# Patient Record
Sex: FEMALE | Race: WHITE | Employment: OTHER
[De-identification: names, ages, dates, MRNs, and addresses within clinical notes are randomized per-mention and may not be internally consistent; named-entity substitution may affect disease eponyms.]

---

## 2017-01-04 ENCOUNTER — OFFICE VISIT (OUTPATIENT)
Dept: INTERNAL MEDICINE | Facility: CLINIC | Age: 72
End: 2017-01-04

## 2017-01-04 VITALS
WEIGHT: 148 LBS | SYSTOLIC BLOOD PRESSURE: 128 MMHG | BODY MASS INDEX: 23.23 KG/M2 | DIASTOLIC BLOOD PRESSURE: 82 MMHG | RESPIRATION RATE: 14 BRPM | HEIGHT: 67 IN

## 2017-01-04 DIAGNOSIS — J43.1 PANLOBULAR EMPHYSEMA (HCC): ICD-10-CM

## 2017-01-04 DIAGNOSIS — E11.9 TYPE 2 DIABETES MELLITUS WITHOUT COMPLICATION, WITHOUT LONG-TERM CURRENT USE OF INSULIN (HCC): ICD-10-CM

## 2017-01-04 DIAGNOSIS — R52 PAIN: Primary | ICD-10-CM

## 2017-01-04 DIAGNOSIS — Z23 NEED FOR VACCINATION: ICD-10-CM

## 2017-01-04 PROCEDURE — 99214 OFFICE O/P EST MOD 30 MIN: CPT | Performed by: INTERNAL MEDICINE

## 2017-01-04 PROCEDURE — 90732 PPSV23 VACC 2 YRS+ SUBQ/IM: CPT | Performed by: INTERNAL MEDICINE

## 2017-01-04 PROCEDURE — G0009 ADMIN PNEUMOCOCCAL VACCINE: HCPCS | Performed by: INTERNAL MEDICINE

## 2017-01-04 ASSESSMENT — ENCOUNTER SYMPTOMS
SHORTNESS OF BREATH: 1
BACK PAIN: 1
HEARTBURN: 0
NAUSEA: 0
RHINORRHEA: 0
ABDOMINAL PAIN: 0
COUGH: 0

## 2017-01-04 ASSESSMENT — COPD QUESTIONNAIRES: COPD: 1

## 2017-01-05 DIAGNOSIS — K21.9 GASTROESOPHAGEAL REFLUX DISEASE, ESOPHAGITIS PRESENCE NOT SPECIFIED: ICD-10-CM

## 2017-01-05 RX ORDER — GABAPENTIN 100 MG/1
CAPSULE ORAL
Qty: 90 CAPSULE | Refills: 3 | Status: ON HOLD | OUTPATIENT
Start: 2017-01-05 | End: 2017-03-27

## 2017-01-05 RX ORDER — OMEPRAZOLE 20 MG/1
CAPSULE, DELAYED RELEASE ORAL
Qty: 90 CAPSULE | Refills: 3 | Status: SHIPPED | OUTPATIENT
Start: 2017-01-05 | End: 2021-03-08

## 2017-01-10 ENCOUNTER — TELEPHONE (OUTPATIENT)
Dept: INTERNAL MEDICINE | Facility: CLINIC | Age: 72
End: 2017-01-10

## 2017-01-10 DIAGNOSIS — N39.0 URINARY TRACT INFECTION WITHOUT HEMATURIA, SITE UNSPECIFIED: ICD-10-CM

## 2017-01-10 DIAGNOSIS — N32.81 OVERACTIVE BLADDER: Primary | Chronic | ICD-10-CM

## 2017-01-12 RX ORDER — LEVOFLOXACIN 250 MG/1
250 TABLET ORAL DAILY
Qty: 15 TABLET | Refills: 0 | Status: SHIPPED | OUTPATIENT
Start: 2017-01-12 | End: 2017-01-27

## 2017-01-13 ENCOUNTER — OFFICE VISIT (OUTPATIENT)
Dept: ORTHOPEDIC SURGERY | Facility: CLINIC | Age: 72
End: 2017-01-13

## 2017-01-13 DIAGNOSIS — M25.511 ACUTE PAIN OF RIGHT SHOULDER: Primary | ICD-10-CM

## 2017-01-13 PROCEDURE — 20610 DRAIN/INJ JOINT/BURSA W/O US: CPT | Performed by: ORTHOPAEDIC SURGERY

## 2017-01-13 PROCEDURE — 99203 OFFICE O/P NEW LOW 30 MIN: CPT | Performed by: ORTHOPAEDIC SURGERY

## 2017-01-13 RX ORDER — BUPIVACAINE HYDROCHLORIDE 5 MG/ML
2 INJECTION, SOLUTION PERINEURAL ONCE
Status: COMPLETED | OUTPATIENT
Start: 2017-01-13 | End: 2017-01-13

## 2017-01-13 RX ORDER — LIDOCAINE HYDROCHLORIDE 10 MG/ML
2 INJECTION, SOLUTION EPIDURAL; INFILTRATION; INTRACAUDAL; PERINEURAL ONCE
Status: COMPLETED | OUTPATIENT
Start: 2017-01-13 | End: 2017-01-13

## 2017-01-13 RX ORDER — BETAMETHASONE SODIUM PHOSPHATE AND BETAMETHASONE ACETATE 3; 3 MG/ML; MG/ML
12 INJECTION, SUSPENSION INTRA-ARTICULAR; INTRALESIONAL; INTRAMUSCULAR; SOFT TISSUE ONCE
Status: COMPLETED | OUTPATIENT
Start: 2017-01-13 | End: 2017-01-13

## 2017-01-13 RX ADMIN — BUPIVACAINE HYDROCHLORIDE 10 MG: 5 INJECTION, SOLUTION PERINEURAL at 11:08

## 2017-01-13 RX ADMIN — BETAMETHASONE SODIUM PHOSPHATE AND BETAMETHASONE ACETATE 12 MG: 3; 3 INJECTION, SUSPENSION INTRA-ARTICULAR; INTRALESIONAL; INTRAMUSCULAR; SOFT TISSUE at 11:07

## 2017-01-13 RX ADMIN — LIDOCAINE HYDROCHLORIDE 2 ML: 10 INJECTION, SOLUTION EPIDURAL; INFILTRATION; INTRACAUDAL; PERINEURAL at 11:08

## 2017-01-24 ENCOUNTER — OFFICE VISIT (OUTPATIENT)
Dept: NEUROLOGY | Facility: CLINIC | Age: 72
End: 2017-01-24

## 2017-01-24 VITALS
BODY MASS INDEX: 23.75 KG/M2 | HEART RATE: 76 BPM | DIASTOLIC BLOOD PRESSURE: 82 MMHG | SYSTOLIC BLOOD PRESSURE: 124 MMHG | WEIGHT: 151.3 LBS | HEIGHT: 67 IN

## 2017-01-24 DIAGNOSIS — G62.9 NEUROPATHY: Primary | ICD-10-CM

## 2017-01-24 DIAGNOSIS — M48.061 LUMBAR SPINAL STENOSIS: ICD-10-CM

## 2017-01-24 PROCEDURE — 99214 OFFICE O/P EST MOD 30 MIN: CPT | Performed by: PSYCHIATRY & NEUROLOGY

## 2017-01-24 RX ORDER — DULOXETIN HYDROCHLORIDE 20 MG/1
20 CAPSULE, DELAYED RELEASE ORAL DAILY
Qty: 90 CAPSULE | Refills: 3 | Status: SHIPPED | OUTPATIENT
Start: 2017-01-24 | End: 2018-01-03 | Stop reason: SDUPTHER

## 2017-01-31 ENCOUNTER — TELEPHONE (OUTPATIENT)
Dept: INTERNAL MEDICINE | Facility: CLINIC | Age: 72
End: 2017-01-31

## 2017-01-31 DIAGNOSIS — M81.0 OSTEOPOROSIS: Primary | ICD-10-CM

## 2017-03-08 ENCOUNTER — TELEPHONE (OUTPATIENT)
Dept: INTERNAL MEDICINE | Facility: CLINIC | Age: 72
End: 2017-03-08

## 2017-03-08 DIAGNOSIS — N39.0 URINARY TRACT INFECTION WITHOUT HEMATURIA, SITE UNSPECIFIED: Primary | ICD-10-CM

## 2017-03-22 ENCOUNTER — OFFICE VISIT (OUTPATIENT)
Dept: INTERNAL MEDICINE CLINIC | Age: 72
End: 2017-03-22
Payer: MEDICARE

## 2017-03-22 VITALS
SYSTOLIC BLOOD PRESSURE: 128 MMHG | HEART RATE: 114 BPM | WEIGHT: 139 LBS | BODY MASS INDEX: 21.82 KG/M2 | HEIGHT: 67 IN | TEMPERATURE: 99.3 F | DIASTOLIC BLOOD PRESSURE: 76 MMHG

## 2017-03-22 DIAGNOSIS — R53.1 WEAKNESS: ICD-10-CM

## 2017-03-22 DIAGNOSIS — J11.1 INFLUENZA: Primary | ICD-10-CM

## 2017-03-22 DIAGNOSIS — R00.0 TACHYCARDIA: ICD-10-CM

## 2017-03-22 PROCEDURE — 99213 OFFICE O/P EST LOW 20 MIN: CPT | Performed by: NURSE PRACTITIONER

## 2017-03-22 RX ORDER — OSELTAMIVIR PHOSPHATE 75 MG/1
75 CAPSULE ORAL 2 TIMES DAILY
Qty: 10 CAPSULE | Refills: 0 | Status: ON HOLD | OUTPATIENT
Start: 2017-03-22 | End: 2017-03-29 | Stop reason: HOSPADM

## 2017-03-22 ASSESSMENT — ENCOUNTER SYMPTOMS
NAUSEA: 0
SORE THROAT: 0
VOMITING: 0
COUGH: 1
DIARRHEA: 0
RHINORRHEA: 1
CONSTIPATION: 0
SINUS PRESSURE: 0

## 2017-03-23 ENCOUNTER — TELEPHONE (OUTPATIENT)
Dept: INTERNAL MEDICINE CLINIC | Age: 72
End: 2017-03-23

## 2017-03-23 DIAGNOSIS — J11.1 INFLUENZA: Primary | ICD-10-CM

## 2017-03-27 ENCOUNTER — HOSPITAL ENCOUNTER (INPATIENT)
Age: 72
LOS: 2 days | Discharge: HOME | DRG: 690 | End: 2017-03-29
Attending: INTERNAL MEDICINE | Admitting: INTERNAL MEDICINE
Payer: MEDICARE

## 2017-03-27 ENCOUNTER — OFFICE VISIT (OUTPATIENT)
Dept: INTERNAL MEDICINE CLINIC | Age: 72
End: 2017-03-27
Payer: MEDICARE

## 2017-03-27 VITALS
RESPIRATION RATE: 14 BRPM | WEIGHT: 149 LBS | HEIGHT: 67 IN | BODY MASS INDEX: 23.39 KG/M2 | DIASTOLIC BLOOD PRESSURE: 70 MMHG | SYSTOLIC BLOOD PRESSURE: 100 MMHG

## 2017-03-27 DIAGNOSIS — E87.8 ELECTROLYTE DISORDER: ICD-10-CM

## 2017-03-27 DIAGNOSIS — E11.00 UNCONTROLLED TYPE 2 DM WITH HYPEROSMOLAR NONKETOTIC HYPERGLYCEMIA (HCC): Primary | ICD-10-CM

## 2017-03-27 DIAGNOSIS — E11.9 TYPE 2 DIABETES MELLITUS WITHOUT COMPLICATION, WITHOUT LONG-TERM CURRENT USE OF INSULIN (HCC): Primary | ICD-10-CM

## 2017-03-27 DIAGNOSIS — N39.0 URINARY TRACT INFECTION, SITE UNSPECIFIED: ICD-10-CM

## 2017-03-27 LAB
ANION GAP SERPL CALCULATED.3IONS-SCNC: 13 MMOL/L (ref 9–17)
BUN BLDV-MCNC: 14 MG/DL (ref 8–23)
BUN/CREAT BLD: ABNORMAL (ref 9–20)
CALCIUM SERPL-MCNC: 9.3 MG/DL (ref 8.6–10.4)
CHLORIDE BLD-SCNC: 93 MMOL/L (ref 98–107)
CO2: 28 MMOL/L (ref 20–31)
CREAT SERPL-MCNC: 0.7 MG/DL (ref 0.5–0.9)
GFR AFRICAN AMERICAN: >60 ML/MIN
GFR NON-AFRICAN AMERICAN: >60 ML/MIN
GFR SERPL CREATININE-BSD FRML MDRD: ABNORMAL ML/MIN/{1.73_M2}
GFR SERPL CREATININE-BSD FRML MDRD: ABNORMAL ML/MIN/{1.73_M2}
GLUCOSE BLD-MCNC: 343 MG/DL (ref 65–105)
GLUCOSE BLD-MCNC: 375 MG/DL (ref 70–99)
HCT VFR BLD CALC: 36.9 % (ref 36–46)
HEMOGLOBIN: 11.9 G/DL (ref 12–16)
MCH RBC QN AUTO: 26.5 PG (ref 26–34)
MCHC RBC AUTO-ENTMCNC: 32.2 G/DL (ref 31–37)
MCV RBC AUTO: 82.3 FL (ref 80–100)
PDW BLD-RTO: 16.1 % (ref 11.5–14.9)
PLATELET # BLD: 417 K/UL (ref 150–450)
PMV BLD AUTO: 8.4 FL (ref 6–12)
POTASSIUM SERPL-SCNC: 4 MMOL/L (ref 3.7–5.3)
RBC # BLD: 4.49 M/UL (ref 4–5.2)
SODIUM BLD-SCNC: 134 MMOL/L (ref 135–144)
WBC # BLD: 13.9 K/UL (ref 3.5–11)

## 2017-03-27 PROCEDURE — 6370000000 HC RX 637 (ALT 250 FOR IP): Performed by: NURSE PRACTITIONER

## 2017-03-27 PROCEDURE — 82947 ASSAY GLUCOSE BLOOD QUANT: CPT

## 2017-03-27 PROCEDURE — 99214 OFFICE O/P EST MOD 30 MIN: CPT | Performed by: INTERNAL MEDICINE

## 2017-03-27 PROCEDURE — G0378 HOSPITAL OBSERVATION PER HR: HCPCS

## 2017-03-27 PROCEDURE — 80048 BASIC METABOLIC PNL TOTAL CA: CPT

## 2017-03-27 PROCEDURE — 1200000000 HC SEMI PRIVATE

## 2017-03-27 PROCEDURE — 36415 COLL VENOUS BLD VENIPUNCTURE: CPT

## 2017-03-27 PROCEDURE — 6370000000 HC RX 637 (ALT 250 FOR IP): Performed by: INTERNAL MEDICINE

## 2017-03-27 PROCEDURE — 85027 COMPLETE CBC AUTOMATED: CPT

## 2017-03-27 PROCEDURE — G0379 DIRECT REFER HOSPITAL OBSERV: HCPCS

## 2017-03-27 PROCEDURE — 2580000003 HC RX 258: Performed by: NURSE PRACTITIONER

## 2017-03-27 RX ORDER — ONDANSETRON 2 MG/ML
4 INJECTION INTRAMUSCULAR; INTRAVENOUS EVERY 6 HOURS PRN
Status: DISCONTINUED | OUTPATIENT
Start: 2017-03-27 | End: 2017-03-29 | Stop reason: HOSPADM

## 2017-03-27 RX ORDER — DULOXETIN HYDROCHLORIDE 20 MG/1
20 CAPSULE, DELAYED RELEASE ORAL DAILY
Status: DISCONTINUED | OUTPATIENT
Start: 2017-03-28 | End: 2017-03-28

## 2017-03-27 RX ORDER — SODIUM CHLORIDE 0.9 % (FLUSH) 0.9 %
10 SYRINGE (ML) INJECTION EVERY 12 HOURS SCHEDULED
Status: DISCONTINUED | OUTPATIENT
Start: 2017-03-27 | End: 2017-03-29 | Stop reason: HOSPADM

## 2017-03-27 RX ORDER — BISACODYL 10 MG
10 SUPPOSITORY, RECTAL RECTAL DAILY PRN
Status: DISCONTINUED | OUTPATIENT
Start: 2017-03-27 | End: 2017-03-29 | Stop reason: HOSPADM

## 2017-03-27 RX ORDER — SODIUM CHLORIDE 0.9 % (FLUSH) 0.9 %
10 SYRINGE (ML) INJECTION PRN
Status: DISCONTINUED | OUTPATIENT
Start: 2017-03-27 | End: 2017-03-29 | Stop reason: HOSPADM

## 2017-03-27 RX ORDER — DEXTROSE MONOHYDRATE 25 G/50ML
12.5 INJECTION, SOLUTION INTRAVENOUS PRN
Status: DISCONTINUED | OUTPATIENT
Start: 2017-03-27 | End: 2017-03-29 | Stop reason: HOSPADM

## 2017-03-27 RX ORDER — OXYBUTYNIN CHLORIDE 5 MG/1
5 TABLET ORAL 2 TIMES DAILY
Status: DISCONTINUED | OUTPATIENT
Start: 2017-03-27 | End: 2017-03-29 | Stop reason: HOSPADM

## 2017-03-27 RX ORDER — CIPROFLOXACIN 2 MG/ML
400 INJECTION, SOLUTION INTRAVENOUS EVERY 12 HOURS
Status: DISCONTINUED | OUTPATIENT
Start: 2017-03-27 | End: 2017-03-28

## 2017-03-27 RX ORDER — MELOXICAM 7.5 MG/1
7.5 TABLET ORAL 2 TIMES DAILY PRN
Status: DISCONTINUED | OUTPATIENT
Start: 2017-03-27 | End: 2017-03-29 | Stop reason: HOSPADM

## 2017-03-27 RX ORDER — OSELTAMIVIR PHOSPHATE 6 MG/ML
30 FOR SUSPENSION ORAL 2 TIMES DAILY
Status: DISCONTINUED | OUTPATIENT
Start: 2017-03-27 | End: 2017-03-28

## 2017-03-27 RX ORDER — ASPIRIN 81 MG/1
81 TABLET, CHEWABLE ORAL DAILY
Status: DISCONTINUED | OUTPATIENT
Start: 2017-03-28 | End: 2017-03-29 | Stop reason: HOSPADM

## 2017-03-27 RX ORDER — SODIUM CHLORIDE 9 MG/ML
INJECTION, SOLUTION INTRAVENOUS CONTINUOUS
Status: DISCONTINUED | OUTPATIENT
Start: 2017-03-27 | End: 2017-03-29 | Stop reason: HOSPADM

## 2017-03-27 RX ORDER — NICOTINE POLACRILEX 4 MG
15 LOZENGE BUCCAL PRN
Status: DISCONTINUED | OUTPATIENT
Start: 2017-03-27 | End: 2017-03-29 | Stop reason: HOSPADM

## 2017-03-27 RX ORDER — SIMVASTATIN 20 MG
20 TABLET ORAL NIGHTLY
Status: DISCONTINUED | OUTPATIENT
Start: 2017-03-27 | End: 2017-03-28

## 2017-03-27 RX ORDER — GABAPENTIN 300 MG/1
900 CAPSULE ORAL 3 TIMES DAILY
Status: DISCONTINUED | OUTPATIENT
Start: 2017-03-27 | End: 2017-03-28

## 2017-03-27 RX ORDER — ACETAMINOPHEN 325 MG/1
650 TABLET ORAL EVERY 4 HOURS PRN
Status: DISCONTINUED | OUTPATIENT
Start: 2017-03-27 | End: 2017-03-29 | Stop reason: HOSPADM

## 2017-03-27 RX ORDER — PANTOPRAZOLE SODIUM 40 MG/1
40 TABLET, DELAYED RELEASE ORAL
Status: DISCONTINUED | OUTPATIENT
Start: 2017-03-28 | End: 2017-03-29 | Stop reason: HOSPADM

## 2017-03-27 RX ORDER — POTASSIUM CHLORIDE 7.45 MG/ML
10 INJECTION INTRAVENOUS PRN
Status: DISCONTINUED | OUTPATIENT
Start: 2017-03-27 | End: 2017-03-29 | Stop reason: HOSPADM

## 2017-03-27 RX ORDER — DEXTROSE MONOHYDRATE 50 MG/ML
100 INJECTION, SOLUTION INTRAVENOUS PRN
Status: DISCONTINUED | OUTPATIENT
Start: 2017-03-27 | End: 2017-03-29 | Stop reason: HOSPADM

## 2017-03-27 RX ORDER — MECOBALAMIN 5000 MCG
5 TABLET,DISINTEGRATING ORAL NIGHTLY PRN
Status: DISCONTINUED | OUTPATIENT
Start: 2017-03-28 | End: 2017-03-29 | Stop reason: HOSPADM

## 2017-03-27 RX ORDER — POTASSIUM CHLORIDE 20 MEQ/1
40 TABLET, EXTENDED RELEASE ORAL PRN
Status: DISCONTINUED | OUTPATIENT
Start: 2017-03-27 | End: 2017-03-29 | Stop reason: HOSPADM

## 2017-03-27 RX ORDER — POTASSIUM CHLORIDE 20MEQ/15ML
40 LIQUID (ML) ORAL PRN
Status: DISCONTINUED | OUTPATIENT
Start: 2017-03-27 | End: 2017-03-29 | Stop reason: HOSPADM

## 2017-03-27 RX ADMIN — INSULIN LISPRO 2 UNITS: 100 INJECTION, SOLUTION INTRAVENOUS; SUBCUTANEOUS at 22:06

## 2017-03-27 RX ADMIN — SODIUM CHLORIDE: 9 INJECTION, SOLUTION INTRAVENOUS at 23:04

## 2017-03-27 RX ADMIN — GABAPENTIN 900 MG: 300 CAPSULE ORAL at 23:05

## 2017-03-27 ASSESSMENT — ENCOUNTER SYMPTOMS
RHINORRHEA: 0
CONSTIPATION: 0
DOUBLE VISION: 0
SPUTUM PRODUCTION: 0
WHEEZING: 0
ABDOMINAL PAIN: 0
NAUSEA: 0
BACK PAIN: 0
NAUSEA: 0
VOMITING: 0
SORE THROAT: 0
SORE THROAT: 0
COUGH: 0
SHORTNESS OF BREATH: 1
COUGH: 1
BLURRED VISION: 0
BACK PAIN: 1
SHORTNESS OF BREATH: 1
ABDOMINAL PAIN: 0
DIARRHEA: 0

## 2017-03-28 ENCOUNTER — APPOINTMENT (OUTPATIENT)
Dept: GENERAL RADIOLOGY | Age: 72
DRG: 690 | End: 2017-03-28
Attending: INTERNAL MEDICINE
Payer: MEDICARE

## 2017-03-28 PROBLEM — N30.00 ACUTE CYSTITIS: Status: ACTIVE | Noted: 2017-03-28

## 2017-03-28 PROBLEM — E86.0 DEHYDRATION: Status: ACTIVE | Noted: 2017-03-28

## 2017-03-28 LAB
-: ABNORMAL
ALBUMIN SERPL-MCNC: 3.1 G/DL (ref 3.5–5.2)
ALBUMIN/GLOBULIN RATIO: ABNORMAL (ref 1–2.5)
ALP BLD-CCNC: 104 U/L (ref 35–104)
ALT SERPL-CCNC: 71 U/L (ref 5–33)
AMORPHOUS: ABNORMAL
ANION GAP SERPL CALCULATED.3IONS-SCNC: 16 MMOL/L (ref 9–17)
AST SERPL-CCNC: 73 U/L
BACTERIA: ABNORMAL
BILIRUB SERPL-MCNC: 0.59 MG/DL (ref 0.3–1.2)
BILIRUBIN URINE: NEGATIVE
BUN BLDV-MCNC: 13 MG/DL (ref 8–23)
BUN/CREAT BLD: ABNORMAL (ref 9–20)
CALCIUM SERPL-MCNC: 8.8 MG/DL (ref 8.6–10.4)
CASTS UA: ABNORMAL /LPF
CHLORIDE BLD-SCNC: 96 MMOL/L (ref 98–107)
CO2: 26 MMOL/L (ref 20–31)
COLOR: YELLOW
COMMENT UA: ABNORMAL
CREAT SERPL-MCNC: 0.67 MG/DL (ref 0.5–0.9)
CRYSTALS, UA: ABNORMAL /HPF
EPITHELIAL CELLS UA: ABNORMAL /HPF
GFR AFRICAN AMERICAN: >60 ML/MIN
GFR NON-AFRICAN AMERICAN: >60 ML/MIN
GFR SERPL CREATININE-BSD FRML MDRD: ABNORMAL ML/MIN/{1.73_M2}
GFR SERPL CREATININE-BSD FRML MDRD: ABNORMAL ML/MIN/{1.73_M2}
GLUCOSE BLD-MCNC: 229 MG/DL (ref 65–105)
GLUCOSE BLD-MCNC: 280 MG/DL (ref 65–105)
GLUCOSE BLD-MCNC: 308 MG/DL (ref 65–105)
GLUCOSE BLD-MCNC: 318 MG/DL (ref 65–105)
GLUCOSE BLD-MCNC: 346 MG/DL (ref 70–99)
GLUCOSE URINE: ABNORMAL
HCT VFR BLD CALC: 36.9 % (ref 36–46)
HEMOGLOBIN: 11.7 G/DL (ref 12–16)
KETONES, URINE: NEGATIVE
LEUKOCYTE ESTERASE, URINE: NEGATIVE
MCH RBC QN AUTO: 26.2 PG (ref 26–34)
MCHC RBC AUTO-ENTMCNC: 31.8 G/DL (ref 31–37)
MCV RBC AUTO: 82.5 FL (ref 80–100)
MUCUS: ABNORMAL
NITRITE, URINE: POSITIVE
OTHER OBSERVATIONS UA: ABNORMAL
PDW BLD-RTO: 16 % (ref 11.5–14.9)
PH UA: 6 (ref 5–8)
PLATELET # BLD: 423 K/UL (ref 150–450)
PMV BLD AUTO: 8.4 FL (ref 6–12)
POTASSIUM SERPL-SCNC: 4.2 MMOL/L (ref 3.7–5.3)
PROTEIN UA: ABNORMAL
RBC # BLD: 4.47 M/UL (ref 4–5.2)
RBC UA: ABNORMAL /HPF
RENAL EPITHELIAL, UA: ABNORMAL /HPF
SODIUM BLD-SCNC: 138 MMOL/L (ref 135–144)
SPECIFIC GRAVITY UA: 1.03 (ref 1–1.03)
TOTAL PROTEIN: 6.9 G/DL (ref 6.4–8.3)
TRICHOMONAS: ABNORMAL
TURBIDITY: ABNORMAL
URINE HGB: ABNORMAL
UROBILINOGEN, URINE: NORMAL
WBC # BLD: 14 K/UL (ref 3.5–11)
WBC UA: ABNORMAL /HPF
YEAST: ABNORMAL

## 2017-03-28 PROCEDURE — 71010 XR CHEST PORTABLE: CPT

## 2017-03-28 PROCEDURE — 87088 URINE BACTERIA CULTURE: CPT

## 2017-03-28 PROCEDURE — 6370000000 HC RX 637 (ALT 250 FOR IP): Performed by: NURSE PRACTITIONER

## 2017-03-28 PROCEDURE — 1200000000 HC SEMI PRIVATE

## 2017-03-28 PROCEDURE — 6370000000 HC RX 637 (ALT 250 FOR IP): Performed by: INTERNAL MEDICINE

## 2017-03-28 PROCEDURE — 99223 1ST HOSP IP/OBS HIGH 75: CPT | Performed by: INTERNAL MEDICINE

## 2017-03-28 PROCEDURE — 80053 COMPREHEN METABOLIC PANEL: CPT

## 2017-03-28 PROCEDURE — 82947 ASSAY GLUCOSE BLOOD QUANT: CPT

## 2017-03-28 PROCEDURE — 87186 SC STD MICRODIL/AGAR DIL: CPT

## 2017-03-28 PROCEDURE — 36415 COLL VENOUS BLD VENIPUNCTURE: CPT

## 2017-03-28 PROCEDURE — 87086 URINE CULTURE/COLONY COUNT: CPT

## 2017-03-28 PROCEDURE — 2580000003 HC RX 258: Performed by: NURSE PRACTITIONER

## 2017-03-28 PROCEDURE — 85027 COMPLETE CBC AUTOMATED: CPT

## 2017-03-28 PROCEDURE — 81001 URINALYSIS AUTO W/SCOPE: CPT

## 2017-03-28 PROCEDURE — 6360000002 HC RX W HCPCS: Performed by: INTERNAL MEDICINE

## 2017-03-28 RX ORDER — CALCIUM CARBONATE/VITAMIN D3 600 MG-10
1 TABLET ORAL DAILY
Status: DISCONTINUED | OUTPATIENT
Start: 2017-03-28 | End: 2017-03-29 | Stop reason: HOSPADM

## 2017-03-28 RX ORDER — GABAPENTIN 300 MG/1
300 CAPSULE ORAL 3 TIMES DAILY
Status: DISCONTINUED | OUTPATIENT
Start: 2017-03-28 | End: 2017-03-28

## 2017-03-28 RX ORDER — SIMVASTATIN 20 MG
20 TABLET ORAL NIGHTLY
Status: DISCONTINUED | OUTPATIENT
Start: 2017-03-28 | End: 2017-03-29 | Stop reason: HOSPADM

## 2017-03-28 RX ORDER — GABAPENTIN 300 MG/1
300 CAPSULE ORAL 3 TIMES DAILY
Status: DISCONTINUED | OUTPATIENT
Start: 2017-03-28 | End: 2017-03-29 | Stop reason: HOSPADM

## 2017-03-28 RX ORDER — GABAPENTIN 300 MG/1
900 CAPSULE ORAL 3 TIMES DAILY
Status: DISCONTINUED | OUTPATIENT
Start: 2017-03-28 | End: 2017-03-28

## 2017-03-28 RX ORDER — INSULIN GLARGINE 100 [IU]/ML
15 INJECTION, SOLUTION SUBCUTANEOUS EVERY MORNING
Status: DISCONTINUED | OUTPATIENT
Start: 2017-03-28 | End: 2017-03-29 | Stop reason: HOSPADM

## 2017-03-28 RX ADMIN — INSULIN LISPRO 2 UNITS: 100 INJECTION, SOLUTION INTRAVENOUS; SUBCUTANEOUS at 17:22

## 2017-03-28 RX ADMIN — OXYBUTYNIN CHLORIDE 5 MG: 5 TABLET ORAL at 20:28

## 2017-03-28 RX ADMIN — GABAPENTIN 300 MG: 300 CAPSULE ORAL at 14:00

## 2017-03-28 RX ADMIN — SODIUM CHLORIDE: 9 INJECTION, SOLUTION INTRAVENOUS at 13:46

## 2017-03-28 RX ADMIN — INSULIN LISPRO 4 UNITS: 100 INJECTION, SOLUTION INTRAVENOUS; SUBCUTANEOUS at 11:54

## 2017-03-28 RX ADMIN — Medication 25 MG: at 20:54

## 2017-03-28 RX ADMIN — INSULIN LISPRO 2 UNITS: 100 INJECTION, SOLUTION INTRAVENOUS; SUBCUTANEOUS at 20:33

## 2017-03-28 RX ADMIN — Medication 500 MG: at 20:30

## 2017-03-28 RX ADMIN — INSULIN GLARGINE 15 UNITS: 100 INJECTION, SOLUTION SUBCUTANEOUS at 11:55

## 2017-03-28 RX ADMIN — INSULIN LISPRO 4 UNITS: 100 INJECTION, SOLUTION INTRAVENOUS; SUBCUTANEOUS at 08:50

## 2017-03-28 RX ADMIN — Medication 25 MG: at 08:49

## 2017-03-28 RX ADMIN — GABAPENTIN 900 MG: 300 CAPSULE ORAL at 08:49

## 2017-03-28 RX ADMIN — PANTOPRAZOLE SODIUM 40 MG: 40 TABLET, DELAYED RELEASE ORAL at 06:35

## 2017-03-28 RX ADMIN — CIPROFLOXACIN 400 MG: 2 INJECTION, SOLUTION INTRAVENOUS at 05:18

## 2017-03-28 RX ADMIN — Medication 20 MG: at 08:59

## 2017-03-28 RX ADMIN — Medication 1 TABLET: at 20:29

## 2017-03-28 RX ADMIN — ACETAMINOPHEN 650 MG: 325 TABLET ORAL at 00:01

## 2017-03-28 RX ADMIN — GABAPENTIN 300 MG: 300 CAPSULE ORAL at 20:54

## 2017-03-28 ASSESSMENT — PAIN SCALES - GENERAL
PAINLEVEL_OUTOF10: 3
PAINLEVEL_OUTOF10: 0

## 2017-03-28 ASSESSMENT — PAIN DESCRIPTION - DESCRIPTORS: DESCRIPTORS: ACHING

## 2017-03-28 ASSESSMENT — PAIN DESCRIPTION - ORIENTATION: ORIENTATION: RIGHT;LEFT

## 2017-03-28 ASSESSMENT — PAIN DESCRIPTION - PAIN TYPE: TYPE: CHRONIC PAIN

## 2017-03-28 ASSESSMENT — PAIN DESCRIPTION - FREQUENCY: FREQUENCY: INTERMITTENT

## 2017-03-28 ASSESSMENT — PAIN DESCRIPTION - LOCATION: LOCATION: LEG

## 2017-03-29 VITALS
WEIGHT: 153.66 LBS | OXYGEN SATURATION: 98 % | HEART RATE: 88 BPM | DIASTOLIC BLOOD PRESSURE: 78 MMHG | HEIGHT: 66 IN | TEMPERATURE: 97.1 F | BODY MASS INDEX: 24.7 KG/M2 | SYSTOLIC BLOOD PRESSURE: 120 MMHG | RESPIRATION RATE: 16 BRPM

## 2017-03-29 LAB
ABSOLUTE BANDS #: 0.14 K/UL (ref 0–1)
ABSOLUTE EOS #: 0 K/UL (ref 0–0.4)
ABSOLUTE LYMPH #: 1.51 K/UL (ref 1–4.8)
ABSOLUTE MONO #: 1.64 K/UL (ref 0.1–1.3)
ANION GAP SERPL CALCULATED.3IONS-SCNC: 15 MMOL/L (ref 9–17)
BANDS: 1 % (ref 0–10)
BASOPHILS # BLD: 0 % (ref 0–2)
BASOPHILS ABSOLUTE: 0 K/UL (ref 0–0.2)
BUN BLDV-MCNC: 14 MG/DL (ref 8–23)
BUN/CREAT BLD: ABNORMAL (ref 9–20)
CALCIUM SERPL-MCNC: 8.2 MG/DL (ref 8.6–10.4)
CHLORIDE BLD-SCNC: 102 MMOL/L (ref 98–107)
CO2: 24 MMOL/L (ref 20–31)
CREAT SERPL-MCNC: 0.55 MG/DL (ref 0.5–0.9)
CULTURE: ABNORMAL
CULTURE: ABNORMAL
DIFFERENTIAL TYPE: ABNORMAL
EOSINOPHILS RELATIVE PERCENT: 0 % (ref 0–4)
ESTIMATED AVERAGE GLUCOSE: 206 MG/DL
GFR AFRICAN AMERICAN: >60 ML/MIN
GFR NON-AFRICAN AMERICAN: >60 ML/MIN
GFR SERPL CREATININE-BSD FRML MDRD: ABNORMAL ML/MIN/{1.73_M2}
GFR SERPL CREATININE-BSD FRML MDRD: ABNORMAL ML/MIN/{1.73_M2}
GLUCOSE BLD-MCNC: 187 MG/DL (ref 65–105)
GLUCOSE BLD-MCNC: 234 MG/DL (ref 70–99)
GLUCOSE BLD-MCNC: 238 MG/DL (ref 65–105)
HBA1C MFR BLD: 8.8 % (ref 4–6)
HCT VFR BLD CALC: 31.7 % (ref 36–46)
HEMOGLOBIN: 10.2 G/DL (ref 12–16)
LYMPHOCYTES # BLD: 11 % (ref 24–44)
Lab: ABNORMAL
MCH RBC QN AUTO: 26.4 PG (ref 26–34)
MCHC RBC AUTO-ENTMCNC: 32.1 G/DL (ref 31–37)
MCV RBC AUTO: 82.2 FL (ref 80–100)
METAMYELOCYTES ABSOLUTE COUNT: 0.14 K/UL
METAMYELOCYTES: 1 %
MONOCYTES # BLD: 12 % (ref 1–7)
MORPHOLOGY: ABNORMAL
ORGANISM: ABNORMAL
PDW BLD-RTO: 15.6 % (ref 11.5–14.9)
PLATELET # BLD: 450 K/UL (ref 150–450)
PLATELET ESTIMATE: ABNORMAL
PMV BLD AUTO: 8.1 FL (ref 6–12)
POTASSIUM SERPL-SCNC: 3.6 MMOL/L (ref 3.7–5.3)
RBC # BLD: 3.85 M/UL (ref 4–5.2)
RBC # BLD: ABNORMAL 10*6/UL
SEG NEUTROPHILS: 75 % (ref 36–66)
SEGMENTED NEUTROPHILS ABSOLUTE COUNT: 10.27 K/UL (ref 1.3–9.1)
SODIUM BLD-SCNC: 141 MMOL/L (ref 135–144)
SPECIMEN DESCRIPTION: ABNORMAL
SPECIMEN DESCRIPTION: ABNORMAL
STATUS: ABNORMAL
WBC # BLD: 13.7 K/UL (ref 3.5–11)
WBC # BLD: ABNORMAL 10*3/UL

## 2017-03-29 PROCEDURE — 6370000000 HC RX 637 (ALT 250 FOR IP): Performed by: INTERNAL MEDICINE

## 2017-03-29 PROCEDURE — 80048 BASIC METABOLIC PNL TOTAL CA: CPT

## 2017-03-29 PROCEDURE — 6370000000 HC RX 637 (ALT 250 FOR IP): Performed by: NURSE PRACTITIONER

## 2017-03-29 PROCEDURE — 36415 COLL VENOUS BLD VENIPUNCTURE: CPT

## 2017-03-29 PROCEDURE — 99239 HOSP IP/OBS DSCHRG MGMT >30: CPT | Performed by: INTERNAL MEDICINE

## 2017-03-29 PROCEDURE — 2580000003 HC RX 258: Performed by: NURSE PRACTITIONER

## 2017-03-29 PROCEDURE — 85025 COMPLETE CBC W/AUTO DIFF WBC: CPT

## 2017-03-29 PROCEDURE — 82947 ASSAY GLUCOSE BLOOD QUANT: CPT

## 2017-03-29 PROCEDURE — 83036 HEMOGLOBIN GLYCOSYLATED A1C: CPT

## 2017-03-29 RX ORDER — CIPROFLOXACIN 250 MG/1
250 TABLET, FILM COATED ORAL 2 TIMES DAILY
Qty: 14 TABLET | Refills: 0 | Status: SHIPPED | OUTPATIENT
Start: 2017-03-29 | End: 2017-04-05

## 2017-03-29 RX ORDER — GLIMEPIRIDE 2 MG/1
2 TABLET ORAL EVERY MORNING
Qty: 30 TABLET | Refills: 3 | Status: SHIPPED | OUTPATIENT
Start: 2017-03-29 | End: 2017-04-12 | Stop reason: DRUGHIGH

## 2017-03-29 RX ORDER — CIPROFLOXACIN 500 MG/1
500 TABLET, FILM COATED ORAL ONCE
Status: DISCONTINUED | OUTPATIENT
Start: 2017-03-29 | End: 2017-03-29 | Stop reason: HOSPADM

## 2017-03-29 RX ORDER — SIMVASTATIN 20 MG
20 TABLET ORAL NIGHTLY
Status: DISCONTINUED | OUTPATIENT
Start: 2017-03-29 | End: 2017-03-29 | Stop reason: HOSPADM

## 2017-03-29 RX ORDER — LANCETS 30 GAUGE
1 EACH MISCELLANEOUS DAILY
Qty: 100 EACH | Refills: 3 | Status: SHIPPED | OUTPATIENT
Start: 2017-03-29 | End: 2021-04-29

## 2017-03-29 RX ORDER — GABAPENTIN 300 MG/1
300 CAPSULE ORAL 3 TIMES DAILY
Status: DISCONTINUED | OUTPATIENT
Start: 2017-03-29 | End: 2017-03-29 | Stop reason: HOSPADM

## 2017-03-29 RX ADMIN — SODIUM CHLORIDE: 9 INJECTION, SOLUTION INTRAVENOUS at 00:31

## 2017-03-29 RX ADMIN — METOPROLOL TARTRATE 25 MG: 25 TABLET ORAL at 09:06

## 2017-03-29 RX ADMIN — Medication 1 TABLET: at 09:06

## 2017-03-29 RX ADMIN — GABAPENTIN 300 MG: 300 CAPSULE ORAL at 09:06

## 2017-03-29 RX ADMIN — ASPIRIN 81 MG: 81 TABLET, CHEWABLE ORAL at 09:07

## 2017-03-29 RX ADMIN — PANTOPRAZOLE SODIUM 40 MG: 40 TABLET, DELAYED RELEASE ORAL at 06:31

## 2017-03-29 RX ADMIN — MELOXICAM 7.5 MG: 7.5 TABLET ORAL at 09:07

## 2017-03-29 RX ADMIN — SODIUM CHLORIDE: 9 INJECTION, SOLUTION INTRAVENOUS at 09:08

## 2017-03-29 RX ADMIN — OXYBUTYNIN CHLORIDE 5 MG: 5 TABLET ORAL at 09:07

## 2017-03-29 RX ADMIN — INSULIN LISPRO 2 UNITS: 100 INJECTION, SOLUTION INTRAVENOUS; SUBCUTANEOUS at 12:04

## 2017-03-29 RX ADMIN — INSULIN GLARGINE 15 UNITS: 100 INJECTION, SOLUTION SUBCUTANEOUS at 09:31

## 2017-03-29 RX ADMIN — METFORMIN HYDROCHLORIDE 500 MG: 500 TABLET, FILM COATED ORAL at 09:06

## 2017-03-29 ASSESSMENT — PAIN SCALES - GENERAL
PAINLEVEL_OUTOF10: 0

## 2017-03-30 ENCOUNTER — TELEPHONE (OUTPATIENT)
Dept: INTERNAL MEDICINE CLINIC | Age: 72
End: 2017-03-30

## 2017-03-30 ENCOUNTER — CARE COORDINATION (OUTPATIENT)
Dept: CARE COORDINATION | Age: 72
End: 2017-03-30

## 2017-03-30 ENCOUNTER — CARE COORDINATION (OUTPATIENT)
Dept: INTERNAL MEDICINE CLINIC | Age: 72
End: 2017-03-30

## 2017-03-31 ENCOUNTER — TELEPHONE (OUTPATIENT)
Dept: INTERNAL MEDICINE CLINIC | Age: 72
End: 2017-03-31

## 2017-04-03 ENCOUNTER — TELEPHONE (OUTPATIENT)
Dept: INTERNAL MEDICINE CLINIC | Age: 72
End: 2017-04-03

## 2017-04-03 DIAGNOSIS — E11.00 UNCONTROLLED TYPE 2 DM WITH HYPEROSMOLAR NONKETOTIC HYPERGLYCEMIA (HCC): Primary | ICD-10-CM

## 2017-04-03 RX ORDER — BLOOD-GLUCOSE METER
1 KIT MISCELLANEOUS DAILY PRN
Qty: 1 KIT | Refills: 0 | Status: SHIPPED | OUTPATIENT
Start: 2017-04-03 | End: 2021-04-29

## 2017-04-03 RX ORDER — GLUCOSAMINE HCL/CHONDROITIN SU 500-400 MG
CAPSULE ORAL
Qty: 100 STRIP | Refills: 3 | Status: ON HOLD | OUTPATIENT
Start: 2017-04-03 | End: 2017-06-02 | Stop reason: SDUPTHER

## 2017-04-05 DIAGNOSIS — E11.69 TYPE 2 DIABETES MELLITUS WITH OTHER SPECIFIED COMPLICATION (HCC): Primary | ICD-10-CM

## 2017-04-05 RX ORDER — LANCETS
1 EACH MISCELLANEOUS 2 TIMES DAILY
Qty: 100 EACH | Refills: 3 | Status: SHIPPED | OUTPATIENT
Start: 2017-04-05

## 2017-04-12 ENCOUNTER — HOSPITAL ENCOUNTER (OUTPATIENT)
Age: 72
Setting detail: SPECIMEN
Discharge: HOME | End: 2017-04-12
Payer: MEDICARE

## 2017-04-12 ENCOUNTER — OFFICE VISIT (OUTPATIENT)
Dept: INTERNAL MEDICINE CLINIC | Age: 72
End: 2017-04-12
Payer: MEDICARE

## 2017-04-12 VITALS
SYSTOLIC BLOOD PRESSURE: 110 MMHG | HEIGHT: 66 IN | WEIGHT: 141 LBS | BODY MASS INDEX: 22.66 KG/M2 | DIASTOLIC BLOOD PRESSURE: 66 MMHG | RESPIRATION RATE: 14 BRPM

## 2017-04-12 DIAGNOSIS — E11.00 UNCONTROLLED TYPE 2 DM WITH HYPEROSMOLAR NONKETOTIC HYPERGLYCEMIA (HCC): ICD-10-CM

## 2017-04-12 DIAGNOSIS — J43.1 PANLOBULAR EMPHYSEMA (HCC): ICD-10-CM

## 2017-04-12 DIAGNOSIS — N39.0 URINARY TRACT INFECTION, SITE UNSPECIFIED: Primary | ICD-10-CM

## 2017-04-12 LAB
-: ABNORMAL
AMORPHOUS: ABNORMAL
BACTERIA: ABNORMAL
BILIRUBIN URINE: NEGATIVE
CASTS UA: ABNORMAL /LPF (ref 0–8)
COLOR: YELLOW
COMMENT UA: ABNORMAL
CRYSTALS, UA: ABNORMAL /HPF
EPITHELIAL CELLS UA: ABNORMAL /HPF (ref 0–5)
GLUCOSE URINE: NEGATIVE
KETONES, URINE: ABNORMAL
LEUKOCYTE ESTERASE, URINE: ABNORMAL
MUCUS: ABNORMAL
NITRITE, URINE: POSITIVE
OTHER OBSERVATIONS UA: ABNORMAL
PH UA: 5 (ref 5–8)
PROTEIN UA: ABNORMAL
RBC UA: ABNORMAL /HPF (ref 0–4)
RENAL EPITHELIAL, UA: ABNORMAL /HPF
SPECIFIC GRAVITY UA: 1.02 (ref 1–1.03)
TRICHOMONAS: ABNORMAL
TURBIDITY: ABNORMAL
URINE HGB: ABNORMAL
UROBILINOGEN, URINE: NORMAL
WBC UA: ABNORMAL /HPF (ref 0–5)
YEAST: ABNORMAL

## 2017-04-12 PROCEDURE — 99214 OFFICE O/P EST MOD 30 MIN: CPT | Performed by: INTERNAL MEDICINE

## 2017-04-12 RX ORDER — CIPROFLOXACIN 250 MG/1
TABLET, FILM COATED ORAL
COMMUNITY
Start: 2017-03-29 | End: 2017-04-12 | Stop reason: ALTCHOICE

## 2017-04-12 RX ORDER — GLIMEPIRIDE 1 MG/1
1 TABLET ORAL EVERY MORNING
Qty: 1 TABLET | Refills: 0 | Status: SHIPPED
Start: 2017-04-12 | End: 2017-04-17 | Stop reason: SINTOL

## 2017-04-12 ASSESSMENT — ENCOUNTER SYMPTOMS
RHINORRHEA: 0
SORE THROAT: 0
COUGH: 0
SHORTNESS OF BREATH: 0
ABDOMINAL PAIN: 0
BACK PAIN: 1
NAUSEA: 0

## 2017-04-13 LAB
CULTURE: ABNORMAL
CULTURE: ABNORMAL
Lab: ABNORMAL
ORGANISM: ABNORMAL
SPECIMEN DESCRIPTION: ABNORMAL
STATUS: ABNORMAL

## 2017-04-13 RX ORDER — CIPROFLOXACIN 500 MG/1
500 TABLET, FILM COATED ORAL 2 TIMES DAILY
Qty: 20 TABLET | Refills: 0 | Status: SHIPPED | OUTPATIENT
Start: 2017-04-13 | End: 2017-05-03 | Stop reason: ALTCHOICE

## 2017-04-17 ENCOUNTER — TELEPHONE (OUTPATIENT)
Dept: INTERNAL MEDICINE CLINIC | Age: 72
End: 2017-04-17

## 2017-04-19 ENCOUNTER — TELEPHONE (OUTPATIENT)
Dept: INTERNAL MEDICINE CLINIC | Age: 72
End: 2017-04-19

## 2017-04-19 DIAGNOSIS — M25.562 ACUTE PAIN OF LEFT KNEE: Primary | ICD-10-CM

## 2017-04-21 ENCOUNTER — HOSPITAL ENCOUNTER (OUTPATIENT)
Facility: CLINIC | Age: 72
Discharge: HOME | End: 2017-04-21
Payer: MEDICARE

## 2017-04-21 ENCOUNTER — HOSPITAL ENCOUNTER (OUTPATIENT)
Dept: GENERAL RADIOLOGY | Facility: CLINIC | Age: 72
Discharge: HOME | End: 2017-04-21
Payer: MEDICARE

## 2017-04-21 PROCEDURE — 73562 X-RAY EXAM OF KNEE 3: CPT

## 2017-05-03 ENCOUNTER — HOSPITAL ENCOUNTER (OUTPATIENT)
Dept: DIABETES SERVICES | Age: 72
Setting detail: THERAPIES SERIES
Discharge: HOME | End: 2017-05-03
Payer: MEDICARE

## 2017-05-03 VITALS
WEIGHT: 142.64 LBS | DIASTOLIC BLOOD PRESSURE: 79 MMHG | HEART RATE: 81 BPM | HEIGHT: 66 IN | BODY MASS INDEX: 22.92 KG/M2 | SYSTOLIC BLOOD PRESSURE: 134 MMHG

## 2017-05-03 PROCEDURE — G0108 DIAB MANAGE TRN  PER INDIV: HCPCS

## 2017-05-03 RX ORDER — MELOXICAM 7.5 MG/1
7.5 TABLET ORAL DAILY PRN
COMMUNITY
End: 2018-01-03 | Stop reason: SDUPTHER

## 2017-05-22 ENCOUNTER — HOSPITAL ENCOUNTER (OUTPATIENT)
Dept: DIABETES SERVICES | Age: 72
Setting detail: THERAPIES SERIES
Discharge: HOME | End: 2017-05-22
Payer: MEDICARE

## 2017-05-22 DIAGNOSIS — E11.00 UNCONTROLLED TYPE 2 DM WITH HYPEROSMOLAR NONKETOTIC HYPERGLYCEMIA (HCC): Primary | ICD-10-CM

## 2017-05-22 PROCEDURE — G0109 DIAB MANAGE TRN IND/GROUP: HCPCS

## 2017-05-26 ENCOUNTER — HOSPITAL ENCOUNTER (OUTPATIENT)
Age: 72
Setting detail: OUTPATIENT SURGERY
Discharge: HOME | End: 2017-05-26
Attending: UROLOGY | Admitting: UROLOGY
Payer: MEDICARE

## 2017-05-26 VITALS
SYSTOLIC BLOOD PRESSURE: 133 MMHG | HEIGHT: 66 IN | TEMPERATURE: 97.5 F | HEART RATE: 64 BPM | RESPIRATION RATE: 18 BRPM | WEIGHT: 142 LBS | DIASTOLIC BLOOD PRESSURE: 77 MMHG | BODY MASS INDEX: 22.82 KG/M2 | OXYGEN SATURATION: 98 %

## 2017-05-26 DIAGNOSIS — E11.00 UNCONTROLLED TYPE 2 DM WITH HYPEROSMOLAR NONKETOTIC HYPERGLYCEMIA (HCC): Primary | ICD-10-CM

## 2017-05-26 PROBLEM — N32.81 OAB (OVERACTIVE BLADDER): Status: ACTIVE | Noted: 2017-05-26

## 2017-05-26 PROBLEM — Z87.440 HISTORY OF RECURRENT UTIS: Status: ACTIVE | Noted: 2017-05-26

## 2017-05-26 PROBLEM — R31.29 MICROHEMATURIA: Status: ACTIVE | Noted: 2017-05-26

## 2017-05-26 LAB
-: ABNORMAL
AMORPHOUS: ABNORMAL
BACTERIA: ABNORMAL
BILIRUBIN URINE: NEGATIVE
CASTS UA: ABNORMAL /LPF
COLOR: YELLOW
COMMENT UA: ABNORMAL
CRYSTALS, UA: ABNORMAL /HPF
EPITHELIAL CELLS UA: ABNORMAL /HPF
GLUCOSE URINE: NEGATIVE
KETONES, URINE: NEGATIVE
LEUKOCYTE ESTERASE, URINE: ABNORMAL
MUCUS: ABNORMAL
NITRITE, URINE: POSITIVE
OTHER OBSERVATIONS UA: ABNORMAL
PH UA: 5.5 (ref 5–8)
PROTEIN UA: NEGATIVE
RBC UA: ABNORMAL /HPF
RENAL EPITHELIAL, UA: ABNORMAL /HPF
SPECIFIC GRAVITY UA: 1.02 (ref 1–1.03)
TRICHOMONAS: ABNORMAL
TURBIDITY: ABNORMAL
URINE HGB: ABNORMAL
UROBILINOGEN, URINE: NORMAL
WBC UA: ABNORMAL /HPF
YEAST: ABNORMAL

## 2017-05-26 PROCEDURE — 6370000000 HC RX 637 (ALT 250 FOR IP): Performed by: UROLOGY

## 2017-05-26 PROCEDURE — 3600000012 HC SURGERY LEVEL 2 ADDTL 15MIN: Performed by: UROLOGY

## 2017-05-26 PROCEDURE — 7100000010 HC PHASE II RECOVERY - FIRST 15 MIN: Performed by: UROLOGY

## 2017-05-26 PROCEDURE — 87088 URINE BACTERIA CULTURE: CPT

## 2017-05-26 PROCEDURE — 81001 URINALYSIS AUTO W/SCOPE: CPT

## 2017-05-26 PROCEDURE — 87086 URINE CULTURE/COLONY COUNT: CPT

## 2017-05-26 PROCEDURE — 87186 SC STD MICRODIL/AGAR DIL: CPT

## 2017-05-26 PROCEDURE — 3600000002 HC SURGERY LEVEL 2 BASE: Performed by: UROLOGY

## 2017-05-26 RX ORDER — NITROFURANTOIN 25; 75 MG/1; MG/1
100 CAPSULE ORAL DAILY
Qty: 30 CAPSULE | Refills: 2 | Status: SHIPPED | OUTPATIENT
Start: 2017-05-26 | End: 2017-06-25

## 2017-05-26 ASSESSMENT — PAIN - FUNCTIONAL ASSESSMENT: PAIN_FUNCTIONAL_ASSESSMENT: 0-10

## 2017-05-26 ASSESSMENT — PAIN SCALES - GENERAL: PAINLEVEL_OUTOF10: 0

## 2017-05-27 LAB
CULTURE: ABNORMAL
CULTURE: ABNORMAL
Lab: ABNORMAL
ORGANISM: ABNORMAL
SPECIMEN DESCRIPTION: ABNORMAL
SPECIMEN DESCRIPTION: ABNORMAL
STATUS: ABNORMAL

## 2017-05-30 ENCOUNTER — CARE COORDINATION (OUTPATIENT)
Dept: CARE COORDINATION | Age: 72
End: 2017-05-30

## 2017-05-30 PROCEDURE — G0109 DIAB MANAGE TRN IND/GROUP: HCPCS

## 2017-06-02 DIAGNOSIS — E11.00 UNCONTROLLED TYPE 2 DM WITH HYPEROSMOLAR NONKETOTIC HYPERGLYCEMIA (HCC): ICD-10-CM

## 2017-06-05 RX ORDER — GLUCOSAMINE HCL/CHONDROITIN SU 500-400 MG
CAPSULE ORAL
Qty: 200 STRIP | Refills: 3 | Status: SHIPPED | OUTPATIENT
Start: 2017-06-05 | End: 2018-08-24 | Stop reason: SDUPTHER

## 2017-06-07 ENCOUNTER — APPOINTMENT (OUTPATIENT)
Dept: DIABETES SERVICES | Age: 72
End: 2017-06-07
Payer: MEDICARE

## 2017-06-07 PROCEDURE — G0109 DIAB MANAGE TRN IND/GROUP: HCPCS

## 2017-06-10 ENCOUNTER — OFFICE VISIT (OUTPATIENT)
Dept: FAMILY MEDICINE CLINIC | Age: 72
End: 2017-06-10
Payer: MEDICARE

## 2017-06-10 VITALS
HEART RATE: 80 BPM | WEIGHT: 137 LBS | SYSTOLIC BLOOD PRESSURE: 109 MMHG | OXYGEN SATURATION: 95 % | DIASTOLIC BLOOD PRESSURE: 71 MMHG | BODY MASS INDEX: 21.5 KG/M2 | HEIGHT: 67 IN | TEMPERATURE: 97.7 F | RESPIRATION RATE: 18 BRPM

## 2017-06-10 DIAGNOSIS — L23.7 POISON IVY DERMATITIS: Primary | ICD-10-CM

## 2017-06-10 PROCEDURE — 99213 OFFICE O/P EST LOW 20 MIN: CPT | Performed by: INTERNAL MEDICINE

## 2017-06-10 RX ORDER — PREDNISONE 50 MG/1
50 TABLET ORAL DAILY
Qty: 10 TABLET | Refills: 0 | Status: SHIPPED | OUTPATIENT
Start: 2017-06-10 | End: 2017-06-20

## 2017-06-10 ASSESSMENT — ENCOUNTER SYMPTOMS
COUGH: 0
SHORTNESS OF BREATH: 0

## 2017-06-13 ENCOUNTER — OFFICE VISIT (OUTPATIENT)
Dept: INTERNAL MEDICINE CLINIC | Age: 72
End: 2017-06-13
Payer: MEDICARE

## 2017-06-13 VITALS
WEIGHT: 140 LBS | HEIGHT: 67 IN | DIASTOLIC BLOOD PRESSURE: 66 MMHG | SYSTOLIC BLOOD PRESSURE: 110 MMHG | RESPIRATION RATE: 14 BRPM | BODY MASS INDEX: 21.97 KG/M2

## 2017-06-13 DIAGNOSIS — J43.8 OTHER EMPHYSEMA (HCC): Primary | ICD-10-CM

## 2017-06-13 DIAGNOSIS — L23.7 POISON IVY DERMATITIS: ICD-10-CM

## 2017-06-13 DIAGNOSIS — Z87.440 HISTORY OF RECURRENT UTIS: ICD-10-CM

## 2017-06-13 PROCEDURE — 99214 OFFICE O/P EST MOD 30 MIN: CPT | Performed by: INTERNAL MEDICINE

## 2017-06-13 ASSESSMENT — ENCOUNTER SYMPTOMS
SHORTNESS OF BREATH: 0
BACK PAIN: 1
SORE THROAT: 0
NAUSEA: 0
COUGH: 0
RHINORRHEA: 0
ABDOMINAL PAIN: 0

## 2017-06-13 ASSESSMENT — PATIENT HEALTH QUESTIONNAIRE - PHQ9
2. FEELING DOWN, DEPRESSED OR HOPELESS: 0
SUM OF ALL RESPONSES TO PHQ9 QUESTIONS 1 & 2: 0
1. LITTLE INTEREST OR PLEASURE IN DOING THINGS: 0
SUM OF ALL RESPONSES TO PHQ QUESTIONS 1-9: 0

## 2017-06-15 ENCOUNTER — HOSPITAL ENCOUNTER (OUTPATIENT)
Dept: DIABETES SERVICES | Age: 72
Setting detail: THERAPIES SERIES
Discharge: HOME | End: 2017-06-15
Payer: MEDICARE

## 2017-06-15 DIAGNOSIS — E11.00 UNCONTROLLED TYPE 2 DM WITH HYPEROSMOLAR NONKETOTIC HYPERGLYCEMIA (HCC): Primary | ICD-10-CM

## 2017-06-15 DIAGNOSIS — N32.81 OVERACTIVE BLADDER: Primary | Chronic | ICD-10-CM

## 2017-06-15 PROCEDURE — G0109 DIAB MANAGE TRN IND/GROUP: HCPCS

## 2017-06-26 ENCOUNTER — HOSPITAL ENCOUNTER (OUTPATIENT)
Dept: CT IMAGING | Age: 72
Discharge: HOME | End: 2017-06-26
Payer: MEDICARE

## 2017-06-26 DIAGNOSIS — N39.0 URINARY TRACT INFECTION, SITE NOT SPECIFIED: ICD-10-CM

## 2017-06-26 LAB
CREAT SERPL-MCNC: 0.62 MG/DL (ref 0.5–0.9)
GFR AFRICAN AMERICAN: >60 ML/MIN
GFR NON-AFRICAN AMERICAN: >60 ML/MIN
GFR SERPL CREATININE-BSD FRML MDRD: NORMAL ML/MIN/{1.73_M2}
GFR SERPL CREATININE-BSD FRML MDRD: NORMAL ML/MIN/{1.73_M2}

## 2017-06-26 PROCEDURE — 2580000003 HC RX 258: Performed by: UROLOGY

## 2017-06-26 PROCEDURE — 36415 COLL VENOUS BLD VENIPUNCTURE: CPT

## 2017-06-26 PROCEDURE — 82565 ASSAY OF CREATININE: CPT

## 2017-06-26 PROCEDURE — 6360000004 HC RX CONTRAST MEDICATION: Performed by: UROLOGY

## 2017-06-26 PROCEDURE — 74178 CT ABD&PLV WO CNTR FLWD CNTR: CPT

## 2017-06-26 RX ORDER — SODIUM CHLORIDE 0.9 % (FLUSH) 0.9 %
10 SYRINGE (ML) INJECTION PRN
Status: DISCONTINUED | OUTPATIENT
Start: 2017-06-26 | End: 2017-06-29 | Stop reason: HOSPADM

## 2017-06-26 RX ORDER — 0.9 % SODIUM CHLORIDE 0.9 %
200 INTRAVENOUS SOLUTION INTRAVENOUS ONCE
Status: COMPLETED | OUTPATIENT
Start: 2017-06-26 | End: 2017-06-26

## 2017-06-26 RX ADMIN — IOVERSOL 130 ML: 741 INJECTION INTRA-ARTERIAL; INTRAVENOUS at 10:56

## 2017-06-26 RX ADMIN — SODIUM CHLORIDE 200 ML: 9 INJECTION, SOLUTION INTRAVENOUS at 10:56

## 2017-06-26 RX ADMIN — Medication 10 ML: at 10:56

## 2017-07-10 ENCOUNTER — TELEPHONE (OUTPATIENT)
Dept: INTERNAL MEDICINE CLINIC | Age: 72
End: 2017-07-10

## 2017-07-10 DIAGNOSIS — R52 PAIN: Primary | ICD-10-CM

## 2017-07-10 DIAGNOSIS — E11.00 UNCONTROLLED TYPE 2 DM WITH HYPEROSMOLAR NONKETOTIC HYPERGLYCEMIA (HCC): ICD-10-CM

## 2017-07-24 ENCOUNTER — HOSPITAL ENCOUNTER (OUTPATIENT)
Age: 72
Setting detail: SPECIMEN
Discharge: HOME | End: 2017-07-24
Payer: MEDICARE

## 2017-07-24 ENCOUNTER — OFFICE VISIT (OUTPATIENT)
Dept: INTERNAL MEDICINE CLINIC | Age: 72
End: 2017-07-24
Payer: MEDICARE

## 2017-07-24 VITALS
WEIGHT: 137 LBS | BODY MASS INDEX: 21.5 KG/M2 | SYSTOLIC BLOOD PRESSURE: 132 MMHG | DIASTOLIC BLOOD PRESSURE: 80 MMHG | HEIGHT: 67 IN

## 2017-07-24 DIAGNOSIS — I10 ESSENTIAL HYPERTENSION: Primary | ICD-10-CM

## 2017-07-24 DIAGNOSIS — E11.8 TYPE 2 DIABETES MELLITUS WITH COMPLICATION, WITHOUT LONG-TERM CURRENT USE OF INSULIN (HCC): ICD-10-CM

## 2017-07-24 DIAGNOSIS — N32.81 OVERACTIVE BLADDER: Chronic | ICD-10-CM

## 2017-07-24 DIAGNOSIS — M81.0 OSTEOPOROSIS: ICD-10-CM

## 2017-07-24 DIAGNOSIS — E78.2 MIXED HYPERLIPIDEMIA: ICD-10-CM

## 2017-07-24 PROBLEM — L23.7 POISON IVY DERMATITIS: Status: RESOLVED | Noted: 2017-06-13 | Resolved: 2017-07-24

## 2017-07-24 PROBLEM — E86.0 DEHYDRATION: Status: RESOLVED | Noted: 2017-03-28 | Resolved: 2017-07-24

## 2017-07-24 PROBLEM — E87.8 ELECTROLYTE DISORDER: Status: RESOLVED | Noted: 2017-03-27 | Resolved: 2017-07-24

## 2017-07-24 PROBLEM — N39.0 UTI (URINARY TRACT INFECTION): Status: RESOLVED | Noted: 2017-03-27 | Resolved: 2017-07-24

## 2017-07-24 LAB
CREATININE URINE: 100.4 MG/DL (ref 28–217)
ESTIMATED AVERAGE GLUCOSE: 140 MG/DL
HBA1C MFR BLD: 6.5 % (ref 4–6)
MICROALBUMIN/CREAT 24H UR: 16 MG/L
MICROALBUMIN/CREAT UR-RTO: 16 MCG/MG CREAT

## 2017-07-24 PROCEDURE — 99214 OFFICE O/P EST MOD 30 MIN: CPT | Performed by: INTERNAL MEDICINE

## 2017-07-24 ASSESSMENT — ENCOUNTER SYMPTOMS
EYE DISCHARGE: 0
EYE PAIN: 0
DIARRHEA: 0
SHORTNESS OF BREATH: 0
BLOOD IN STOOL: 0
TROUBLE SWALLOWING: 0
COLOR CHANGE: 0
COUGH: 0
ABDOMINAL DISTENTION: 0
WHEEZING: 0

## 2017-07-25 RX ORDER — GABAPENTIN 300 MG/1
CAPSULE ORAL
Qty: 810 CAPSULE | Refills: 2 | Status: SHIPPED | OUTPATIENT
Start: 2017-07-25 | End: 2018-01-31 | Stop reason: SDUPTHER

## 2017-07-25 RX ORDER — MELOXICAM 7.5 MG/1
TABLET ORAL
Qty: 180 TABLET | Refills: 2 | Status: SHIPPED | OUTPATIENT
Start: 2017-07-25 | End: 2018-06-29 | Stop reason: SDUPTHER

## 2017-07-31 ENCOUNTER — CARE COORDINATION (OUTPATIENT)
Dept: CARE COORDINATION | Age: 72
End: 2017-07-31

## 2017-08-30 ENCOUNTER — TELEPHONE (OUTPATIENT)
Dept: INTERNAL MEDICINE CLINIC | Age: 72
End: 2017-08-30

## 2017-08-30 RX ORDER — CEFUROXIME AXETIL 250 MG/1
250 TABLET ORAL 2 TIMES DAILY
Qty: 20 TABLET | Refills: 0 | Status: SHIPPED | OUTPATIENT
Start: 2017-08-30 | End: 2017-09-01 | Stop reason: ALTCHOICE

## 2017-09-01 ENCOUNTER — OFFICE VISIT (OUTPATIENT)
Dept: INTERNAL MEDICINE CLINIC | Age: 72
End: 2017-09-01
Payer: MEDICARE

## 2017-09-01 VITALS
HEIGHT: 67 IN | BODY MASS INDEX: 21.66 KG/M2 | WEIGHT: 138 LBS | SYSTOLIC BLOOD PRESSURE: 138 MMHG | DIASTOLIC BLOOD PRESSURE: 82 MMHG

## 2017-09-01 DIAGNOSIS — E78.2 MIXED HYPERLIPIDEMIA: ICD-10-CM

## 2017-09-01 DIAGNOSIS — I10 ESSENTIAL HYPERTENSION: ICD-10-CM

## 2017-09-01 DIAGNOSIS — J43.1 PANLOBULAR EMPHYSEMA (HCC): ICD-10-CM

## 2017-09-01 DIAGNOSIS — D64.9 ANEMIA, UNSPECIFIED TYPE: ICD-10-CM

## 2017-09-01 PROCEDURE — 99214 OFFICE O/P EST MOD 30 MIN: CPT | Performed by: INTERNAL MEDICINE

## 2017-09-01 RX ORDER — ATORVASTATIN CALCIUM 40 MG/1
40 TABLET, FILM COATED ORAL DAILY
Qty: 90 TABLET | Refills: 3 | Status: SHIPPED | OUTPATIENT
Start: 2017-09-01 | End: 2018-02-09 | Stop reason: SDUPTHER

## 2017-09-01 ASSESSMENT — ENCOUNTER SYMPTOMS
BLOOD IN STOOL: 0
WHEEZING: 0
SHORTNESS OF BREATH: 0
TROUBLE SWALLOWING: 0
COUGH: 0
EYE DISCHARGE: 0
EYE PAIN: 0
ABDOMINAL DISTENTION: 0
DIARRHEA: 0
COLOR CHANGE: 0

## 2017-09-13 ENCOUNTER — CARE COORDINATION (OUTPATIENT)
Dept: CARE COORDINATION | Age: 72
End: 2017-09-13

## 2017-09-13 ENCOUNTER — HOSPITAL ENCOUNTER (OUTPATIENT)
Age: 72
Setting detail: SPECIMEN
Discharge: HOME | End: 2017-09-13
Payer: MEDICARE

## 2017-09-13 DIAGNOSIS — D64.9 ANEMIA, UNSPECIFIED TYPE: ICD-10-CM

## 2017-09-13 LAB
ABSOLUTE EOS #: 0 K/UL (ref 0–0.4)
ABSOLUTE LYMPH #: 2.4 K/UL (ref 1–4.8)
ABSOLUTE MONO #: 0.5 K/UL (ref 0.1–1.2)
BASOPHILS # BLD: 0 %
BASOPHILS ABSOLUTE: 0 K/UL (ref 0–0.2)
DIFFERENTIAL TYPE: ABNORMAL
EOSINOPHILS RELATIVE PERCENT: 1 %
ESTIMATED AVERAGE GLUCOSE: 151 MG/DL
FOLATE: >20 NG/ML
HBA1C MFR BLD: 6.9 % (ref 4–6)
HCT VFR BLD CALC: 45.6 % (ref 36–46)
HEMOGLOBIN: 14.8 G/DL (ref 12–16)
IRON SATURATION: 28 % (ref 20–55)
IRON: 109 UG/DL (ref 37–145)
LYMPHOCYTES # BLD: 37 %
MCH RBC QN AUTO: 27.8 PG (ref 26–34)
MCHC RBC AUTO-ENTMCNC: 32.5 G/DL (ref 31–37)
MCV RBC AUTO: 85.7 FL (ref 80–100)
MONOCYTES # BLD: 8 %
PDW BLD-RTO: 16.1 % (ref 12.5–15.4)
PLATELET # BLD: 295 K/UL (ref 140–450)
PLATELET ESTIMATE: ABNORMAL
PMV BLD AUTO: 8.3 FL (ref 6–12)
RBC # BLD: 5.32 M/UL (ref 4–5.2)
RBC # BLD: ABNORMAL 10*6/UL
SEG NEUTROPHILS: 54 %
SEGMENTED NEUTROPHILS ABSOLUTE COUNT: 3.6 K/UL (ref 1.8–7.7)
TOTAL IRON BINDING CAPACITY: 390 UG/DL (ref 250–450)
UNSATURATED IRON BINDING CAPACITY: 281 UG/DL (ref 112–347)
WBC # BLD: 6.6 K/UL (ref 3.5–11)
WBC # BLD: ABNORMAL 10*3/UL

## 2017-09-22 ENCOUNTER — HOSPITAL ENCOUNTER (OUTPATIENT)
Dept: DIABETES SERVICES | Age: 72
Setting detail: THERAPIES SERIES
Discharge: HOME | End: 2017-09-22
Payer: MEDICARE

## 2017-09-22 DIAGNOSIS — E11.00 UNCONTROLLED TYPE 2 DM WITH HYPEROSMOLAR NONKETOTIC HYPERGLYCEMIA (HCC): Primary | ICD-10-CM

## 2017-09-22 PROCEDURE — G0109 DIAB MANAGE TRN IND/GROUP: HCPCS

## 2017-10-06 ENCOUNTER — OFFICE VISIT (OUTPATIENT)
Dept: INTERNAL MEDICINE CLINIC | Age: 72
End: 2017-10-06
Payer: MEDICARE

## 2017-10-06 VITALS
DIASTOLIC BLOOD PRESSURE: 86 MMHG | SYSTOLIC BLOOD PRESSURE: 138 MMHG | BODY MASS INDEX: 21.5 KG/M2 | WEIGHT: 137 LBS | OXYGEN SATURATION: 96 % | HEIGHT: 67 IN | HEART RATE: 84 BPM

## 2017-10-06 DIAGNOSIS — M81.0 OSTEOPOROSIS, UNSPECIFIED OSTEOPOROSIS TYPE, UNSPECIFIED PATHOLOGICAL FRACTURE PRESENCE: Primary | ICD-10-CM

## 2017-10-06 DIAGNOSIS — I10 ESSENTIAL HYPERTENSION: ICD-10-CM

## 2017-10-06 DIAGNOSIS — E78.2 MIXED HYPERLIPIDEMIA: ICD-10-CM

## 2017-10-06 PROCEDURE — 99214 OFFICE O/P EST MOD 30 MIN: CPT | Performed by: INTERNAL MEDICINE

## 2017-10-06 ASSESSMENT — ENCOUNTER SYMPTOMS
EYE PAIN: 0
WHEEZING: 0
BLOOD IN STOOL: 0
DIARRHEA: 0
ABDOMINAL DISTENTION: 0
COUGH: 0
EYE DISCHARGE: 0
TROUBLE SWALLOWING: 0
SHORTNESS OF BREATH: 0
COLOR CHANGE: 0

## 2017-10-06 NOTE — MR AVS SNAPSHOT
After Visit Summary             Latasha Rodas   10/6/2017 10:15 AM   Office Visit    Description:  Female : 1945   Provider:  Jessika Adorno MD   Department:  [facility]              Your Follow-Up and Future Appointments         Below is a list of your follow-up and future appointments. This may not be a complete list as you may have made appointments directly with providers that we are not aware of or your providers may have made some for you. Please call your providers to confirm appointments. It is important to keep your appointments. Please bring your current insurance card, photo ID, co-pay, and all medication bottles to your appointment. If self-pay, payment is expected at the time of service. Your To-Do List     Future Appointments Provider Department Dept Phone    2017 11:40 AM Wilian Sr MD [facility] Neurology Specialist [phone]    Please arrive 15 minutes prior to appointment time, bring insurance card and photo ID.     2018 10:30 AM Jessika Adorno MD [facility] [phone]    Please arrive 15 minutes prior to appointment, bring photo ID and insurance card. Future Orders Complete By Expires    DEXA BONE DENSITY AXIAL SKELETON [[id number] Custom]  2018 10/6/2018    Follow-Up    Return in about 3 months (around 2018) for HTN, DM.          Information from Your Visit        Department     Name Address Phone Fax    [facility] [id number] [phone]      You Were Seen for:         Comments    Osteoporosis, unspecified osteoporosis type, unspecified pathological fracture presence   [[id number]]         Vital Signs     Blood Pressure Pulse Height Weight Oxygen Saturation Body Mass Index    138/86 84 5' 6.53\" (1.69 m) 137 lb (62.1 kg) 96% 21.76 kg/m2    Smoking Status                   Former Smoker              Medications and Orders      Your Current Medications Are metFORMIN (GLUCOPHAGE) 1000 MG tablet Starting on 12/2/2017. Take 1 tablet by mouth 2 times daily (with meals)    atorvastatin (LIPITOR) 40 MG tablet Take 1 tablet by mouth daily    meloxicam (MOBIC) 7.5 MG tablet Take 1 tablet by mouth two  times daily as needed    gabapentin (NEURONTIN) 300 MG capsule Take 3 capsules by mouth 3  times daily    Glucose Blood (BLOOD GLUCOSE TEST STRIPS) STRP Patient checking blood sugar levels 4 times daily    meloxicam (MOBIC) 7.5 MG tablet Take 7.5 mg by mouth daily as needed for Pain    ONE TOUCH ULTRASOFT LANCETS MISC 1 each by Does not apply route 2 times daily    Blood Glucose Monitoring Suppl (ONE TOUCH ULTRA MINI) W/DEVICE KIT 1 kit by Does not apply route daily as needed (patient is checking blood sugar levels twice daily)    Lancets MISC 1 each by Does not apply route daily    DULoxetine (CYMBALTA) 20 MG extended release capsule Take 1 capsule by mouth daily    omeprazole (PRILOSEC) 20 MG delayed release capsule Take 1 capsule by mouth  daily    metoprolol tartrate (LOPRESSOR) 25 MG tablet Take 1 tablet by mouth two  times daily    denosumab (PROLIA) 60 MG/ML SOLN SC injection Inject 1 mL into the skin once for 1 dose    oxybutynin (DITROPAN) 5 MG tablet Take 1 tablet by mouth 2 times daily    fluticasone (FLOVENT HFA) 220 MCG/ACT inhaler Inhale 2 puffs into the lungs 2 times daily    MELATONIN PO Take 5 mg by mouth nightly as needed (insomnia)     Calcium-Magnesium-Vitamin D (CALCIUM 1200+D3 PO) Take 2 tablets by mouth daily. Glucosamine 500 MG CAPS Take 3 tablets by mouth daily. acetaminophen (TYLENOL) 500 MG tablet Take 500 mg by mouth every 6 hours as needed for Pain. aspirin 81 MG chewable tablet Take 81 mg by mouth daily.         Allergies           No Known Allergies         Additional Information        Basic Information     Date Of Birth Sex Race Ethnicity Preferred Language Preferred Written Language guidelines. However these guidelines can be individualized by your provider. 10/17/2018    Colonoscopy 4/11/2025            MyChart Signup           Our records indicate that you have an active [employer] account. You can view your After Visit Summary by going to [email or web address] and logging in with your [employer] username and password. If you don't have a [employer] username and password but a parent or guardian has access to your record, the parent or guardian should login with their own [employer] username and password and access your record to view the After Visit Summary. Additional Information  If you have questions, please contact the physician practice where you receive care. Remember, [employer] is NOT to be used for urgent needs. For medical emergencies, dial 911. For questions regarding your [employer] account call [phone]. If you have a clinical question, please call your doctor's office.

## 2017-10-06 NOTE — PROGRESS NOTES
Subjective:      Visit Information    Have you changed or started any medications since your last visit including any over-the-counter medicines, vitamins, or herbal medicines? no   Have you stopped taking any of your medications? Is so, why? -  no  Are you having any side effects from any of your medications? - no    Have you seen any other physician or provider since your last visit?  no   Have you had any other diagnostic tests since your last visit?  no   Have you been seen in the emergency room and/or had an admission in a hospital since we last saw you?  no   Have you had your routine dental cleaning in the past 6 months?  no     Do you have an active MyChart account? If no, what is the barrier? Yes    Patient Care Team:  Brinda Ramirez MD as PCP - General (Internal Medicine)  Ida Santana MD as PCP - [facility] Attributed Provider  Stephanie Bhagat DO (Obstetrics & Gynecology)  Ramon Carreno RN as Care Coordinator    Medical History Review  Past Medical, Family, and Social History reviewed and does not contribute to the patient presenting condition    Health Maintenance   Topic Date Due    DTaP/Tdap/Td vaccine (1 - Tdap) 09/08/1963    Zostavax vaccine  09/08/2004    Lipid screen  03/15/2017    Diabetic foot exam  06/14/2017    Flu vaccine (1) 09/01/2017    Pneumococcal low/med risk (2 of 2 - PCV13) 01/04/2018    Diabetic microalbuminuria test  07/24/2018    Diabetic retinal exam  07/31/2018    Diabetic hemoglobin A1C test  09/13/2018    Breast cancer screen  10/17/2018    Colon cancer screen colonoscopy  04/11/2025    DEXA (modify frequency per FRAX score)  Completed             Patient ID: Ciaran Gottlieb is a 67 y.o. female. HPI    Review of Systems   Constitutional: Negative for appetite change, diaphoresis and fatigue. HENT: Negative for ear discharge and trouble swallowing. Eyes: Negative for pain and discharge. Respiratory: Negative for cough, shortness of breath and wheezing. Neurological: She is alert and oriented to person, place, and time. She displays no atrophy. No cranial nerve deficit or sensory deficit. She exhibits normal muscle tone. Coordination normal.   Skin: Skin is warm. No bruising, no ecchymosis and no rash noted. She is not diaphoretic. No pallor. Psychiatric: She has a normal mood and affect. Her behavior is normal. Her mood appears not anxious. Her affect is not angry. Her speech is not slurred. She is not aggressive. Cognition and memory are not impaired. She expresses no homicidal ideation. I have personally reviewed and agree with the patient ROS, HPI and [address] Vivian Florentino is a 67 y.o. female who presents today for follow up on her chronic medical conditions as noted below.   Georgie De Paz is c/o of   Chief Complaint   Patient presents with    Results     labs       Patient Active Problem List:     Hypertension     Hyperlipidemia     Thoracic or lumbosacral neuritis or radiculitis, unspecified     Overactive bladder     Lumbar stenosis     COPD (chronic obstructive pulmonary disease) ([address]     History of lumbosacral spine surgery     S/P spinal fusion     Osteoporosis     Intercostal pain     Acute cystitis     Microhematuria     History of recurrent UTIs     DM type 2, uncontrolled, with neuropathy ([address]     Past Medical History:   Diagnosis Date    Anxiety state, unspecified     Arthritis     Cataracts, bilateral     Esophageal reflux     Generalized osteoarthrosis, unspecified site     Hearing loss     Hyperlipidemia     Hypertension     Neuropathy ([address]     Neuropathy due to medical condition ([address]     Pure hypercholesterolemia     Thoracic or lumbosacral neuritis or radiculitis, unspecified     Urinary retention     UTI (urinary tract infection)       Past Surgical History:   Procedure Laterality Date    BACK SURGERY      X3    BREAST BIOPSY  2007    Lt     CARPAL TUNNEL RELEASE Right     CATARACT REMOVAL WITH IMPLANT Bilateral DENSITY AXIAL SKELETON   2. Essential hypertension     3. Mixed hyperlipidemia     4. DM type 2, uncontrolled, with neuropathy ([facility]             Plan:      No Follow-up on file. Orders Placed This Encounter   Procedures    DEXA BONE DENSITY AXIAL SKELETON     Standing Status:   Future     Standing Expiration Date:   10/6/2018     Order Specific Question:   Reason for exam:     Answer:   osteoporosis     No orders of the defined types were placed in this encounter.   labas rev  Anemia resolved  Ok to prn meloxican    a1c is rev  Better  htn better  hdl rev

## 2017-10-09 NOTE — TELEPHONE ENCOUNTER
Patient is requesting refill on amaryl. According to the chart this was discontinued in April due to low BS. Please advise if she is supposed to resume taking this, it was stopped because patient states that her BS was running down into the 80's.

## 2017-10-10 RX ORDER — GLIMEPIRIDE 2 MG/1
2 TABLET ORAL EVERY MORNING
Qty: 90 TABLET | Refills: 3 | Status: SHIPPED | OUTPATIENT
Start: 2017-10-10 | End: 2018-01-03 | Stop reason: ALTCHOICE

## 2017-10-24 DIAGNOSIS — Z12.31 SCREENING MAMMOGRAM, ENCOUNTER FOR: Primary | ICD-10-CM

## 2017-11-14 ENCOUNTER — HOSPITAL ENCOUNTER (OUTPATIENT)
Dept: WOMENS IMAGING | Age: 72
Discharge: HOME | End: 2017-11-14
Payer: MEDICARE

## 2017-11-14 ENCOUNTER — TELEPHONE (OUTPATIENT)
Dept: NEUROLOGY | Age: 72
End: 2017-11-14

## 2017-11-14 DIAGNOSIS — Z12.31 SCREENING MAMMOGRAM, ENCOUNTER FOR: ICD-10-CM

## 2017-11-14 DIAGNOSIS — M81.0 OSTEOPOROSIS, UNSPECIFIED OSTEOPOROSIS TYPE, UNSPECIFIED PATHOLOGICAL FRACTURE PRESENCE: ICD-10-CM

## 2017-11-14 PROCEDURE — 77080 DXA BONE DENSITY AXIAL: CPT

## 2017-11-14 PROCEDURE — 77063 BREAST TOMOSYNTHESIS BI: CPT

## 2017-11-14 NOTE — TELEPHONE ENCOUNTER
Pt called in stating she received a bunch of lab orders stating she needed to have them done. She was confused about this. I looked at her chart and told her that in January when she saw Dr Juanpablo Gaona he had ordered a CBC, E-lyte panel, BUN and ALT. We never received those results so we resent the orders. She said that she had gone to see her PCP and he told her that he would take care of the labs and make sure we received the results. She said apparently that didn't happen. I told her no. I did look at her chart and told her that in February Dr Miryam Kwan had done a CBC and a CMP, these two labs would have covered all the labs Dr Juanpablo Gaona ordered. She can disregard the labs we sent her. I will cancel them in her chart and soheila them as duplicate. She stated her understanding.

## 2017-11-15 ENCOUNTER — CARE COORDINATION (OUTPATIENT)
Dept: CARE COORDINATION | Age: 72
End: 2017-11-15

## 2017-11-15 NOTE — CARE COORDINATION
Ambulatory Care Coordination Note  11/15/2017  CM Risk Score: 3  Rainer Mortality Risk Score: 7.38    ACC: Reena Middleton, RN    Summary Note: spoke to patient who states she is doing well, has no voiced complaints. She said her blood sugars vary depending on what she eats. She went in Sept to diabetic classes at [address]. She said she recently has a mammogram and dexa done is waiting on results. A1c had improved last checked 6.9. Encouraged to continue with diet. Diabetes Assessment    Meal Planning:  Avoidance of concentrated sweets   How often do you test your blood sugar?:  No Testing   Do you have barriers with adherence to non-pharmacologic self-management interventions? (Nutrition/Exercise/Self-Monitoring):  No   Have you ever had to go to the ED for symptoms of low blood sugar?:  No       No patient-reported symptoms        Lab Results   Component Value Date    LABA1C 6.9 (H) 09/13/2017    LABA1C 6.5 (H) 07/24/2017    LABA1C 8.8 (H) 03/29/2017     Lab Results   Component Value Date    LABMICR 16 07/24/2017    CREATININE 0.62 06/26/2017           Care Coordination Interventions    Program Enrollment:  Rising Risk  Referral from Primary Care Provider:  Yes  Suggested Interventions and Community Resources  Diabetes Education:  Completed (Comment: [facility] Classes)  Zone Management Tools:  Completed (Comment: DM, COPD zones)         Goals Addressed             Most Recent     Conditions and Symptoms   On track (11/15/2017)             I will schedule office visits, as directed by my provider. I will keep my appointment or reschedule if I have to cancel. I will notify my provider of any barriers to my plan of care. I will follow my Zone Management tool to seek urgent or emergent care. I will notify my provider of any symptoms that indicate a worsening of my condition. Barriers: lack of education  Plan for overcoming my barriers: will make future appt ahen in the office.   Confidence: 6 hours as needed for Pain. Yes Historical Provider, MD   aspirin 81 MG chewable tablet Take 81 mg by mouth daily. Yes Historical Provider, MD   meloxicam (MOBIC) 7.5 MG tablet Take 7.5 mg by mouth daily as needed for Pain    Historical Provider, MD   Blood Glucose Monitoring Suppl (ONE TOUCH ULTRA MINI) W/DEVICE KIT 1 kit by Does not apply route daily as needed (patient is checking blood sugar levels twice daily) 4/3/17   Kathy Melvin MD   Lancets MISC 1 each by Does not apply route daily 3/29/17   Juliette Zimmer MD   denosumab (PROLIA) 60 MG/ML SOLN SC injection Inject 1 mL into the skin once for 1 dose 4/12/16 10/6/17  Kathy Melvin MD       Future Appointments  Date Time Provider Deidra Lopezi   12/5/2017 11:40 AM Luis Santos MD Neuro [facility]   1/12/2018 10:30 AM Joss Goodwin MD [facility] will reach out to patient in 2-3 weeks for follow up. Patient encouraged to contact [facility] for any questions or concerns regarding his health care, medications, or treatment plan, patient verbalized understanding. RNCC instructed patient that any concerns or issues will be addressed with patient's PCP and any new orders or instruction will be relayed to patient via [facility] or [address] [place] [address] once received. [facility] contact information given to patient.     Meme Asencio RN  Ambulatory Care Coordinator

## 2018-01-03 ENCOUNTER — OFFICE VISIT (OUTPATIENT)
Dept: NEUROLOGY | Age: 73
End: 2018-01-03
Payer: MEDICARE

## 2018-01-03 VITALS
SYSTOLIC BLOOD PRESSURE: 148 MMHG | WEIGHT: 138 LBS | HEART RATE: 96 BPM | HEIGHT: 66 IN | DIASTOLIC BLOOD PRESSURE: 81 MMHG | BODY MASS INDEX: 22.18 KG/M2

## 2018-01-03 DIAGNOSIS — M48.062 SPINAL STENOSIS OF LUMBAR REGION WITH NEUROGENIC CLAUDICATION: Primary | ICD-10-CM

## 2018-01-03 PROCEDURE — 99213 OFFICE O/P EST LOW 20 MIN: CPT | Performed by: NURSE PRACTITIONER

## 2018-01-03 PROCEDURE — 3017F COLORECTAL CA SCREEN DOC REV: CPT | Performed by: NURSE PRACTITIONER

## 2018-01-03 PROCEDURE — G8484 FLU IMMUNIZE NO ADMIN: HCPCS | Performed by: NURSE PRACTITIONER

## 2018-01-03 PROCEDURE — 3014F SCREEN MAMMO DOC REV: CPT | Performed by: NURSE PRACTITIONER

## 2018-01-03 PROCEDURE — 1090F PRES/ABSN URINE INCON ASSESS: CPT | Performed by: NURSE PRACTITIONER

## 2018-01-03 PROCEDURE — 1123F ACP DISCUSS/DSCN MKR DOCD: CPT | Performed by: NURSE PRACTITIONER

## 2018-01-03 PROCEDURE — G8420 CALC BMI NORM PARAMETERS: HCPCS | Performed by: NURSE PRACTITIONER

## 2018-01-03 PROCEDURE — G8399 PT W/DXA RESULTS DOCUMENT: HCPCS | Performed by: NURSE PRACTITIONER

## 2018-01-03 PROCEDURE — G8427 DOCREV CUR MEDS BY ELIG CLIN: HCPCS | Performed by: NURSE PRACTITIONER

## 2018-01-03 PROCEDURE — 4040F PNEUMOC VAC/ADMIN/RCVD: CPT | Performed by: NURSE PRACTITIONER

## 2018-01-03 PROCEDURE — 1036F TOBACCO NON-USER: CPT | Performed by: NURSE PRACTITIONER

## 2018-01-03 RX ORDER — DULOXETIN HYDROCHLORIDE 20 MG/1
20 CAPSULE, DELAYED RELEASE ORAL DAILY
Qty: 90 CAPSULE | Refills: 3 | Status: SHIPPED | OUTPATIENT
Start: 2018-01-03 | End: 2019-01-08 | Stop reason: SDUPTHER

## 2018-01-03 NOTE — LETTER
 COLONOSCOPY  4/24/15    diverticulosis    CYSTOSCOPY  [id number]    CYSTOSCOPY  05/26/2017    CYSTOSCOPY N/A 5/26/2017    CYSTOSCOPY URODYNAMICS AND DILATION  performed by Idania Mitchell MD at [address] HAND SURGERY Bilateral     thumbs reconstruction    HIP ARTHROPLASTY Bilateral     JOINT REPLACEMENT Bilateral     LUMBAR FUSION  October 27, 2015       Family History   Problem Relation Age of Onset    Cancer Mother      lymphoma    Hypertension Mother     Hypertension Father     Hypertension Brother        Social History   Substance Use Topics    Smoking status: Former Smoker     Quit date: 1/1/1984    Smokeless tobacco: Never Used    Alcohol use 0.0 oz/week      Comment: social                               REVIEW OF SYSTEMS    CONSTITUTIONAL Weight: present, Appetite: absent, Fatigue: absent      HEENT Ears: normal, Visual disturbance: absent   RESPIRATORY Shortness of breath: absent, Cough: absent   CARDIOVASCULAR Chest pain: absent, Leg swelling :absent      GI Constipation: absent, Diarrhea: absent, Swallowing change: absent       Urinary frequency: present, Urinary urgency: present   MUSCULOSKELETAL Neck pain: absent, Back pain: absent, Stiffness: absent, Muscle pain: absent, Joint pain: present Restless legs: absent   DERMATOLOGIC Hair loss: present, Skin changes: absent   NEUROLOGIC Memory loss: absent, Confusion: absent, Seizures: absent Trouble walking or imbalance: present, Dizziness: present, Weakness: absent, Numbness: present Tremor: absent, Spasm: absent, Speech difficulty: absent, Headache: absent, Light sensitivity: absent   PSYCHIATRIC Anxiety: absent, Hallucination: absent, Mood disorder: absent   HEMATOLOGIC Abnormal bleeding: absent, Anemia: absent, Clotting disorder: absent, Lymph gland changes: absent           No Known Allergies        Current Outpatient Prescriptions   Medication Sig Dispense Refill    metFORMIN (GLUCOPHAGE) 1000 MG tablet Take 1 tablet by mouth 2 times Left patellar: 2+  Right achilles: 2+  Left achilles: 2+  Right plantar: normal  Left plantar: normal            ASSESSMENT/PLAN:       In summary, your patient, Fern Jones exhibits the following, with associated plan:    1. High-grade lumbar stenosis with associated back and leg pain. 1. Continue gabapentin 300 mg in the morning and 600 mg in the afternoon and 900 mg at bedtime  2. Start Cymbalta 20 mg daily. Patient was instructed that the medication is a daily medication and cannot be used on an as-needed basis. 3. Patient will continue to follow up on an annual basis            Signed: Guillaume Canales CNP        If you have questions, please do not hesitate to call me. I look forward to following Agus Rausch along with you.     Sincerely,        Darcie Thompson CNP

## 2018-01-03 NOTE — PROGRESS NOTES
Bilateral     thumbs reconstruction    HIP ARTHROPLASTY Bilateral     JOINT REPLACEMENT Bilateral     LUMBAR FUSION  October 27, 2015       Family History   Problem Relation Age of Onset    Cancer Mother      lymphoma    Hypertension Mother     Hypertension Father     Hypertension Brother        Social History   Substance Use Topics    Smoking status: Former Smoker     Quit date: 1/1/1984    Smokeless tobacco: Never Used    Alcohol use 0.0 oz/week      Comment: social                               REVIEW OF SYSTEMS    CONSTITUTIONAL Weight: present, Appetite: absent, Fatigue: absent      HEENT Ears: normal, Visual disturbance: absent   RESPIRATORY Shortness of breath: absent, Cough: absent   CARDIOVASCULAR Chest pain: absent, Leg swelling :absent      GI Constipation: absent, Diarrhea: absent, Swallowing change: absent       Urinary frequency: present, Urinary urgency: present   MUSCULOSKELETAL Neck pain: absent, Back pain: absent, Stiffness: absent, Muscle pain: absent, Joint pain: present Restless legs: absent   DERMATOLOGIC Hair loss: present, Skin changes: absent   NEUROLOGIC Memory loss: absent, Confusion: absent, Seizures: absent Trouble walking or imbalance: present, Dizziness: present, Weakness: absent, Numbness: present Tremor: absent, Spasm: absent, Speech difficulty: absent, Headache: absent, Light sensitivity: absent   PSYCHIATRIC Anxiety: absent, Hallucination: absent, Mood disorder: absent   HEMATOLOGIC Abnormal bleeding: absent, Anemia: absent, Clotting disorder: absent, Lymph gland changes: absent           No Known Allergies        Current Outpatient Prescriptions   Medication Sig Dispense Refill    metFORMIN (GLUCOPHAGE) 1000 MG tablet Take 1 tablet by mouth 2 times daily (with meals) 180 tablet 3    meloxicam (MOBIC) 7.5 MG tablet Take 1 tablet by mouth two  times daily as needed 180 tablet 2    gabapentin (NEURONTIN) 300 MG capsule Take 3 capsules by mouth 3  times daily 810 capsule 2    Glucose Blood (BLOOD GLUCOSE TEST STRIPS) STRP Patient checking blood sugar levels 4 times daily 200 strip 3    ONE TOUCH ULTRASOFT LANCETS MISC 1 each by Does not apply route 2 times daily 100 each 3    Blood Glucose Monitoring Suppl (ONE TOUCH ULTRA MINI) W/DEVICE KIT 1 kit by Does not apply route daily as needed (patient is checking blood sugar levels twice daily) 1 kit 0    Lancets MISC 1 each by Does not apply route daily 100 each 3    DULoxetine (CYMBALTA) 20 MG extended release capsule Take 1 capsule by mouth daily 90 capsule 3    omeprazole (PRILOSEC) 20 MG delayed release capsule Take 1 capsule by mouth  daily 90 capsule 3    metoprolol tartrate (LOPRESSOR) 25 MG tablet Take 1 tablet by mouth two  times daily 180 tablet 11    oxybutynin (DITROPAN) 5 MG tablet Take 1 tablet by mouth 2 times daily      Calcium-Magnesium-Vitamin D (CALCIUM 1200+D3 PO) Take 2 tablets by mouth daily.  Glucosamine 500 MG CAPS Take 3 tablets by mouth daily.  acetaminophen (TYLENOL) 500 MG tablet Take 500 mg by mouth every 6 hours as needed for Pain.  aspirin 81 MG chewable tablet Take 81 mg by mouth daily.  atorvastatin (LIPITOR) 40 MG tablet Take 1 tablet by mouth daily 90 tablet 3    denosumab (PROLIA) 60 MG/ML SOLN SC injection Inject 1 mL into the skin once for 1 dose 1 mL 0     No current facility-administered medications for this visit. PHYSICAL EXAMINATION       There were no vitals filed for this visit.                                            .                                                                                                    General Appearance:  Alert, cooperative, no signs of distress, appears stated age   Head:  Normocephalic, no signs of trauma   Eyes:  Conjunctiva/corneas clear;  eyelids intact   Ears:  Normal external ear and canals   Nose: Nares normal, mucosa normal, no drainage    Throat: Lips and tongue normal;

## 2018-01-08 ENCOUNTER — CARE COORDINATION (OUTPATIENT)
Dept: CARE COORDINATION | Age: 73
End: 2018-01-08

## 2018-01-09 ENCOUNTER — CARE COORDINATION (OUTPATIENT)
Dept: CARE COORDINATION | Age: 73
End: 2018-01-09

## 2018-01-12 ENCOUNTER — TELEPHONE (OUTPATIENT)
Dept: INTERNAL MEDICINE CLINIC | Age: 73
End: 2018-01-12

## 2018-01-12 NOTE — TELEPHONE ENCOUNTER
Spoke with patient, she spoke with [facility] this morning and changed her appt.   NOT mailing no show letter

## 2018-02-02 RX ORDER — GABAPENTIN 300 MG/1
600 CAPSULE ORAL 3 TIMES DAILY
Qty: 540 CAPSULE | Refills: 0 | Status: SHIPPED | OUTPATIENT
Start: 2018-02-02 | End: 2018-06-29 | Stop reason: SDUPTHER

## 2018-02-09 ENCOUNTER — CARE COORDINATION (OUTPATIENT)
Dept: CARE COORDINATION | Age: 73
End: 2018-02-09

## 2018-02-09 ENCOUNTER — OFFICE VISIT (OUTPATIENT)
Dept: INTERNAL MEDICINE CLINIC | Age: 73
End: 2018-02-09
Payer: MEDICARE

## 2018-02-09 VITALS
BODY MASS INDEX: 22.02 KG/M2 | SYSTOLIC BLOOD PRESSURE: 138 MMHG | HEIGHT: 66 IN | WEIGHT: 137 LBS | DIASTOLIC BLOOD PRESSURE: 82 MMHG

## 2018-02-09 DIAGNOSIS — J44.9 CHRONIC OBSTRUCTIVE PULMONARY DISEASE, UNSPECIFIED COPD TYPE (HCC): ICD-10-CM

## 2018-02-09 DIAGNOSIS — E78.2 MIXED HYPERLIPIDEMIA: ICD-10-CM

## 2018-02-09 DIAGNOSIS — J43.1 PANLOBULAR EMPHYSEMA (HCC): ICD-10-CM

## 2018-02-09 DIAGNOSIS — I10 ESSENTIAL HYPERTENSION: ICD-10-CM

## 2018-02-09 DIAGNOSIS — E11.42 DIABETIC POLYNEUROPATHY ASSOCIATED WITH TYPE 2 DIABETES MELLITUS (HCC): ICD-10-CM

## 2018-02-09 PROCEDURE — G8399 PT W/DXA RESULTS DOCUMENT: HCPCS | Performed by: INTERNAL MEDICINE

## 2018-02-09 PROCEDURE — G8427 DOCREV CUR MEDS BY ELIG CLIN: HCPCS | Performed by: INTERNAL MEDICINE

## 2018-02-09 PROCEDURE — 1123F ACP DISCUSS/DSCN MKR DOCD: CPT | Performed by: INTERNAL MEDICINE

## 2018-02-09 PROCEDURE — 3023F SPIROM DOC REV: CPT | Performed by: INTERNAL MEDICINE

## 2018-02-09 PROCEDURE — 3046F HEMOGLOBIN A1C LEVEL >9.0%: CPT | Performed by: INTERNAL MEDICINE

## 2018-02-09 PROCEDURE — G8420 CALC BMI NORM PARAMETERS: HCPCS | Performed by: INTERNAL MEDICINE

## 2018-02-09 PROCEDURE — G8484 FLU IMMUNIZE NO ADMIN: HCPCS | Performed by: INTERNAL MEDICINE

## 2018-02-09 PROCEDURE — 3017F COLORECTAL CA SCREEN DOC REV: CPT | Performed by: INTERNAL MEDICINE

## 2018-02-09 PROCEDURE — 99214 OFFICE O/P EST MOD 30 MIN: CPT | Performed by: INTERNAL MEDICINE

## 2018-02-09 PROCEDURE — 1036F TOBACCO NON-USER: CPT | Performed by: INTERNAL MEDICINE

## 2018-02-09 PROCEDURE — 3014F SCREEN MAMMO DOC REV: CPT | Performed by: INTERNAL MEDICINE

## 2018-02-09 PROCEDURE — 1090F PRES/ABSN URINE INCON ASSESS: CPT | Performed by: INTERNAL MEDICINE

## 2018-02-09 PROCEDURE — G8926 SPIRO NO PERF OR DOC: HCPCS | Performed by: INTERNAL MEDICINE

## 2018-02-09 PROCEDURE — 4040F PNEUMOC VAC/ADMIN/RCVD: CPT | Performed by: INTERNAL MEDICINE

## 2018-02-09 RX ORDER — ATORVASTATIN CALCIUM 40 MG/1
40 TABLET, FILM COATED ORAL DAILY
Qty: 90 TABLET | Refills: 3 | Status: SHIPPED | OUTPATIENT
Start: 2018-02-09 | End: 2019-01-04 | Stop reason: SDUPTHER

## 2018-02-09 ASSESSMENT — ENCOUNTER SYMPTOMS
ABDOMINAL DISTENTION: 0
SHORTNESS OF BREATH: 0
EYE DISCHARGE: 0
WHEEZING: 0
COLOR CHANGE: 0
COUGH: 0
TROUBLE SWALLOWING: 0
EYE PAIN: 0
DIARRHEA: 0
BLOOD IN STOOL: 0

## 2018-02-09 NOTE — PROGRESS NOTES
blurry vision  No chest pain  HLD  Onset more than 5 years ago  Severity is mild, not getting worse  Not associated with pancreatitis  Tolerating statin well no muscle pain          Review of Systems   Constitutional: Negative for appetite change, diaphoresis and fatigue. HENT: Negative for ear discharge and trouble swallowing. Eyes: Negative for pain and discharge. Respiratory: Negative for cough, shortness of breath and wheezing. Cardiovascular: Negative for chest pain and palpitations. Gastrointestinal: Negative for abdominal distention, blood in stool and diarrhea. Endocrine: Negative for polydipsia and polyphagia. Genitourinary: Negative for difficulty urinating and frequency. Musculoskeletal: Negative for gait problem, myalgias and neck pain. Skin: Negative for color change and rash. Allergic/Immunologic: Negative for environmental allergies and food allergies. Neurological: Positive for numbness. Negative for dizziness and headaches. Hematological: Negative for adenopathy. Does not bruise/bleed easily. Psychiatric/Behavioral: Negative for behavioral problems and sleep disturbance. Objective:   Physical Exam   Constitutional: She is oriented to person, place, and time. She appears well-developed and well-nourished. HENT:   Head: Normocephalic and atraumatic. Eyes: Conjunctivae and EOM are normal. Right eye exhibits no discharge. Left eye exhibits no discharge. Right conjunctiva is not injected. Left conjunctiva is not injected. Right eye exhibits normal extraocular motion. Left eye exhibits normal extraocular motion. Neck: Normal range of motion. Neck supple. No JVD present. No edema and no erythema present. No thyroid mass and no thyromegaly present. Cardiovascular: Normal rate and regular rhythm. Exam reveals no friction rub. No murmur heard. Pulmonary/Chest: Effort normal and breath sounds normal. No accessory muscle usage. No tachypnea and no bradypnea.  No respiratory distress. She has no wheezes. She has no rales. Abdominal: Soft. Bowel sounds are normal. She exhibits no distension. There is no tenderness. There is no rebound. Musculoskeletal: Normal range of motion. She exhibits no edema or tenderness. Lymphadenopathy:        Head (right side): No submental and no submandibular adenopathy present. Head (left side): No submental and no submandibular adenopathy present. She has no cervical adenopathy. Neurological: She is alert and oriented to person, place, and time. She displays no atrophy. No cranial nerve deficit or sensory deficit. She exhibits normal muscle tone. Coordination normal.   Skin: Skin is warm. No bruising, no ecchymosis and no rash noted. She is not diaphoretic. No pallor. Psychiatric: She has a normal mood and affect. Her behavior is normal. Her mood appears not anxious. Her affect is not angry. Her speech is not slurred. She is not aggressive. Cognition and memory are not impaired. She expresses no homicidal ideation. I have personally reviewed and agree with the patient NORA, GABBIE and [address] is a 67 y.o. female who presents today for follow up on her chronic medical conditions as noted below. Noah Jeffries is c/o of   Chief Complaint   Patient presents with   Arpita Mad Discuss Medications     discuss high qty of gabapentin    Anorexia     pt states she is having loss of appetite.  Osteoporosis     pt is wondering if she needs another prolia injection    Urinary Frequency     pt states she gets up every hr or so at night to use the restroom .         Patient Active Problem List:     Hypertension     Hyperlipidemia     Thoracic or lumbosacral neuritis or radiculitis, unspecified     Overactive bladder     Lumbar stenosis     History of lumbosacral spine surgery     S/P spinal fusion     Osteoporosis     Intercostal pain     Acute cystitis     Microhematuria     History of recurrent UTIs     DM type 2, uncontrolled, with neuropathy [facility])     Past Medical History:   Diagnosis Date    Anxiety state, unspecified     Arthritis     Cataracts, bilateral     Esophageal reflux     Generalized osteoarthrosis, unspecified site     Hearing loss     Hyperlipidemia     Hypertension     Neuropathy ([address]     Neuropathy due to medical condition ([address]     Pure hypercholesterolemia     Thoracic or lumbosacral neuritis or radiculitis, unspecified     Type 2 diabetes mellitus without complication ([address]     Urinary retention     UTI (urinary tract infection)       Past Surgical History:   Procedure Laterality Date    BACK SURGERY      X3    BREAST BIOPSY  2007    Lt     CARPAL TUNNEL RELEASE Right     CATARACT REMOVAL WITH IMPLANT Bilateral     COLONOSCOPY  2006    COLONOSCOPY  4/24/15    diverticulosis    CYSTOSCOPY  [id number]    CYSTOSCOPY  05/26/2017    CYSTOSCOPY N/A 5/26/2017    CYSTOSCOPY URODYNAMICS AND DILATION  performed by Ellen Carballo MD at [facility] [place] HAND SURGERY Bilateral     thumbs reconstruction    HIP ARTHROPLASTY Bilateral     JOINT REPLACEMENT Bilateral     LUMBAR FUSION  October 27, 2015     Family History   Problem Relation Age of Onset    Cancer Mother      lymphoma    Hypertension Mother     Hypertension Father     Hypertension Brother     Heart Disease Brother      pt states that brother had 5 bypass surgeries. Current Outpatient Prescriptions   Medication Sig Dispense Refill    atorvastatin (LIPITOR) 40 MG tablet Take 1 tablet by mouth daily 90 tablet 3    gabapentin (NEURONTIN) 300 MG capsule Take 2 capsules by mouth 3 times daily for 90 days.  540 capsule 0    DULoxetine (CYMBALTA) 20 MG extended release capsule Take 1 capsule by mouth daily 90 capsule 3    metFORMIN (GLUCOPHAGE) 1000 MG tablet Take 1 tablet by mouth 2 times daily (with meals) 180 tablet 3    meloxicam (MOBIC) 7.5 MG tablet Take 1 tablet by mouth two  times daily as needed 180 tablet 2    Glucose Blood (BLOOD

## 2018-02-12 ENCOUNTER — HOSPITAL ENCOUNTER (OUTPATIENT)
Age: 73
Setting detail: SPECIMEN
Discharge: HOME | End: 2018-02-12
Payer: MEDICARE

## 2018-02-12 DIAGNOSIS — E78.2 MIXED HYPERLIPIDEMIA: ICD-10-CM

## 2018-02-12 LAB
CHOLESTEROL/HDL RATIO: 1.8
CHOLESTEROL: 161 MG/DL
CREATININE URINE: 105.3 MG/DL (ref 28–217)
ESTIMATED AVERAGE GLUCOSE: 140 MG/DL
HBA1C MFR BLD: 6.5 % (ref 4–6)
HDLC SERPL-MCNC: 90 MG/DL
LDL CHOLESTEROL: 49 MG/DL (ref 0–130)
MICROALBUMIN/CREAT 24H UR: 122 MG/L
MICROALBUMIN/CREAT UR-RTO: 116 MCG/MG CREAT
TRIGL SERPL-MCNC: 110 MG/DL
VLDLC SERPL CALC-MCNC: NORMAL MG/DL (ref 1–30)

## 2018-02-13 ENCOUNTER — CARE COORDINATION (OUTPATIENT)
Dept: CARE COORDINATION | Age: 73
End: 2018-02-13

## 2018-02-13 NOTE — LETTER
2/13/2018    Matthew Gerard [address]  [address] [address]      I wanted to introduce myself and the care coordination program offered here at Mallory García MD's office. Our goal is to support you in your efforts to be as healthy as possible. [facility] is committed to walk with you on your journey to better health. Please let me know if you have any questions or if you would like to schedule an appointment with me. You can reach me at the numbers listed below.      In good health,     Levy Morales, [address]

## 2018-03-01 ENCOUNTER — CARE COORDINATION (OUTPATIENT)
Dept: CARE COORDINATION | Age: 73
End: 2018-03-01

## 2018-03-01 ENCOUNTER — TELEPHONE (OUTPATIENT)
Dept: INTERNAL MEDICINE CLINIC | Age: 73
End: 2018-03-01

## 2018-03-01 DIAGNOSIS — N39.0 URINARY TRACT INFECTION WITHOUT HEMATURIA, SITE UNSPECIFIED: Primary | ICD-10-CM

## 2018-03-01 DIAGNOSIS — R80.9 MICROALBUMINURIA: ICD-10-CM

## 2018-03-01 RX ORDER — CIPROFLOXACIN 500 MG/1
500 TABLET, FILM COATED ORAL 2 TIMES DAILY
Qty: 10 TABLET | Refills: 0 | Status: SHIPPED | OUTPATIENT
Start: 2018-03-01 | End: 2018-03-06

## 2018-03-01 RX ORDER — LISINOPRIL 10 MG/1
10 TABLET ORAL DAILY
Qty: 30 TABLET | Refills: 3 | Status: SHIPPED | OUTPATIENT
Start: 2018-03-01 | End: 2019-03-20 | Stop reason: SDUPTHER

## 2018-03-01 NOTE — TELEPHONE ENCOUNTER
Patient requesting results from urinalysis on 2-12-18, she did not get results yet and is still having urinary frequency, please advise.

## 2018-03-14 ENCOUNTER — CARE COORDINATION (OUTPATIENT)
Dept: CARE COORDINATION | Age: 73
End: 2018-03-14

## 2018-03-14 NOTE — CARE COORDINATION
Attempting to reach patient for a follow up care coordination call, regarding previous UTI symptoms, check on improvement?

## 2018-03-16 ENCOUNTER — OFFICE VISIT (OUTPATIENT)
Dept: INTERNAL MEDICINE CLINIC | Age: 73
End: 2018-03-16
Payer: MEDICARE

## 2018-03-16 VITALS
DIASTOLIC BLOOD PRESSURE: 62 MMHG | OXYGEN SATURATION: 93 % | HEIGHT: 66 IN | WEIGHT: 135 LBS | SYSTOLIC BLOOD PRESSURE: 118 MMHG | BODY MASS INDEX: 21.69 KG/M2 | HEART RATE: 67 BPM

## 2018-03-16 DIAGNOSIS — Z23 NEED FOR VACCINATION: ICD-10-CM

## 2018-03-16 DIAGNOSIS — I10 ESSENTIAL HYPERTENSION: ICD-10-CM

## 2018-03-16 DIAGNOSIS — E78.2 MIXED HYPERLIPIDEMIA: ICD-10-CM

## 2018-03-16 PROCEDURE — G8427 DOCREV CUR MEDS BY ELIG CLIN: HCPCS | Performed by: INTERNAL MEDICINE

## 2018-03-16 PROCEDURE — G0009 ADMIN PNEUMOCOCCAL VACCINE: HCPCS | Performed by: INTERNAL MEDICINE

## 2018-03-16 PROCEDURE — 1090F PRES/ABSN URINE INCON ASSESS: CPT | Performed by: INTERNAL MEDICINE

## 2018-03-16 PROCEDURE — 3014F SCREEN MAMMO DOC REV: CPT | Performed by: INTERNAL MEDICINE

## 2018-03-16 PROCEDURE — 1036F TOBACCO NON-USER: CPT | Performed by: INTERNAL MEDICINE

## 2018-03-16 PROCEDURE — 99213 OFFICE O/P EST LOW 20 MIN: CPT | Performed by: INTERNAL MEDICINE

## 2018-03-16 PROCEDURE — 3044F HG A1C LEVEL LT 7.0%: CPT | Performed by: INTERNAL MEDICINE

## 2018-03-16 PROCEDURE — 1123F ACP DISCUSS/DSCN MKR DOCD: CPT | Performed by: INTERNAL MEDICINE

## 2018-03-16 PROCEDURE — 90670 PCV13 VACCINE IM: CPT | Performed by: INTERNAL MEDICINE

## 2018-03-16 PROCEDURE — 4040F PNEUMOC VAC/ADMIN/RCVD: CPT | Performed by: INTERNAL MEDICINE

## 2018-03-16 PROCEDURE — 3017F COLORECTAL CA SCREEN DOC REV: CPT | Performed by: INTERNAL MEDICINE

## 2018-03-16 PROCEDURE — G8399 PT W/DXA RESULTS DOCUMENT: HCPCS | Performed by: INTERNAL MEDICINE

## 2018-03-16 PROCEDURE — G8484 FLU IMMUNIZE NO ADMIN: HCPCS | Performed by: INTERNAL MEDICINE

## 2018-03-16 PROCEDURE — G8420 CALC BMI NORM PARAMETERS: HCPCS | Performed by: INTERNAL MEDICINE

## 2018-03-16 ASSESSMENT — ENCOUNTER SYMPTOMS
SHORTNESS OF BREATH: 0
WHEEZING: 0
ABDOMINAL DISTENTION: 0
EYE PAIN: 0
COUGH: 0
BLOOD IN STOOL: 0
TROUBLE SWALLOWING: 0
COLOR CHANGE: 0
DIARRHEA: 0
EYE DISCHARGE: 0

## 2018-03-16 NOTE — PROGRESS NOTES
Subjective:      Patient ID: Alicia Licea is a 67 y.o. female. Visit Information    Have you changed or started any medications since your last visit including any over-the-counter medicines, vitamins, or herbal medicines? no   Are you having any side effects from any of your medications? -  no  Have you stopped taking any of your medications? Is so, why? -  no    Have you seen any other physician or provider since your last visit? No  Have you had any other diagnostic tests since your last visit? No  Have you been seen in the emergency room and/or had an admission to a hospital since we last saw you? No  Have you had your routine dental cleaning in the past 6 months? no    Have you activated your [employer] account? If not, what are your barriers?  Yes     Patient Care Team:  Harrison Lewis MD as PCP - General (Internal Medicine)  Harrison Lewis MD as PCP - S Attributed Provider  Stephanie August DO (Obstetrics & Gynecology)  Zoë Nicole RN as Care Coordinator    Medical History Review  Past Medical, Family, and Social History reviewed and does not contribute to the patient presenting condition    Health Maintenance   Topic Date Due    DTaP/Tdap/Td vaccine (1 - Tdap) 09/08/1963    Shingles Vaccine (1 of 2 - 2 Dose Series) 09/08/1994    Diabetic foot exam  06/14/2017    Pneumococcal low/med risk (2 of 2 - PCV13) 01/04/2018    Potassium monitoring  03/29/2018    Flu vaccine (1) 03/28/2019 (Originally 9/1/2017)    Creatinine monitoring  06/26/2018    Diabetic retinal exam  07/31/2018    A1C test (Diabetic or Prediabetic)  02/12/2019    Diabetic microalbuminuria test  02/12/2019    Lipid screen  02/12/2019    Breast cancer screen  11/14/2019    Colon cancer screen colonoscopy  04/11/2025    DEXA (modify frequency per FRAX score)  Completed     HTN  Onset more than 2 years ago  ernesto mild to mod  Controlled with current po meds  Not associated with headaches or blurry vision  No chest pain          Review of Systems   Constitutional: Negative for appetite change, diaphoresis and fatigue. HENT: Negative for ear discharge and trouble swallowing. Eyes: Negative for pain and discharge. Respiratory: Negative for cough, shortness of breath and wheezing. Cardiovascular: Negative for chest pain and palpitations. Gastrointestinal: Negative for abdominal distention, blood in stool and diarrhea. Endocrine: Negative for polydipsia and polyphagia. Genitourinary: Negative for difficulty urinating and frequency. Musculoskeletal: Negative for gait problem, myalgias and neck pain. Skin: Negative for color change and rash. Allergic/Immunologic: Negative for environmental allergies and food allergies. Neurological: Negative for dizziness and headaches. Hematological: Negative for adenopathy. Does not bruise/bleed easily. Psychiatric/Behavioral: Negative for behavioral problems and sleep disturbance. Objective:   Physical Exam   Constitutional: She is oriented to person, place, and time. She appears well-developed and well-nourished. HENT:   Head: Normocephalic and atraumatic. Eyes: Conjunctivae and EOM are normal. Right eye exhibits no discharge. Left eye exhibits no discharge. Right conjunctiva is not injected. Left conjunctiva is not injected. Right eye exhibits normal extraocular motion. Left eye exhibits normal extraocular motion. Neck: Normal range of motion. Neck supple. No JVD present. No edema and no erythema present. No thyroid mass and no thyromegaly present. Cardiovascular: Normal rate and regular rhythm. Exam reveals no friction rub. No murmur heard. Pulmonary/Chest: Effort normal and breath sounds normal. No accessory muscle usage. No tachypnea and no bradypnea. No respiratory distress. She has no wheezes. She has no rales. Abdominal: Soft. Bowel sounds are normal. She exhibits no distension. There is no tenderness. There is no rebound. Musculoskeletal: Normal range of motion. She exhibits no edema or tenderness. Lymphadenopathy:        Head (right side): No submental and no submandibular adenopathy present. Head (left side): No submental and no submandibular adenopathy present. She has no cervical adenopathy. Neurological: She is alert and oriented to person, place, and time. She displays no atrophy. No cranial nerve deficit or sensory deficit. She exhibits normal muscle tone. Coordination normal.   Skin: Skin is warm. No bruising, no ecchymosis and no rash noted. She is not diaphoretic. No pallor. Psychiatric: She has a normal mood and affect. Her behavior is normal. Her mood appears not anxious. Her affect is not angry. Her speech is not slurred. She is not aggressive. Cognition and memory are not impaired. She expresses no homicidal ideation. I have personally reviewed and agree with the patient NORA, GABBIE and [address] is a 67 y.o. female who presents today for follow up on her chronic medical conditions as noted below.   Torri Alcala is c/o of   Chief Complaint   Patient presents with    1 Month Follow-Up     pt here for 1 month check up       Patient Active Problem List:     Hypertension     Hyperlipidemia     Thoracic or lumbosacral neuritis or radiculitis, unspecified     Overactive bladder     Lumbar stenosis     History of lumbosacral spine surgery     S/P spinal fusion     Osteoporosis     Intercostal pain     Acute cystitis     Microhematuria     History of recurrent UTIs     DM type 2, uncontrolled, with neuropathy ([address]     Past Medical History:   Diagnosis Date    Anxiety state, unspecified     Arthritis     Cataracts, bilateral     Esophageal reflux     Generalized osteoarthrosis, unspecified site     Hearing loss     Hyperlipidemia     Hypertension     Neuropathy ([address]     Neuropathy due to medical condition ([address]     Pure hypercholesterolemia     Thoracic or lumbosacral neuritis or blood sugar levels twice daily) 1 kit 0    Lancets MISC 1 each by Does not apply route daily 100 each 3    omeprazole (PRILOSEC) 20 MG delayed release capsule Take 1 capsule by mouth  daily 90 capsule 3    metoprolol tartrate (LOPRESSOR) 25 MG tablet Take 1 tablet by mouth two  times daily 180 tablet 11    oxybutynin (DITROPAN) 5 MG tablet Take 1 tablet by mouth 2 times daily      Calcium-Magnesium-Vitamin D (CALCIUM 1200+D3 PO) Take 2 tablets by mouth daily.  Glucosamine 500 MG CAPS Take 3 tablets by mouth daily.  acetaminophen (TYLENOL) 500 MG tablet Take 500 mg by mouth every 6 hours as needed for Pain.  aspirin 81 MG chewable tablet Take 81 mg by mouth daily. No current facility-administered medications for this visit. ALLERGIES:  No Known Allergies    Social History   Substance Use Topics    Smoking status: Former Smoker     Quit date: 1/1/1984    Smokeless tobacco: Never Used    Alcohol use 0.0 oz/week      Comment: social      Body mass index is 21.96 kg/m².   /62   Pulse 67   Ht 5' 5.75\" (1.67 m)   Wt 135 lb (61.2 kg)   SpO2 93%   BMI 21.96 kg/m²     Lab Results   Component Value Date     03/29/2017    K 3.6 03/29/2017     03/29/2017    CO2 24 03/29/2017    BUN 14 03/29/2017    CREATININE 0.62 06/26/2017    GLUCOSE 234 03/29/2017    CALCIUM 8.2 03/29/2017    PROT 6.9 03/28/2017    LABALBU 3.1 03/28/2017    BILITOT 0.59 03/28/2017    ALKPHOS 104 03/28/2017    AST 73 03/28/2017    ALT 71 03/28/2017       Lab Results   Component Value Date    WBC 6.6 09/13/2017    RBC 5.32 09/13/2017    HGB 14.8 09/13/2017    HCT 45.6 09/13/2017    MCV 85.7 09/13/2017    MCH 27.8 09/13/2017    MCHC 32.5 09/13/2017    RDW 16.1 09/13/2017     09/13/2017    MPV 8.3 09/13/2017       Lab Results   Component Value Date    LABA1C 6.5 02/12/2018       Lab Results   Component Value Date    HDL 90 02/12/2018    LDLCHOLESTEROL 49 02/12/2018       Assessment:      1. DM type 2, uncontrolled, with neuropathy ([facility] [address]     2. Need for vaccination  PREVNAR 13 IM (Pneumococcal conjugate vaccine 13-valent)   3. Essential hypertension     4. Mixed hyperlipidemia             Plan:      No Follow-up on file. Orders Placed This Encounter   Procedures    PREVNAR 13 IM (Pneumococcal conjugate vaccine 13-valent)     No orders of the defined types were placed in this encounter.     a1c     6.5  Metformin ful dose

## 2018-03-22 ENCOUNTER — CARE COORDINATION (OUTPATIENT)
Dept: CARE COORDINATION | Age: 73
End: 2018-03-22

## 2018-03-22 NOTE — CARE COORDINATION
Attempting to reach patient for a follow up care coordination call regarding any needs, questions or concerns.   Dione Rubio, [address] [facility]

## 2018-04-04 DIAGNOSIS — I10 ESSENTIAL HYPERTENSION: ICD-10-CM

## 2018-04-24 ENCOUNTER — CARE COORDINATION (OUTPATIENT)
Dept: CARE COORDINATION | Age: 73
End: 2018-04-24

## 2018-04-26 ENCOUNTER — CARE COORDINATION (OUTPATIENT)
Dept: CARE COORDINATION | Age: 73
End: 2018-04-26

## 2018-05-01 ENCOUNTER — CARE COORDINATION (OUTPATIENT)
Dept: CARE COORDINATION | Age: 73
End: 2018-05-01

## 2018-05-16 ENCOUNTER — CARE COORDINATION (OUTPATIENT)
Dept: CARE COORDINATION | Age: 73
End: 2018-05-16

## 2018-05-18 ENCOUNTER — CARE COORDINATION (OUTPATIENT)
Dept: CARE COORDINATION | Age: 73
End: 2018-05-18

## 2018-05-22 ENCOUNTER — TELEPHONE (OUTPATIENT)
Dept: INTERNAL MEDICINE CLINIC | Age: 73
End: 2018-05-22

## 2018-06-15 ENCOUNTER — OFFICE VISIT (OUTPATIENT)
Dept: INTERNAL MEDICINE CLINIC | Age: 73
End: 2018-06-15
Payer: MEDICARE

## 2018-06-15 VITALS
BODY MASS INDEX: 21.05 KG/M2 | WEIGHT: 131 LBS | HEIGHT: 66 IN | SYSTOLIC BLOOD PRESSURE: 122 MMHG | DIASTOLIC BLOOD PRESSURE: 70 MMHG

## 2018-06-15 DIAGNOSIS — I10 ESSENTIAL HYPERTENSION: ICD-10-CM

## 2018-06-15 DIAGNOSIS — R63.4 WEIGHT LOSS: ICD-10-CM

## 2018-06-15 DIAGNOSIS — M81.0 OSTEOPOROSIS, UNSPECIFIED OSTEOPOROSIS TYPE, UNSPECIFIED PATHOLOGICAL FRACTURE PRESENCE: Primary | ICD-10-CM

## 2018-06-15 DIAGNOSIS — R14.0 BLOATING: ICD-10-CM

## 2018-06-15 DIAGNOSIS — E78.2 MIXED HYPERLIPIDEMIA: ICD-10-CM

## 2018-06-15 PROCEDURE — G8427 DOCREV CUR MEDS BY ELIG CLIN: HCPCS | Performed by: INTERNAL MEDICINE

## 2018-06-15 PROCEDURE — 99214 OFFICE O/P EST MOD 30 MIN: CPT | Performed by: INTERNAL MEDICINE

## 2018-06-15 PROCEDURE — 1090F PRES/ABSN URINE INCON ASSESS: CPT | Performed by: INTERNAL MEDICINE

## 2018-06-15 PROCEDURE — 1123F ACP DISCUSS/DSCN MKR DOCD: CPT | Performed by: INTERNAL MEDICINE

## 2018-06-15 PROCEDURE — 2022F DILAT RTA XM EVC RTNOPTHY: CPT | Performed by: INTERNAL MEDICINE

## 2018-06-15 PROCEDURE — 1036F TOBACCO NON-USER: CPT | Performed by: INTERNAL MEDICINE

## 2018-06-15 PROCEDURE — G8399 PT W/DXA RESULTS DOCUMENT: HCPCS | Performed by: INTERNAL MEDICINE

## 2018-06-15 PROCEDURE — 4040F PNEUMOC VAC/ADMIN/RCVD: CPT | Performed by: INTERNAL MEDICINE

## 2018-06-15 PROCEDURE — 3044F HG A1C LEVEL LT 7.0%: CPT | Performed by: INTERNAL MEDICINE

## 2018-06-15 PROCEDURE — G8420 CALC BMI NORM PARAMETERS: HCPCS | Performed by: INTERNAL MEDICINE

## 2018-06-15 PROCEDURE — 3017F COLORECTAL CA SCREEN DOC REV: CPT | Performed by: INTERNAL MEDICINE

## 2018-06-15 ASSESSMENT — ENCOUNTER SYMPTOMS
ABDOMINAL DISTENTION: 0
BLOOD IN STOOL: 0
WHEEZING: 0
EYE PAIN: 0
TROUBLE SWALLOWING: 0
COUGH: 0
SHORTNESS OF BREATH: 0
EYE DISCHARGE: 0
DIARRHEA: 0
COLOR CHANGE: 0

## 2018-06-15 ASSESSMENT — PATIENT HEALTH QUESTIONNAIRE - PHQ9
SUM OF ALL RESPONSES TO PHQ9 QUESTIONS 1 & 2: 0
2. FEELING DOWN, DEPRESSED OR HOPELESS: 0
1. LITTLE INTEREST OR PLEASURE IN DOING THINGS: 0
SUM OF ALL RESPONSES TO PHQ QUESTIONS 1-9: 0

## 2018-06-18 ENCOUNTER — HOSPITAL ENCOUNTER (OUTPATIENT)
Age: 73
Setting detail: SPECIMEN
Discharge: HOME | End: 2018-06-18
Payer: MEDICARE

## 2018-06-18 DIAGNOSIS — R63.4 WEIGHT LOSS: ICD-10-CM

## 2018-06-18 LAB
HEPATITIS C ANTIBODY: NONREACTIVE
TSH SERPL DL<=0.05 MIU/L-ACNC: 1.08 MIU/L (ref 0.3–5)

## 2018-06-21 ENCOUNTER — HOSPITAL ENCOUNTER (OUTPATIENT)
Dept: CT IMAGING | Facility: CLINIC | Age: 73
Discharge: HOME | End: 2018-06-23
Payer: MEDICARE

## 2018-06-21 ENCOUNTER — HOSPITAL ENCOUNTER (OUTPATIENT)
Age: 73
Setting detail: SPECIMEN
Discharge: HOME | End: 2018-06-21
Payer: MEDICARE

## 2018-06-21 DIAGNOSIS — R63.4 WEIGHT LOSS: ICD-10-CM

## 2018-06-21 LAB — POC CREATININE: 0.7 MG/DL (ref 0.6–1.4)

## 2018-06-21 PROCEDURE — 74177 CT ABD & PELVIS W/CONTRAST: CPT

## 2018-06-21 PROCEDURE — 2580000003 HC RX 258: Performed by: INTERNAL MEDICINE

## 2018-06-21 PROCEDURE — 6360000004 HC RX CONTRAST MEDICATION: Performed by: INTERNAL MEDICINE

## 2018-06-21 PROCEDURE — 71260 CT THORAX DX C+: CPT

## 2018-06-21 RX ORDER — SODIUM CHLORIDE 0.9 % (FLUSH) 0.9 %
10 SYRINGE (ML) INJECTION PRN
Status: DISCONTINUED | OUTPATIENT
Start: 2018-06-21 | End: 2018-06-24 | Stop reason: HOSPADM

## 2018-06-21 RX ORDER — 0.9 % SODIUM CHLORIDE 0.9 %
100 INTRAVENOUS SOLUTION INTRAVENOUS ONCE
Status: COMPLETED | OUTPATIENT
Start: 2018-06-21 | End: 2018-06-21

## 2018-06-21 RX ADMIN — Medication 10 ML: at 10:12

## 2018-06-21 RX ADMIN — IOPAMIDOL 75 ML: 755 INJECTION, SOLUTION INTRAVENOUS at 10:12

## 2018-06-21 RX ADMIN — SODIUM CHLORIDE 100 ML: 9 INJECTION, SOLUTION INTRAVENOUS at 10:11

## 2018-06-21 RX ADMIN — IOHEXOL 50 ML: 240 INJECTION, SOLUTION INTRATHECAL; INTRAVASCULAR; INTRAVENOUS; ORAL at 10:11

## 2018-06-22 ENCOUNTER — CARE COORDINATION (OUTPATIENT)
Dept: CARE COORDINATION | Age: 73
End: 2018-06-22

## 2018-06-29 RX ORDER — GABAPENTIN 300 MG/1
CAPSULE ORAL
Qty: 540 CAPSULE | Refills: 0 | Status: SHIPPED | OUTPATIENT
Start: 2018-06-29 | End: 2019-02-08 | Stop reason: SDUPTHER

## 2018-06-29 RX ORDER — MELOXICAM 7.5 MG/1
TABLET ORAL
Qty: 180 TABLET | Refills: 2 | Status: SHIPPED | OUTPATIENT
Start: 2018-06-29 | End: 2019-08-17 | Stop reason: SDUPTHER

## 2018-07-06 ENCOUNTER — TELEPHONE (OUTPATIENT)
Dept: INTERNAL MEDICINE CLINIC | Age: 73
End: 2018-07-06

## 2018-07-06 DIAGNOSIS — E04.1 THYROID NODULE: Primary | ICD-10-CM

## 2018-07-11 ENCOUNTER — HOSPITAL ENCOUNTER (OUTPATIENT)
Dept: ULTRASOUND IMAGING | Age: 73
Discharge: HOME | End: 2018-07-13
Payer: MEDICARE

## 2018-07-11 DIAGNOSIS — E04.1 THYROID NODULE: ICD-10-CM

## 2018-07-11 PROCEDURE — 76536 US EXAM OF HEAD AND NECK: CPT

## 2018-07-17 ENCOUNTER — HOSPITAL ENCOUNTER (OUTPATIENT)
Age: 73
Setting detail: SPECIMEN
Discharge: HOME | End: 2018-07-17
Payer: MEDICARE

## 2018-07-17 ENCOUNTER — OFFICE VISIT (OUTPATIENT)
Dept: INTERNAL MEDICINE CLINIC | Age: 73
End: 2018-07-17
Payer: MEDICARE

## 2018-07-17 VITALS
WEIGHT: 129 LBS | DIASTOLIC BLOOD PRESSURE: 80 MMHG | HEIGHT: 66 IN | BODY MASS INDEX: 20.73 KG/M2 | SYSTOLIC BLOOD PRESSURE: 124 MMHG

## 2018-07-17 DIAGNOSIS — I10 ESSENTIAL HYPERTENSION: ICD-10-CM

## 2018-07-17 DIAGNOSIS — E04.1 THYROID NODULE: ICD-10-CM

## 2018-07-17 DIAGNOSIS — E78.2 MIXED HYPERLIPIDEMIA: ICD-10-CM

## 2018-07-17 DIAGNOSIS — R63.4 WEIGHT LOSS: ICD-10-CM

## 2018-07-17 LAB
ESTIMATED AVERAGE GLUCOSE: 140 MG/DL
HBA1C MFR BLD: 6.5 % (ref 4–6)
TSH SERPL DL<=0.05 MIU/L-ACNC: 0.67 MIU/L (ref 0.3–5)

## 2018-07-17 PROCEDURE — 3044F HG A1C LEVEL LT 7.0%: CPT | Performed by: INTERNAL MEDICINE

## 2018-07-17 PROCEDURE — G8427 DOCREV CUR MEDS BY ELIG CLIN: HCPCS | Performed by: INTERNAL MEDICINE

## 2018-07-17 PROCEDURE — 1090F PRES/ABSN URINE INCON ASSESS: CPT | Performed by: INTERNAL MEDICINE

## 2018-07-17 PROCEDURE — 1036F TOBACCO NON-USER: CPT | Performed by: INTERNAL MEDICINE

## 2018-07-17 PROCEDURE — 1123F ACP DISCUSS/DSCN MKR DOCD: CPT | Performed by: INTERNAL MEDICINE

## 2018-07-17 PROCEDURE — 99214 OFFICE O/P EST MOD 30 MIN: CPT | Performed by: INTERNAL MEDICINE

## 2018-07-17 PROCEDURE — 3017F COLORECTAL CA SCREEN DOC REV: CPT | Performed by: INTERNAL MEDICINE

## 2018-07-17 PROCEDURE — 2022F DILAT RTA XM EVC RTNOPTHY: CPT | Performed by: INTERNAL MEDICINE

## 2018-07-17 PROCEDURE — 4040F PNEUMOC VAC/ADMIN/RCVD: CPT | Performed by: INTERNAL MEDICINE

## 2018-07-17 PROCEDURE — G8420 CALC BMI NORM PARAMETERS: HCPCS | Performed by: INTERNAL MEDICINE

## 2018-07-17 PROCEDURE — 1101F PT FALLS ASSESS-DOCD LE1/YR: CPT | Performed by: INTERNAL MEDICINE

## 2018-07-17 PROCEDURE — G8399 PT W/DXA RESULTS DOCUMENT: HCPCS | Performed by: INTERNAL MEDICINE

## 2018-07-17 ASSESSMENT — PATIENT HEALTH QUESTIONNAIRE - PHQ9
SUM OF ALL RESPONSES TO PHQ QUESTIONS 1-9: 0
2. FEELING DOWN, DEPRESSED OR HOPELESS: 0
1. LITTLE INTEREST OR PLEASURE IN DOING THINGS: 0
SUM OF ALL RESPONSES TO PHQ9 QUESTIONS 1 & 2: 0

## 2018-07-17 ASSESSMENT — ENCOUNTER SYMPTOMS
DIARRHEA: 0
TROUBLE SWALLOWING: 0
SHORTNESS OF BREATH: 0
COUGH: 0
EYE DISCHARGE: 0
COLOR CHANGE: 0
BLOOD IN STOOL: 0
ABDOMINAL DISTENTION: 0
EYE PAIN: 0
WHEEZING: 0

## 2018-07-17 NOTE — PROGRESS NOTES
sever  Associated signs and symtoms none   aggravated with sugar diet and better with low sugar diet             Review of Systems   Constitutional: Negative for appetite change, diaphoresis and fatigue. HENT: Negative for ear discharge and trouble swallowing. Eyes: Negative for pain and discharge. Respiratory: Negative for cough, shortness of breath and wheezing. Cardiovascular: Negative for chest pain and palpitations. Gastrointestinal: Negative for abdominal distention, blood in stool and diarrhea. Endocrine: Negative for polydipsia and polyphagia. Genitourinary: Negative for difficulty urinating and frequency. Musculoskeletal: Negative for gait problem, myalgias and neck pain. Skin: Negative for color change and rash. Allergic/Immunologic: Negative for environmental allergies and food allergies. Neurological: Negative for dizziness and headaches. Hematological: Negative for adenopathy. Does not bruise/bleed easily. Psychiatric/Behavioral: Negative for behavioral problems and sleep disturbance. Objective:   Physical Exam   Constitutional: She is oriented to person, place, and time. She appears well-developed and well-nourished. HENT:   Head: Normocephalic and atraumatic. Eyes: Conjunctivae and EOM are normal. Right eye exhibits no discharge. Left eye exhibits no discharge. Right conjunctiva is not injected. Left conjunctiva is not injected. Right eye exhibits normal extraocular motion. Left eye exhibits normal extraocular motion. Neck: Normal range of motion. Neck supple. No JVD present. No edema and no erythema present. No thyroid mass and no thyromegaly present. Cardiovascular: Normal rate and regular rhythm. Exam reveals no friction rub. No murmur heard. Pulmonary/Chest: Effort normal and breath sounds normal. No accessory muscle usage. No tachypnea and no bradypnea. No respiratory distress. She has no wheezes. She has no rales. Abdominal: Soft.  Bowel sounds are sugar levels twice daily) 1 kit 0    Lancets MISC 1 each by Does not apply route daily 100 each 3    omeprazole (PRILOSEC) 20 MG delayed release capsule Take 1 capsule by mouth  daily 90 capsule 3    oxybutynin (DITROPAN) 5 MG tablet Take 1 tablet by mouth 2 times daily      Calcium-Magnesium-Vitamin D (CALCIUM 1200+D3 PO) Take 2 tablets by mouth daily.  Glucosamine 500 MG CAPS Take 3 tablets by mouth daily.  acetaminophen (TYLENOL) 500 MG tablet Take 500 mg by mouth every 6 hours as needed for Pain.  aspirin 81 MG chewable tablet Take 81 mg by mouth daily.  atorvastatin (LIPITOR) 40 MG tablet Take 1 tablet by mouth daily 90 tablet 3    metFORMIN (GLUCOPHAGE) 1000 MG tablet Take 1 tablet by mouth 2 times daily (with meals) 180 tablet 3     No current facility-administered medications for this visit. ALLERGIES:  No Known Allergies    Social History   Substance Use Topics    Smoking status: Former Smoker     Packs/day: 2.00     Years: 20.00     Types: Cigarettes     Quit date: 1/1/1984    Smokeless tobacco: Never Used    Alcohol use 0.0 oz/week      Comment: social      Body mass index is 20.82 kg/m².   /80   Ht 5' 6\" (1.676 m)   Wt 129 lb (58.5 kg)   BMI 20.82 kg/m²     Lab Results   Component Value Date     03/29/2017    K 3.6 03/29/2017     03/29/2017    CO2 24 03/29/2017    BUN 14 03/29/2017    CREATININE 0.7 06/21/2018    CREATININE 0.62 06/26/2017    GLUCOSE 234 03/29/2017    CALCIUM 8.2 03/29/2017    PROT 6.9 03/28/2017    LABALBU 3.1 03/28/2017    BILITOT 0.59 03/28/2017    ALKPHOS 104 03/28/2017    AST 73 03/28/2017    ALT 71 03/28/2017       Lab Results   Component Value Date    WBC 6.6 09/13/2017    RBC 5.32 09/13/2017    HGB 14.8 09/13/2017    HCT 45.6 09/13/2017    MCV 85.7 09/13/2017    MCH 27.8 09/13/2017    MCHC 32.5 09/13/2017    RDW 16.1 09/13/2017     09/13/2017    MPV 8.3 09/13/2017       Lab Results   Component Value Date    LABA1C 6.5 02/12/2018       Lab Results   Component Value Date    HDL 90 02/12/2018    LDLCHOLESTEROL 49 02/12/2018       Assessment:       Diagnosis Orders   1. DM type 2, uncontrolled, with neuropathy (HCC)  HM DIABETES FOOT EXAM    Hemoglobin A1C   2. Essential hypertension     3. Mixed hyperlipidemia     4. Weight loss     5. Thyroid nodule  US Thyroid    TSH With Reflex Ft4           Plan:      No Follow-up on file. Orders Placed This Encounter   Procedures    US Thyroid     Standing Status:   Future     Standing Expiration Date:   7/17/2019    Hemoglobin A1C     Standing Status:   Future     Standing Expiration Date:   10/15/2018    TSH With Reflex Ft4     Standing Status:   Future     Standing Expiration Date:   7/17/2019     DIABETES FOOT EXAM     No orders of the defined types were placed in this encounter.         likey caue of wt loss is extreme diet with dm 2  Pt seems very concerned of dm  [place] dign[place]  Doing well with control  sherlyn cut ack metofmon to half  Do a1c  May nneed go back to full dose  Wt bearing exercise  Ct abdo chst no ca   thyoid nodule sherlyn need us thyroid

## 2018-07-18 ENCOUNTER — CARE COORDINATION (OUTPATIENT)
Dept: CARE COORDINATION | Age: 73
End: 2018-07-18

## 2018-08-24 ENCOUNTER — OFFICE VISIT (OUTPATIENT)
Dept: INTERNAL MEDICINE CLINIC | Age: 73
End: 2018-08-24
Payer: MEDICARE

## 2018-08-24 ENCOUNTER — CARE COORDINATION (OUTPATIENT)
Dept: INTERNAL MEDICINE CLINIC | Age: 73
End: 2018-08-24

## 2018-08-24 VITALS
BODY MASS INDEX: 20.73 KG/M2 | HEIGHT: 66 IN | SYSTOLIC BLOOD PRESSURE: 138 MMHG | WEIGHT: 129 LBS | DIASTOLIC BLOOD PRESSURE: 88 MMHG

## 2018-08-24 DIAGNOSIS — R50.9 FEVER OF UNDETERMINED ORIGIN: Primary | ICD-10-CM

## 2018-08-24 DIAGNOSIS — R61 NIGHT SWEAT: ICD-10-CM

## 2018-08-24 DIAGNOSIS — E78.2 MIXED HYPERLIPIDEMIA: ICD-10-CM

## 2018-08-24 DIAGNOSIS — I10 ESSENTIAL HYPERTENSION: ICD-10-CM

## 2018-08-24 DIAGNOSIS — E11.00 UNCONTROLLED TYPE 2 DM WITH HYPEROSMOLAR NONKETOTIC HYPERGLYCEMIA (HCC): ICD-10-CM

## 2018-08-24 PROCEDURE — G8399 PT W/DXA RESULTS DOCUMENT: HCPCS | Performed by: INTERNAL MEDICINE

## 2018-08-24 PROCEDURE — 3017F COLORECTAL CA SCREEN DOC REV: CPT | Performed by: INTERNAL MEDICINE

## 2018-08-24 PROCEDURE — 1090F PRES/ABSN URINE INCON ASSESS: CPT | Performed by: INTERNAL MEDICINE

## 2018-08-24 PROCEDURE — 99214 OFFICE O/P EST MOD 30 MIN: CPT | Performed by: INTERNAL MEDICINE

## 2018-08-24 PROCEDURE — 1123F ACP DISCUSS/DSCN MKR DOCD: CPT | Performed by: INTERNAL MEDICINE

## 2018-08-24 PROCEDURE — 2022F DILAT RTA XM EVC RTNOPTHY: CPT | Performed by: INTERNAL MEDICINE

## 2018-08-24 PROCEDURE — G8420 CALC BMI NORM PARAMETERS: HCPCS | Performed by: INTERNAL MEDICINE

## 2018-08-24 PROCEDURE — G8427 DOCREV CUR MEDS BY ELIG CLIN: HCPCS | Performed by: INTERNAL MEDICINE

## 2018-08-24 PROCEDURE — 1036F TOBACCO NON-USER: CPT | Performed by: INTERNAL MEDICINE

## 2018-08-24 PROCEDURE — 3044F HG A1C LEVEL LT 7.0%: CPT | Performed by: INTERNAL MEDICINE

## 2018-08-24 PROCEDURE — 4040F PNEUMOC VAC/ADMIN/RCVD: CPT | Performed by: INTERNAL MEDICINE

## 2018-08-24 PROCEDURE — 1101F PT FALLS ASSESS-DOCD LE1/YR: CPT | Performed by: INTERNAL MEDICINE

## 2018-08-24 RX ORDER — GLUCOSAMINE HCL/CHONDROITIN SU 500-400 MG
CAPSULE ORAL
Qty: 200 STRIP | Refills: 3 | Status: SHIPPED | OUTPATIENT
Start: 2018-08-24 | End: 2020-07-23 | Stop reason: SDUPTHER

## 2018-08-24 ASSESSMENT — ENCOUNTER SYMPTOMS
ABDOMINAL DISTENTION: 0
TROUBLE SWALLOWING: 0
COUGH: 0
COLOR CHANGE: 0
EYE DISCHARGE: 0
DIARRHEA: 0
WHEEZING: 0
SHORTNESS OF BREATH: 0
BLOOD IN STOOL: 0
EYE PAIN: 0

## 2018-08-24 NOTE — PROGRESS NOTES
Subjective:      Visit Information    Have you changed or started any medications since your last visit including any over-the-counter medicines, vitamins, or herbal medicines? no   Have you stopped taking any of your medications? Is so, why? -  no  Are you having any side effects from any of your medications? - no    Have you seen any other physician or provider since your last visit?  no   Have you had any other diagnostic tests since your last visit? Yes, thyroid u/s  Have you been seen in the emergency room and/or had an admission in a hospital since we last saw you?  no   Have you had your routine dental cleaning in the past 6 months?  no     Do you have an active MyChart account? If no, what is the barrier? Yes    Patient Care Team:  Mia Angulo MD as PCP - General (Internal Medicine)  Mia Angulo MD as PCP - S Attributed Provider  Stephanie Barrera DO (Obstetrics & Gynecology)  Crow Lopez RN as Care Coordinator    Medical History Review  Past Medical, Family, and Social History reviewed and does not contribute to the patient presenting condition    Health Maintenance   Topic Date Due    DTaP/Tdap/Td vaccine (1 - Tdap) 09/08/1963    Shingles Vaccine (1 of 2 - 2 Dose Series) 09/08/1994    Potassium monitoring  03/29/2018    Creatinine monitoring  06/26/2018    Diabetic retinal exam  07/31/2018    Flu vaccine (1) 03/28/2019 (Originally 9/1/2018)    Diabetic microalbuminuria test  02/12/2019    Lipid screen  02/12/2019    Diabetic foot exam  07/17/2019    A1C test (Diabetic or Prediabetic)  07/17/2019    Breast cancer screen  11/14/2019    Colon cancer screen colonoscopy  04/24/2025    DEXA (modify frequency per FRAX score)  Completed    Pneumococcal low/med risk  Completed             Patient ID: Monica Yoon is a 67 y.o. female.     HTN  Onset more than 2 years ago  ernesto mild to mod  Controlled with current po meds  Not associated with headaches or blurry vision  No chest accessory muscle usage. No tachypnea and no bradypnea. No respiratory distress. She has no wheezes. She has no rales. Abdominal: Soft. Bowel sounds are normal. She exhibits no distension. There is no tenderness. There is no rebound. Musculoskeletal: Normal range of motion. She exhibits no edema or tenderness. Lymphadenopathy:        Head (right side): No submental and no submandibular adenopathy present. Head (left side): No submental and no submandibular adenopathy present. She has no cervical adenopathy. Neurological: She is alert and oriented to person, place, and time. She displays no atrophy. No cranial nerve deficit or sensory deficit. She exhibits normal muscle tone. Coordination normal.   Skin: Skin is warm. No bruising, no ecchymosis and no rash noted. She is not diaphoretic. No pallor. Psychiatric: She has a normal mood and affect. Her behavior is normal. Her mood appears not anxious. Her affect is not angry. Her speech is not slurred. She is not aggressive. Cognition and memory are not impaired. She expresses no homicidal ideation. I have personally reviewed and agree with the patient ROS, HPI and [address] Florentino is a 67 y.o. female who presents today for follow up on her chronic medical conditions as noted below. Paulina Khalil is c/o of   Chief Complaint   Patient presents with    Results     labs and thyroid u/s    Fever     pt states she will get a fever for 2-3 days every month. pt is wondering what this could be.         Patient Active Problem List:     Hypertension     Hyperlipidemia     Thoracic or lumbosacral neuritis or radiculitis, unspecified     Overactive bladder     Lumbar stenosis     History of lumbosacral spine surgery     S/P spinal fusion     Osteoporosis     Intercostal pain     Acute cystitis     Microhematuria     History of recurrent UTIs     DM type 2, uncontrolled, with neuropathy ([facility] [address]     Past Medical History:   Diagnosis Date    Anxiety state, Ros Michaud MD

## 2018-08-29 ENCOUNTER — HOSPITAL ENCOUNTER (OUTPATIENT)
Age: 73
Setting detail: SPECIMEN
Discharge: HOME | End: 2018-08-29
Payer: MEDICARE

## 2018-08-29 DIAGNOSIS — R50.9 FEVER OF UNDETERMINED ORIGIN: ICD-10-CM

## 2018-08-29 LAB
LACTATE DEHYDROGENASE: 137 U/L (ref 135–214)
SEDIMENTATION RATE, ERYTHROCYTE: 3 MM (ref 0–20)

## 2018-08-31 ENCOUNTER — TELEPHONE (OUTPATIENT)
Dept: INTERNAL MEDICINE CLINIC | Age: 73
End: 2018-08-31

## 2018-08-31 NOTE — TELEPHONE ENCOUNTER
Received a call from [employer] they are unable to reach the pt at either of her number and they need to know what brand of test strips she is using. They are not able to fill until they get the info.

## 2018-09-13 DIAGNOSIS — E11.00 UNCONTROLLED TYPE 2 DM WITH HYPEROSMOLAR NONKETOTIC HYPERGLYCEMIA (HCC): ICD-10-CM

## 2018-09-18 DIAGNOSIS — E11.00 UNCONTROLLED TYPE 2 DM WITH HYPEROSMOLAR NONKETOTIC HYPERGLYCEMIA (HCC): ICD-10-CM

## 2018-09-20 ENCOUNTER — CARE COORDINATION (OUTPATIENT)
Dept: CARE COORDINATION | Age: 73
End: 2018-09-20

## 2018-09-20 NOTE — CARE COORDINATION
Attempting to reach patient for a follow up care coordination call regarding any needs, questions or concerns.   Lisa Valladares, [address]

## 2018-09-27 ENCOUNTER — HOSPITAL ENCOUNTER (OUTPATIENT)
Age: 73
Setting detail: SPECIMEN
Discharge: HOME | End: 2018-09-27
Payer: MEDICARE

## 2018-09-27 LAB
C-REACTIVE PROTEIN: 1.1 MG/L (ref 0–5)
LACTATE DEHYDROGENASE: 154 U/L (ref 135–214)
SEDIMENTATION RATE, ERYTHROCYTE: 1 MM (ref 0–20)

## 2018-10-05 ENCOUNTER — OFFICE VISIT (OUTPATIENT)
Dept: INTERNAL MEDICINE CLINIC | Age: 73
End: 2018-10-05
Payer: MEDICARE

## 2018-10-05 ENCOUNTER — CARE COORDINATION (OUTPATIENT)
Dept: CARE COORDINATION | Age: 73
End: 2018-10-05

## 2018-10-05 VITALS
DIASTOLIC BLOOD PRESSURE: 80 MMHG | WEIGHT: 132 LBS | HEIGHT: 66 IN | SYSTOLIC BLOOD PRESSURE: 138 MMHG | BODY MASS INDEX: 21.21 KG/M2

## 2018-10-05 DIAGNOSIS — E78.2 MIXED HYPERLIPIDEMIA: ICD-10-CM

## 2018-10-05 DIAGNOSIS — I10 ESSENTIAL HYPERTENSION: ICD-10-CM

## 2018-10-05 PROCEDURE — 1036F TOBACCO NON-USER: CPT | Performed by: INTERNAL MEDICINE

## 2018-10-05 PROCEDURE — G8420 CALC BMI NORM PARAMETERS: HCPCS | Performed by: INTERNAL MEDICINE

## 2018-10-05 PROCEDURE — G8484 FLU IMMUNIZE NO ADMIN: HCPCS | Performed by: INTERNAL MEDICINE

## 2018-10-05 PROCEDURE — 1101F PT FALLS ASSESS-DOCD LE1/YR: CPT | Performed by: INTERNAL MEDICINE

## 2018-10-05 PROCEDURE — 99213 OFFICE O/P EST LOW 20 MIN: CPT | Performed by: INTERNAL MEDICINE

## 2018-10-05 PROCEDURE — G8427 DOCREV CUR MEDS BY ELIG CLIN: HCPCS | Performed by: INTERNAL MEDICINE

## 2018-10-05 PROCEDURE — 1123F ACP DISCUSS/DSCN MKR DOCD: CPT | Performed by: INTERNAL MEDICINE

## 2018-10-05 PROCEDURE — G8399 PT W/DXA RESULTS DOCUMENT: HCPCS | Performed by: INTERNAL MEDICINE

## 2018-10-05 PROCEDURE — 4040F PNEUMOC VAC/ADMIN/RCVD: CPT | Performed by: INTERNAL MEDICINE

## 2018-10-05 PROCEDURE — 3044F HG A1C LEVEL LT 7.0%: CPT | Performed by: INTERNAL MEDICINE

## 2018-10-05 PROCEDURE — 1090F PRES/ABSN URINE INCON ASSESS: CPT | Performed by: INTERNAL MEDICINE

## 2018-10-05 PROCEDURE — 2022F DILAT RTA XM EVC RTNOPTHY: CPT | Performed by: INTERNAL MEDICINE

## 2018-10-05 PROCEDURE — 3017F COLORECTAL CA SCREEN DOC REV: CPT | Performed by: INTERNAL MEDICINE

## 2018-10-05 ASSESSMENT — ENCOUNTER SYMPTOMS
COUGH: 0
WHEEZING: 0
EYE PAIN: 0
TROUBLE SWALLOWING: 0
COLOR CHANGE: 0
DIARRHEA: 0
ABDOMINAL DISTENTION: 0
EYE DISCHARGE: 0
SHORTNESS OF BREATH: 0
BLOOD IN STOOL: 0

## 2018-10-05 NOTE — CARE COORDINATION
Take 1 capsule by mouth daily 1/3/18 1/3/19  JAMES Sherman CNP   ONE TOUCH ULTRASOFT LANCETS MISC 1 each by Does not apply route 2 times daily 4/5/17   Orlando Iqbal MD   Blood Glucose Monitoring Suppl (ONE TOUCH ULTRA MINI) W/DEVICE KIT 1 kit by Does not apply route daily as needed (patient is checking blood sugar levels twice daily) 4/3/17   Orlando Iqbal MD   Lancets MISC 1 each by Does not apply route daily 3/29/17   Ronna Shields MD   omeprazole (PRILOSEC) 20 MG delayed release capsule Take 1 capsule by mouth  daily 1/5/17   Orlando Iqbal MD   oxybutynin (DITROPAN) 5 MG tablet Take 1 tablet by mouth 2 times daily 5/20/15   Historical Provider, MD   Calcium-Magnesium-Vitamin D (CALCIUM 1200+D3 PO) Take 2 tablets by mouth daily. Historical Provider, MD   Glucosamine 500 MG CAPS Take 3 tablets by mouth daily. Historical Provider, MD   acetaminophen (TYLENOL) 500 MG tablet Take 500 mg by mouth every 6 hours as needed for Pain. Historical Provider, MD   aspirin 81 MG chewable tablet Take 81 mg by mouth daily.       Historical Provider, MD       Future Appointments  Date Time Provider Deidra Cici   1/8/2019 10:20 AM JAMES Sherman CNP Neuro Spec [id number]   2/8/2019 9:30 AM [facility]

## 2018-10-05 NOTE — PROGRESS NOTES
Subjective:      Visit Information    Have you changed or started any medications since your last visit including any over-the-counter medicines, vitamins, or herbal medicines? no   Have you stopped taking any of your medications? Is so, why? -  no  Are you having any side effects from any of your medications? - no    Have you seen any other physician or provider since your last visit?  no   Have you had any other diagnostic tests since your last visit?  no   Have you been seen in the emergency room and/or had an admission in a hospital since we last saw you?  no   Have you had your routine dental cleaning in the past 6 months?  yes -      Do you have an active MyChart account? If no, what is the barrier? Yes    Patient Care Team:  Ronni Schirmer, MD as PCP - General (Internal Medicine)  Ronni Schirmer, MD as PCP - S Attributed Provider  Stephanie Desir DO (Obstetrics & Gynecology)  Sully Rodriguez RN as Care Coordinator    Medical History Review  Past Medical, Family, and Social History reviewed and does not contribute to the patient presenting condition    Health Maintenance   Topic Date Due    DTaP/Tdap/Td vaccine (1 - Tdap) 09/08/1963    Shingles Vaccine (1 of 2 - 2 Dose Series) 09/08/1994    Potassium monitoring  03/29/2018    Creatinine monitoring  06/26/2018    Diabetic retinal exam  07/31/2018    Flu vaccine (1) 03/28/2019 (Originally 9/1/2018)    Diabetic microalbuminuria test  02/12/2019    Lipid screen  02/12/2019    Diabetic foot exam  07/17/2019    A1C test (Diabetic or Prediabetic)  07/17/2019    Breast cancer screen  11/14/2019    Colon cancer screen colonoscopy  04/24/2025    DEXA (modify frequency per FRAX score)  Completed    Pneumococcal low/med risk  Completed             Patient ID: Temi Alcazar is a 75 y.o. female.     HTN  Onset more than 2 years ago  ernesto mild to mod  Controlled with current po meds  Not associated with headaches or blurry vision  No chest pain  Diabetes

## 2018-11-01 ENCOUNTER — OFFICE VISIT (OUTPATIENT)
Dept: INTERNAL MEDICINE CLINIC | Age: 73
End: 2018-11-01
Payer: MEDICARE

## 2018-11-01 ENCOUNTER — HOSPITAL ENCOUNTER (OUTPATIENT)
Age: 73
Setting detail: SPECIMEN
Discharge: HOME | End: 2018-11-01
Payer: MEDICARE

## 2018-11-01 VITALS
RESPIRATION RATE: 14 BRPM | WEIGHT: 133 LBS | DIASTOLIC BLOOD PRESSURE: 75 MMHG | HEART RATE: 66 BPM | SYSTOLIC BLOOD PRESSURE: 140 MMHG | BODY MASS INDEX: 21.38 KG/M2 | HEIGHT: 66 IN

## 2018-11-01 DIAGNOSIS — Z00.00 ROUTINE GENERAL MEDICAL EXAMINATION AT A HEALTH CARE FACILITY: ICD-10-CM

## 2018-11-01 DIAGNOSIS — E78.2 MIXED HYPERLIPIDEMIA: ICD-10-CM

## 2018-11-01 DIAGNOSIS — I10 HYPERTENSION, UNSPECIFIED TYPE: ICD-10-CM

## 2018-11-01 LAB
CREATININE URINE: 240 MG/DL (ref 28–217)
MICROALBUMIN/CREAT 24H UR: 28 MG/L
MICROALBUMIN/CREAT UR-RTO: 12 MCG/MG CREAT

## 2018-11-01 PROCEDURE — 3044F HG A1C LEVEL LT 7.0%: CPT | Performed by: INTERNAL MEDICINE

## 2018-11-01 PROCEDURE — 4040F PNEUMOC VAC/ADMIN/RCVD: CPT | Performed by: INTERNAL MEDICINE

## 2018-11-01 PROCEDURE — G8484 FLU IMMUNIZE NO ADMIN: HCPCS | Performed by: INTERNAL MEDICINE

## 2018-11-01 PROCEDURE — G0438 PPPS, INITIAL VISIT: HCPCS | Performed by: INTERNAL MEDICINE

## 2018-11-01 ASSESSMENT — PATIENT HEALTH QUESTIONNAIRE - PHQ9
SUM OF ALL RESPONSES TO PHQ QUESTIONS 1-9: 0
SUM OF ALL RESPONSES TO PHQ9 QUESTIONS 1 & 2: 0
SUM OF ALL RESPONSES TO PHQ QUESTIONS 1-9: 0
1. LITTLE INTEREST OR PLEASURE IN DOING THINGS: 0
2. FEELING DOWN, DEPRESSED OR HOPELESS: 0

## 2018-11-01 ASSESSMENT — LIFESTYLE VARIABLES
HOW MANY STANDARD DRINKS CONTAINING ALCOHOL DO YOU HAVE ON A TYPICAL DAY: 0
HOW OFTEN DO YOU HAVE SIX OR MORE DRINKS ON ONE OCCASION: 0
HOW OFTEN DURING THE LAST YEAR HAVE YOU HAD A FEELING OF GUILT OR REMORSE AFTER DRINKING: 0
HOW OFTEN DURING THE LAST YEAR HAVE YOU FOUND THAT YOU WERE NOT ABLE TO STOP DRINKING ONCE YOU HAD STARTED: 0
AUDIT TOTAL SCORE: 4
AUDIT-C TOTAL SCORE: 4
HAS A RELATIVE, FRIEND, DOCTOR, OR ANOTHER HEALTH PROFESSIONAL EXPRESSED CONCERN ABOUT YOUR DRINKING OR SUGGESTED YOU CUT DOWN: 0
HOW OFTEN DURING THE LAST YEAR HAVE YOU NEEDED AN ALCOHOLIC DRINK FIRST THING IN THE MORNING TO GET YOURSELF GOING AFTER A NIGHT OF HEAVY DRINKING: 0
HOW OFTEN DURING THE LAST YEAR HAVE YOU BEEN UNABLE TO REMEMBER WHAT HAPPENED THE NIGHT BEFORE BECAUSE YOU HAD BEEN DRINKING: 0
HAVE YOU OR SOMEONE ELSE BEEN INJURED AS A RESULT OF YOUR DRINKING: 0
HOW OFTEN DO YOU HAVE A DRINK CONTAINING ALCOHOL: 4
HOW OFTEN DURING THE LAST YEAR HAVE YOU FAILED TO DO WHAT WAS NORMALLY EXPECTED FROM YOU BECAUSE OF DRINKING: 0

## 2018-11-01 ASSESSMENT — ANXIETY QUESTIONNAIRES: GAD7 TOTAL SCORE: 0

## 2018-11-01 NOTE — PROGRESS NOTES
Medicare Annual Wellness Visit  Name: Iqra Perez Date: 2018   MRN: [id number] Sex: Female   Age: 75 y.o. Ethnicity: Non-/Non    : 1945 Race: Matias Mccabe is here for Annual Exam    Screenings for behavioral, psychosocial and functional/safety risks, and cognitive dysfunction are all negative except as indicated below. These results, as well as other patient data from the [address] form, are documented in Flowsheets linked to this Encounter. No Known Allergies    Prior to Visit Medications    Medication Sig Taking?  Authorizing Provider   ONE TOUCH ULTRA TEST strip TEST BLOOD SUGAR LEVELS FOUR TIMES A DAY Yes Naina Simmons MD   ONE TOUCH ULTRA TEST strip TEST BLOOD SUGAR LEVELS FOUR TIMES A DAY Yes Hugo Ferguson MD   blood glucose monitor strips Patient checking blood sugar levels 4 times daily Yes Hugo Ferguson MD   metFORMIN (GLUCOPHAGE) 1000 MG tablet TAKE 1 TABLET BY MOUTH TWO  TIMES DAILY WITH MEALS Yes Hugo Ferguson MD   meloxicam (MOBIC) 7.5 MG tablet TAKE 1 TABLET BY MOUTH TWO  TIMES DAILY AS NEEDED Yes Hugo Ferguson MD   gabapentin (NEURONTIN) 300 MG capsule TAKE 2 CAPSULES BY MOUTH 3  TIMES A DAY Yes Hugo Ferguson MD   metoprolol tartrate (LOPRESSOR) 25 MG tablet TAKE 1 TABLET BY MOUTH TWO  TIMES DAILY Yes Hugo Ferguson MD   lisinopril (PRINIVIL;ZESTRIL) 10 MG tablet Take 1 tablet by mouth daily Yes Hugo Ferguson MD   atorvastatin (LIPITOR) 40 MG tablet Take 1 tablet by mouth daily Yes Hugo Ferguson MD   DULoxetine (CYMBALTA) 20 MG extended release capsule Take 1 capsule by mouth daily Yes Cruzito Zamora APRN - CNP   ONE TOUCH ULTRASOFT LANCETS MISC 1 each by Does not apply route 2 times daily Yes Naina Simmons MD   Blood Glucose Monitoring Suppl (ONE TOUCH ULTRA MINI) W/DEVICE KIT 1 kit by Does not apply route daily as needed (patient is checking blood sugar levels twice daily) Yes Naina Simmons MD

## 2018-11-01 NOTE — PATIENT INSTRUCTIONS
Personalized Preventive Plan for Julieth Filter - 11/1/2018  Medicare offers a range of preventive health benefits. Some of the tests and screenings are paid in full while other may be subject to a deductible, co-insurance, and/or copay. Some of these benefits include a comprehensive review of your medical history including lifestyle, illnesses that may run in your family, and various assessments and screenings as appropriate. After reviewing your medical record and screening and assessments performed today your provider may have ordered immunizations, labs, imaging, and/or referrals for you. A list of these orders (if applicable) as well as your Preventive Care list are included within your After Visit Summary for your review. Other Preventive Recommendations:    · A preventive eye exam performed by an eye specialist is recommended every 1-2 years to screen for glaucoma; cataracts, macular degeneration, and other eye disorders. · A preventive dental visit is recommended every 6 months. · Try to get at least 150 minutes of exercise per week or 10,000 steps per day on a pedometer . · Order or download the FREE \"Exercise & Physical Activity: Your Everyday Guide\" from [employer]. Call 5-267.351.6654 or search [employer] online. · You need 9058-0665 mg of calcium and 9719-5252 IU of vitamin D per day. It is possible to meet your calcium requirement with diet alone, but a vitamin D supplement is usually necessary to meet this goal.  · When exposed to the sun, use a sunscreen that protects against both UVA and UVB radiation with an SPF of 30 or greater. Reapply every 2 to 3 hours or after sweating, drying off with a towel, or swimming. · Always wear a seat belt when traveling in a car. Always wear a helmet when riding a bicycle or motorcycle.

## 2018-11-02 LAB
ESTIMATED AVERAGE GLUCOSE: 137 MG/DL
HBA1C MFR BLD: 6.4 % (ref 4–6)

## 2019-01-04 RX ORDER — ATORVASTATIN CALCIUM 40 MG/1
40 TABLET, FILM COATED ORAL DAILY
Qty: 90 TABLET | Refills: 3 | Status: SHIPPED | OUTPATIENT
Start: 2019-01-04 | End: 2019-11-18

## 2019-01-08 ENCOUNTER — OFFICE VISIT (OUTPATIENT)
Dept: NEUROLOGY | Age: 74
End: 2019-01-08
Payer: MEDICARE

## 2019-01-08 VITALS
DIASTOLIC BLOOD PRESSURE: 72 MMHG | SYSTOLIC BLOOD PRESSURE: 136 MMHG | BODY MASS INDEX: 21.95 KG/M2 | HEIGHT: 66 IN | WEIGHT: 136.6 LBS | HEART RATE: 78 BPM

## 2019-01-08 DIAGNOSIS — Z98.1 S/P SPINAL FUSION: ICD-10-CM

## 2019-01-08 DIAGNOSIS — M48.062 SPINAL STENOSIS OF LUMBAR REGION WITH NEUROGENIC CLAUDICATION: Primary | ICD-10-CM

## 2019-01-08 PROCEDURE — G8427 DOCREV CUR MEDS BY ELIG CLIN: HCPCS | Performed by: NURSE PRACTITIONER

## 2019-01-08 PROCEDURE — G8420 CALC BMI NORM PARAMETERS: HCPCS | Performed by: NURSE PRACTITIONER

## 2019-01-08 PROCEDURE — 1090F PRES/ABSN URINE INCON ASSESS: CPT | Performed by: NURSE PRACTITIONER

## 2019-01-08 PROCEDURE — 1123F ACP DISCUSS/DSCN MKR DOCD: CPT | Performed by: NURSE PRACTITIONER

## 2019-01-08 PROCEDURE — 1036F TOBACCO NON-USER: CPT | Performed by: NURSE PRACTITIONER

## 2019-01-08 PROCEDURE — G8399 PT W/DXA RESULTS DOCUMENT: HCPCS | Performed by: NURSE PRACTITIONER

## 2019-01-08 PROCEDURE — G8484 FLU IMMUNIZE NO ADMIN: HCPCS | Performed by: NURSE PRACTITIONER

## 2019-01-08 PROCEDURE — 4040F PNEUMOC VAC/ADMIN/RCVD: CPT | Performed by: NURSE PRACTITIONER

## 2019-01-08 PROCEDURE — 3017F COLORECTAL CA SCREEN DOC REV: CPT | Performed by: NURSE PRACTITIONER

## 2019-01-08 PROCEDURE — 99214 OFFICE O/P EST MOD 30 MIN: CPT | Performed by: NURSE PRACTITIONER

## 2019-01-08 PROCEDURE — 1101F PT FALLS ASSESS-DOCD LE1/YR: CPT | Performed by: NURSE PRACTITIONER

## 2019-01-08 RX ORDER — DULOXETIN HYDROCHLORIDE 30 MG/1
30 CAPSULE, DELAYED RELEASE ORAL DAILY
Qty: 90 CAPSULE | Refills: 3 | Status: SHIPPED | OUTPATIENT
Start: 2019-01-08 | End: 2020-01-08

## 2019-02-08 DIAGNOSIS — I10 ESSENTIAL HYPERTENSION: ICD-10-CM

## 2019-02-08 RX ORDER — GABAPENTIN 300 MG/1
CAPSULE ORAL
Qty: 540 CAPSULE | Refills: 1 | Status: SHIPPED | OUTPATIENT
Start: 2019-02-08 | End: 2020-01-08

## 2019-02-13 ENCOUNTER — HOSPITAL ENCOUNTER (OUTPATIENT)
Age: 74
Setting detail: SPECIMEN
Discharge: HOME | End: 2019-02-13
Payer: MEDICARE

## 2019-02-13 ENCOUNTER — OFFICE VISIT (OUTPATIENT)
Dept: INTERNAL MEDICINE CLINIC | Age: 74
End: 2019-02-13
Payer: MEDICARE

## 2019-02-13 VITALS
DIASTOLIC BLOOD PRESSURE: 72 MMHG | WEIGHT: 136 LBS | SYSTOLIC BLOOD PRESSURE: 132 MMHG | BODY MASS INDEX: 21.86 KG/M2 | HEIGHT: 66 IN

## 2019-02-13 DIAGNOSIS — M19.041 PRIMARY OSTEOARTHRITIS OF BOTH HANDS: ICD-10-CM

## 2019-02-13 DIAGNOSIS — E78.2 MIXED HYPERLIPIDEMIA: ICD-10-CM

## 2019-02-13 DIAGNOSIS — M19.042 PRIMARY OSTEOARTHRITIS OF BOTH HANDS: ICD-10-CM

## 2019-02-13 DIAGNOSIS — E11.00 UNCONTROLLED TYPE 2 DM WITH HYPEROSMOLAR NONKETOTIC HYPERGLYCEMIA (HCC): ICD-10-CM

## 2019-02-13 DIAGNOSIS — I10 ESSENTIAL HYPERTENSION: ICD-10-CM

## 2019-02-13 LAB
CREATININE URINE: 335.6 MG/DL (ref 28–217)
ESTIMATED AVERAGE GLUCOSE: 154 MG/DL
HBA1C MFR BLD: 7 % (ref 4–6)
MICROALBUMIN/CREAT 24H UR: 61 MG/L
MICROALBUMIN/CREAT UR-RTO: 18 MCG/MG CREAT

## 2019-02-13 PROCEDURE — 4040F PNEUMOC VAC/ADMIN/RCVD: CPT | Performed by: INTERNAL MEDICINE

## 2019-02-13 PROCEDURE — 1036F TOBACCO NON-USER: CPT | Performed by: INTERNAL MEDICINE

## 2019-02-13 PROCEDURE — 3017F COLORECTAL CA SCREEN DOC REV: CPT | Performed by: INTERNAL MEDICINE

## 2019-02-13 PROCEDURE — G8427 DOCREV CUR MEDS BY ELIG CLIN: HCPCS | Performed by: INTERNAL MEDICINE

## 2019-02-13 PROCEDURE — 1123F ACP DISCUSS/DSCN MKR DOCD: CPT | Performed by: INTERNAL MEDICINE

## 2019-02-13 PROCEDURE — G8399 PT W/DXA RESULTS DOCUMENT: HCPCS | Performed by: INTERNAL MEDICINE

## 2019-02-13 PROCEDURE — 2022F DILAT RTA XM EVC RTNOPTHY: CPT | Performed by: INTERNAL MEDICINE

## 2019-02-13 PROCEDURE — G8420 CALC BMI NORM PARAMETERS: HCPCS | Performed by: INTERNAL MEDICINE

## 2019-02-13 PROCEDURE — G8484 FLU IMMUNIZE NO ADMIN: HCPCS | Performed by: INTERNAL MEDICINE

## 2019-02-13 PROCEDURE — 1101F PT FALLS ASSESS-DOCD LE1/YR: CPT | Performed by: INTERNAL MEDICINE

## 2019-02-13 PROCEDURE — 1090F PRES/ABSN URINE INCON ASSESS: CPT | Performed by: INTERNAL MEDICINE

## 2019-02-13 PROCEDURE — 3046F HEMOGLOBIN A1C LEVEL >9.0%: CPT | Performed by: INTERNAL MEDICINE

## 2019-02-13 PROCEDURE — 99214 OFFICE O/P EST MOD 30 MIN: CPT | Performed by: INTERNAL MEDICINE

## 2019-02-13 RX ORDER — IBUPROFEN 400 MG/1
400 TABLET ORAL EVERY 8 HOURS PRN
Qty: 90 TABLET | Refills: 1 | Status: SHIPPED | OUTPATIENT
Start: 2019-02-13 | End: 2020-02-24

## 2019-02-13 ASSESSMENT — ENCOUNTER SYMPTOMS
EYE DISCHARGE: 0
WHEEZING: 0
COLOR CHANGE: 0
SHORTNESS OF BREATH: 0
TROUBLE SWALLOWING: 0
DIARRHEA: 0
EYE PAIN: 0
BLOOD IN STOOL: 0
COUGH: 0
ABDOMINAL DISTENTION: 0

## 2019-02-13 ASSESSMENT — PATIENT HEALTH QUESTIONNAIRE - PHQ9
2. FEELING DOWN, DEPRESSED OR HOPELESS: 0
SUM OF ALL RESPONSES TO PHQ QUESTIONS 1-9: 0
SUM OF ALL RESPONSES TO PHQ9 QUESTIONS 1 & 2: 0
1. LITTLE INTEREST OR PLEASURE IN DOING THINGS: 0
SUM OF ALL RESPONSES TO PHQ QUESTIONS 1-9: 0

## 2019-02-19 ENCOUNTER — TELEPHONE (OUTPATIENT)
Dept: INTERNAL MEDICINE CLINIC | Age: 74
End: 2019-02-19

## 2019-03-20 ENCOUNTER — OFFICE VISIT (OUTPATIENT)
Dept: INTERNAL MEDICINE CLINIC | Age: 74
End: 2019-03-20
Payer: MEDICARE

## 2019-03-20 VITALS
SYSTOLIC BLOOD PRESSURE: 136 MMHG | BODY MASS INDEX: 21.69 KG/M2 | HEIGHT: 66 IN | WEIGHT: 135 LBS | DIASTOLIC BLOOD PRESSURE: 78 MMHG

## 2019-03-20 DIAGNOSIS — E78.2 MIXED HYPERLIPIDEMIA: ICD-10-CM

## 2019-03-20 DIAGNOSIS — I10 ESSENTIAL HYPERTENSION: Primary | ICD-10-CM

## 2019-03-20 DIAGNOSIS — M48.061 SPINAL STENOSIS OF LUMBAR REGION, UNSPECIFIED WHETHER NEUROGENIC CLAUDICATION PRESENT: ICD-10-CM

## 2019-03-20 DIAGNOSIS — R80.9 MICROALBUMINURIA: ICD-10-CM

## 2019-03-20 PROCEDURE — 3017F COLORECTAL CA SCREEN DOC REV: CPT | Performed by: INTERNAL MEDICINE

## 2019-03-20 PROCEDURE — G8420 CALC BMI NORM PARAMETERS: HCPCS | Performed by: INTERNAL MEDICINE

## 2019-03-20 PROCEDURE — G8427 DOCREV CUR MEDS BY ELIG CLIN: HCPCS | Performed by: INTERNAL MEDICINE

## 2019-03-20 PROCEDURE — 1090F PRES/ABSN URINE INCON ASSESS: CPT | Performed by: INTERNAL MEDICINE

## 2019-03-20 PROCEDURE — G8484 FLU IMMUNIZE NO ADMIN: HCPCS | Performed by: INTERNAL MEDICINE

## 2019-03-20 PROCEDURE — 3045F PR MOST RECENT HEMOGLOBIN A1C LEVEL 7.0-9.0%: CPT | Performed by: INTERNAL MEDICINE

## 2019-03-20 PROCEDURE — 1123F ACP DISCUSS/DSCN MKR DOCD: CPT | Performed by: INTERNAL MEDICINE

## 2019-03-20 PROCEDURE — 1101F PT FALLS ASSESS-DOCD LE1/YR: CPT | Performed by: INTERNAL MEDICINE

## 2019-03-20 PROCEDURE — 99214 OFFICE O/P EST MOD 30 MIN: CPT | Performed by: INTERNAL MEDICINE

## 2019-03-20 PROCEDURE — 1036F TOBACCO NON-USER: CPT | Performed by: INTERNAL MEDICINE

## 2019-03-20 PROCEDURE — 2022F DILAT RTA XM EVC RTNOPTHY: CPT | Performed by: INTERNAL MEDICINE

## 2019-03-20 PROCEDURE — G8399 PT W/DXA RESULTS DOCUMENT: HCPCS | Performed by: INTERNAL MEDICINE

## 2019-03-20 PROCEDURE — 4040F PNEUMOC VAC/ADMIN/RCVD: CPT | Performed by: INTERNAL MEDICINE

## 2019-03-20 RX ORDER — LISINOPRIL 20 MG/1
20 TABLET ORAL 2 TIMES DAILY
Qty: 180 TABLET | Refills: 2 | Status: SHIPPED | OUTPATIENT
Start: 2019-03-20 | End: 2019-11-11 | Stop reason: SDUPTHER

## 2019-03-20 ASSESSMENT — ENCOUNTER SYMPTOMS
COLOR CHANGE: 0
EYE DISCHARGE: 0
TROUBLE SWALLOWING: 0
COUGH: 0
BLOOD IN STOOL: 0
WHEEZING: 0
SHORTNESS OF BREATH: 0
ABDOMINAL DISTENTION: 0
DIARRHEA: 0
EYE PAIN: 0

## 2019-05-16 ENCOUNTER — HOSPITAL ENCOUNTER (OUTPATIENT)
Age: 74
Setting detail: SPECIMEN
Discharge: HOME | End: 2019-05-16
Payer: MEDICARE

## 2019-05-16 DIAGNOSIS — E78.2 MIXED HYPERLIPIDEMIA: ICD-10-CM

## 2019-05-16 LAB
-: ABNORMAL
AMORPHOUS: ABNORMAL
BACTERIA: ABNORMAL
BILIRUBIN URINE: NEGATIVE
CASTS UA: ABNORMAL /LPF (ref 0–8)
CHOLESTEROL/HDL RATIO: 2.2
CHOLESTEROL: 162 MG/DL
COLOR: YELLOW
COMMENT UA: ABNORMAL
CRYSTALS, UA: ABNORMAL /HPF
EPITHELIAL CELLS UA: ABNORMAL /HPF (ref 0–5)
ESTIMATED AVERAGE GLUCOSE: 148 MG/DL
GLUCOSE URINE: NEGATIVE
HBA1C MFR BLD: 6.8 % (ref 4–6)
HDLC SERPL-MCNC: 73 MG/DL
KETONES, URINE: NEGATIVE
LDL CHOLESTEROL: 71 MG/DL (ref 0–130)
LEUKOCYTE ESTERASE, URINE: ABNORMAL
MUCUS: ABNORMAL
NITRITE, URINE: NEGATIVE
OTHER OBSERVATIONS UA: ABNORMAL
PH UA: 8 (ref 5–8)
PROTEIN UA: NEGATIVE
RBC UA: ABNORMAL /HPF (ref 0–4)
RENAL EPITHELIAL, UA: ABNORMAL /HPF
SPECIFIC GRAVITY UA: 1.01 (ref 1–1.03)
TRICHOMONAS: ABNORMAL
TRIGL SERPL-MCNC: 89 MG/DL
TURBIDITY: CLEAR
URINE HGB: NEGATIVE
UROBILINOGEN, URINE: NORMAL
VLDLC SERPL CALC-MCNC: NORMAL MG/DL (ref 1–30)
WBC UA: ABNORMAL /HPF (ref 0–5)
YEAST: ABNORMAL

## 2019-06-17 ENCOUNTER — OFFICE VISIT (OUTPATIENT)
Dept: INTERNAL MEDICINE CLINIC | Age: 74
End: 2019-06-17
Payer: MEDICARE

## 2019-06-17 VITALS
WEIGHT: 135 LBS | BODY MASS INDEX: 21.69 KG/M2 | HEIGHT: 66 IN | DIASTOLIC BLOOD PRESSURE: 72 MMHG | SYSTOLIC BLOOD PRESSURE: 118 MMHG

## 2019-06-17 DIAGNOSIS — I10 ESSENTIAL HYPERTENSION: ICD-10-CM

## 2019-06-17 DIAGNOSIS — E78.2 MIXED HYPERLIPIDEMIA: ICD-10-CM

## 2019-06-17 PROBLEM — N30.00 ACUTE CYSTITIS: Status: RESOLVED | Noted: 2017-03-28 | Resolved: 2019-06-17

## 2019-06-17 LAB — HBA1C MFR BLD: 6.4 %

## 2019-06-17 PROCEDURE — 4040F PNEUMOC VAC/ADMIN/RCVD: CPT | Performed by: INTERNAL MEDICINE

## 2019-06-17 PROCEDURE — G8420 CALC BMI NORM PARAMETERS: HCPCS | Performed by: INTERNAL MEDICINE

## 2019-06-17 PROCEDURE — 1036F TOBACCO NON-USER: CPT | Performed by: INTERNAL MEDICINE

## 2019-06-17 PROCEDURE — 83036 HEMOGLOBIN GLYCOSYLATED A1C: CPT | Performed by: INTERNAL MEDICINE

## 2019-06-17 PROCEDURE — 3044F HG A1C LEVEL LT 7.0%: CPT | Performed by: INTERNAL MEDICINE

## 2019-06-17 PROCEDURE — G8427 DOCREV CUR MEDS BY ELIG CLIN: HCPCS | Performed by: INTERNAL MEDICINE

## 2019-06-17 PROCEDURE — 1090F PRES/ABSN URINE INCON ASSESS: CPT | Performed by: INTERNAL MEDICINE

## 2019-06-17 PROCEDURE — 3017F COLORECTAL CA SCREEN DOC REV: CPT | Performed by: INTERNAL MEDICINE

## 2019-06-17 PROCEDURE — G8399 PT W/DXA RESULTS DOCUMENT: HCPCS | Performed by: INTERNAL MEDICINE

## 2019-06-17 PROCEDURE — 99213 OFFICE O/P EST LOW 20 MIN: CPT | Performed by: INTERNAL MEDICINE

## 2019-06-17 PROCEDURE — 1123F ACP DISCUSS/DSCN MKR DOCD: CPT | Performed by: INTERNAL MEDICINE

## 2019-06-17 PROCEDURE — 2022F DILAT RTA XM EVC RTNOPTHY: CPT | Performed by: INTERNAL MEDICINE

## 2019-06-17 ASSESSMENT — ENCOUNTER SYMPTOMS
ABDOMINAL DISTENTION: 0
COUGH: 0
BLOOD IN STOOL: 0
SHORTNESS OF BREATH: 0
EYE DISCHARGE: 0
WHEEZING: 0
EYE PAIN: 0
TROUBLE SWALLOWING: 0
COLOR CHANGE: 0
DIARRHEA: 0

## 2019-06-17 NOTE — PROGRESS NOTES
Subjective:      Patient ID: Barin Cintron is a 75 y.o. female. Visit Information    Have you changed or started any medications since your last visit including any over-the-counter medicines, vitamins, or herbal medicines? no   Are you having any side effects from any of your medications? -  no  Have you stopped taking any of your medications? Is so, why? -  no    Have you seen any other physician or provider since your last visit? No  Have you had any other diagnostic tests since your last visit? No  Have you been seen in the emergency room and/or had an admission to a hospital since we last saw you? No  Have you had your routine dental cleaning in the past 6 months? no    Have you activated your [employer] account? If not, what are your barriers?  Yes     Patient Care Team:  Cora Magaña MD as PCP - General (Internal Medicine)  Cora Magaña MD as PCP - [facility]  Stephanie Gayle DO (Obstetrics & Gynecology)    Medical History Review  Past Medical, Family, and Social History reviewed and does contribute to the patient presenting condition    Health Maintenance   Topic Date Due    DTaP/Tdap/Td vaccine (1 - Tdap) 09/08/1963    Potassium monitoring  03/29/2018    Creatinine monitoring  06/26/2018    Diabetic retinal exam  07/31/2018    Shingles Vaccine (2 of 2) 09/26/2018    Diabetic foot exam  07/17/2019    Flu vaccine (Season Ended) 09/01/2019    Breast cancer screen  11/14/2019    Diabetic microalbuminuria test  02/13/2020    A1C test (Diabetic or Prediabetic)  05/16/2020    Lipid screen  05/16/2020    Colon cancer screen colonoscopy  04/24/2025    DEXA (modify frequency per FRAX score)  Completed    Pneumococcal 65+ years Vaccine  Completed     Gas bloating  Wants to cut back metformin  HTN  Onset more than 2 years ago  ernesto mild to mod  Controlled with current po meds  Not associated with headaches or blurry vision  No chest pain        Review of Systems   Constitutional: tenderness. Lymphadenopathy:        Head (right side): No submental and no submandibular adenopathy present. Head (left side): No submental and no submandibular adenopathy present. She has no cervical adenopathy. Neurological: She is alert and oriented to person, place, and time. She displays no atrophy. No cranial nerve deficit or sensory deficit. She exhibits normal muscle tone. Coordination normal.   Skin: Skin is warm. No bruising, no ecchymosis and no rash noted. She is not diaphoretic. No pallor. Psychiatric: She has a normal mood and affect. Her behavior is normal. Her mood appears not anxious. Her affect is not angry. Her speech is not slurred. She is not aggressive. Cognition and memory are not impaired. She expresses no homicidal ideation. I have personally reviewed and agree with the patient GABBIE MELENDEZ and Jose Vivian Florentino is a 75 y.o. female who presents today for follow up on her chronic medical conditions as noted below.   Franci Whitten is c/o of   Chief Complaint   Patient presents with    Diabetes    3 Month Follow-Up    Gas     still having concerns       Patient Active Problem List:     Hypertension     Hyperlipidemia     Thoracic or lumbosacral neuritis or radiculitis, unspecified     Overactive bladder     Lumbar stenosis     History of lumbosacral spine surgery     S/P spinal fusion     Osteoporosis     Intercostal pain     Microhematuria     History of recurrent UTIs     DM type 2, uncontrolled, with neuropathy ([address]     Osteoarthritis of both hands     Past Medical History:   Diagnosis Date    Anxiety state, unspecified     Arthritis     Cataracts, bilateral     Esophageal reflux     Generalized osteoarthrosis, unspecified site     Hearing loss     Hyperlipidemia     Hypertension     Neuropathy     Neuropathy due to medical condition ([address]     Pure hypercholesterolemia     Thoracic or lumbosacral neuritis or radiculitis, unspecified     Type 2 diabetes (MOBIC) 7.5 MG tablet TAKE 1 TABLET BY MOUTH TWO  TIMES DAILY AS NEEDED 180 tablet 2    Blood Glucose Monitoring Suppl (ONE TOUCH ULTRA MINI) W/DEVICE KIT 1 kit by Does not apply route daily as needed (patient is checking blood sugar levels twice daily) 1 kit 0    Lancets MISC 1 each by Does not apply route daily 100 each 3    omeprazole (PRILOSEC) 20 MG delayed release capsule Take 1 capsule by mouth  daily 90 capsule 3    oxybutynin (DITROPAN) 5 MG tablet Take 1 tablet by mouth 2 times daily      Calcium-Magnesium-Vitamin D (CALCIUM 1200+D3 PO) Take 2 tablets by mouth daily.  Glucosamine 500 MG CAPS Take 3 tablets by mouth daily.  acetaminophen (TYLENOL) 500 MG tablet Take 500 mg by mouth every 6 hours as needed for Pain.  aspirin 81 MG chewable tablet Take 81 mg by mouth daily.  ONE TOUCH ULTRASOFT LANCETS MISC 1 each by Does not apply route 2 times daily 100 each 3     No current facility-administered medications for this visit. ALLERGIES:  No Known Allergies    Social History     Tobacco Use    Smoking status: Former Smoker     Packs/day: 2.00     Years: 20.00     Pack years: 40.00     Types: Cigarettes     Last attempt to quit: 1984     Years since quittin.4    Smokeless tobacco: Never Used   Substance Use Topics    Alcohol use: Yes     Alcohol/week: 0.0 oz     Comment: social      Body mass index is 21.8 kg/m².   /72 (Site: Left Upper Arm, Position: Sitting, Cuff Size: Small Adult)   Ht 5' 5.98\" (1.676 m)   Wt 135 lb (61.2 kg)   BMI 21.80 kg/m²     Lab Results   Component Value Date     2017    K 3.6 2017     2017    CO2 24 2017    BUN 14 2017    CREATININE 0.7 2018    CREATININE 0.62 2017    GLUCOSE 234 2017    CALCIUM 8.2 2017    PROT 6.9 2017    LABALBU 3.1 2017    BILITOT 0.59 2017    ALKPHOS 104 2017    AST 73 2017    ALT 71 2017       Lab Results Component Value Date    WBC 6.6 09/13/2017    RBC 5.32 09/13/2017    HGB 14.8 09/13/2017    HCT 45.6 09/13/2017    MCV 85.7 09/13/2017    MCH 27.8 09/13/2017    MCHC 32.5 09/13/2017    RDW 16.1 09/13/2017     09/13/2017    MPV 8.3 09/13/2017       Lab Results   Component Value Date    LABA1C 6.4 06/17/2019       Lab Results   Component Value Date    HDL 73 05/16/2019    LDLCHOLESTEROL 71 05/16/2019       Assessment / Plan:      Diagnosis Orders   1. DM type 2, uncontrolled, with neuropathy (HCC)  POCT glycosylated hemoglobin (Hb A1C)   2. Mixed hyperlipidemia     3. Essential hypertension       No follow-ups on file. Orders Placed This Encounter   Procedures    POCT glycosylated hemoglobin (Hb A1C)     No orders of the defined types were placed in this encounter.        Gas bloating  Wants to cut back metformin   Ok to c ut back 500 bid  Pt agrees  [employer] prn rare

## 2019-08-18 DIAGNOSIS — I10 ESSENTIAL HYPERTENSION: ICD-10-CM

## 2019-08-19 RX ORDER — MELOXICAM 7.5 MG/1
TABLET ORAL
Qty: 180 TABLET | Refills: 2 | Status: SHIPPED | OUTPATIENT
Start: 2019-08-19 | End: 2021-03-08

## 2019-10-05 DIAGNOSIS — E11.00 UNCONTROLLED TYPE 2 DM WITH HYPEROSMOLAR NONKETOTIC HYPERGLYCEMIA (HCC): ICD-10-CM

## 2019-10-08 ENCOUNTER — TELEPHONE (OUTPATIENT)
Dept: INTERNAL MEDICINE CLINIC | Age: 74
End: 2019-10-08

## 2019-10-08 DIAGNOSIS — I10 ESSENTIAL HYPERTENSION: ICD-10-CM

## 2019-10-22 ENCOUNTER — TELEPHONE (OUTPATIENT)
Dept: OBGYN CLINIC | Age: 74
End: 2019-10-22

## 2019-11-08 DIAGNOSIS — R80.9 MICROALBUMINURIA: ICD-10-CM

## 2019-11-11 RX ORDER — LISINOPRIL 20 MG/1
20 TABLET ORAL 2 TIMES DAILY
Qty: 180 TABLET | Refills: 2 | Status: SHIPPED | OUTPATIENT
Start: 2019-11-11 | End: 2020-07-23 | Stop reason: SDUPTHER

## 2019-11-13 ENCOUNTER — TELEPHONE (OUTPATIENT)
Dept: PHARMACY | Facility: CLINIC | Age: 74
End: 2019-11-13

## 2019-11-18 ENCOUNTER — HOSPITAL ENCOUNTER (OUTPATIENT)
Age: 74
Setting detail: SPECIMEN
Discharge: HOME | End: 2019-11-18
Payer: MEDICARE

## 2019-11-18 ENCOUNTER — OFFICE VISIT (OUTPATIENT)
Dept: INTERNAL MEDICINE CLINIC | Age: 74
End: 2019-11-18
Payer: MEDICARE

## 2019-11-18 VITALS
BODY MASS INDEX: 20.88 KG/M2 | OXYGEN SATURATION: 96 % | SYSTOLIC BLOOD PRESSURE: 110 MMHG | HEART RATE: 70 BPM | DIASTOLIC BLOOD PRESSURE: 64 MMHG | HEIGHT: 67 IN | WEIGHT: 133 LBS

## 2019-11-18 DIAGNOSIS — M48.061 SPINAL STENOSIS OF LUMBAR REGION WITHOUT NEUROGENIC CLAUDICATION: ICD-10-CM

## 2019-11-18 DIAGNOSIS — I10 ESSENTIAL HYPERTENSION: ICD-10-CM

## 2019-11-18 DIAGNOSIS — R14.0 BLOATING SYMPTOM: ICD-10-CM

## 2019-11-18 DIAGNOSIS — E78.2 MIXED HYPERLIPIDEMIA: ICD-10-CM

## 2019-11-18 DIAGNOSIS — M81.0 AGE-RELATED OSTEOPOROSIS WITHOUT CURRENT PATHOLOGICAL FRACTURE: ICD-10-CM

## 2019-11-18 PROBLEM — M85.852 OSTEOPENIA OF NECKS OF BOTH FEMURS: Status: ACTIVE | Noted: 2019-11-18

## 2019-11-18 PROBLEM — M85.851 OSTEOPENIA OF NECKS OF BOTH FEMURS: Status: ACTIVE | Noted: 2019-11-18

## 2019-11-18 LAB
ABSOLUTE EOS #: 0.24 K/UL (ref 0–0.44)
ABSOLUTE IMMATURE GRANULOCYTE: <0.03 K/UL (ref 0–0.3)
ABSOLUTE LYMPH #: 3.24 K/UL (ref 1.1–3.7)
ABSOLUTE MONO #: 0.91 K/UL (ref 0.1–1.2)
ALBUMIN SERPL-MCNC: 4.5 G/DL (ref 3.5–5.2)
ALBUMIN/GLOBULIN RATIO: 1.7 (ref 1–2.5)
ALP BLD-CCNC: 73 U/L (ref 35–104)
ALT SERPL-CCNC: 20 U/L (ref 5–33)
ANION GAP SERPL CALCULATED.3IONS-SCNC: 18 MMOL/L (ref 9–17)
AST SERPL-CCNC: 16 U/L
BASOPHILS # BLD: 0 % (ref 0–2)
BASOPHILS ABSOLUTE: 0.03 K/UL (ref 0–0.2)
BILIRUB SERPL-MCNC: 0.54 MG/DL (ref 0.3–1.2)
BUN BLDV-MCNC: 19 MG/DL (ref 8–23)
BUN/CREAT BLD: ABNORMAL (ref 9–20)
CALCIUM SERPL-MCNC: 9.5 MG/DL (ref 8.6–10.4)
CHLORIDE BLD-SCNC: 101 MMOL/L (ref 98–107)
CO2: 23 MMOL/L (ref 20–31)
CREAT SERPL-MCNC: 0.52 MG/DL (ref 0.5–0.9)
DIFFERENTIAL TYPE: NORMAL
EOSINOPHILS RELATIVE PERCENT: 3 % (ref 1–4)
ESTIMATED AVERAGE GLUCOSE: 151 MG/DL
GFR AFRICAN AMERICAN: >60 ML/MIN
GFR NON-AFRICAN AMERICAN: >60 ML/MIN
GFR SERPL CREATININE-BSD FRML MDRD: ABNORMAL ML/MIN/{1.73_M2}
GFR SERPL CREATININE-BSD FRML MDRD: ABNORMAL ML/MIN/{1.73_M2}
GLUCOSE BLD-MCNC: 72 MG/DL (ref 70–99)
HBA1C MFR BLD: 6.9 % (ref 4–6)
HCT VFR BLD CALC: 46.2 % (ref 36.3–47.1)
HEMOGLOBIN: 14.1 G/DL (ref 11.9–15.1)
IMMATURE GRANULOCYTES: 0 %
LYMPHOCYTES # BLD: 35 % (ref 24–43)
MCH RBC QN AUTO: 29.5 PG (ref 25.2–33.5)
MCHC RBC AUTO-ENTMCNC: 30.5 G/DL (ref 28.4–34.8)
MCV RBC AUTO: 96.7 FL (ref 82.6–102.9)
MONOCYTES # BLD: 10 % (ref 3–12)
NRBC AUTOMATED: 0 PER 100 WBC
PDW BLD-RTO: 13.4 % (ref 11.8–14.4)
PLATELET # BLD: 358 K/UL (ref 138–453)
PLATELET ESTIMATE: NORMAL
PMV BLD AUTO: 10.4 FL (ref 8.1–13.5)
POTASSIUM SERPL-SCNC: 5 MMOL/L (ref 3.7–5.3)
RBC # BLD: 4.78 M/UL (ref 3.95–5.11)
RBC # BLD: NORMAL 10*6/UL
SEG NEUTROPHILS: 52 % (ref 36–65)
SEGMENTED NEUTROPHILS ABSOLUTE COUNT: 4.72 K/UL (ref 1.5–8.1)
SODIUM BLD-SCNC: 142 MMOL/L (ref 135–144)
TOTAL PROTEIN: 7.2 G/DL (ref 6.4–8.3)
WBC # BLD: 9.2 K/UL (ref 3.5–11.3)
WBC # BLD: NORMAL 10*3/UL

## 2019-11-18 PROCEDURE — G8399 PT W/DXA RESULTS DOCUMENT: HCPCS | Performed by: INTERNAL MEDICINE

## 2019-11-18 PROCEDURE — 4040F PNEUMOC VAC/ADMIN/RCVD: CPT | Performed by: INTERNAL MEDICINE

## 2019-11-18 PROCEDURE — G8420 CALC BMI NORM PARAMETERS: HCPCS | Performed by: INTERNAL MEDICINE

## 2019-11-18 PROCEDURE — 2022F DILAT RTA XM EVC RTNOPTHY: CPT | Performed by: INTERNAL MEDICINE

## 2019-11-18 PROCEDURE — 1090F PRES/ABSN URINE INCON ASSESS: CPT | Performed by: INTERNAL MEDICINE

## 2019-11-18 PROCEDURE — 99214 OFFICE O/P EST MOD 30 MIN: CPT | Performed by: INTERNAL MEDICINE

## 2019-11-18 PROCEDURE — G8484 FLU IMMUNIZE NO ADMIN: HCPCS | Performed by: INTERNAL MEDICINE

## 2019-11-18 PROCEDURE — G8427 DOCREV CUR MEDS BY ELIG CLIN: HCPCS | Performed by: INTERNAL MEDICINE

## 2019-11-18 PROCEDURE — 1123F ACP DISCUSS/DSCN MKR DOCD: CPT | Performed by: INTERNAL MEDICINE

## 2019-11-18 PROCEDURE — 3044F HG A1C LEVEL LT 7.0%: CPT | Performed by: INTERNAL MEDICINE

## 2019-11-18 PROCEDURE — 1036F TOBACCO NON-USER: CPT | Performed by: INTERNAL MEDICINE

## 2019-11-18 PROCEDURE — 3017F COLORECTAL CA SCREEN DOC REV: CPT | Performed by: INTERNAL MEDICINE

## 2019-11-18 RX ORDER — GABAPENTIN 300 MG/1
CAPSULE ORAL
COMMUNITY
Start: 2019-11-13 | End: 2020-01-08

## 2019-11-18 RX ORDER — ATORVASTATIN CALCIUM 40 MG/1
TABLET, FILM COATED ORAL
COMMUNITY
Start: 2019-09-09 | End: 2020-02-17

## 2019-11-18 ASSESSMENT — ENCOUNTER SYMPTOMS
COUGH: 0
SHORTNESS OF BREATH: 0
DIARRHEA: 0
ABDOMINAL DISTENTION: 0
WHEEZING: 0
BLOOD IN STOOL: 0
EYE PAIN: 0
TROUBLE SWALLOWING: 0
EYE DISCHARGE: 0
COLOR CHANGE: 0

## 2019-11-19 ENCOUNTER — HOSPITAL ENCOUNTER (OUTPATIENT)
Age: 74
Setting detail: SPECIMEN
Discharge: HOME | End: 2019-11-19
Payer: MEDICARE

## 2019-11-19 LAB
CREATININE URINE: 32.1 MG/DL (ref 28–217)
MICROALBUMIN/CREAT 24H UR: <12 MG/L
MICROALBUMIN/CREAT UR-RTO: NORMAL MCG/MG CREAT

## 2019-12-03 DIAGNOSIS — M81.0 AGE-RELATED OSTEOPOROSIS WITHOUT CURRENT PATHOLOGICAL FRACTURE: Primary | ICD-10-CM

## 2019-12-30 ENCOUNTER — HOSPITAL ENCOUNTER (OUTPATIENT)
Dept: WOMENS IMAGING | Age: 74
Discharge: HOME | End: 2020-01-01
Payer: MEDICARE

## 2019-12-30 ENCOUNTER — NURSE TRIAGE (OUTPATIENT)
Dept: OTHER | Facility: CLINIC | Age: 74
End: 2019-12-30

## 2019-12-30 PROCEDURE — 77063 BREAST TOMOSYNTHESIS BI: CPT

## 2020-01-02 ENCOUNTER — OFFICE VISIT (OUTPATIENT)
Dept: INTERNAL MEDICINE CLINIC | Age: 75
End: 2020-01-02
Payer: MEDICARE

## 2020-01-02 VITALS
HEART RATE: 70 BPM | BODY MASS INDEX: 20.89 KG/M2 | DIASTOLIC BLOOD PRESSURE: 72 MMHG | SYSTOLIC BLOOD PRESSURE: 120 MMHG | HEIGHT: 66 IN | OXYGEN SATURATION: 98 % | WEIGHT: 130 LBS

## 2020-01-02 PROCEDURE — 99214 OFFICE O/P EST MOD 30 MIN: CPT | Performed by: INTERNAL MEDICINE

## 2020-01-02 PROCEDURE — 2022F DILAT RTA XM EVC RTNOPTHY: CPT | Performed by: INTERNAL MEDICINE

## 2020-01-02 PROCEDURE — 3017F COLORECTAL CA SCREEN DOC REV: CPT | Performed by: INTERNAL MEDICINE

## 2020-01-02 PROCEDURE — 4040F PNEUMOC VAC/ADMIN/RCVD: CPT | Performed by: INTERNAL MEDICINE

## 2020-01-02 PROCEDURE — 3046F HEMOGLOBIN A1C LEVEL >9.0%: CPT | Performed by: INTERNAL MEDICINE

## 2020-01-02 PROCEDURE — G8420 CALC BMI NORM PARAMETERS: HCPCS | Performed by: INTERNAL MEDICINE

## 2020-01-02 PROCEDURE — 1090F PRES/ABSN URINE INCON ASSESS: CPT | Performed by: INTERNAL MEDICINE

## 2020-01-02 PROCEDURE — 1036F TOBACCO NON-USER: CPT | Performed by: INTERNAL MEDICINE

## 2020-01-02 PROCEDURE — G8399 PT W/DXA RESULTS DOCUMENT: HCPCS | Performed by: INTERNAL MEDICINE

## 2020-01-02 PROCEDURE — G8427 DOCREV CUR MEDS BY ELIG CLIN: HCPCS | Performed by: INTERNAL MEDICINE

## 2020-01-02 PROCEDURE — 1123F ACP DISCUSS/DSCN MKR DOCD: CPT | Performed by: INTERNAL MEDICINE

## 2020-01-02 PROCEDURE — G8482 FLU IMMUNIZE ORDER/ADMIN: HCPCS | Performed by: INTERNAL MEDICINE

## 2020-01-02 RX ORDER — ALBUTEROL SULFATE 90 UG/1
2 AEROSOL, METERED RESPIRATORY (INHALATION) 4 TIMES DAILY PRN
Qty: 3 INHALER | Refills: 1 | Status: SHIPPED | OUTPATIENT
Start: 2020-01-02 | End: 2021-03-08

## 2020-01-02 ASSESSMENT — PATIENT HEALTH QUESTIONNAIRE - PHQ9
SUM OF ALL RESPONSES TO PHQ9 QUESTIONS 1 & 2: 0
1. LITTLE INTEREST OR PLEASURE IN DOING THINGS: 0
SUM OF ALL RESPONSES TO PHQ QUESTIONS 1-9: 0
2. FEELING DOWN, DEPRESSED OR HOPELESS: 0
SUM OF ALL RESPONSES TO PHQ QUESTIONS 1-9: 0

## 2020-01-02 NOTE — PROGRESS NOTES
Visit Information    Have you changed or started any medications since your last visit including any over-the-counter medicines, vitamins, or herbal medicines? no   Are you having any side effects from any of your medications? -  no  Have you stopped taking any of your medications? Is so, why? -  no    Have you seen any other physician or provider since your last visit? No  Have you had any other diagnostic tests since your last visit? No  Have you been seen in the emergency room and/or had an admission to a hospital since we last saw you? No  Have you had your routine dental cleaning in the past 6 months? yes -     Have you activated your [employer] account? If not, what are your barriers?  Yes     Patient Care Team:  Ovi Simons MD as PCP - General (Internal Medicine)  Ovi Simons MD as PCP - [facility]  Stephanie Marquez DO (Obstetrics & Gynecology)    Medical History Review  Past Medical, Family, and Social History reviewed and does not contribute to the patient presenting condition    Health Maintenance   Topic Date Due    DTaP/Tdap/Td vaccine (1 - Tdap) 09/08/1956    Shingles Vaccine (2 of 2) 09/26/2018    Annual Wellness Visit (AWV)  05/29/2019    Diabetic foot exam  07/17/2019    Lipid screen  05/16/2020    Diabetic retinal exam  08/07/2020    A1C test (Diabetic or Prediabetic)  11/18/2020    Potassium monitoring  11/18/2020    Creatinine monitoring  11/18/2020    Colon cancer screen colonoscopy  04/24/2025    DEXA (modify frequency per FRAX score)  Completed    Flu vaccine  Completed    Pneumococcal 65+ years Vaccine  Completed     SUBJECTIVE:  Blaire Pulliam is a 75 y.o. female patient who  comes for complaints of   Chief Complaint   Patient presents with    Shortness of Breath     on going for a few months     Diabetes     last a1c on 11/18/19 6.[address] [facility]       Shortness of breath  Duration-few months  Severity- worsening over last few month  Context- no known asthma/COPD; has well conrolled Ty2 DM and HTN  Associated signs and symptoms-   Initially in summer when going upstairs   Now even if she walks down the [place]  No leg swelling  Associated with palpitations sometimes. NO chest pain  No cough/fevers  3-4lbs wt loss in last month  Wt Readings from Last 3 Encounters:   01/02/20 130 lb (59 kg)   11/18/19 133 lb (60.3 kg)   06/17/19 135 lb (61.2 kg)      quit smoking ~30yr ago  1PPD for 20yrs before that    Modifying factors- relived on resting    Brother had CAD- first noted age >71    Labs reviewed  Hb 11/2019 was 14.1  Cbc, CMP unremarkable    No h/o occupational exposure to chemicals  Has a cat at home- 10 yrs      REVIEW OF SYSTEMS (except Subjective (HPI))  GENERAL: No fevers / chills  RESPIRATORY: see HPI  CARDIOVASCULAR: Negative for chest pain or palpitations.   GI: no nausea, vomiting, or diarrhea  NEURO: No history of headaches    Past Medical History:   Diagnosis Date    Anxiety state, unspecified     Arthritis     Cataracts, bilateral     Esophageal reflux     Generalized osteoarthrosis, unspecified site     Hearing loss     Hyperlipidemia     Hypertension     Neuropathy     Neuropathy due to medical condition ([address]     Pure hypercholesterolemia     Thoracic or lumbosacral neuritis or radiculitis, unspecified     Type 2 diabetes mellitus without complication ([address]     Urinary retention     UTI (urinary tract infection)        SOCIAL HISTORY:  Social History     Socioeconomic History    Marital status:      Spouse name: Not on file    Number of children: Not on file    Years of education: Not on file    Highest education level: Not on file   Occupational History    Not on file   Social Needs    Financial resource strain: Not on file    Food insecurity:     Worry: Not on file     Inability: Not on file    Transportation needs:     Medical: Not on file     Non-medical: Not on file   Tobacco Use    Smoking status: Former Smoker Packs/day: 2.00     Years: 20.00     Pack years: 40.00     Types: Cigarettes     Last attempt to quit: 1984     Years since quittin.0    Smokeless tobacco: Never Used   Substance and Sexual Activity    Alcohol use:  Yes     Alcohol/week: 0.0 standard drinks     Comment: social    Drug use: No    Sexual activity: Yes     Partners: Male   Lifestyle    Physical activity:     Days per week: Not on file     Minutes per session: Not on file    Stress: Not on file   Relationships    Social connections:     Talks on phone: Not on file     Gets together: Not on file     Attends Sikhism service: Not on file     Active member of club or organization: Not on file     Attends meetings of clubs or organizations: Not on file     Relationship status: Not on file    Intimate partner violence:     Fear of current or ex partner: Not on file     Emotionally abused: Not on file     Physically abused: Not on file     Forced sexual activity: Not on file   Other Topics Concern    Not on file   Social History Narrative    Not on file           CURRENT MEDICATIONS:  Current Outpatient Medications   Medication Sig Dispense Refill    atorvastatin (LIPITOR) 40 MG tablet       gabapentin (NEURONTIN) 300 MG capsule       lisinopril (PRINIVIL;ZESTRIL) 20 MG tablet Take 1 tablet by mouth 2 times daily 180 tablet 2    metoprolol tartrate (LOPRESSOR) 25 MG tablet Take 1 tablet by mouth 2 times daily 180 tablet 1    ONE TOUCH ULTRA TEST strip USE TO TEST BLOOD SUGAR FOUR TIMES A  strip 1    meloxicam (MOBIC) 7.5 MG tablet TAKE 1 TABLET BY MOUTH TWO  TIMES DAILY AS NEEDED 180 tablet 2    metFORMIN (GLUCOPHAGE) 1000 MG tablet TAKE 1 TABLET BY MOUTH TWO  TIMES DAILY WITH MEALS 180 tablet 3    DULoxetine (CYMBALTA) 30 MG extended release capsule Take 1 capsule by mouth daily 90 capsule 3    blood glucose monitor strips Patient checking blood sugar levels 4 times daily 200 strip 3    ONE TOUCH ULTRASOFT LANCETS MISC 1 each by Does not apply route 2 times daily 100 each 3    Blood Glucose Monitoring Suppl (ONE TOUCH ULTRA MINI) W/DEVICE KIT 1 kit by Does not apply route daily as needed (patient is checking blood sugar levels twice daily) 1 kit 0    Lancets MISC 1 each by Does not apply route daily 100 each 3    omeprazole (PRILOSEC) 20 MG delayed release capsule Take 1 capsule by mouth  daily 90 capsule 3    oxybutynin (DITROPAN) 5 MG tablet Take 1 tablet by mouth 2 times daily      Calcium-Magnesium-Vitamin D (CALCIUM 1200+D3 PO) Take 2 tablets by mouth daily.  Glucosamine 500 MG CAPS Take 3 tablets by mouth daily.  acetaminophen (TYLENOL) 500 MG tablet Take 500 mg by mouth every 6 hours as needed for Pain.  aspirin 81 MG chewable tablet Take 81 mg by mouth daily.  ibuprofen (ADVIL;MOTRIN) 400 MG tablet Take 1 tablet by mouth every 8 hours as needed for Pain 90 tablet 1    gabapentin (NEURONTIN) 300 MG capsule TAKE 2 CAPSULES BY MOUTH 3  TIMES A  capsule 1    ONE TOUCH ULTRA TEST strip TEST BLOOD SUGAR LEVELS FOUR TIMES A DAY (Patient not taking: Reported on 1/2/2020) 200 strip 2     No current facility-administered medications for this visit. OBJECTIVE:  Vitals:    01/02/20 1045   BP: 120/72   Pulse: 70   SpO2: 98%     Body mass index is 20.67 kg/m². Focal exam:  Visual inspection:  Deformity/amputation: absent  Skin lesions/pre-ulcerative calluses: absent  Nail changes: none  Edema: not noted    Sensory exam:  Monofilament sensation: normal  (minimum of 5 random plantar locations tested, avoiding callused areas - > 1 area with absence of sensation is + for neuropathy)    Pulses: normal DP both feet. Nia Bocanegra MD        General exam (except above):  General appearance - well appearing, alert, in no acute distress  Head - Atraumatic, normocephalic  Eyes - EOMI, no jaundice or pallor  Lungs - Lungs clear to auscultation.   No wheezing, rhonchi, rales  Heart - RRR without murmur, gallop, or rubs. No ectopy  Abdomen - Abdomen soft, non-tender. Bowel sounds normal. No masses, organomegaly  Extremities -No significant edema, or skin discoloration. Good capillary refill. Neuro - Pt Alert, awake and oriented x 3. No gross focal neurological deficits    ASSESSMENT AND PLAN (MEDICAL DECISION MAKING):  Yen Henriquez was seen today for shortness of breath and diabetes. Diagnoses and all orders for this visit:    Dyspnea on exertion  Comments:  examination benign  Orders:  -     ECHO 2D WO Color Doppler Complete; Future  -     XR CHEST STANDARD (2 VW); Future  -     Brain Natriuretic Peptide; Future  -     TSH; Future  -     Spirometry With Bronchodilator; Future  -     albuterol sulfate  (90 Base) MCG/ACT inhaler; Inhale 2 puffs into the lungs 4 times daily as needed for Wheezing    Essential hypertension    DM type 2, uncontrolled, with neuropathy ([facility] [address]  -      DIABETES FOOT EXAM    Palpitation  Comments:  normal sinus rhythm  Orders:  -     EKG 12 lead; Future  -     TSH; Future  -     Spirometry With Bronchodilator; Future              Follow up in: 2mth    Greater than 25 minutes spent face-to-face with patient of which more than 50% was spent reviewing the past medical history, addressing the current concerns, counseling and formulating a mutual plan to help coordinate the care    This note was partially generated using Dragon voice recognition system, any errors noted are unintentional and are due to this technology.     Daysi Scott MD

## 2020-01-03 PROCEDURE — 93000 ELECTROCARDIOGRAM COMPLETE: CPT | Performed by: INTERNAL MEDICINE

## 2020-01-06 ENCOUNTER — HOSPITAL ENCOUNTER (OUTPATIENT)
Age: 75
Setting detail: SPECIMEN
Discharge: HOME | End: 2020-01-06
Payer: MEDICARE

## 2020-01-06 ENCOUNTER — HOSPITAL ENCOUNTER (OUTPATIENT)
Facility: CLINIC | Age: 75
Discharge: HOME | End: 2020-01-08
Payer: MEDICARE

## 2020-01-06 ENCOUNTER — HOSPITAL ENCOUNTER (OUTPATIENT)
Dept: GENERAL RADIOLOGY | Facility: CLINIC | Age: 75
Discharge: HOME | End: 2020-01-08
Payer: MEDICARE

## 2020-01-06 LAB
BNP INTERPRETATION: NORMAL
PRO-BNP: 198 PG/ML
TSH SERPL DL<=0.05 MIU/L-ACNC: 0.55 MIU/L (ref 0.3–5)

## 2020-01-06 PROCEDURE — 71046 X-RAY EXAM CHEST 2 VIEWS: CPT

## 2020-01-08 ENCOUNTER — OFFICE VISIT (OUTPATIENT)
Dept: NEUROLOGY | Age: 75
End: 2020-01-08
Payer: MEDICARE

## 2020-01-08 VITALS
BODY MASS INDEX: 21.38 KG/M2 | SYSTOLIC BLOOD PRESSURE: 119 MMHG | HEART RATE: 68 BPM | WEIGHT: 133 LBS | DIASTOLIC BLOOD PRESSURE: 67 MMHG | HEIGHT: 66 IN

## 2020-01-08 PROCEDURE — G8399 PT W/DXA RESULTS DOCUMENT: HCPCS | Performed by: NURSE PRACTITIONER

## 2020-01-08 PROCEDURE — 1123F ACP DISCUSS/DSCN MKR DOCD: CPT | Performed by: NURSE PRACTITIONER

## 2020-01-08 PROCEDURE — G8427 DOCREV CUR MEDS BY ELIG CLIN: HCPCS | Performed by: NURSE PRACTITIONER

## 2020-01-08 PROCEDURE — 99214 OFFICE O/P EST MOD 30 MIN: CPT | Performed by: NURSE PRACTITIONER

## 2020-01-08 PROCEDURE — 2022F DILAT RTA XM EVC RTNOPTHY: CPT | Performed by: NURSE PRACTITIONER

## 2020-01-08 PROCEDURE — 3046F HEMOGLOBIN A1C LEVEL >9.0%: CPT | Performed by: NURSE PRACTITIONER

## 2020-01-08 PROCEDURE — 1036F TOBACCO NON-USER: CPT | Performed by: NURSE PRACTITIONER

## 2020-01-08 PROCEDURE — 4040F PNEUMOC VAC/ADMIN/RCVD: CPT | Performed by: NURSE PRACTITIONER

## 2020-01-08 PROCEDURE — 3017F COLORECTAL CA SCREEN DOC REV: CPT | Performed by: NURSE PRACTITIONER

## 2020-01-08 PROCEDURE — G8482 FLU IMMUNIZE ORDER/ADMIN: HCPCS | Performed by: NURSE PRACTITIONER

## 2020-01-08 PROCEDURE — G8420 CALC BMI NORM PARAMETERS: HCPCS | Performed by: NURSE PRACTITIONER

## 2020-01-08 PROCEDURE — 1090F PRES/ABSN URINE INCON ASSESS: CPT | Performed by: NURSE PRACTITIONER

## 2020-01-08 RX ORDER — DULOXETIN HYDROCHLORIDE 60 MG/1
60 CAPSULE, DELAYED RELEASE ORAL DAILY
Qty: 90 CAPSULE | Refills: 3 | Status: SHIPPED | OUTPATIENT
Start: 2020-01-08 | End: 2020-09-16

## 2020-01-08 RX ORDER — GABAPENTIN 300 MG/1
CAPSULE ORAL
Qty: 360 CAPSULE | Refills: 3 | Status: SHIPPED | OUTPATIENT
Start: 2020-01-08 | End: 2021-02-04

## 2020-01-08 NOTE — PROGRESS NOTES
L1/2 to the visualized lower thoracic spine. Findings could be reactive to patient's 3 prior surgeries. Inflammatory, infectious, or neoplastic process not entirely excluded. Clinical    correlation and followup is recommended. 3. Degenerative changes result in severe central canal stenosis and severe narrowing bilateral neural foramina at L1/2 which has progressed from prior exam. Degenerative changes present to lesser extent at other levels. See above for description of    findings at each level. 4. 2 foci of intermediate signal intensity posterior to the right lobe of the liver with dimensions given above. These are potentially related to lymph nodes. This area was not covered in the field-of-view on prior exam. Recommend attention to this area    on followup exam to confirm stability. 5. 13 mm cystic lesion laterally mid pole left kidney. Further evaluation can begin with ultrasound.       A [employer] message communicated  via EHR and through phone conversation via the Radiology Hub connecting radiologist to health care provider on 7/10/2015 2:34 PM, [employer] Critical Result System.  Radiology hub asked to fax copy of report to Dr. Ahuja Lute office and to call office to confirm receipt of fax.                   PAST MEDICAL HISTORY:         Diagnosis Date    Anxiety state, unspecified     Arthritis     Cataracts, bilateral     Esophageal reflux     Generalized osteoarthrosis, unspecified site     Hearing loss     Hyperlipidemia     Hypertension     Neuropathy     Neuropathy due to medical condition ([address]     Pure hypercholesterolemia     Thoracic or lumbosacral neuritis or radiculitis, unspecified     Type 2 diabetes mellitus without complication ([address]     Urinary retention     UTI (urinary tract infection)         PAST SURGICAL HISTORY:         Procedure Laterality Date    BACK SURGERY      X3    BREAST BIOPSY  2007    Lt     CARPAL TUNNEL RELEASE Right     CATARACT REMOVAL WITH IMPLANT Bilateral     COLONOSCOPY      COLONOSCOPY  4/24/15    diverticulosis    CYSTOSCOPY  [id number]    CYSTOSCOPY  2017    CYSTOSCOPY N/A 2017    CYSTOSCOPY URODYNAMICS AND DILATION  performed by Jany Monreal MD at [facility] HAND SURGERY Bilateral     thumbs reconstruction    HIP ARTHROPLASTY Bilateral     JOINT REPLACEMENT Bilateral     LUMBAR FUSION  2015        SOCIAL HISTORY:     Social History     Socioeconomic History    Marital status:      Spouse name: Not on file    Number of children: Not on file    Years of education: Not on file    Highest education level: Not on file   Occupational History    Not on file   Social Needs    Financial resource strain: Not on file    Food insecurity:     Worry: Not on file     Inability: Not on file    Transportation needs:     Medical: Not on file     Non-medical: Not on file   Tobacco Use    Smoking status: Former Smoker     Packs/day: 2.00     Years: 20.00     Pack years: 40.00     Types: Cigarettes     Last attempt to quit: 1984     Years since quittin.0    Smokeless tobacco: Never Used   Substance and Sexual Activity    Alcohol use:  Yes     Alcohol/week: 0.0 standard drinks     Comment: social    Drug use: No    Sexual activity: Yes     Partners: Male   Lifestyle    Physical activity:     Days per week: Not on file     Minutes per session: Not on file    Stress: Not on file   Relationships    Social connections:     Talks on phone: Not on file     Gets together: Not on file     Attends Sikhism service: Not on file     Active member of club or organization: Not on file     Attends meetings of clubs or organizations: Not on file     Relationship status: Not on file    Intimate partner violence:     Fear of current or ex partner: Not on file     Emotionally abused: Not on file     Physically abused: Not on file     Forced sexual activity: Not on file   Other Topics Concern    Not on file   Social History Narrative    Not on file       CURRENT MEDICATIONS:     Current Outpatient Medications   Medication Sig Dispense Refill    atorvastatin (LIPITOR) 40 MG tablet       lisinopril (PRINIVIL;ZESTRIL) 20 MG tablet Take 1 tablet by mouth 2 times daily 180 tablet 2    metoprolol tartrate (LOPRESSOR) 25 MG tablet Take 1 tablet by mouth 2 times daily 180 tablet 1    ONE TOUCH ULTRA TEST strip USE TO TEST BLOOD SUGAR FOUR TIMES A  strip 1    meloxicam (MOBIC) 7.5 MG tablet TAKE 1 TABLET BY MOUTH TWO  TIMES DAILY AS NEEDED 180 tablet 2    metFORMIN (GLUCOPHAGE) 1000 MG tablet TAKE 1 TABLET BY MOUTH TWO  TIMES DAILY WITH MEALS 180 tablet 3    gabapentin (NEURONTIN) 300 MG capsule TAKE 2 CAPSULES BY MOUTH 3  TIMES A  capsule 1    DULoxetine (CYMBALTA) 30 MG extended release capsule Take 1 capsule by mouth daily 90 capsule 3    blood glucose monitor strips Patient checking blood sugar levels 4 times daily 200 strip 3    ONE TOUCH ULTRASOFT LANCETS MISC 1 each by Does not apply route 2 times daily 100 each 3    Blood Glucose Monitoring Suppl (ONE TOUCH ULTRA MINI) W/DEVICE KIT 1 kit by Does not apply route daily as needed (patient is checking blood sugar levels twice daily) 1 kit 0    Lancets MISC 1 each by Does not apply route daily 100 each 3    oxybutynin (DITROPAN) 5 MG tablet Take 1 tablet by mouth 2 times daily      Calcium-Magnesium-Vitamin D (CALCIUM 1200+D3 PO) Take 2 tablets by mouth daily.  Glucosamine 500 MG CAPS Take 3 tablets by mouth daily.  acetaminophen (TYLENOL) 500 MG tablet Take 500 mg by mouth every 6 hours as needed for Pain.  aspirin 81 MG chewable tablet Take 81 mg by mouth daily.         albuterol sulfate  (90 Base) MCG/ACT inhaler Inhale 2 puffs into the lungs 4 times daily as needed for Wheezing (Patient not taking: Reported on 1/8/2020) 3 Inhaler 1    ibuprofen (ADVIL;MOTRIN) 400 MG tablet Take 1 tablet by mouth every 8 hours as needed for Pain (Patient not taking: Reported on 1/8/2020) 90 tablet 1    omeprazole (PRILOSEC) 20 MG delayed release capsule Take 1 capsule by mouth  daily (Patient not taking: Reported on 1/8/2020) 90 capsule 3     No current facility-administered medications for this visit.          ALLERGIES:   No Known Allergies                              REVIEW OF SYSTEMS    CONSTITUTIONAL Weight: absent, Appetite: absent, Fatigue: absent      HEENT Ears: normal, Visual disturbance: absent   RESPIRATORY Shortness of breath: present, Cough: absent   CARDIOVASCULAR Chest pain: absent, Leg swelling :absent      GI Constipation: absent, Diarrhea: absent, Swallowing change: absent       Urinary frequency: absent, Urinary urgency: absent,   MUSCULOSKELETAL Neck pain: absent, Back pain: absent, Stiffness: absent, Muscle pain: absent, Joint pain: absent Restless legs: absent   DERMATOLOGIC Hair loss: absent, Skin changes: absent   NEUROLOGIC Memory loss: absent, Confusion: absent, Seizures: absent Trouble walking or imbalance: absent, Dizziness: absent, Weakness: absent, Numbness: present Tremor: absent, Spasm: absent, Speech difficulty: absent, Headache: absent, Light sensitivity: absent   PSYCHIATRIC Anxiety: absent, Hallucination: absent, Mood disorder: absent   HEMATOLOGIC Abnormal bleeding: absent, Anemia: absent,            PHYSICAL EXAMINATION:       Vitals:    01/08/20 1013   BP: 119/67   Pulse: 68                                              .                                                                                                    General Appearance:  Alert, cooperative, no signs of distress, appears stated age   Head:  Normocephalic, no signs of trauma   Eyes:  Conjunctiva/corneas clear;  eyelids intact   Ears:  Normal external ear and canals   Nose: Nares normal, mucosa normal, no drainage    Throat: Lips and tongue normal; teeth normal;  gums normal   Neck: Supple, intact flexion, extension and rotation;   trachea midline; no adenopathy;   thyroid: not enlarged;   no carotid pulse abnormality   Back:   Symmetric, no curvature, ROM adequate   Lungs:   Respirations unlabored   Heart:  Regular rate and rhythm           Extremities: Extremities normal, no cyanosis, no edema   Pulses: Symmetric over head and neck   Skin: Skin color, texture normal, no rashes, no lesions                                     NEUROLOGIC EXAMINATION    Neurologic Exam  Mental status    Alert and oriented x 3; intact memory with no confusion, speech or language problems; no hallucinations or delusions  Fund of information appropriate for level of education    Cranial nerves    II - visual fields intact to confrontation bilaterally  III, IV, VI - extra-ocular muscles full: no pupillary defect; no CLAUDIA, no nystagmus, no ptosis   V - normal facial sensation                                                               VII - normal facial symmetry                                                             VIII - intact hearing                                                                             IX, X - symmetrical palate                                                                  XI - symmetrical shoulder shrug                                                       XII - tongue midline without atrophy or fasciculation      Motor function  Normal muscle bulk and tone; strength 5/5 on all 4 extremities, no pronator drift      Sensory function Intact to light touch, pinprick, vibration, proprioception on all 4 extremities      Cerebellar Intact fine motor movement. No involuntary movements or tremors. No ataxia or dysmetria on finger to nose or heel to shin testing      Reflex function DTR plus in left patellar tendon and 1+ in the right patellar tendon.   They are 1+ in bilateral Achilles and 2+ in her arms bilaterally      Gait                   normal base and arm swing                  ASSESSMENT AND PLAN:           In summary, your patient, Tati Push exhibits the following, with associated plan:    1. High-grade lumbar stenosis associated with saddle paresthesias. Patient also likely suffers from a mild diabetic neuropathy  1. Increase Cymbalta to 60 mg at bedtime daily  2. Gabapentin 300 mg in the morning, 300 mg in the afternoon, and 600 mg at bedtime.   3. The patient will continue to be seen on an annual basis per her request.            Signed: Norville Olszewski, CNP      *Please note that portions of this note were completed with a voice recognition program.  Although every effort was made to insure the accuracy of this automated transcription, some errors in transcription may have occurred, occasionally words and are mis-transcribed

## 2020-01-28 LAB
EXPIRATORY TIME-POST: NORMAL
EXPIRATORY TIME: NORMAL
FEF 25-75% %CHNG: NORMAL
FEF 25-75% %PRED-POST: NORMAL
FEF 25-75% %PRED-PRE: NORMAL
FEF 25-75% PRED: NORMAL
FEF 25-75%-POST: NORMAL
FEF 25-75%-PRE: NORMAL
FEV1 %PRED-POST: 2.15 %
FEV1 %PRED-PRE: 2.02 %
FEV1 PRED: NORMAL
FEV1-POST: NORMAL
FEV1-PRE: NORMAL
FEV1/FVC %PRED-POST: NORMAL
FEV1/FVC %PRED-PRE: NORMAL
FEV1/FVC PRED: NORMAL
FEV1/FVC-POST: 2.73 %
FEV1/FVC-PRE: 2.42 %
FVC %PRED-POST: NORMAL
FVC %PRED-PRE: NORMAL
FVC PRED: NORMAL
FVC-POST: NORMAL
FVC-PRE: NORMAL
PEF %PRED-POST: NORMAL
PEF %PRED-PRE: NORMAL
PEF PRED: NORMAL
PEF%CHNG: NORMAL
PEF-POST: NORMAL
PEF-PRE: NORMAL

## 2020-01-28 ASSESSMENT — PULMONARY FUNCTION TESTS
FEV1_PERCENT_PREDICTED_PRE: 2.02
FEV1_PERCENT_PREDICTED_POST: 2.15
FEV1/FVC_POST: 2.73
FEV1/FVC_PRE: 2.42

## 2020-02-10 RX ORDER — DULOXETIN HYDROCHLORIDE 30 MG/1
30 CAPSULE, DELAYED RELEASE ORAL DAILY
Qty: 90 CAPSULE | Refills: 3 | Status: SHIPPED | OUTPATIENT
Start: 2020-02-10 | End: 2021-03-08

## 2020-02-10 NOTE — TELEPHONE ENCOUNTER
Marquez Carlson called saying that the cost for the Cymbalta 60 mg is too high. She would like a prescription mailed to her for the Cymbalta 30 mg instead. This will cost her less. I put the prescription in for the medication. Please approve if appropriate.

## 2020-02-17 ENCOUNTER — TELEPHONE (OUTPATIENT)
Dept: NEUROLOGY | Age: 75
End: 2020-02-17

## 2020-02-17 RX ORDER — ATORVASTATIN CALCIUM 40 MG/1
TABLET, FILM COATED ORAL
Qty: 90 TABLET | Refills: 1 | Status: SHIPPED | OUTPATIENT
Start: 2020-02-17 | End: 2020-07-09

## 2020-02-21 ENCOUNTER — TELEPHONE (OUTPATIENT)
Dept: INTERNAL MEDICINE CLINIC | Age: 75
End: 2020-02-21

## 2020-02-24 ENCOUNTER — HOSPITAL ENCOUNTER (OUTPATIENT)
Dept: WOMENS IMAGING | Age: 75
Discharge: HOME | End: 2020-02-26
Payer: MEDICARE

## 2020-02-24 ENCOUNTER — OFFICE VISIT (OUTPATIENT)
Dept: INTERNAL MEDICINE CLINIC | Age: 75
End: 2020-02-24
Payer: MEDICARE

## 2020-02-24 ENCOUNTER — HOSPITAL ENCOUNTER (OUTPATIENT)
Age: 75
Discharge: HOME | End: 2020-02-24
Payer: MEDICARE

## 2020-02-24 ENCOUNTER — HOSPITAL ENCOUNTER (OUTPATIENT)
Dept: CT IMAGING | Age: 75
Discharge: HOME | End: 2020-02-26
Payer: MEDICARE

## 2020-02-24 VITALS
RESPIRATION RATE: 18 BRPM | HEART RATE: 76 BPM | WEIGHT: 131 LBS | DIASTOLIC BLOOD PRESSURE: 76 MMHG | OXYGEN SATURATION: 98 % | HEIGHT: 66 IN | SYSTOLIC BLOOD PRESSURE: 120 MMHG | BODY MASS INDEX: 21.05 KG/M2

## 2020-02-24 LAB
ABSOLUTE EOS #: 0.17 K/UL (ref 0–0.4)
ABSOLUTE IMMATURE GRANULOCYTE: ABNORMAL K/UL (ref 0–0.3)
ABSOLUTE LYMPH #: 2.91 K/UL (ref 1–4.8)
ABSOLUTE MONO #: 0.83 K/UL (ref 0.1–1.3)
ANION GAP SERPL CALCULATED.3IONS-SCNC: 15 MMOL/L (ref 9–17)
BASOPHILS # BLD: 1 % (ref 0–2)
BASOPHILS ABSOLUTE: 0.08 K/UL (ref 0–0.2)
BUN BLDV-MCNC: 24 MG/DL (ref 8–23)
BUN/CREAT BLD: ABNORMAL (ref 9–20)
CALCIUM SERPL-MCNC: 10.4 MG/DL (ref 8.6–10.4)
CHLORIDE BLD-SCNC: 99 MMOL/L (ref 98–107)
CHP ED QC CHECK: NORMAL
CO2: 25 MMOL/L (ref 20–31)
CREAT SERPL-MCNC: 0.61 MG/DL (ref 0.5–0.9)
DIFFERENTIAL TYPE: ABNORMAL
EOSINOPHILS RELATIVE PERCENT: 2 % (ref 0–4)
GFR AFRICAN AMERICAN: >60 ML/MIN
GFR NON-AFRICAN AMERICAN: >60 ML/MIN
GFR SERPL CREATININE-BSD FRML MDRD: ABNORMAL ML/MIN/{1.73_M2}
GFR SERPL CREATININE-BSD FRML MDRD: ABNORMAL ML/MIN/{1.73_M2}
GLUCOSE BLD-MCNC: 129 MG/DL (ref 70–99)
GLUCOSE BLD-MCNC: 199 MG/DL
HBA1C MFR BLD: 7.2 %
HCT VFR BLD CALC: 37.2 % (ref 36–46)
HEMOGLOBIN: 11.4 G/DL (ref 12–16)
IMMATURE GRANULOCYTES: ABNORMAL %
LYMPHOCYTES # BLD: 35 % (ref 24–44)
MCH RBC QN AUTO: 23.8 PG (ref 26–34)
MCHC RBC AUTO-ENTMCNC: 30.7 G/DL (ref 31–37)
MCV RBC AUTO: 77.6 FL (ref 80–100)
MONOCYTES # BLD: 10 % (ref 1–7)
MORPHOLOGY: ABNORMAL
MORPHOLOGY: ABNORMAL
NRBC AUTOMATED: ABNORMAL PER 100 WBC
PDW BLD-RTO: 18.2 % (ref 11.5–14.9)
PLATELET # BLD: 425 K/UL (ref 150–450)
PLATELET ESTIMATE: ABNORMAL
PMV BLD AUTO: 7.3 FL (ref 6–12)
POTASSIUM SERPL-SCNC: 4.7 MMOL/L (ref 3.7–5.3)
RBC # BLD: 4.79 M/UL (ref 4–5.2)
RBC # BLD: ABNORMAL 10*6/UL
SEG NEUTROPHILS: 52 % (ref 36–66)
SEGMENTED NEUTROPHILS ABSOLUTE COUNT: 4.31 K/UL (ref 1.3–9.1)
SODIUM BLD-SCNC: 139 MMOL/L (ref 135–144)
WBC # BLD: 8.3 K/UL (ref 3.5–11)
WBC # BLD: ABNORMAL 10*3/UL

## 2020-02-24 PROCEDURE — G8399 PT W/DXA RESULTS DOCUMENT: HCPCS | Performed by: INTERNAL MEDICINE

## 2020-02-24 PROCEDURE — 1036F TOBACCO NON-USER: CPT | Performed by: INTERNAL MEDICINE

## 2020-02-24 PROCEDURE — 2580000003 HC RX 258: Performed by: INTERNAL MEDICINE

## 2020-02-24 PROCEDURE — 2022F DILAT RTA XM EVC RTNOPTHY: CPT | Performed by: INTERNAL MEDICINE

## 2020-02-24 PROCEDURE — 1090F PRES/ABSN URINE INCON ASSESS: CPT | Performed by: INTERNAL MEDICINE

## 2020-02-24 PROCEDURE — 85025 COMPLETE CBC W/AUTO DIFF WBC: CPT

## 2020-02-24 PROCEDURE — 77081 DXA BONE DENSITY APPENDICULR: CPT

## 2020-02-24 PROCEDURE — 80048 BASIC METABOLIC PNL TOTAL CA: CPT

## 2020-02-24 PROCEDURE — 3017F COLORECTAL CA SCREEN DOC REV: CPT | Performed by: INTERNAL MEDICINE

## 2020-02-24 PROCEDURE — 3051F HG A1C>EQUAL 7.0%<8.0%: CPT | Performed by: INTERNAL MEDICINE

## 2020-02-24 PROCEDURE — 99214 OFFICE O/P EST MOD 30 MIN: CPT | Performed by: INTERNAL MEDICINE

## 2020-02-24 PROCEDURE — 71260 CT THORAX DX C+: CPT

## 2020-02-24 PROCEDURE — 1123F ACP DISCUSS/DSCN MKR DOCD: CPT | Performed by: INTERNAL MEDICINE

## 2020-02-24 PROCEDURE — 4040F PNEUMOC VAC/ADMIN/RCVD: CPT | Performed by: INTERNAL MEDICINE

## 2020-02-24 PROCEDURE — G8420 CALC BMI NORM PARAMETERS: HCPCS | Performed by: INTERNAL MEDICINE

## 2020-02-24 PROCEDURE — G8427 DOCREV CUR MEDS BY ELIG CLIN: HCPCS | Performed by: INTERNAL MEDICINE

## 2020-02-24 PROCEDURE — 6360000004 HC RX CONTRAST MEDICATION: Performed by: INTERNAL MEDICINE

## 2020-02-24 PROCEDURE — 36415 COLL VENOUS BLD VENIPUNCTURE: CPT

## 2020-02-24 PROCEDURE — G8482 FLU IMMUNIZE ORDER/ADMIN: HCPCS | Performed by: INTERNAL MEDICINE

## 2020-02-24 RX ORDER — SODIUM CHLORIDE 0.9 % (FLUSH) 0.9 %
10 SYRINGE (ML) INJECTION PRN
Status: DISCONTINUED | OUTPATIENT
Start: 2020-02-24 | End: 2020-02-27 | Stop reason: HOSPADM

## 2020-02-24 RX ORDER — 0.9 % SODIUM CHLORIDE 0.9 %
80 INTRAVENOUS SOLUTION INTRAVENOUS ONCE
Status: COMPLETED | OUTPATIENT
Start: 2020-02-24 | End: 2020-02-24

## 2020-02-24 RX ADMIN — Medication 10 ML: at 18:22

## 2020-02-24 RX ADMIN — SODIUM CHLORIDE 80 ML: 9 INJECTION, SOLUTION INTRAVENOUS at 18:22

## 2020-02-24 RX ADMIN — IOVERSOL 75 ML: 741 INJECTION INTRA-ARTERIAL; INTRAVENOUS at 18:22

## 2020-02-24 ASSESSMENT — ENCOUNTER SYMPTOMS
TROUBLE SWALLOWING: 0
EYE DISCHARGE: 0
COLOR CHANGE: 0
WHEEZING: 0
ABDOMINAL DISTENTION: 0
SHORTNESS OF BREATH: 1
COUGH: 0
BLOOD IN STOOL: 0
DIARRHEA: 0
EYE PAIN: 0

## 2020-02-24 NOTE — PROGRESS NOTES
[address] [address]  Dept: [phone]  Dept Fax: [phone]     Yanni Ortiz is a 75 y.o. female who presents today for her medical conditions/complaintsas noted below. Yanni Ortiz is c/o of   Chief Complaint   Patient presents with    Shortness of Breath     Pt still c/o shortness of breath since last visit.  Medication Refill     Refill oxybutine         HPI:      dyspena on exertion  No cp  No anekle edema  Quit smoking 40 years ago        Hemoglobin A1C (%)   Date Value   02/24/2020 7.2   11/18/2019 6.9 (H)   06/17/2019 6.4             ( goal A1Cis < 7)   Microalb/Crt.  Ratio (mcg/mg creat)   Date Value   11/19/2019 CANNOT BE CALCULATED     LDL Cholesterol (mg/dL)   Date Value   05/16/2019 71   02/12/2018 49   03/15/2016 127       (goal LDL is <100)   AST (U/L)   Date Value   11/18/2019 16     ALT (U/L)   Date Value   11/18/2019 20     BUN (mg/dL)   Date Value   11/18/2019 19     BP Readings from Last 3 Encounters:   02/24/20 120/76   01/08/20 119/67   01/02/20 120/72          (goal 120/80)    Past Medical History:   Diagnosis Date    Anxiety state, unspecified     Arthritis     Cataracts, bilateral     Esophageal reflux     Generalized osteoarthrosis, unspecified site     Hearing loss     Hyperlipidemia     Hypertension     Neuropathy     Neuropathy due to medical condition ([address]     Pure hypercholesterolemia     Thoracic or lumbosacral neuritis or radiculitis, unspecified     Type 2 diabetes mellitus without complication ([address]     Urinary retention     UTI (urinary tract infection)       Past Surgical History:   Procedure Laterality Date    BACK SURGERY      X3    BREAST BIOPSY  2007    Lt     CARPAL TUNNEL RELEASE Right     CATARACT REMOVAL WITH IMPLANT Bilateral     COLONOSCOPY  2006    COLONOSCOPY  4/24/15    diverticulosis    CYSTOSCOPY  [id number]    CYSTOSCOPY  05/26/2017    CYSTOSCOPY N/A 5/26/2017    CYSTOSCOPY 1 kit 0    Lancets MISC 1 each by Does not apply route daily 100 each 3    oxybutynin (DITROPAN) 5 MG tablet Take 1 tablet by mouth 2 times daily      Calcium-Magnesium-Vitamin D (CALCIUM 1200+D3 PO) Take 2 tablets by mouth daily.  Glucosamine 500 MG CAPS Take 3 tablets by mouth daily.  acetaminophen (TYLENOL) 500 MG tablet Take 500 mg by mouth every 6 hours as needed for Pain.  aspirin 81 MG chewable tablet Take 81 mg by mouth daily.  DULoxetine (CYMBALTA) 60 MG extended release capsule Take 1 capsule by mouth daily (Patient not taking: Reported on 2/24/2020) 90 capsule 3    albuterol sulfate  (90 Base) MCG/ACT inhaler Inhale 2 puffs into the lungs 4 times daily as needed for Wheezing (Patient not taking: Reported on 2/24/2020) 3 Inhaler 1    lisinopril (PRINIVIL;ZESTRIL) 20 MG tablet Take 1 tablet by mouth 2 times daily 180 tablet 2    omeprazole (PRILOSEC) 20 MG delayed release capsule Take 1 capsule by mouth  daily (Patient not taking: Reported on 2/24/2020) 90 capsule 3     No current facility-administered medications for this visit. No Known Allergies    Health Maintenance   Topic Date Due    DTaP/Tdap/Td vaccine (1 - Tdap) 09/08/1956    Hepatitis B vaccine (1 of 3 - Risk 3-dose series) 09/08/1963    Shingles Vaccine (2 of 2) 09/26/2018    Annual Wellness Visit (AWV)  02/25/2020 (Originally 5/29/2019)    Lipid screen  05/16/2020    Diabetic retinal exam  08/07/2020    A1C test (Diabetic or Prediabetic)  11/18/2020    Diabetic foot exam  01/02/2021    Colon cancer screen colonoscopy  04/24/2025    DEXA (modify frequency per FRAX score)  Completed    Flu vaccine  Completed    Pneumococcal 65+ years Vaccine  Completed    Hepatitis A vaccine  Aged Out    Hib vaccine  Aged Out    Meningococcal (ACWY) vaccine  Aged Out       Subjective:     Review of Systems   Constitutional: Negative for appetite change, diaphoresis and fatigue.    HENT: Negative for ear discharge and trouble swallowing. Eyes: Negative for pain and discharge. Respiratory: Positive for shortness of breath. Negative for cough and wheezing. Cardiovascular: Negative for chest pain and palpitations. Gastrointestinal: Negative for abdominal distention, blood in stool and diarrhea. Endocrine: Negative for polydipsia and polyphagia. Genitourinary: Negative for difficulty urinating and frequency. Musculoskeletal: Negative for gait problem, myalgias and neck pain. Skin: Negative for color change and rash. Allergic/Immunologic: Negative for environmental allergies and food allergies. Neurological: Negative for dizziness and headaches. Hematological: Negative for adenopathy. Does not bruise/bleed easily. Psychiatric/Behavioral: Negative for behavioral problems and sleep disturbance. Objective:     Physical Exam  Constitutional:       Appearance: She is well-developed. She is not diaphoretic. HENT:      Head: Normocephalic and atraumatic. Eyes:      General:         Right eye: No discharge. Left eye: No discharge. Extraocular Movements:      Right eye: Normal extraocular motion. Left eye: Normal extraocular motion. Conjunctiva/sclera: Conjunctivae normal.      Right eye: Right conjunctiva is not injected. Left eye: Left conjunctiva is not injected. Neck:      Musculoskeletal: Normal range of motion and neck supple. No edema or erythema. Thyroid: No thyroid mass or thyromegaly. Vascular: No JVD. Cardiovascular:      Rate and Rhythm: Normal rate and regular rhythm. Heart sounds: No murmur. No friction rub. Pulmonary:      Effort: Pulmonary effort is normal. No tachypnea, bradypnea, accessory muscle usage or respiratory distress. Breath sounds: Normal breath sounds. No wheezing or rales. Abdominal:      General: Bowel sounds are normal. There is no distension. Palpations: Abdomen is soft. Tenderness:  There is no abdominal tenderness. There is no rebound. Musculoskeletal: Normal range of motion. General: No tenderness. Lymphadenopathy:      Head:      Right side of head: No submental or submandibular adenopathy. Left side of head: No submental or submandibular adenopathy. Cervical: No cervical adenopathy. Skin:     General: Skin is warm. Coloration: Skin is not pale. Findings: No bruising, ecchymosis or rash. Neurological:      Mental Status: She is alert and oriented to person, place, and time. Cranial Nerves: No cranial nerve deficit. Sensory: No sensory deficit. Motor: No atrophy or abnormal muscle tone. Coordination: Coordination normal.   Psychiatric:         Mood and Affect: Mood is not anxious. Affect is not angry. Speech: Speech is not slurred. Behavior: Behavior normal. Behavior is not aggressive. Thought Content: Thought content does not include homicidal ideation. Cognition and Memory: Memory is not impaired. /76 (Site: Right Upper Arm, Position: Sitting, Cuff Size: Small Adult)   Pulse 76   Resp 18   Ht 5' 5.98\" (1.676 m)   Wt 131 lb (59.4 kg)   SpO2 98%   BMI 21.15 kg/m²     Assessment:          Diagnosis Orders   1. DM type 2, uncontrolled, with neuropathy (HCC)  POCT glycosylated hemoglobin (Hb A1C)    POCT Glucose    POCT glycosylated hemoglobin (Hb A1C)    POCT Glucose    CT Chest Pulmonary Embolism W Contrast   2. Dyspnea on exertion  Stress test, myoview    CBC Auto Differential    Basic Metabolic Panel             Plan:      Return in about 1 week (around 3/2/2020).     Orders Placed This Encounter   Procedures    CT Chest Pulmonary Embolism W Contrast     Standing Status:   Future     Standing Expiration Date:   2/23/2021     Order Specific Question:   Reason for exam:     Answer:   rule otu pe    CBC Auto Differential     Standing Status:   Future     Standing Expiration Date:   5/24/2020   Elidia Talavera Metabolic Panel     Standing Status:   Future     Standing Expiration Date:   2/24/2021    Stress test, myoview     Order Specific Question:   Reason for Exam?     Answer:   Shortness of breath    POCT glycosylated hemoglobin (Hb A1C)    POCT Glucose    POCT glycosylated hemoglobin (Hb A1C)    POCT Glucose      No orders of the defined types were placed in this encounter. exertional dyspena  Will rule out angina equivalnt  stres test in few days and ct a rule out pe  Today  Stat  Pf tis not reprfoted yet  Hg is good  Lung sounds clear     Patient given educational materials - see patient instructions. Discussed use, benefit, and side effects of prescribed medications. All patientquestions answered. Pt voiced understanding. Reviewed health maintenance. Instructedto continue current medications, diet and exercise. Patient agreed with treatmentplan. Follow up as directed.      Electronically signed by Mary Zafar MD on 2/24/2020 at 4:32 PM

## 2020-02-28 ENCOUNTER — TELEPHONE (OUTPATIENT)
Dept: INTERNAL MEDICINE CLINIC | Age: 75
End: 2020-02-28

## 2020-02-28 NOTE — TELEPHONE ENCOUNTER
Please inform patient CT of the chest is essentially normal.  Some minor long-term changes noted-can be discussed further at next office visit. Recommend office visit within the next couple of weeks.   Chalo Vaughn

## 2020-03-02 ENCOUNTER — HOSPITAL ENCOUNTER (OUTPATIENT)
Dept: NUCLEAR MEDICINE | Age: 75
Discharge: HOME | End: 2020-03-04
Payer: MEDICARE

## 2020-03-02 ENCOUNTER — HOSPITAL ENCOUNTER (OUTPATIENT)
Dept: NON INVASIVE DIAGNOSTICS | Age: 75
Discharge: HOME | End: 2020-03-02
Payer: MEDICARE

## 2020-03-02 VITALS
RESPIRATION RATE: 16 BRPM | DIASTOLIC BLOOD PRESSURE: 58 MMHG | HEIGHT: 66 IN | OXYGEN SATURATION: 96 % | BODY MASS INDEX: 21.05 KG/M2 | HEART RATE: 65 BPM | WEIGHT: 131 LBS | SYSTOLIC BLOOD PRESSURE: 109 MMHG

## 2020-03-02 LAB
LV EF: 70 %
LVEF MODALITY: NORMAL

## 2020-03-02 PROCEDURE — 3430000000 HC RX DIAGNOSTIC RADIOPHARMACEUTICAL: Performed by: INTERNAL MEDICINE

## 2020-03-02 PROCEDURE — 93017 CV STRESS TEST TRACING ONLY: CPT

## 2020-03-02 PROCEDURE — 2580000003 HC RX 258: Performed by: INTERNAL MEDICINE

## 2020-03-02 PROCEDURE — 78452 HT MUSCLE IMAGE SPECT MULT: CPT

## 2020-03-02 PROCEDURE — 6360000002 HC RX W HCPCS: Performed by: INTERNAL MEDICINE

## 2020-03-02 PROCEDURE — A9500 TC99M SESTAMIBI: HCPCS | Performed by: INTERNAL MEDICINE

## 2020-03-02 RX ORDER — SIMVASTATIN 40 MG
TABLET ORAL
COMMUNITY
End: 2020-12-18

## 2020-03-02 RX ORDER — SODIUM CHLORIDE 0.9 % (FLUSH) 0.9 %
10 SYRINGE (ML) INJECTION PRN
Status: DISCONTINUED | OUTPATIENT
Start: 2020-03-02 | End: 2020-03-05 | Stop reason: HOSPADM

## 2020-03-02 RX ORDER — AMINOPHYLLINE DIHYDRATE 25 MG/ML
50 INJECTION, SOLUTION INTRAVENOUS PRN
Status: ACTIVE | OUTPATIENT
Start: 2020-03-02 | End: 2020-03-02

## 2020-03-02 RX ORDER — ALBUTEROL SULFATE 90 UG/1
2 AEROSOL, METERED RESPIRATORY (INHALATION) PRN
Status: ACTIVE | OUTPATIENT
Start: 2020-03-02 | End: 2020-03-02

## 2020-03-02 RX ORDER — METOPROLOL TARTRATE 5 MG/5ML
5 INJECTION INTRAVENOUS EVERY 5 MIN PRN
Status: ACTIVE | OUTPATIENT
Start: 2020-03-02 | End: 2020-03-02

## 2020-03-02 RX ORDER — ATROPINE SULFATE 0.1 MG/ML
0.5 INJECTION INTRAVENOUS EVERY 5 MIN PRN
Status: ACTIVE | OUTPATIENT
Start: 2020-03-02 | End: 2020-03-02

## 2020-03-02 RX ORDER — NITROGLYCERIN 0.4 MG/1
0.4 TABLET SUBLINGUAL EVERY 5 MIN PRN
Status: ACTIVE | OUTPATIENT
Start: 2020-03-02 | End: 2020-03-02

## 2020-03-02 RX ORDER — KETOTIFEN FUMARATE 0.35 MG/ML
1 SOLUTION/ DROPS OPHTHALMIC
COMMUNITY
End: 2021-04-29

## 2020-03-02 RX ORDER — SODIUM CHLORIDE 9 MG/ML
500 INJECTION, SOLUTION INTRAVENOUS CONTINUOUS PRN
Status: ACTIVE | OUTPATIENT
Start: 2020-03-02 | End: 2020-03-02

## 2020-03-02 RX ORDER — SODIUM CHLORIDE 0.9 % (FLUSH) 0.9 %
10 SYRINGE (ML) INJECTION PRN
Status: ACTIVE | OUTPATIENT
Start: 2020-03-02 | End: 2020-03-02

## 2020-03-02 RX ADMIN — Medication 10 ML: at 09:08

## 2020-03-02 RX ADMIN — REGADENOSON 0.4 MG: 0.08 INJECTION, SOLUTION INTRAVENOUS at 09:08

## 2020-03-02 RX ADMIN — Medication 10 ML: at 08:35

## 2020-03-02 RX ADMIN — TETRAKIS(2-METHOXYISOBUTYLISOCYANIDE)COPPER(I) TETRAFLUOROBORATE 35.3 MILLICURIE: 1 INJECTION, POWDER, LYOPHILIZED, FOR SOLUTION INTRAVENOUS at 09:10

## 2020-03-02 RX ADMIN — Medication 10 ML: at 07:03

## 2020-03-02 RX ADMIN — Medication 10 ML: at 07:02

## 2020-03-02 RX ADMIN — TETRAKIS(2-METHOXYISOBUTYLISOCYANIDE)COPPER(I) TETRAFLUOROBORATE 11.1 MILLICURIE: 1 INJECTION, POWDER, LYOPHILIZED, FOR SOLUTION INTRAVENOUS at 07:03

## 2020-03-02 NOTE — PROGRESS NOTES
PATIENT RETURNED TO BASELINE, /52, , IV REMOVED, PATIENT TAKEN TO WAITING ROOM FOR FINAL STRESS SCANS

## 2020-03-02 NOTE — PROGRESS NOTES
PATIENT TO STRESS LAB, EXPLAINED LEXISCAN AND CONSENT SIGNED, PATIENT PLACED ON EKG AND VITALS MACHINE. PATIENT DENIES PAIN AND SHORTNESS OF BREATH AT THIS TIME. IV FLUSHED WITH NORMAL SALINE. /59, HR 65, O2 SAT 96%ROOM AIR, PATIENT GIVEN WARM BLANKETS AND IS RESTING IN BED, NSR ON MONITOR.

## 2020-03-03 ENCOUNTER — OFFICE VISIT (OUTPATIENT)
Dept: INTERNAL MEDICINE CLINIC | Age: 75
End: 2020-03-03
Payer: MEDICARE

## 2020-03-03 VITALS
SYSTOLIC BLOOD PRESSURE: 120 MMHG | OXYGEN SATURATION: 97 % | WEIGHT: 131 LBS | HEART RATE: 69 BPM | DIASTOLIC BLOOD PRESSURE: 70 MMHG | BODY MASS INDEX: 21.05 KG/M2 | HEIGHT: 66 IN

## 2020-03-03 PROBLEM — R06.09 DYSPNEA ON EXERTION: Status: ACTIVE | Noted: 2020-03-03

## 2020-03-03 PROBLEM — D50.0 IRON DEFICIENCY ANEMIA DUE TO CHRONIC BLOOD LOSS: Status: ACTIVE | Noted: 2020-03-03

## 2020-03-03 PROCEDURE — 2022F DILAT RTA XM EVC RTNOPTHY: CPT | Performed by: INTERNAL MEDICINE

## 2020-03-03 PROCEDURE — G8420 CALC BMI NORM PARAMETERS: HCPCS | Performed by: INTERNAL MEDICINE

## 2020-03-03 PROCEDURE — 1090F PRES/ABSN URINE INCON ASSESS: CPT | Performed by: INTERNAL MEDICINE

## 2020-03-03 PROCEDURE — 1036F TOBACCO NON-USER: CPT | Performed by: INTERNAL MEDICINE

## 2020-03-03 PROCEDURE — 4040F PNEUMOC VAC/ADMIN/RCVD: CPT | Performed by: INTERNAL MEDICINE

## 2020-03-03 PROCEDURE — 99214 OFFICE O/P EST MOD 30 MIN: CPT | Performed by: INTERNAL MEDICINE

## 2020-03-03 PROCEDURE — G8482 FLU IMMUNIZE ORDER/ADMIN: HCPCS | Performed by: INTERNAL MEDICINE

## 2020-03-03 PROCEDURE — 3017F COLORECTAL CA SCREEN DOC REV: CPT | Performed by: INTERNAL MEDICINE

## 2020-03-03 PROCEDURE — G8399 PT W/DXA RESULTS DOCUMENT: HCPCS | Performed by: INTERNAL MEDICINE

## 2020-03-03 PROCEDURE — G8427 DOCREV CUR MEDS BY ELIG CLIN: HCPCS | Performed by: INTERNAL MEDICINE

## 2020-03-03 PROCEDURE — 3051F HG A1C>EQUAL 7.0%<8.0%: CPT | Performed by: INTERNAL MEDICINE

## 2020-03-03 PROCEDURE — 1123F ACP DISCUSS/DSCN MKR DOCD: CPT | Performed by: INTERNAL MEDICINE

## 2020-03-03 RX ORDER — FERROUS SULFATE 325(65) MG
325 TABLET ORAL 2 TIMES DAILY
Qty: 180 TABLET | Refills: 1 | Status: SHIPPED | OUTPATIENT
Start: 2020-03-03 | End: 2021-03-08

## 2020-03-03 ASSESSMENT — PATIENT HEALTH QUESTIONNAIRE - PHQ9
2. FEELING DOWN, DEPRESSED OR HOPELESS: 0
1. LITTLE INTEREST OR PLEASURE IN DOING THINGS: 0
SUM OF ALL RESPONSES TO PHQ QUESTIONS 1-9: 0
SUM OF ALL RESPONSES TO PHQ QUESTIONS 1-9: 0
SUM OF ALL RESPONSES TO PHQ9 QUESTIONS 1 & 2: 0

## 2020-03-03 ASSESSMENT — ENCOUNTER SYMPTOMS
ABDOMINAL DISTENTION: 0
SHORTNESS OF BREATH: 1
TROUBLE SWALLOWING: 0
EYE PAIN: 0
COLOR CHANGE: 0
WHEEZING: 0
DIARRHEA: 0
EYE DISCHARGE: 0
COUGH: 0
BLOOD IN STOOL: 0

## 2020-03-03 NOTE — PROGRESS NOTES
Visit Information    Have you changed or started any medications since your last visit including any over-the-counter medicines, vitamins, or herbal medicines? no   Are you having any side effects from any of your medications? -  no  Have you stopped taking any of your medications? Is so, why? -  no    Have you seen any other physician or provider since your last visit? Yes - Records Obtained  Have you had any other diagnostic tests since your last visit? Yes - Records Obtained     [address] [address]  Dept: [phone]  Dept Fax: [phone]     Jorje Winslow is a 75 y.o. female who presents today for her medical conditions/complaintsas noted below. Jorje Winslow is c/o of   Chief Complaint   Patient presents with    Diabetes     7.2         HPI:      Dyspnea on exertion  No cp  HTN  Onset more than 2 years ago  ernesto mild to mod  Controlled with current po meds  Not associated with headaches or blurry vision  No chest pain  Diabetes   Duration more than 7 years  Modifying factors on Glucophage and other med  Severity uncontrolled sever  Associated signs and symtoms neuropathy/ckd/ CAD. aggravated with sugar diet and better with low sugar diet           Hemoglobin A1C (%)   Date Value   02/24/2020 7.2   11/18/2019 6.9 (H)   06/17/2019 6.4             ( goal A1Cis < 7)   Microalb/Crt.  Ratio (mcg/mg creat)   Date Value   11/19/2019 CANNOT BE CALCULATED     LDL Cholesterol (mg/dL)   Date Value   05/16/2019 71   02/12/2018 49   03/15/2016 127       (goal LDL is <100)   AST (U/L)   Date Value   11/18/2019 16     ALT (U/L)   Date Value   11/18/2019 20     BUN (mg/dL)   Date Value   02/24/2020 24 (H)     BP Readings from Last 3 Encounters:   03/03/20 120/70   03/02/20 (!) 109/58   02/24/20 120/76          (goal 120/80)    Past Medical History:   Diagnosis Date    Anxiety state, unspecified     Arthritis     Cataracts, bilateral     Esophageal reflux     Generalized osteoarthrosis, unspecified site     Hearing loss     Hyperlipidemia     Hypertension     Neuropathy     [facility]     Pure hypercholesterolemia     Thoracic or lumbosacral neuritis or radiculitis, unspecified     Type 2 diabetes mellitus without complication ([facility]     Urinary retention     UTI (urinary tract infection)       Past Surgical History:   Procedure Laterality Date    BACK SURGERY      X3    BREAST BIOPSY  2007    Lt     CARPAL TUNNEL RELEASE Right     CATARACT REMOVAL WITH IMPLANT Bilateral     COLONOSCOPY      COLONOSCOPY  4/24/15    diverticulosis    CYSTOSCOPY  [id number]    CYSTOSCOPY  2017    CYSTOSCOPY N/A 2017    CYSTOSCOPY URODYNAMICS AND DILATION  performed by Thuy Soni MD at [address] Bilateral     thumbs reconstruction    HIP ARTHROPLASTY Bilateral     JOINT REPLACEMENT Bilateral     LUMBAR FUSION  2015       Family History   Problem Relation Age of Onset    Cancer Mother         lymphoma    Hypertension Mother     Hypertension Father     Hypertension Brother     Heart Disease Brother         pt states that brother had 5 bypass surgeries. Social History     Tobacco Use    Smoking status: Former Smoker     Packs/day: 2.00     Years: 20.00     Pack years: 40.00     Types: Cigarettes     Last attempt to quit: 1984     Years since quittin.1    Smokeless tobacco: Never Used   Substance Use Topics    Alcohol use:  Yes     Alcohol/week: 0.0 standard drinks     Comment: social         Current Outpatient Medications   Medication Sig Dispense Refill    ferrous sulfate 325 (65 Fe) MG tablet Take 1 tablet by mouth 2 times daily 180 tablet 1    simvastatin (ZOCOR) 40 MG tablet take 1 tablet (40MG)  by oral route  every day      ketotifen (ALAWAY) 0.025 % ophthalmic solution Apply 1 drop to eye      atorvastatin (LIPITOR) 40 MG tablet TAKE 1 TABLET BY MOUTH  DAILY 90 - see patient instructions. Discussed use, benefit, and side effects of prescribed medications. All patientquestions answered. Pt voiced understanding. Reviewed health maintenance. Instructedto continue current medications, diet and exercise. Patient agreed with treatmentplan. Follow up as directed. Electronically signed by Bautista Martinez MD on 3/3/2020 at 10:37 AM  Have you been seen in the emergency room and/or had an admission to a hospital since we last saw you? No  Have you had your routine dental cleaning in the past 6 months? yes -     Have you activated your [employer] account? If not, what are your barriers?  Yes     Patient Care Team:  Bautista Martinez MD as PCP - General (Internal Medicine)  Bautista Martinez MD as PCP - [facility] Empaneled Provider  Stephanie Frazier DO (Obstetrics & Gynecology)    Medical History Review  Past Medical, Family, and Social History reviewed and does not contribute to the patient presenting condition    Health Maintenance   Topic Date Due    DTaP/Tdap/Td vaccine (1 - Tdap) 09/08/1955    Hepatitis B vaccine (1 of 3 - Risk 3-dose series) 09/08/1963    Shingles Vaccine (2 of 2) 09/26/2018    Annual Wellness Visit (AWV)  05/29/2019    Lipid screen  05/16/2020    Diabetic retinal exam  08/07/2020    Diabetic foot exam  01/02/2021    A1C test (Diabetic or Prediabetic)  02/24/2021    Colon cancer screen colonoscopy  04/24/2025    DEXA (modify frequency per FRAX score)  Completed    Flu vaccine  Completed    Pneumococcal 65+ years Vaccine  Completed    Hepatitis A vaccine  Aged Out    Hib vaccine  Aged Out    Meningococcal (ACWY) vaccine  Aged Out

## 2020-03-10 ENCOUNTER — TELEPHONE (OUTPATIENT)
Dept: GASTROENTEROLOGY | Age: 75
End: 2020-03-10

## 2020-06-23 ENCOUNTER — OFFICE VISIT (OUTPATIENT)
Dept: INTERNAL MEDICINE CLINIC | Age: 75
End: 2020-06-23
Payer: MEDICARE

## 2020-06-23 VITALS
DIASTOLIC BLOOD PRESSURE: 70 MMHG | OXYGEN SATURATION: 98 % | TEMPERATURE: 97 F | HEART RATE: 70 BPM | HEIGHT: 66 IN | SYSTOLIC BLOOD PRESSURE: 116 MMHG | BODY MASS INDEX: 20.57 KG/M2 | WEIGHT: 128 LBS

## 2020-06-23 PROCEDURE — 4040F PNEUMOC VAC/ADMIN/RCVD: CPT | Performed by: INTERNAL MEDICINE

## 2020-06-23 PROCEDURE — 1123F ACP DISCUSS/DSCN MKR DOCD: CPT | Performed by: INTERNAL MEDICINE

## 2020-06-23 PROCEDURE — G8427 DOCREV CUR MEDS BY ELIG CLIN: HCPCS | Performed by: INTERNAL MEDICINE

## 2020-06-23 PROCEDURE — 3051F HG A1C>EQUAL 7.0%<8.0%: CPT | Performed by: INTERNAL MEDICINE

## 2020-06-23 PROCEDURE — 99214 OFFICE O/P EST MOD 30 MIN: CPT | Performed by: INTERNAL MEDICINE

## 2020-06-23 PROCEDURE — G8399 PT W/DXA RESULTS DOCUMENT: HCPCS | Performed by: INTERNAL MEDICINE

## 2020-06-23 PROCEDURE — G8420 CALC BMI NORM PARAMETERS: HCPCS | Performed by: INTERNAL MEDICINE

## 2020-06-23 PROCEDURE — 3017F COLORECTAL CA SCREEN DOC REV: CPT | Performed by: INTERNAL MEDICINE

## 2020-06-23 PROCEDURE — 1036F TOBACCO NON-USER: CPT | Performed by: INTERNAL MEDICINE

## 2020-06-23 PROCEDURE — 2022F DILAT RTA XM EVC RTNOPTHY: CPT | Performed by: INTERNAL MEDICINE

## 2020-06-23 PROCEDURE — 1090F PRES/ABSN URINE INCON ASSESS: CPT | Performed by: INTERNAL MEDICINE

## 2020-06-23 ASSESSMENT — PATIENT HEALTH QUESTIONNAIRE - PHQ9
SUM OF ALL RESPONSES TO PHQ QUESTIONS 1-9: 0
SUM OF ALL RESPONSES TO PHQ QUESTIONS 1-9: 0

## 2020-06-23 ASSESSMENT — LIFESTYLE VARIABLES
HOW MANY STANDARD DRINKS CONTAINING ALCOHOL DO YOU HAVE ON A TYPICAL DAY: 0
HOW OFTEN DURING THE LAST YEAR HAVE YOU NEEDED AN ALCOHOLIC DRINK FIRST THING IN THE MORNING TO GET YOURSELF GOING AFTER A NIGHT OF HEAVY DRINKING: 0
AUDIT TOTAL SCORE: 4
HAVE YOU OR SOMEONE ELSE BEEN INJURED AS A RESULT OF YOUR DRINKING: 0
HOW OFTEN DURING THE LAST YEAR HAVE YOU FAILED TO DO WHAT WAS NORMALLY EXPECTED FROM YOU BECAUSE OF DRINKING: 0
HOW OFTEN DURING THE LAST YEAR HAVE YOU HAD A FEELING OF GUILT OR REMORSE AFTER DRINKING: 0
HOW OFTEN DO YOU HAVE A DRINK CONTAINING ALCOHOL: 4
HOW OFTEN DO YOU HAVE SIX OR MORE DRINKS ON ONE OCCASION: 0
HOW OFTEN DURING THE LAST YEAR HAVE YOU BEEN UNABLE TO REMEMBER WHAT HAPPENED THE NIGHT BEFORE BECAUSE YOU HAD BEEN DRINKING: 0
HAS A RELATIVE, FRIEND, DOCTOR, OR ANOTHER HEALTH PROFESSIONAL EXPRESSED CONCERN ABOUT YOUR DRINKING OR SUGGESTED YOU CUT DOWN: 0
AUDIT-C TOTAL SCORE: 4
HOW OFTEN DURING THE LAST YEAR HAVE YOU FOUND THAT YOU WERE NOT ABLE TO STOP DRINKING ONCE YOU HAD STARTED: 0

## 2020-06-23 NOTE — PATIENT INSTRUCTIONS
Personalized Preventive Plan for Jerson Formica - 6/23/2020  Medicare offers a range of preventive health benefits. Some of the tests and screenings are paid in full while other may be subject to a deductible, co-insurance, and/or copay. Some of these benefits include a comprehensive review of your medical history including lifestyle, illnesses that may run in your family, and various assessments and screenings as appropriate. After reviewing your medical record and screening and assessments performed today your provider may have ordered immunizations, labs, imaging, and/or referrals for you. A list of these orders (if applicable) as well as your Preventive Care list are included within your After Visit Summary for your review. Other Preventive Recommendations:    · A preventive eye exam performed by an eye specialist is recommended every 1-2 years to screen for glaucoma; cataracts, macular degeneration, and other eye disorders. · A preventive dental visit is recommended every 6 months. · Try to get at least 150 minutes of exercise per week or 10,000 steps per day on a pedometer . · Order or download the FREE \"Exercise & Physical Activity: Your Everyday Guide\" from [employer]. Call 5-867.317.6714 or search [employer] online. · You need 7093-7639 mg of calcium and 1596-4544 IU of vitamin D per day. It is possible to meet your calcium requirement with diet alone, but a vitamin D supplement is usually necessary to meet this goal.  · When exposed to the sun, use a sunscreen that protects against both UVA and UVB radiation with an SPF of 30 or greater. Reapply every 2 to 3 hours or after sweating, drying off with a towel, or swimming. · Always wear a seat belt when traveling in a car. Always wear a helmet when riding a bicycle or motorcycle.

## 2020-06-23 NOTE — PROGRESS NOTES
MOUTH TWO  TIMES DAILY WITH MEALS Yes Blair Alejandre MD   blood glucose monitor strips Patient checking blood sugar levels 4 times daily Yes Blair Alejandre MD   ONE TOUCH ULTRASOFT LANCETS MISC 1 each by Does not apply route 2 times daily Yes Rashmi Mendoza MD   Blood Glucose Monitoring Suppl (ONE TOUCH ULTRA MINI) W/DEVICE KIT 1 kit by Does not apply route daily as needed (patient is checking blood sugar levels twice daily) Yes Rashmi Mendoza MD   Lancets MISC 1 each by Does not apply route daily Yes Feliciano Zhang MD   omeprazole (PRILOSEC) 20 MG delayed release capsule Take 1 capsule by mouth  daily Yes Rashmi Mendoza MD   oxybutynin (DITROPAN) 5 MG tablet Take 1 tablet by mouth 2 times daily Yes Historical Provider, MD   Calcium-Magnesium-Vitamin D (CALCIUM 1200+D3 PO) Take 2 tablets by mouth daily. Yes Historical Provider, MD   Glucosamine 500 MG CAPS Take 3 tablets by mouth daily. Yes Historical Provider, MD   acetaminophen (TYLENOL) 500 MG tablet Take 500 mg by mouth every 6 hours as needed for Pain. Yes Historical Provider, MD   aspirin 81 MG chewable tablet Take 81 mg by mouth daily.    Yes Historical Provider, MD   lisinopril (PRINIVIL;ZESTRIL) 20 MG tablet Take 1 tablet by mouth 2 times daily  Blair Alejandre MD       Past Medical History:   Diagnosis Date    Anxiety state, unspecified     Arthritis     Cataracts, bilateral     Esophageal reflux     Generalized osteoarthrosis, unspecified site     Hearing loss     Hyperlipidemia     Hypertension     Neuropathy     Neuropathy due to medical condition ([address]     Pure hypercholesterolemia     Thoracic or lumbosacral neuritis or radiculitis, unspecified     Type 2 diabetes mellitus without complication ([address]     Urinary retention     UTI (urinary tract infection)        Past Surgical History:   Procedure Laterality Date    BACK SURGERY      X3    BREAST BIOPSY  2007    Lt     CARPAL TUNNEL RELEASE Right     CATARACT

## 2020-06-24 ENCOUNTER — HOSPITAL ENCOUNTER (OUTPATIENT)
Age: 75
Setting detail: SPECIMEN
Discharge: HOME | End: 2020-06-24
Payer: MEDICARE

## 2020-06-24 LAB
CHOLESTEROL, FASTING: 155 MG/DL
CHOLESTEROL/HDL RATIO: 2.1
ESTIMATED AVERAGE GLUCOSE: 203 MG/DL
HBA1C MFR BLD: 8.7 % (ref 4–6)
HDLC SERPL-MCNC: 73 MG/DL
LDL CHOLESTEROL: 62 MG/DL (ref 0–130)
TRIGLYCERIDE, FASTING: 101 MG/DL
VLDLC SERPL CALC-MCNC: NORMAL MG/DL (ref 1–30)

## 2020-06-25 RX ORDER — GLIPIZIDE 5 MG/1
5 TABLET ORAL
Qty: 60 TABLET | Refills: 11 | Status: SHIPPED | OUTPATIENT
Start: 2020-06-25 | End: 2020-07-23 | Stop reason: SDUPTHER

## 2020-07-09 RX ORDER — ATORVASTATIN CALCIUM 40 MG/1
TABLET, FILM COATED ORAL
Qty: 90 TABLET | Refills: 1 | Status: SHIPPED | OUTPATIENT
Start: 2020-07-09 | End: 2020-12-18

## 2020-07-23 ENCOUNTER — OFFICE VISIT (OUTPATIENT)
Dept: INTERNAL MEDICINE CLINIC | Age: 75
End: 2020-07-23
Payer: MEDICARE

## 2020-07-23 VITALS
SYSTOLIC BLOOD PRESSURE: 122 MMHG | WEIGHT: 126 LBS | DIASTOLIC BLOOD PRESSURE: 70 MMHG | HEIGHT: 66 IN | TEMPERATURE: 98.2 F | BODY MASS INDEX: 20.25 KG/M2 | HEART RATE: 87 BPM | OXYGEN SATURATION: 98 %

## 2020-07-23 PROCEDURE — G8399 PT W/DXA RESULTS DOCUMENT: HCPCS | Performed by: INTERNAL MEDICINE

## 2020-07-23 PROCEDURE — 1036F TOBACCO NON-USER: CPT | Performed by: INTERNAL MEDICINE

## 2020-07-23 PROCEDURE — 1090F PRES/ABSN URINE INCON ASSESS: CPT | Performed by: INTERNAL MEDICINE

## 2020-07-23 PROCEDURE — 2022F DILAT RTA XM EVC RTNOPTHY: CPT | Performed by: INTERNAL MEDICINE

## 2020-07-23 PROCEDURE — 1123F ACP DISCUSS/DSCN MKR DOCD: CPT | Performed by: INTERNAL MEDICINE

## 2020-07-23 PROCEDURE — 3052F HG A1C>EQUAL 8.0%<EQUAL 9.0%: CPT | Performed by: INTERNAL MEDICINE

## 2020-07-23 PROCEDURE — G8420 CALC BMI NORM PARAMETERS: HCPCS | Performed by: INTERNAL MEDICINE

## 2020-07-23 PROCEDURE — G8427 DOCREV CUR MEDS BY ELIG CLIN: HCPCS | Performed by: INTERNAL MEDICINE

## 2020-07-23 PROCEDURE — 3017F COLORECTAL CA SCREEN DOC REV: CPT | Performed by: INTERNAL MEDICINE

## 2020-07-23 PROCEDURE — 4040F PNEUMOC VAC/ADMIN/RCVD: CPT | Performed by: INTERNAL MEDICINE

## 2020-07-23 PROCEDURE — 99214 OFFICE O/P EST MOD 30 MIN: CPT | Performed by: INTERNAL MEDICINE

## 2020-07-23 RX ORDER — GLUCOSAMINE HCL/CHONDROITIN SU 500-400 MG
CAPSULE ORAL
Qty: 200 STRIP | Refills: 3 | Status: SHIPPED | OUTPATIENT
Start: 2020-07-23 | End: 2021-04-29

## 2020-07-23 RX ORDER — GLUCOSAMINE HCL/CHONDROITIN SU 500-400 MG
CAPSULE ORAL
Qty: 120 STRIP | Refills: 5 | Status: SHIPPED | OUTPATIENT
Start: 2020-07-23

## 2020-07-23 RX ORDER — LISINOPRIL 20 MG/1
20 TABLET ORAL 2 TIMES DAILY
Qty: 180 TABLET | Refills: 2 | Status: SHIPPED | OUTPATIENT
Start: 2020-07-23 | End: 2021-04-13

## 2020-07-23 RX ORDER — OXYBUTYNIN CHLORIDE 5 MG/1
5 TABLET ORAL 2 TIMES DAILY
Qty: 180 TABLET | Refills: 3 | Status: SHIPPED | OUTPATIENT
Start: 2020-07-23 | End: 2021-04-29

## 2020-07-23 RX ORDER — GLIPIZIDE 10 MG/1
10 TABLET ORAL
Qty: 180 TABLET | Refills: 3 | Status: SHIPPED | OUTPATIENT
Start: 2020-07-23 | End: 2021-03-08 | Stop reason: SDUPTHER

## 2020-07-23 ASSESSMENT — ENCOUNTER SYMPTOMS
WHEEZING: 0
COUGH: 0
ABDOMINAL DISTENTION: 0
SHORTNESS OF BREATH: 0
EYE DISCHARGE: 0
TROUBLE SWALLOWING: 0
DIARRHEA: 0
BLOOD IN STOOL: 0
EYE PAIN: 0
COLOR CHANGE: 0

## 2020-07-23 NOTE — PROGRESS NOTES
Visit Information    Have you changed or started any medications since your last visit including any over-the-counter medicines, vitamins, or herbal medicines? no   Are you having any side effects from any of your medications? -  no  Have you stopped taking any of your medications? Is so, why? -  no    Have you seen any other physician or provider since your last visit? No  Have you had any other diagnostic tests since your last visit? No  Have you been seen in the emergency room and/or had an admission to a hospital since we last saw you? No  Have you had your routine dental cleaning in the past 6 months? no    Have you activated your [employer] account? If not, what are your barriers?  Yes     Patient Care Team:  Irasema Gan MD as PCP - General (Internal Medicine)  Irasema Gan MD as PCP - [facility]  Stephanie Foster DO (Obstetrics & Gynecology)    Medical History Review  Past Medical, Family, and Social History reviewed and does not contribute to the patient presenting condition    Health Maintenance   Topic Date Due    DTaP/Tdap/Td vaccine (1 - Tdap) 09/08/1963    Shingles Vaccine (2 of 2) 09/26/2018    Annual Wellness Visit (AWV)  05/29/2019    Diabetic retinal exam  08/07/2020    Flu vaccine (1) 09/01/2020    Diabetic foot exam  01/02/2021    A1C test (Diabetic or Prediabetic)  06/24/2021    Lipid screen  06/24/2021    Colon cancer screen colonoscopy  04/24/2025    DEXA (modify frequency per FRAX score)  Completed    Pneumococcal 65+ years Vaccine  Completed    Hepatitis A vaccine  Aged Out    Hib vaccine  Aged Out    Meningococcal (ACWY) vaccine  Aged Out

## 2020-07-23 NOTE — PROGRESS NOTES
[address] [address]  Dept: [phone]  Dept Fax: [phone]    Park Hilton is a 75 y.o. female who presents today for her medical conditions/complaintsas noted below. Park Hilton is c/o of   Chief Complaint   Patient presents with    Diabetes     8.7 on 6/24/20          HPI:     HTN  Onset more than 2 years ago  ernesto mild to mod  Controlled with current po meds  Not associated with headaches or blurry vision  No chest pain  Diabetes   Duration more than 7 years  Modifying factors on Glucophage and other med  Severity uncontrolled sever  Associated signs and symtoms neuropathy/ckd/ CAD. aggravated with sugar diet and better with low sugar diet           Hemoglobin A1C (%)   Date Value   06/24/2020 8.7 (H)   02/24/2020 7.2   11/18/2019 6.9 (H)             ( goal A1Cis < 7)   Microalb/Crt.  Ratio (mcg/mg creat)   Date Value   11/19/2019 CANNOT BE CALCULATED     LDL Cholesterol (mg/dL)   Date Value   06/24/2020 62   05/16/2019 71   02/12/2018 49       (goal LDL is <100)   AST (U/L)   Date Value   11/18/2019 16     ALT (U/L)   Date Value   11/18/2019 20     BUN (mg/dL)   Date Value   02/24/2020 24 (H)     BP Readings from Last 3 Encounters:   07/23/20 122/70   06/23/20 116/70   03/02/20 (!) 109/58          (goal 120/80)    Past Medical History:   Diagnosis Date    Anxiety state, unspecified     Arthritis     Cataracts, bilateral     Esophageal reflux     Generalized osteoarthrosis, unspecified site     Hearing loss     Hyperlipidemia     Hypertension     Neuropathy     Neuropathy due to medical condition ([address]     Pure hypercholesterolemia     Thoracic or lumbosacral neuritis or radiculitis, unspecified     Type 2 diabetes mellitus without complication ([address]     Urinary retention     UTI (urinary tract infection)       Past Surgical History:   Procedure Laterality Date    BACK SURGERY      X3    BREAST BIOPSY  2007    Lt     CARPAL TUNNEL RELEASE Right     CATARACT REMOVAL WITH IMPLANT Bilateral     COLONOSCOPY      COLONOSCOPY  4/24/15    diverticulosis    CYSTOSCOPY  [id number]    CYSTOSCOPY  2017    CYSTOSCOPY N/A 2017    CYSTOSCOPY URODYNAMICS AND DILATION  performed by Lyubov Waller MD at [address] [place] [address] SURGERY Bilateral     thumbs reconstruction    HIP ARTHROPLASTY Bilateral     JOINT REPLACEMENT Bilateral     LUMBAR FUSION  2015       Family History   Problem Relation Age of Onset    Cancer Mother         lymphoma    Hypertension Mother     Hypertension Father     Hypertension Brother     Heart Disease Brother         pt states that brother had 5 bypass surgeries. Social History     Tobacco Use    Smoking status: Former Smoker     Packs/day: 2.00     Years: 20.00     Pack years: 40.00     Types: Cigarettes     Last attempt to quit: 1984     Years since quittin.5    Smokeless tobacco: Never Used   Substance Use Topics    Alcohol use: Yes     Alcohol/week: 0.0 standard drinks     Comment: social      Current Outpatient Medications   Medication Sig Dispense Refill    lisinopril (PRINIVIL;ZESTRIL) 20 MG tablet Take 1 tablet by mouth 2 times daily 180 tablet 2    metoprolol tartrate (LOPRESSOR) 25 MG tablet TAKE 1 TABLET BY MOUTH TWO  TIMES DAILY 180 tablet 3    blood glucose monitor strips Patient checking blood sugar levels 4 times daily 200 strip 3    glipiZIDE (GLUCOTROL) 10 MG tablet Take 1 tablet by mouth 2 times daily (before meals) 180 tablet 3    oxybutynin (DITROPAN) 5 MG tablet Take 1 tablet by mouth 2 times daily 180 tablet 3    blood glucose monitor strips Test four  times a day & as needed for symptoms of irregular blood glucose. Dispense sufficient amount for indicated testing frequency plus additional to accommodate PRN testing needs.  120 strip 5    atorvastatin (LIPITOR) 40 MG tablet TAKE 1 TABLET BY MOUTH  DAILY 90 tablet 1    ferrous sulfate 325 (65 Fe) MG tablet Take 1 tablet by mouth 2 times daily 180 tablet 1    simvastatin (ZOCOR) 40 MG tablet take 1 tablet (40MG)  by oral route  every day      ketotifen (ALAWAY) 0.025 % ophthalmic solution Apply 1 drop to eye      DULoxetine (CYMBALTA) 30 MG extended release capsule Take 1 capsule by mouth daily 90 capsule 3    DULoxetine (CYMBALTA) 60 MG extended release capsule Take 1 capsule by mouth daily 90 capsule 3    gabapentin (NEURONTIN) 300 MG capsule 300 mg in the am, 300 mg in the afternoon and 600 mg at bedtime 360 capsule 3    albuterol sulfate  (90 Base) MCG/ACT inhaler Inhale 2 puffs into the lungs 4 times daily as needed for Wheezing 3 Inhaler 1    ONE TOUCH ULTRA TEST strip USE TO TEST BLOOD SUGAR FOUR TIMES A  strip 1    meloxicam (MOBIC) 7.5 MG tablet TAKE 1 TABLET BY MOUTH TWO  TIMES DAILY AS NEEDED 180 tablet 2    metFORMIN (GLUCOPHAGE) 1000 MG tablet TAKE 1 TABLET BY MOUTH TWO  TIMES DAILY WITH MEALS 180 tablet 3    ONE TOUCH ULTRASOFT LANCETS MISC 1 each by Does not apply route 2 times daily 100 each 3    Blood Glucose Monitoring Suppl (ONE TOUCH ULTRA MINI) W/DEVICE KIT 1 kit by Does not apply route daily as needed (patient is checking blood sugar levels twice daily) 1 kit 0    Lancets MISC 1 each by Does not apply route daily 100 each 3    omeprazole (PRILOSEC) 20 MG delayed release capsule Take 1 capsule by mouth  daily 90 capsule 3    Calcium-Magnesium-Vitamin D (CALCIUM 1200+D3 PO) Take 2 tablets by mouth daily.  Glucosamine 500 MG CAPS Take 3 tablets by mouth daily.  acetaminophen (TYLENOL) 500 MG tablet Take 500 mg by mouth every 6 hours as needed for Pain.  aspirin 81 MG chewable tablet Take 81 mg by mouth daily.  SITagliptin (JANUVIA) 100 MG tablet Take 1 tablet by mouth daily (Patient not taking: Reported on 7/23/2020) 30 tablet 3     No current facility-administered medications for this visit.       Allergies   Allergen Reactions    Acetaminophen     Hydrocodone        Health Maintenance   Topic Date Due    DTaP/Tdap/Td vaccine (1 - Tdap) 09[birth year]08/1963    Shingles Vaccine (2 of 2) 09/26/2018    Annual Wellness Visit (AWV)  05/29/2019    Diabetic retinal exam  08/07/2020    Flu vaccine (1) 09/01/2020    Diabetic foot exam  01/02/2021    Potassium monitoring  02/24/2021    Creatinine monitoring  02/24/2021    A1C test (Diabetic or Prediabetic)  06/24/2021    Lipid screen  06/24/2021    Colon cancer screen colonoscopy  04/24/2025    DEXA (modify frequency per FRAX score)  Completed    Pneumococcal 65+ years Vaccine  Completed    Hepatitis A vaccine  Aged Out    Hib vaccine  Aged Out    Meningococcal (ACWY) vaccine  Aged Out       Subjective:     Review of Systems   Constitutional: Positive for fatigue. Negative for appetite change and diaphoresis. HENT: Negative for ear discharge and trouble swallowing. Eyes: Negative for pain and discharge. Respiratory: Negative for cough, shortness of breath and wheezing. Cardiovascular: Negative for chest pain and palpitations. Gastrointestinal: Negative for abdominal distention, blood in stool and diarrhea. Endocrine: Negative for polydipsia and polyphagia. Genitourinary: Negative for difficulty urinating and frequency. Musculoskeletal: Positive for arthralgias. Negative for gait problem, myalgias and neck pain. Skin: Negative for color change and rash. Allergic/Immunologic: Negative for environmental allergies and food allergies. Neurological: Negative for dizziness and headaches. Hematological: Negative for adenopathy. Does not bruise/bleed easily. Psychiatric/Behavioral: Negative for behavioral problems and sleep disturbance. Objective:     Physical Exam  Constitutional:       Appearance: She is well-developed. She is not diaphoretic. HENT:      Head: Normocephalic and atraumatic. Eyes:      General:         Right eye: No discharge.          Left eye: No discharge. Extraocular Movements:      Right eye: Normal extraocular motion. Left eye: Normal extraocular motion. Conjunctiva/sclera: Conjunctivae normal.      Right eye: Right conjunctiva is not injected. Left eye: Left conjunctiva is not injected. Neck:      Musculoskeletal: Normal range of motion and neck supple. No edema or erythema. Thyroid: No thyroid mass or thyromegaly. Vascular: No JVD. Cardiovascular:      Rate and Rhythm: Normal rate and regular rhythm. Heart sounds: No murmur. No friction rub. Pulmonary:      Effort: Pulmonary effort is normal. No tachypnea, bradypnea, accessory muscle usage or respiratory distress. Breath sounds: Normal breath sounds. No wheezing or rales. Abdominal:      General: Bowel sounds are normal. There is no distension. Palpations: Abdomen is soft. Tenderness: There is no abdominal tenderness. There is no rebound. Musculoskeletal: Normal range of motion. General: No tenderness. Lymphadenopathy:      Head:      Right side of head: No submental or submandibular adenopathy. Left side of head: No submental or submandibular adenopathy. Cervical: No cervical adenopathy. Skin:     General: Skin is warm. Coloration: Skin is not pale. Findings: No bruising, ecchymosis or rash. Neurological:      Mental Status: She is alert and oriented to person, place, and time. Cranial Nerves: No cranial nerve deficit. Sensory: No sensory deficit. Motor: No atrophy or abnormal muscle tone. Coordination: Coordination normal.   Psychiatric:         Mood and Affect: Mood is not anxious. Affect is not angry. Speech: Speech is not slurred. Behavior: Behavior normal. Behavior is not aggressive. Thought Content: Thought content does not include homicidal ideation. Cognition and Memory: Memory is not impaired.        /70   Pulse 87   Temp 98.2 °F (36.8 °C)  5' 5.98\" (1.676 m)   Wt 126 lb (57.2 kg)   SpO2 98%   BMI 20.35 kg/m²     Assessment:       Diagnosis Orders   1. Uncontrolled type 2 DM with hyperosmolar nonketotic hyperglycemia (HCC)  blood glucose monitor strips     DIABETES FOOT EXAM   2. Microalbuminuria  lisinopril (PRINIVIL;ZESTRIL) 20 MG tablet   3. Essential hypertension  metoprolol tartrate (LOPRESSOR) 25 MG tablet   4. Mixed hyperlipidemia     5. DM type 2, uncontrolled, with neuropathy ([facility]   DIABETES FOOT EXAM             Plan:      No follow-ups on file. Orders Placed This Encounter   Procedures     DIABETES FOOT EXAM     Orders Placed This Encounter   Medications    lisinopril (PRINIVIL;ZESTRIL) 20 MG tablet     Sig: Take 1 tablet by mouth 2 times daily     Dispense:  180 tablet     Refill:  2    metoprolol tartrate (LOPRESSOR) 25 MG tablet     Sig: TAKE 1 TABLET BY MOUTH TWO  TIMES DAILY     Dispense:  180 tablet     Refill:  3    blood glucose monitor strips     Sig: Patient checking blood sugar levels 4 times daily     Dispense:  200 strip     Refill:  3    glipiZIDE (GLUCOTROL) 10 MG tablet     Sig: Take 1 tablet by mouth 2 times daily (before meals)     Dispense:  180 tablet     Refill:  3    oxybutynin (DITROPAN) 5 MG tablet     Sig: Take 1 tablet by mouth 2 times daily     Dispense:  180 tablet     Refill:  3    blood glucose monitor strips     Sig: Test four  times a day & as needed for symptoms of irregular blood glucose. Dispense sufficient amount for indicated testing frequency plus additional to accommodate PRN testing needs. Dispense:  120 strip     Refill:  5     Brand per patient preference. May round up to next available package size. Patient given educational materials - see patient instructions. Discussed use, benefit, and side effects of prescribed medications. All patientquestions answered. Pt voiced understanding. Reviewed health maintenance.   Instructedto continue current medications, diet and

## 2020-07-27 ENCOUNTER — TELEPHONE (OUTPATIENT)
Dept: INTERNAL MEDICINE CLINIC | Age: 75
End: 2020-07-27

## 2020-07-27 RX ORDER — BLOOD SUGAR DIAGNOSTIC
STRIP MISCELLANEOUS
Qty: 100 STRIP | Refills: 0 | Status: SHIPPED | OUTPATIENT
Start: 2020-07-27 | End: 2021-08-06 | Stop reason: SDUPTHER

## 2020-08-20 ENCOUNTER — OFFICE VISIT (OUTPATIENT)
Dept: INTERNAL MEDICINE CLINIC | Age: 75
End: 2020-08-20
Payer: MEDICARE

## 2020-08-20 VITALS
WEIGHT: 124 LBS | DIASTOLIC BLOOD PRESSURE: 66 MMHG | HEIGHT: 65 IN | TEMPERATURE: 97.8 F | OXYGEN SATURATION: 98 % | BODY MASS INDEX: 20.66 KG/M2 | SYSTOLIC BLOOD PRESSURE: 118 MMHG | HEART RATE: 74 BPM

## 2020-08-20 PROCEDURE — G8420 CALC BMI NORM PARAMETERS: HCPCS | Performed by: INTERNAL MEDICINE

## 2020-08-20 PROCEDURE — G8427 DOCREV CUR MEDS BY ELIG CLIN: HCPCS | Performed by: INTERNAL MEDICINE

## 2020-08-20 PROCEDURE — 1036F TOBACCO NON-USER: CPT | Performed by: INTERNAL MEDICINE

## 2020-08-20 PROCEDURE — G8399 PT W/DXA RESULTS DOCUMENT: HCPCS | Performed by: INTERNAL MEDICINE

## 2020-08-20 PROCEDURE — 3017F COLORECTAL CA SCREEN DOC REV: CPT | Performed by: INTERNAL MEDICINE

## 2020-08-20 PROCEDURE — 1090F PRES/ABSN URINE INCON ASSESS: CPT | Performed by: INTERNAL MEDICINE

## 2020-08-20 PROCEDURE — 4040F PNEUMOC VAC/ADMIN/RCVD: CPT | Performed by: INTERNAL MEDICINE

## 2020-08-20 PROCEDURE — G8510 SCR DEP NEG, NO PLAN REQD: HCPCS | Performed by: INTERNAL MEDICINE

## 2020-08-20 PROCEDURE — 2022F DILAT RTA XM EVC RTNOPTHY: CPT | Performed by: INTERNAL MEDICINE

## 2020-08-20 PROCEDURE — 99214 OFFICE O/P EST MOD 30 MIN: CPT | Performed by: INTERNAL MEDICINE

## 2020-08-20 PROCEDURE — 3052F HG A1C>EQUAL 8.0%<EQUAL 9.0%: CPT | Performed by: INTERNAL MEDICINE

## 2020-08-20 PROCEDURE — 1123F ACP DISCUSS/DSCN MKR DOCD: CPT | Performed by: INTERNAL MEDICINE

## 2020-08-20 ASSESSMENT — ENCOUNTER SYMPTOMS
EYE DISCHARGE: 0
COUGH: 0
COLOR CHANGE: 0
TROUBLE SWALLOWING: 0
BLOOD IN STOOL: 0
DIARRHEA: 0
WHEEZING: 0
ABDOMINAL DISTENTION: 0
SHORTNESS OF BREATH: 0
EYE PAIN: 0

## 2020-08-20 ASSESSMENT — PATIENT HEALTH QUESTIONNAIRE - PHQ9
SUM OF ALL RESPONSES TO PHQ QUESTIONS 1-9: 0
2. FEELING DOWN, DEPRESSED OR HOPELESS: 0
SUM OF ALL RESPONSES TO PHQ QUESTIONS 1-9: 0
SUM OF ALL RESPONSES TO PHQ9 QUESTIONS 1 & 2: 0
1. LITTLE INTEREST OR PLEASURE IN DOING THINGS: 0

## 2020-08-20 NOTE — PROGRESS NOTES
Visit Information    Have you changed or started any medications since your last visit including any over-the-counter medicines, vitamins, or herbal medicines? no   Are you having any side effects from any of your medications? -  no  Have you stopped taking any of your medications? Is so, why? -  no    Have you seen any other physician or provider since your last visit? No  Have you had any other diagn    [facility] [id number] [place] [address]  [address]  Dept: [phone]  Dept Fax: [phone]    María Mullen is a 75 y.o. female who presents today for her medical conditions/complaintsas noted below. María Mullen is c/o of   Chief Complaint   Patient presents with    Diabetes     6/24/20 a1c was 8.7    Other     [facility] states 8/19/20 patient has open care gap for their diabetic kidney disease screening          HPI:     HTN  Onset more than 2 years ago  ernesto mild to mod  Controlled with current po meds  Not associated with headaches or blurry vision  No chest pain  Diabetes   Duration more than 7 years  Modifying factors on Glucophage and other med  Severity uncontrolled sever  Associated signs and symtoms neuropathy/ckd/ CAD. aggravated with sugar diet and better with low sugar diet           Hemoglobin A1C (%)   Date Value   06/24/2020 8.7 (H)   02/24/2020 7.2   11/18/2019 6.9 (H)             ( goal A1Cis < 7)   Microalb/Crt.  Ratio (mcg/mg creat)   Date Value   11/19/2019 CANNOT BE CALCULATED     LDL Cholesterol (mg/dL)   Date Value   06/24/2020 62   05/16/2019 71   02/12/2018 49       (goal LDL is <100)   AST (U/L)   Date Value   11/18/2019 16     ALT (U/L)   Date Value   11/18/2019 20     BUN (mg/dL)   Date Value   02/24/2020 24 (H)     BP Readings from Last 3 Encounters:   08/20/20 118/66   07/23/20 122/70   06/23/20 116/70          (goal 120/80)    Past Medical History:   Diagnosis Date    Anxiety state, unspecified     Arthritis     Cataracts, bilateral     Esophageal reflux     Generalized osteoarthrosis, unspecified site     Hearing loss     Hyperlipidemia     Hypertension     Neuropathy     [facility]uropathy due to medical condition ([facility]     Pure hypercholesterolemia     Thoracic or lumbosacral neuritis or radiculitis, unspecified     Type 2 diabetes mellitus without complication ([facility]     Urinary retention     UTI (urinary tract infection)       Past Surgical History:   Procedure Laterality Date    BACK SURGERY      X3    BREAST BIOPSY  2007    Lt     CARPAL TUNNEL RELEASE Right     CATARACT REMOVAL WITH IMPLANT Bilateral     COLONOSCOPY      COLONOSCOPY  4/24/15    diverticulosis    CYSTOSCOPY  [id number]    CYSTOSCOPY  2017    CYSTOSCOPY N/A 2017    CYSTOSCOPY URODYNAMICS AND DILATION  performed by Janeen Renee MD at [address] Bilateral     thumbs reconstruction    HIP ARTHROPLASTY Bilateral     JOINT REPLACEMENT Bilateral     LUMBAR FUSION  2015       Family History   Problem Relation Age of Onset    Cancer Mother         lymphoma    Hypertension Mother     Hypertension Father     Hypertension Brother     Heart Disease Brother         pt states that brother had 5 bypass surgeries. Social History     Tobacco Use    Smoking status: Former Smoker     Packs/day: 2.00     Years: 20.00     Pack years: 40.00     Types: Cigarettes     Last attempt to quit: 1984     Years since quittin.6    Smokeless tobacco: Never Used   Substance Use Topics    Alcohol use:  Yes     Alcohol/week: 0.0 standard drinks     Comment: social      Current Outpatient Medications   Medication Sig Dispense Refill    metFORMIN (GLUCOPHAGE) 1000 MG tablet TAKE 1 TABLET BY MOUTH TWO  TIMES DAILY WITH MEALS 180 tablet 3    ONETOUCH ULTRA strip USE ONE STRIP TO TEST FOUR TIMES A  strip 0    lisinopril (PRINIVIL;ZESTRIL) 20 MG tablet Take 1 tablet by mouth 2 times daily 180 tablet 2    metoprolol tartrate (LOPRESSOR) 25 MG tablet TAKE 1 TABLET BY MOUTH TWO  TIMES DAILY 180 tablet 3    blood glucose monitor strips Patient checking blood sugar levels 4 times daily 200 strip 3    glipiZIDE (GLUCOTROL) 10 MG tablet Take 1 tablet by mouth 2 times daily (before meals) 180 tablet 3    oxybutynin (DITROPAN) 5 MG tablet Take 1 tablet by mouth 2 times daily 180 tablet 3    blood glucose monitor strips Test four  times a day & as needed for symptoms of irregular blood glucose. Dispense sufficient amount for indicated testing frequency plus additional to accommodate PRN testing needs.  120 strip 5    atorvastatin (LIPITOR) 40 MG tablet TAKE 1 TABLET BY MOUTH  DAILY 90 tablet 1    ferrous sulfate 325 (65 Fe) MG tablet Take 1 tablet by mouth 2 times daily 180 tablet 1    simvastatin (ZOCOR) 40 MG tablet take 1 tablet (40MG)  by oral route  every day      ketotifen (ALAWAY) 0.025 % ophthalmic solution Apply 1 drop to eye      DULoxetine (CYMBALTA) 30 MG extended release capsule Take 1 capsule by mouth daily 90 capsule 3    DULoxetine (CYMBALTA) 60 MG extended release capsule Take 1 capsule by mouth daily 90 capsule 3    gabapentin (NEURONTIN) 300 MG capsule 300 mg in the am, 300 mg in the afternoon and 600 mg at bedtime 360 capsule 3    albuterol sulfate  (90 Base) MCG/ACT inhaler Inhale 2 puffs into the lungs 4 times daily as needed for Wheezing 3 Inhaler 1    meloxicam (MOBIC) 7.5 MG tablet TAKE 1 TABLET BY MOUTH TWO  TIMES DAILY AS NEEDED 180 tablet 2    ONE TOUCH ULTRASOFT LANCETS MISC 1 each by Does not apply route 2 times daily 100 each 3    Blood Glucose Monitoring Suppl (ONE TOUCH ULTRA MINI) W/DEVICE KIT 1 kit by Does not apply route daily as needed (patient is checking blood sugar levels twice daily) 1 kit 0    Lancets MISC 1 each by Does not apply route daily 100 each 3    omeprazole (PRILOSEC) 20 MG delayed release capsule Take 1 capsule by mouth  daily 90 capsule 3    Calcium-Magnesium-Vitamin D (CALCIUM 1200+D3 PO) Take 2 tablets by mouth daily.  Glucosamine 500 MG CAPS Take 3 tablets by mouth daily.  acetaminophen (TYLENOL) 500 MG tablet Take 500 mg by mouth every 6 hours as needed for Pain.  aspirin 81 MG chewable tablet Take 81 mg by mouth daily. No current facility-administered medications for this visit. Allergies   Allergen Reactions    Acetaminophen     Hydrocodone        Health Maintenance   Topic Date Due    DTaP/Tdap/Td vaccine (1 - Tdap) 09/08/1963    Shingles Vaccine (2 of 2) 09/26/2018    Annual Wellness Visit (AWV)  05/29/2019    Flu vaccine (1) 09/01/2020    Potassium monitoring  02/24/2021    Creatinine monitoring  02/24/2021    A1C test (Diabetic or Prediabetic)  06/24/2021    Lipid screen  06/24/2021    Diabetic foot exam  07/23/2021    Diabetic retinal exam  08/14/2021    Colon cancer screen colonoscopy  04/24/2025    DEXA (modify frequency per FRAX score)  Completed    Pneumococcal 65+ years Vaccine  Completed    Hepatitis A vaccine  Aged Out    Hib vaccine  Aged Out    Meningococcal (ACWY) vaccine  Aged Out       Subjective:     Review of Systems   Constitutional: Negative for appetite change, diaphoresis and fatigue. HENT: Negative for ear discharge and trouble swallowing. Eyes: Negative for pain and discharge. Respiratory: Negative for cough, shortness of breath and wheezing. Cardiovascular: Negative for chest pain and palpitations. Gastrointestinal: Negative for abdominal distention, blood in stool and diarrhea. Endocrine: Negative for polydipsia and polyphagia. Genitourinary: Negative for difficulty urinating and frequency. Musculoskeletal: Positive for arthralgias. Negative for gait problem, myalgias and neck pain. Skin: Negative for color change and rash. Allergic/Immunologic: Negative for environmental allergies and food allergies. Neurological: Negative for dizziness and headaches. Hematological: Negative for adenopathy. Does not bruise/bleed easily. Psychiatric/Behavioral: Negative for behavioral problems and sleep disturbance. Objective:     Physical Exam  Constitutional:       Appearance: She is well-developed. She is not diaphoretic. HENT:      Head: Normocephalic and atraumatic. Eyes:      General:         Right eye: No discharge. Left eye: No discharge. Extraocular Movements:      Right eye: Normal extraocular motion. Left eye: Normal extraocular motion. Conjunctiva/sclera: Conjunctivae normal.      Right eye: Right conjunctiva is not injected. Left eye: Left conjunctiva is not injected. Neck:      Musculoskeletal: Normal range of motion and neck supple. No edema or erythema. Thyroid: No thyroid mass or thyromegaly. Vascular: No JVD. Cardiovascular:      Rate and Rhythm: Normal rate and regular rhythm. Heart sounds: No murmur. No friction rub. Pulmonary:      Effort: Pulmonary effort is normal. No tachypnea, bradypnea, accessory muscle usage or respiratory distress. Breath sounds: Normal breath sounds. No wheezing or rales. Abdominal:      General: Bowel sounds are normal. There is no distension. Palpations: Abdomen is soft. Tenderness: There is no abdominal tenderness. There is no rebound. Musculoskeletal: Normal range of motion. General: No tenderness. Lymphadenopathy:      Head:      Right side of head: No submental or submandibular adenopathy. Left side of head: No submental or submandibular adenopathy. Cervical: No cervical adenopathy. Skin:     General: Skin is warm. Coloration: Skin is not pale. Findings: No bruising, ecchymosis or rash. Neurological:      Mental Status: She is alert and oriented to person, place, and time. Cranial Nerves: No cranial nerve deficit. Sensory: No sensory deficit. Motor: No atrophy or abnormal muscle tone.       Coordination: Coordination normal.   Psychiatric: Mood and Affect: Mood is not anxious. Affect is not angry. Speech: Speech is not slurred. Behavior: Behavior normal. Behavior is not aggressive. Thought Content: Thought content does not include homicidal ideation. Cognition and Memory: Memory is not impaired. /66   Pulse 74   Temp 97.8 °F (36.6 °C)   Ht 5' 5\" (1.651 m)   Wt 124 lb (56.2 kg)   SpO2 98%   BMI 20.63 kg/m²     Assessment:       Diagnosis Orders   1. Essential hypertension     2. Mixed hyperlipidemia     3. DM type 2, uncontrolled, with neuropathy (HCC)  Hemoglobin A1C   4. Primary osteoarthritis of both hands               Plan:      No follow-ups on file. Orders Placed This Encounter   Procedures    Hemoglobin A1C     Standing Status:   Future     Standing Expiration Date:   8/20/2021     Orders Placed This Encounter   Medications    metFORMIN (GLUCOPHAGE) 1000 MG tablet     Sig: TAKE 1 TABLET BY MOUTH TWO  TIMES DAILY WITH MEALS     Dispense:  180 tablet     Refill:  3    uncontrolled dm  Went up; from 7.2 to 8.7 due to changes in diet in corona times  councelled log ntoed  nubmers are better  metformina dn glucotrol 10 bid  Will see in a month with a1c     Patient given educational materials - see patient instructions. Discussed use, benefit, and side effects of prescribed medications. All patientquestions answered. Pt voiced understanding. Reviewed health maintenance. Instructedto continue current medications, diet and exercise. Patient agreed with treatmentplan. Follow up as directed. Electronically signed by Derick Randall MD on 8/20/2020 at 9:59 AMostic tests since your last visit? No  Have you been seen in the emergency room and/or had an admission to a hospital since we last saw you? No  Have you had your routine dental cleaning in the past 6 months? no    Have you activated your [email or web address] account? If not, what are your barriers?  Yes     Patient Care Team:  eDrick Randall MD

## 2020-09-16 RX ORDER — DULOXETIN HYDROCHLORIDE 60 MG/1
60 CAPSULE, DELAYED RELEASE ORAL DAILY
Qty: 90 CAPSULE | Refills: 1 | Status: SHIPPED | OUTPATIENT
Start: 2020-09-16 | End: 2021-03-19 | Stop reason: SDUPTHER

## 2020-09-16 RX ORDER — DULOXETIN HYDROCHLORIDE 30 MG/1
30 CAPSULE, DELAYED RELEASE ORAL DAILY
Qty: 90 CAPSULE | Refills: 1 | Status: CANCELLED | OUTPATIENT
Start: 2020-09-16

## 2020-09-16 NOTE — TELEPHONE ENCOUNTER
Faina Powell needs her Cymbalta refilled. We discussed yesterday that she send [facility] for it. I told her about [facility] and that she can possibly get it filled locally for under $10.00. She was going to contact [facility] and discuss [facility] with them. I got a call today from [facility] that pt does want her RX to be filled at [facility]. Fainamichell Powell called me and said that she would like the 60 mg. dose because she will be able to get it cheap at [facility].  That is what Moniqeu Grissom had originally ordered

## 2020-10-19 ENCOUNTER — HOSPITAL ENCOUNTER (OUTPATIENT)
Age: 75
Setting detail: SPECIMEN
Discharge: HOME | End: 2020-10-19
Payer: MEDICARE

## 2020-10-19 LAB
ESTIMATED AVERAGE GLUCOSE: 200 MG/DL
HBA1C MFR BLD: 8.6 % (ref 4–6)

## 2020-11-03 ENCOUNTER — NURSE ONLY (OUTPATIENT)
Dept: INTERNAL MEDICINE CLINIC | Age: 75
End: 2020-11-03
Payer: MEDICARE

## 2020-11-03 PROCEDURE — G0008 ADMIN INFLUENZA VIRUS VAC: HCPCS | Performed by: INTERNAL MEDICINE

## 2020-11-03 PROCEDURE — 90694 VACC AIIV4 NO PRSRV 0.5ML IM: CPT | Performed by: INTERNAL MEDICINE

## 2020-11-19 ENCOUNTER — OFFICE VISIT (OUTPATIENT)
Dept: INTERNAL MEDICINE CLINIC | Age: 75
End: 2020-11-19
Payer: MEDICARE

## 2020-11-19 VITALS
DIASTOLIC BLOOD PRESSURE: 68 MMHG | SYSTOLIC BLOOD PRESSURE: 108 MMHG | OXYGEN SATURATION: 98 % | HEART RATE: 88 BPM | HEIGHT: 65 IN | BODY MASS INDEX: 20.49 KG/M2 | TEMPERATURE: 98.3 F | WEIGHT: 123 LBS

## 2020-11-19 PROCEDURE — G0439 PPPS, SUBSEQ VISIT: HCPCS | Performed by: INTERNAL MEDICINE

## 2020-11-19 PROCEDURE — 1123F ACP DISCUSS/DSCN MKR DOCD: CPT | Performed by: INTERNAL MEDICINE

## 2020-11-19 PROCEDURE — G8484 FLU IMMUNIZE NO ADMIN: HCPCS | Performed by: INTERNAL MEDICINE

## 2020-11-19 PROCEDURE — 4040F PNEUMOC VAC/ADMIN/RCVD: CPT | Performed by: INTERNAL MEDICINE

## 2020-11-19 ASSESSMENT — PATIENT HEALTH QUESTIONNAIRE - PHQ9
SUM OF ALL RESPONSES TO PHQ9 QUESTIONS 1 & 2: 0
SUM OF ALL RESPONSES TO PHQ QUESTIONS 1-9: 0
2. FEELING DOWN, DEPRESSED OR HOPELESS: 0
SUM OF ALL RESPONSES TO PHQ QUESTIONS 1-9: 0
1. LITTLE INTEREST OR PLEASURE IN DOING THINGS: 0
SUM OF ALL RESPONSES TO PHQ QUESTIONS 1-9: 0

## 2020-11-19 ASSESSMENT — LIFESTYLE VARIABLES
HOW OFTEN DURING THE LAST YEAR HAVE YOU FAILED TO DO WHAT WAS NORMALLY EXPECTED FROM YOU BECAUSE OF DRINKING: 0
HOW OFTEN DURING THE LAST YEAR HAVE YOU BEEN UNABLE TO REMEMBER WHAT HAPPENED THE NIGHT BEFORE BECAUSE YOU HAD BEEN DRINKING: 0
HAVE YOU OR SOMEONE ELSE BEEN INJURED AS A RESULT OF YOUR DRINKING: 0
HOW OFTEN DO YOU HAVE A DRINK CONTAINING ALCOHOL: 1
HOW OFTEN DO YOU HAVE SIX OR MORE DRINKS ON ONE OCCASION: 0
HOW OFTEN DURING THE LAST YEAR HAVE YOU FOUND THAT YOU WERE NOT ABLE TO STOP DRINKING ONCE YOU HAD STARTED: 0
HAS A RELATIVE, FRIEND, DOCTOR, OR ANOTHER HEALTH PROFESSIONAL EXPRESSED CONCERN ABOUT YOUR DRINKING OR SUGGESTED YOU CUT DOWN: 0
HOW OFTEN DURING THE LAST YEAR HAVE YOU NEEDED AN ALCOHOLIC DRINK FIRST THING IN THE MORNING TO GET YOURSELF GOING AFTER A NIGHT OF HEAVY DRINKING: 0
HOW MANY STANDARD DRINKS CONTAINING ALCOHOL DO YOU HAVE ON A TYPICAL DAY: 0
AUDIT TOTAL SCORE: 1
HOW OFTEN DURING THE LAST YEAR HAVE YOU HAD A FEELING OF GUILT OR REMORSE AFTER DRINKING: 0
AUDIT-C TOTAL SCORE: 1

## 2020-11-19 NOTE — PROGRESS NOTES
Medicare Annual Wellness Visit  Name: Yanelis Mesa Date: 2020   MRN: [id number] Sex: Female   Age: 67 y.o. Ethnicity: Non-/Non    : 1945 Race: Matias Mccabe is here for Medicare AWV    Screenings for behavioral, psychosocial and functional/safety risks, and cognitive dysfunction are all negative except as indicated below. These results, as well as other patient data from the [address] form, are documented in Flowsheets linked to this Encounter. Allergies   Allergen Reactions    Acetaminophen     Hydrocodone          Prior to Visit Medications    Medication Sig Taking? Authorizing Provider   Semaglutide 3 MG TABS Take 1 tablet by mouth every 30 days Yes Aura Ibrahim MD   metFORMIN (GLUCOPHAGE) 1000 MG tablet TAKE 1 TABLET BY MOUTH TWO  TIMES DAILY WITH MEALS Yes Aura Ibrahim MD   lisinopril (PRINIVIL;ZESTRIL) 20 MG tablet Take 1 tablet by mouth 2 times daily Yes Aura Ibrahim MD   metoprolol tartrate (LOPRESSOR) 25 MG tablet TAKE 1 TABLET BY MOUTH TWO  TIMES DAILY Yes Aura Ibrahim MD   glipiZIDE (GLUCOTROL) 10 MG tablet Take 1 tablet by mouth 2 times daily (before meals) Yes Aura Ibrahim MD   blood glucose monitor strips Test four  times a day & as needed for symptoms of irregular blood glucose. Dispense sufficient amount for indicated testing frequency plus additional to accommodate PRN testing needs. Yes Aura Ibrahim MD   atorvastatin (LIPITOR) 40 MG tablet TAKE 1 TABLET BY MOUTH  DAILY Yes Aura Ibrahim MD   DULoxetine (CYMBALTA) 30 MG extended release capsule Take 1 capsule by mouth daily Yes Abiquiu Pod, APRN - CNP   gabapentin (NEURONTIN) 300 MG capsule 300 mg in the am, 300 mg in the afternoon and 600 mg at bedtime Yes Abiquiu Pod, APRN - CNP   Lancets MISC 1 each by Does not apply route daily Yes Marquita Arellano MD   Calcium-Magnesium-Vitamin D (CALCIUM 1200+D3 PO) Take 2 tablets by mouth daily.  Yes Historical Provider, MD   acetaminophen (TYLENOL) 500 MG tablet Take 500 mg by mouth every 6 hours as needed for Pain. Yes Historical Provider, MD   aspirin 81 MG chewable tablet Take 81 mg by mouth daily.    Yes Historical Provider, MD   DULoxetine (CYMBALTA) 60 MG extended release capsule Take 1 capsule by mouth daily  Patient not taking: Reported on 11/19/2020  JAMES Guajardo CNP   ONETOUCH ULTRA strip USE ONE STRIP TO TEST FOUR TIMES A DAY  Patient not taking: Reported on 11/19/2020  Mihai Pérez MD   blood glucose monitor strips Patient checking blood sugar levels 4 times daily  Patient not taking: Reported on 11/19/2020  Mihai Pérez MD   oxybutynin (DITROPAN) 5 MG tablet Take 1 tablet by mouth 2 times daily  Patient not taking: Reported on 11/19/2020  Mihai Pérez MD   ferrous sulfate 325 (65 Fe) MG tablet Take 1 tablet by mouth 2 times daily  Patient not taking: Reported on 11/19/2020  Mihai Pérez MD   simvastatin (ZOCOR) 40 MG tablet take 1 tablet (40MG)  by oral route  every day  Historical Provider, MD   ketotifen (ALAWAY) 0.025 % ophthalmic solution Apply 1 drop to eye  Historical Provider, MD   albuterol sulfate  (90 Base) MCG/ACT inhaler Inhale 2 puffs into the lungs 4 times daily as needed for Wheezing  Patient not taking: Reported on 11/19/2020  Kristen Duron MD   meloxicam (MOBIC) 7.5 MG tablet TAKE 1 TABLET BY MOUTH TWO  TIMES DAILY AS NEEDED  Patient not taking: Reported on 11/19/2020  Mihai Pérez MD   ONE TOUCH ULTRASOFT LANCETS MISC 1 each by Does not apply route 2 times daily  Patient not taking: Reported on 11/19/2020  Kiersten Lew MD   Blood Glucose Monitoring Suppl (ONE TOUCH ULTRA MINI) W/DEVICE KIT 1 kit by Does not apply route daily as needed (patient is checking blood sugar levels twice daily)  Patient not taking: Reported on 11/19/2020  Kiersten Lew MD   omeprazole (PRILOSEC) 20 MG delayed release capsule Take 1 capsule by mouth daily  Patient not taking: Reported on 11/19/2020  Claudine Rodriges MD   Glucosamine 500 MG CAPS Take 3 tablets by mouth daily. Historical Provider, MD         Past Medical History:   Diagnosis Date    Anxiety state, unspecified     Arthritis     Cataracts, bilateral     Esophageal reflux     Generalized osteoarthrosis, unspecified site     Hearing loss     Hyperlipidemia     Hypertension     Neuropathy     [facility]urop[facility] to [facility] condition [facility] [address]     Pure hypercholesterolemia     Thoracic or lumbosacral neuritis or radiculitis, unspecified     Type 2 diabetes mellitus without complication ([address]     Urinary retention     UTI (urinary tract infection)        Past Surgical History:   Procedure Laterality Date    BACK SURGERY      X3    BREAST BIOPSY  2007    Lt     CARPAL TUNNEL RELEASE Right     CATARACT REMOVAL WITH IMPLANT Bilateral     COLONOSCOPY  2006    COLONOSCOPY  4/24/15    diverticulosis    CYSTOSCOPY  [id number]    CYSTOSCOPY  05/26/2017    CYSTOSCOPY N/A 5/26/2017    CYSTOSCOPY URODYNAMICS AND DILATION  performed by Laura Fam MD at [address] Bilateral     thumbs reconstruction    HIP ARTHROPLASTY Bilateral     JOINT REPLACEMENT Bilateral     LUMBAR FUSION  October 27, 2015         Family History   Problem Relation Age of Onset    Cancer Mother         lymphoma    Hypertension Mother     Hypertension Father     Hypertension Brother     Heart Disease Brother         pt states that brother had 5 bypass surgeries.         CareTeam (Including outside providers/suppliers regularly involved in providing care):   Patient Care Team:  Rene Ibrahim MD as PCP - General (Internal Medicine)  Rene Ibrahim MD as PCP - [facility] Empaneled Provider  Stephanie Dupont DO (Obstetrics & Gynecology)    [facility] Readings from Last 3 Encounters:   11/19/20 123 lb (55.8 kg)   08/20/20 124 lb (56.2 kg)   07/23/20 126 lb (57.2 kg)     Vitals:    11/19/20 1429   BP: 108/68   Pulse: 88 Temp: 98.3 °F (36.8 °C)   SpO2: 98%   Weight: 123 lb (55.8 kg)   Height: 5' 5\" (1.651 m)     Body mass index is 20.47 kg/m². Based upon direct observation of the patient, evaluation of cognition reveals recent and remote memory intact. Skin: warm and dry, no rash or erythema  Head: normocephalic and atraumatic  Eyes: pupils equal, round, and reactive to light, extraocular eye movements intact, conjunctivae normal  ENT: tympanic membrane, external ear and ear canal normal bilaterally, nose without deformity, nasal mucosa and turbinates normal without polyps  Neck: supple and non-tender without mass, no thyromegaly or thyroid nodules, no cervical lymphadenopathy  Pulmonary/Chest: clear to auscultation bilaterally- no wheezes, rales or rhonchi, normal air movement, no respiratory distress  Cardiovascular: normal rate, regular rhythm, normal S1 and S2, no murmurs, rubs, clicks, or gallops, distal pulses intact, no carotid bruits  Abdomen: soft, non-tender, non-distended, normal bowel sounds, no masses or organomegaly  Extremities: no cyanosis, clubbing or edema  Musculoskeletal: normal range of motion, no joint swelling, deformity or tenderness  Neurologic: reflexes normal and symmetric, no cranial nerve deficit, gait, coordination and speech normal    Patient's complete Health Risk Assessment and screening values have been reviewed and are found in Flowsheets. The following problems were reviewed today and where indicated follow up appointments were made and/or referrals ordered. Positive Risk Factor Screenings with Interventions:       General Health and ACP:  General  In general, how would you say your health is?: Very Good  In the past 7 days, have you experienced any of the following?  New or Increased Pain, New or Increased Fatigue, Loneliness, Social Isolation, Stress or Anger?: None of These  Do you get the social and emotional support that you need?: Yes  Do you have a Living Will?: Yes  Advance orders  -     Semaglutide 3 MG TABS; Take 1 tablet by mouth every 30 days        uncontrolled dm  sherlyn add semaglutide   Cont other two  [employer] a1c less than 8

## 2020-11-19 NOTE — PATIENT INSTRUCTIONS
Personalized Preventive Plan for Rashmi Amado - 11/19/2020  Medicare offers a range of preventive health benefits. Some of the tests and screenings are paid in full while other may be subject to a deductible, co-insurance, and/or copay. Some of these benefits include a comprehensive review of your medical history including lifestyle, illnesses that may run in your family, and various assessments and screenings as appropriate. After reviewing your medical record and screening and assessments performed today your provider may have ordered immunizations, labs, imaging, and/or referrals for you. A list of these orders (if applicable) as well as your Preventive Care list are included within your After Visit Summary for your review. Other Preventive Recommendations:    · A preventive eye exam performed by an eye specialist is recommended every 1-2 years to screen for glaucoma; cataracts, macular degeneration, and other eye disorders. · A preventive dental visit is recommended every 6 months. · Try to get at least 150 minutes of exercise per week or 10,000 steps per day on a pedometer . · Order or download the FREE \"Exercise & Physical Activity: Your Everyday Guide\" from [employer]. Call 2-795.255.4867 or search [employer] online. · You need 5464-4661 mg of calcium and 9991-8178 IU of vitamin D per day. It is possible to meet your calcium requirement with diet alone, but a vitamin D supplement is usually necessary to meet this goal.  · When exposed to the sun, use a sunscreen that protects against both UVA and UVB radiation with an SPF of 30 or greater. Reapply every 2 to 3 hours or after sweating, drying off with a towel, or swimming. · Always wear a seat belt when traveling in a car. Always wear a helmet when riding a bicycle or motorcycle.

## 2020-11-19 NOTE — PROGRESS NOTES
Visit Information    Have you changed or started any medications since your last visit including any over-the-counter medicines, vitamins, or herbal medicines? no   Are you having any side effects from any of your medications? -  no  Have you stopped taking any of your medications? Is so, why? -  no    Have you seen any other physician or provider since your last visit? Yes - Records Obtained  Have you had any other diagnostic tests since your last visit? Yes - Records Obtained  Have you been seen in the emergency room and/or had an admission to a hospital since we last saw you? No  Have you had your routine dental cleaning in the past 6 months? no    Have you activated your [employer] account? If not, what are your barriers?  Yes     Patient Care Team:  Ronald Ahumada MD as PCP - General (Internal Medicine)  Ronald Ahumada MD as PCP - [facility]  Stephanie Hess DO (Obstetrics & Gynecology)    Medical History Review  Past Medical, Family, and Social History reviewed and does not contribute to the patient presenting condition    Health Maintenance   Topic Date Due    DTaP/Tdap/Td vaccine (1 - Tdap) 09/08/1964    Shingles Vaccine (2 of 2) 09/26/2018    Annual Wellness Visit (AWV)  05/29/2019    Lipid screen  06/24/2021    DEXA (modify frequency per FRAX score)  Completed    Flu vaccine  Completed    Pneumococcal 65+ years Vaccine  Completed    Hepatitis A vaccine  Aged Out    Hib vaccine  Aged Out    Meningococcal (ACWY) vaccine  Aged Out

## 2020-12-18 RX ORDER — ATORVASTATIN CALCIUM 40 MG/1
TABLET, FILM COATED ORAL
Qty: 90 TABLET | Refills: 3 | Status: SHIPPED | OUTPATIENT
Start: 2020-12-18 | End: 2021-11-15

## 2021-02-04 RX ORDER — GABAPENTIN 300 MG/1
CAPSULE ORAL
Qty: 360 CAPSULE | Refills: 3 | Status: SHIPPED | OUTPATIENT
Start: 2021-02-04 | End: 2022-02-10 | Stop reason: SDUPTHER

## 2021-02-04 NOTE — TELEPHONE ENCOUNTER
Pharmacy requesting a  refill of  Gabapentin 300mg.       Medication active on med list yes      Date of last prescription  01/08/2020  with 3 refills verified on 02/04/2021    verified by EDYTA TINSLEY      Date of last appointment 01/08/2020    Next Visit Date:  4/5/2021

## 2021-02-05 ENCOUNTER — IMMUNIZATION (OUTPATIENT)
Dept: LAB | Age: 76
End: 2021-02-05
Payer: MEDICARE

## 2021-02-05 PROCEDURE — 90471 IMMUNIZATION ADMIN: CPT

## 2021-02-24 ENCOUNTER — NURSE TRIAGE (OUTPATIENT)
Dept: OTHER | Facility: CLINIC | Age: 76
End: 2021-02-24

## 2021-02-24 ENCOUNTER — TELEPHONE (OUTPATIENT)
Dept: INTERNAL MEDICINE CLINIC | Age: 76
End: 2021-02-24

## 2021-02-24 NOTE — TELEPHONE ENCOUNTER
Reason for Disposition   MILD diarrhea (e.g., 1-3 or more stools than normal in past 24 hours) diarrhea without known cause and present > 7 days    Answer Assessment - Initial Assessment Questions  1. DIARRHEA SEVERITY: \"How bad is the diarrhea? \" \"How many extra stools have you had in the past 24 hours than normal?\"     - NO DIARRHEA (SCALE 0)    - MILD (SCALE 1-3): Few loose or mushy BMs; increase of 1-3 stools over normal daily number of stools; mild increase in ostomy output. -  MODERATE (SCALE 4-7): Increase of 4-6 stools daily over normal; moderate increase in ostomy output. * SEVERE (SCALE 8-10; OR 'WORST POSSIBLE'): Increase of 7 or more stools daily over normal; moderate increase in ostomy output; incontinence. At night- Once during the night (mild)    2. ONSET: \"When did the diarrhea begin? \"       One month ago    3. BM CONSISTENCY: \"How loose or watery is the diarrhea? \"       Loose and watery    4. VOMITING: \"Are you also vomiting? \" If so, ask: \"How many times in the past 24 hours? \"      Denies    5. ABDOMINAL PAIN: Marija Prophet you having any abdominal pain? \" If yes: \"What does it feel like? \" (e.g., crampy, dull, intermittent, constant)       Denies pain    6. ABDOMINAL PAIN SEVERITY: If present, ask: \"How bad is the pain? \"  (e.g., Scale 1-10; mild, moderate, or severe)    - MILD (1-3): doesn't interfere with normal activities, abdomen soft and not tender to touch     - MODERATE (4-7): interferes with normal activities or awakens from sleep, tender to touch     - SEVERE (8-10): excruciating pain, doubled over, unable to do any normal activities        Denies pain    7. ORAL INTAKE: If vomiting, \"Have you been able to drink liquids? \" \"How much fluids have you had in the past 24 hours? \"      Denies vomiting, is able to eat and drink normally during the day    8. HYDRATION: \"Any signs of dehydration? \" (e.g., dry mouth [not just dry lips], too weak to stand, dizziness, new weight loss) \"When did you last urinate? \"      Gets \"A little dizzy\"- chronic issue    9. EXPOSURE: \"Have you traveled to a foreign country recently? \" \"Have you been exposed to anyone with diarrhea? \" \"Could you have eaten any food that was spoiled? \"     Went to [place] [address]- not out of the country    10. ANTIBIOTIC USE: \"Are you taking antibiotics now or have you taken antibiotics in the past 2 months? \"        Denies    11. OTHER SYMPTOMS: \"Do you have any other symptoms? \" (e.g., fever, blood in stool)        States, \"I can feel something on my stomach that if I push down it will come back up\", noticed about 2-3 weeks ago    12. PREGNANCY: \"Is there any chance you are pregnant? \" \"When was your last menstrual period? \"        N/A    Protocols used: [id number]    Patient called Flash Ozuna at [facility] pre-service center [facility])  with red flag complaint. Brief description of triage: See above. Triage indicates for patient to be seen    Care advice provided, patient verbalizes understanding; denies any other questions or concerns; instructed to call back for any new or worsening symptoms. Writer provided warm transfer to [place] at [facility] for appointment scheduling. Attention Provider: Thank you for allowing me to participate in the care of your patient. The patient was connected to triage in response to information provided to the [facility]. Please do not respond through this encounter as the response is not directed to a shared pool.

## 2021-02-25 ENCOUNTER — OFFICE VISIT (OUTPATIENT)
Dept: INTERNAL MEDICINE CLINIC | Age: 76
End: 2021-02-25
Payer: MEDICARE

## 2021-02-25 ENCOUNTER — HOSPITAL ENCOUNTER (OUTPATIENT)
Age: 76
Setting detail: SPECIMEN
Discharge: HOME | End: 2021-02-25
Payer: MEDICARE

## 2021-02-25 VITALS
HEART RATE: 64 BPM | SYSTOLIC BLOOD PRESSURE: 100 MMHG | DIASTOLIC BLOOD PRESSURE: 60 MMHG | OXYGEN SATURATION: 95 % | WEIGHT: 121 LBS | TEMPERATURE: 97 F | BODY MASS INDEX: 20.14 KG/M2

## 2021-02-25 DIAGNOSIS — R19.7 DIARRHEA, UNSPECIFIED TYPE: ICD-10-CM

## 2021-02-25 DIAGNOSIS — I10 ESSENTIAL HYPERTENSION: ICD-10-CM

## 2021-02-25 DIAGNOSIS — R19.04 LEFT LOWER QUADRANT ABDOMINAL SWELLING, MASS AND LUMP: Primary | ICD-10-CM

## 2021-02-25 LAB
ABSOLUTE EOS #: 0.13 K/UL (ref 0–0.44)
ABSOLUTE IMMATURE GRANULOCYTE: <0.03 K/UL (ref 0–0.3)
ABSOLUTE LYMPH #: 2.15 K/UL (ref 1.1–3.7)
ABSOLUTE MONO #: 0.75 K/UL (ref 0.1–1.2)
ALBUMIN SERPL-MCNC: 4.2 G/DL (ref 3.5–5.2)
ALBUMIN/GLOBULIN RATIO: 1.6 (ref 1–2.5)
ALP BLD-CCNC: 80 U/L (ref 35–104)
ALT SERPL-CCNC: 14 U/L (ref 5–33)
ANION GAP SERPL CALCULATED.3IONS-SCNC: 11 MMOL/L (ref 9–17)
AST SERPL-CCNC: 17 U/L
BASOPHILS # BLD: 0 % (ref 0–2)
BASOPHILS ABSOLUTE: 0.03 K/UL (ref 0–0.2)
BILIRUB SERPL-MCNC: 0.36 MG/DL (ref 0.3–1.2)
BUN BLDV-MCNC: 12 MG/DL (ref 8–23)
BUN/CREAT BLD: ABNORMAL (ref 9–20)
C-REACTIVE PROTEIN: <3 MG/L (ref 0–5)
CALCIUM SERPL-MCNC: 9.8 MG/DL (ref 8.6–10.4)
CHLORIDE BLD-SCNC: 101 MMOL/L (ref 98–107)
CO2: 29 MMOL/L (ref 20–31)
CREAT SERPL-MCNC: 0.52 MG/DL (ref 0.5–0.9)
DIFFERENTIAL TYPE: NORMAL
EOSINOPHILS RELATIVE PERCENT: 2 % (ref 1–4)
GFR AFRICAN AMERICAN: >60 ML/MIN
GFR NON-AFRICAN AMERICAN: >60 ML/MIN
GFR SERPL CREATININE-BSD FRML MDRD: ABNORMAL ML/MIN/{1.73_M2}
GFR SERPL CREATININE-BSD FRML MDRD: ABNORMAL ML/MIN/{1.73_M2}
GLUCOSE BLD-MCNC: 188 MG/DL (ref 70–99)
HBA1C MFR BLD: 8.7 %
HCT VFR BLD CALC: 43.4 % (ref 36.3–47.1)
HEMOGLOBIN: 13.2 G/DL (ref 11.9–15.1)
IMMATURE GRANULOCYTES: 0 %
LYMPHOCYTES # BLD: 30 % (ref 24–43)
MCH RBC QN AUTO: 28 PG (ref 25.2–33.5)
MCHC RBC AUTO-ENTMCNC: 30.4 G/DL (ref 28.4–34.8)
MCV RBC AUTO: 92.1 FL (ref 82.6–102.9)
MONOCYTES # BLD: 11 % (ref 3–12)
NRBC AUTOMATED: 0 PER 100 WBC
PDW BLD-RTO: 13.7 % (ref 11.8–14.4)
PLATELET # BLD: 305 K/UL (ref 138–453)
PLATELET ESTIMATE: NORMAL
PMV BLD AUTO: 10.6 FL (ref 8.1–13.5)
POTASSIUM SERPL-SCNC: 4.6 MMOL/L (ref 3.7–5.3)
RBC # BLD: 4.71 M/UL (ref 3.95–5.11)
RBC # BLD: NORMAL 10*6/UL
SEDIMENTATION RATE, ERYTHROCYTE: 6 MM (ref 0–30)
SEG NEUTROPHILS: 57 % (ref 36–65)
SEGMENTED NEUTROPHILS ABSOLUTE COUNT: 4.02 K/UL (ref 1.5–8.1)
SODIUM BLD-SCNC: 141 MMOL/L (ref 135–144)
TOTAL PROTEIN: 6.9 G/DL (ref 6.4–8.3)
WBC # BLD: 7.1 K/UL (ref 3.5–11.3)
WBC # BLD: NORMAL 10*3/UL

## 2021-02-25 PROCEDURE — 1123F ACP DISCUSS/DSCN MKR DOCD: CPT | Performed by: INTERNAL MEDICINE

## 2021-02-25 PROCEDURE — G8399 PT W/DXA RESULTS DOCUMENT: HCPCS | Performed by: INTERNAL MEDICINE

## 2021-02-25 PROCEDURE — 83036 HEMOGLOBIN GLYCOSYLATED A1C: CPT | Performed by: INTERNAL MEDICINE

## 2021-02-25 PROCEDURE — 4040F PNEUMOC VAC/ADMIN/RCVD: CPT | Performed by: INTERNAL MEDICINE

## 2021-02-25 PROCEDURE — G8484 FLU IMMUNIZE NO ADMIN: HCPCS | Performed by: INTERNAL MEDICINE

## 2021-02-25 PROCEDURE — 99214 OFFICE O/P EST MOD 30 MIN: CPT | Performed by: INTERNAL MEDICINE

## 2021-02-25 PROCEDURE — 3288F FALL RISK ASSESSMENT DOCD: CPT | Performed by: INTERNAL MEDICINE

## 2021-02-25 PROCEDURE — G8420 CALC BMI NORM PARAMETERS: HCPCS | Performed by: INTERNAL MEDICINE

## 2021-02-25 PROCEDURE — 1090F PRES/ABSN URINE INCON ASSESS: CPT | Performed by: INTERNAL MEDICINE

## 2021-02-25 PROCEDURE — 1036F TOBACCO NON-USER: CPT | Performed by: INTERNAL MEDICINE

## 2021-02-25 PROCEDURE — 3052F HG A1C>EQUAL 8.0%<EQUAL 9.0%: CPT | Performed by: INTERNAL MEDICINE

## 2021-02-25 PROCEDURE — G8427 DOCREV CUR MEDS BY ELIG CLIN: HCPCS | Performed by: INTERNAL MEDICINE

## 2021-02-25 ASSESSMENT — PATIENT HEALTH QUESTIONNAIRE - PHQ9
SUM OF ALL RESPONSES TO PHQ9 QUESTIONS 1 & 2: 0
SUM OF ALL RESPONSES TO PHQ QUESTIONS 1-9: 0
1. LITTLE INTEREST OR PLEASURE IN DOING THINGS: 0
2. FEELING DOWN, DEPRESSED OR HOPELESS: 0

## 2021-02-25 NOTE — PROGRESS NOTES
Visit Information    Have you changed or started any medications since your last visit including any over-the-counter medicines, vitamins, or herbal medicines? no   Are you having any side effects from any of your medications? -  no  Have you stopped taking any of your medications? Is so, why? -  no    Have you seen any other physician or provider since your last visit? No  Have you had any other diagnostic tests since your last visit? No  Have you been seen in the emergency room and/or had an admission to a hospital since we last saw you? No  Have you had your routine dental cleaning in the past 6 months? no    Have you activated your [employer] account? If not, what are your barriers?  Yes     Patient Care Team:  Martha Alvarado MD as PCP - General (Internal Medicine)  Martha Alvarado MD as PCP - [facility]  Stephanie Troy DO (Obstetrics & Gynecology)    Medical History Review  Past Medical, Family, and Social History reviewed and does not contribute to the patient presenting condition    Health Maintenance   Topic Date Due    DTaP/Tdap/Td vaccine (1 - Tdap) 09[birth year]/1963    Shingles Vaccine (2 of 2) 09/26/2018    Potassium monitoring  02/24/2021    Creatinine monitoring  02/24/2021    COVID-19 Vaccine (2 of 2 - Jayleen Pagoda series) 03/05/2021    Lipid screen  06/24/2021    Annual Wellness Visit (AWV)  11/20/2021    DEXA (modify frequency per FRAX score)  Completed    Flu vaccine  Completed    Pneumococcal 65+ years Vaccine  Completed    Hepatitis C screen  Completed    Hepatitis A vaccine  Aged Out    Hib vaccine  Aged Out    Meningococcal (ACWY) vaccine  Aged Out     SUBJECTIVE:  Dexter Solis is a 67 y.o. female patient who  comes for complaints of   Chief Complaint   Patient presents with    Diarrhea     x1 month       Diarrhea   Duration-1mth  Severity-moderate  Context-type 2 DM  Associated signs and symptoms-  Loose watery BMs started 1mth ago,more at night, wakes her up  Some urge incontinence of stool as well  Lost 2lbs in 2mth  Loose stool in day, watery stool 2-3times at night some night  No relation to food  Pt has felt a lump in left abdomen  No fever/vomiting/belly pain  No travel in last 6mth  Has been on metformin 3yr  No new meds  Modifying factors-    bp a litle low today    HbA1c 8.7    Pt not sure which meds she is taking. REVIEW OF SYSTEMS (except Subjective (HPI))  GENERAL: No fevers / chills  RESPIRATORY: Negative for cough, wheezing or shortness of breath  CARDIOVASCULAR: Negative for chest pain or palpitations. GI: no nausea, vomiting, or diarrhea  NEURO: No history of headaches    Past Medical History:   Diagnosis Date    Anxiety state, unspecified     Arthritis     Cataracts, bilateral     Esophageal reflux     Generalized osteoarthrosis, unspecified site     Hearing loss     Hyperlipidemia     Hypertension     Neuropathy     [facility] to [facility]     Pure hypercholesterolemia     Thoracic or lumbosacral neuritis or radiculitis, unspecified     Type 2 diabetes mellitus without complication ([facility]     Urinary retention     UTI (urinary tract infection)        SOCIAL HISTORY:  Social History     Socioeconomic History    Marital status:      Spouse name: Not on file    Number of children: Not on file    Years of education: Not on file    Highest education level: Not on file   Occupational History    Not on file   Social Needs    Financial resource strain: Not on file    Food insecurity     Worry: Not on file     Inability: Not on file    Transportation needs     Medical: Not on file     Non-medical: Not on file   Tobacco Use    Smoking status: Former Smoker     Packs/day: 2.00     Years: 20.00     Pack years: 40.00     Types: Cigarettes     Quit date: 1984     Years since quittin.1    Smokeless tobacco: Never Used   Substance and Sexual Activity    Alcohol use:  Yes     Alcohol/week: 0.0 standard drinks Comment: social    Drug use: No    Sexual activity: Yes     Partners: Male   Lifestyle    Physical activity     Days per week: Not on file     Minutes per session: Not on file    Stress: Not on file   Relationships    Social connections     Talks on phone: Not on file     Gets together: Not on file     Attends Ten[employer] service: Not on file     Active member of club or organization: Not on file     Attends meetings of clubs or organizations: Not on file     Relationship status: Not on file    Intimate partner violence     Fear of current or ex partner: Not on file     Emotionally abused: Not on file     Physically abused: Not on file     Forced sexual activity: Not on file   Other Topics Concern    Not on file   Social History Narrative    Not on file           CURRENT MEDICATIONS:  Current Outpatient Medications   Medication Sig Dispense Refill    gabapentin (NEURONTIN) 300 MG capsule TAKE 1 CAPSULE BY MOUTH IN  THE MORNING, 1 CAPSULE IN  THE AFTERNOON, AND 2  CAPSULES AT BEDTIME 360 capsule 3    atorvastatin (LIPITOR) 40 MG tablet TAKE 1 TABLET BY MOUTH  DAILY 90 tablet 3    metFORMIN (GLUCOPHAGE) 1000 MG tablet TAKE 1 TABLET BY MOUTH TWO  TIMES DAILY WITH MEALS 180 tablet 3    lisinopril (PRINIVIL;ZESTRIL) 20 MG tablet Take 1 tablet by mouth 2 times daily 180 tablet 2    metoprolol tartrate (LOPRESSOR) 25 MG tablet TAKE 1 TABLET BY MOUTH TWO  TIMES DAILY 180 tablet 3    glipiZIDE (GLUCOTROL) 10 MG tablet Take 1 tablet by mouth 2 times daily (before meals) 180 tablet 3    blood glucose monitor strips Test four  times a day & as needed for symptoms of irregular blood glucose. Dispense sufficient amount for indicated testing frequency plus additional to accommodate PRN testing needs.  120 strip 5    ketotifen (ALAWAY) 0.025 % ophthalmic solution Apply 1 drop to eye      DULoxetine (CYMBALTA) 30 MG extended release capsule Take 1 capsule by mouth daily 90 capsule 3    Lancets MISC 1 each by Does not apply route daily 100 each 3    Calcium-Magnesium-Vitamin D (CALCIUM 1200+D3 PO) Take 2 tablets by mouth daily.  Glucosamine 500 MG CAPS Take 3 tablets by mouth daily.  acetaminophen (TYLENOL) 500 MG tablet Take 500 mg by mouth every 6 hours as needed for Pain.  aspirin 81 MG chewable tablet Take 81 mg by mouth daily.         Semaglutide 3 MG TABS Take 1 tablet by mouth every 30 days (Patient not taking: Reported on 2/25/2021) 30 tablet 1    DULoxetine (CYMBALTA) 60 MG extended release capsule Take 1 capsule by mouth daily (Patient not taking: Reported on 11/19/2020) 90 capsule 1    ONETOUCH ULTRA strip USE ONE STRIP TO TEST FOUR TIMES A DAY (Patient not taking: Reported on 11/19/2020) 100 strip 0    blood glucose monitor strips Patient checking blood sugar levels 4 times daily (Patient not taking: Reported on 11/19/2020) 200 strip 3    oxybutynin (DITROPAN) 5 MG tablet Take 1 tablet by mouth 2 times daily (Patient not taking: Reported on 11/19/2020) 180 tablet 3    ferrous sulfate 325 (65 Fe) MG tablet Take 1 tablet by mouth 2 times daily (Patient not taking: Reported on 11/19/2020) 180 tablet 1    albuterol sulfate  (90 Base) MCG/ACT inhaler Inhale 2 puffs into the lungs 4 times daily as needed for Wheezing (Patient not taking: Reported on 11/19/2020) 3 Inhaler 1    meloxicam (MOBIC) 7.5 MG tablet TAKE 1 TABLET BY MOUTH TWO  TIMES DAILY AS NEEDED (Patient not taking: Reported on 11/19/2020) 180 tablet 2    ONE TOUCH ULTRASOFT LANCETS MISC 1 each by Does not apply route 2 times daily (Patient not taking: Reported on 11/19/2020) 100 each 3    Blood Glucose Monitoring Suppl (ONE TOUCH ULTRA MINI) W/DEVICE KIT 1 kit by Does not apply route daily as needed (patient is checking blood sugar levels twice daily) (Patient not taking: Reported on 11/19/2020) 1 kit 0    omeprazole (PRILOSEC) 20 MG delayed release capsule Take 1 capsule by mouth  daily (Patient not taking: Reported on 11/19/2020) 90 capsule 3     No current facility-administered medications for this visit. OBJECTIVE:  Vitals:    02/25/21 1155   BP: 100/60   Pulse: 64   Temp: 97 °F (36.1 °C)   SpO2: 95%     Body mass index is 20.14 kg/m². Focal exam:  Tender over left side of abdomen, firm mass felt, palpation of which causes significantly increased borborygmi sounds. General exam (except above):  General appearance - well appearing, alert, in no acute distress  Head - Atraumatic, normocephalic  Eyes - EOMI, no jaundice or pallor  Lungs - Lungs clear to auscultation. No wheezing, rhonchi, rales  Heart - RRR without murmur, gallop, or rubs. No ectopy  Abdomen - Abdomen soft, non-tender. Bowel sounds normal. No masses, organomegaly  Extremities -No significant edema, or skin discoloration. Good capillary refill. Neuro - Pt Alert, awake and oriented x 3. No gross focal neurological deficits    ASSESSMENT AND PLAN (MEDICAL DECISION MAKING):    Rohith Bales was seen today for diarrhea. Diagnoses and all orders for this visit:    Left lower quadrant abdominal swelling, mass and lump  -     CT ABDOMEN PELVIS W IV CONTRAST Additional Contrast? None; Future    Diarrhea, unspecified type  -     CT ABDOMEN PELVIS W IV CONTRAST Additional Contrast? None; Future  -     CBC With Auto Differential; Future  -     Comprehensive Metabolic Panel; Future  -     Clostridium Difficile Toxin/Antigen; Future  -     Gastrointestinal Panel, Molecular; Future  -     Sedimentation rate, automated; Future  -     C-Reactive Protein; Future    DM type 2, uncontrolled, with neuropathy ([facility]  Comments:  HbA1c 8.7, patient unsure which medication she is taking, advised to come back with medication and glucose log to discuss further.   Orders:  -     POCT glycosylated hemoglobin (Hb A1C)    Essential hypertension           Follow up in: 1 week      Lorie Aguilar MD

## 2021-02-26 ENCOUNTER — HOSPITAL ENCOUNTER (OUTPATIENT)
Age: 76
Setting detail: SPECIMEN
Discharge: HOME | End: 2021-02-26
Payer: MEDICARE

## 2021-02-26 ENCOUNTER — TELEPHONE (OUTPATIENT)
Dept: INTERNAL MEDICINE CLINIC | Age: 76
End: 2021-02-26

## 2021-02-26 DIAGNOSIS — R19.7 DIARRHEA, UNSPECIFIED TYPE: ICD-10-CM

## 2021-02-26 NOTE — TELEPHONE ENCOUNTER
Lab called and stated that the gastrointestinal panel would not be covered by insurance it is over $1,000

## 2021-02-27 LAB
C DIFF AG + TOXIN: NEGATIVE
SPECIMEN DESCRIPTION: NORMAL

## 2021-03-04 ENCOUNTER — HOSPITAL ENCOUNTER (OUTPATIENT)
Dept: CT IMAGING | Facility: CLINIC | Age: 76
Discharge: HOME | End: 2021-03-06
Payer: MEDICARE

## 2021-03-04 DIAGNOSIS — R19.04 LEFT LOWER QUADRANT ABDOMINAL SWELLING, MASS AND LUMP: ICD-10-CM

## 2021-03-04 DIAGNOSIS — R19.7 DIARRHEA, UNSPECIFIED TYPE: ICD-10-CM

## 2021-03-04 PROCEDURE — 74177 CT ABD & PELVIS W/CONTRAST: CPT

## 2021-03-04 PROCEDURE — 6360000004 HC RX CONTRAST MEDICATION: Performed by: INTERNAL MEDICINE

## 2021-03-04 PROCEDURE — 2580000003 HC RX 258: Performed by: INTERNAL MEDICINE

## 2021-03-04 RX ORDER — 0.9 % SODIUM CHLORIDE 0.9 %
80 INTRAVENOUS SOLUTION INTRAVENOUS ONCE
Status: COMPLETED | OUTPATIENT
Start: 2021-03-04 | End: 2021-03-04

## 2021-03-04 RX ORDER — SODIUM CHLORIDE 0.9 % (FLUSH) 0.9 %
10 SYRINGE (ML) INJECTION PRN
Status: DISCONTINUED | OUTPATIENT
Start: 2021-03-04 | End: 2021-03-07 | Stop reason: HOSPADM

## 2021-03-04 RX ADMIN — Medication 10 ML: at 10:03

## 2021-03-04 RX ADMIN — SODIUM CHLORIDE 80 ML: 9 INJECTION, SOLUTION INTRAVENOUS at 10:03

## 2021-03-04 RX ADMIN — IOVERSOL 75 ML: 741 INJECTION INTRA-ARTERIAL; INTRAVENOUS at 10:03

## 2021-03-05 ENCOUNTER — IMMUNIZATION (OUTPATIENT)
Dept: LAB | Age: 76
End: 2021-03-05
Payer: MEDICARE

## 2021-03-05 PROCEDURE — 91301 COVID-19, MODERNA VACCINE 100MCG/0.5ML DOSE: CPT

## 2021-03-08 ENCOUNTER — OFFICE VISIT (OUTPATIENT)
Dept: INTERNAL MEDICINE CLINIC | Age: 76
End: 2021-03-08
Payer: MEDICARE

## 2021-03-08 ENCOUNTER — TELEPHONE (OUTPATIENT)
Dept: INTERNAL MEDICINE CLINIC | Age: 76
End: 2021-03-08

## 2021-03-08 VITALS
HEART RATE: 78 BPM | BODY MASS INDEX: 20.16 KG/M2 | DIASTOLIC BLOOD PRESSURE: 72 MMHG | SYSTOLIC BLOOD PRESSURE: 118 MMHG | OXYGEN SATURATION: 98 % | TEMPERATURE: 97.8 F | HEIGHT: 65 IN | WEIGHT: 121 LBS

## 2021-03-08 DIAGNOSIS — E78.2 MIXED HYPERLIPIDEMIA: ICD-10-CM

## 2021-03-08 DIAGNOSIS — K59.1 FUNCTIONAL DIARRHEA: ICD-10-CM

## 2021-03-08 DIAGNOSIS — E11.49 OTHER DIABETIC NEUROLOGICAL COMPLICATION ASSOCIATED WITH TYPE 2 DIABETES MELLITUS (HCC): Primary | ICD-10-CM

## 2021-03-08 DIAGNOSIS — I10 ESSENTIAL HYPERTENSION: ICD-10-CM

## 2021-03-08 PROCEDURE — G8420 CALC BMI NORM PARAMETERS: HCPCS | Performed by: INTERNAL MEDICINE

## 2021-03-08 PROCEDURE — G8484 FLU IMMUNIZE NO ADMIN: HCPCS | Performed by: INTERNAL MEDICINE

## 2021-03-08 PROCEDURE — 1090F PRES/ABSN URINE INCON ASSESS: CPT | Performed by: INTERNAL MEDICINE

## 2021-03-08 PROCEDURE — G8399 PT W/DXA RESULTS DOCUMENT: HCPCS | Performed by: INTERNAL MEDICINE

## 2021-03-08 PROCEDURE — G8427 DOCREV CUR MEDS BY ELIG CLIN: HCPCS | Performed by: INTERNAL MEDICINE

## 2021-03-08 PROCEDURE — 1123F ACP DISCUSS/DSCN MKR DOCD: CPT | Performed by: INTERNAL MEDICINE

## 2021-03-08 PROCEDURE — 99214 OFFICE O/P EST MOD 30 MIN: CPT | Performed by: INTERNAL MEDICINE

## 2021-03-08 PROCEDURE — 4040F PNEUMOC VAC/ADMIN/RCVD: CPT | Performed by: INTERNAL MEDICINE

## 2021-03-08 PROCEDURE — 1036F TOBACCO NON-USER: CPT | Performed by: INTERNAL MEDICINE

## 2021-03-08 PROCEDURE — 3052F HG A1C>EQUAL 8.0%<EQUAL 9.0%: CPT | Performed by: INTERNAL MEDICINE

## 2021-03-08 RX ORDER — GLIPIZIDE 10 MG/1
20 TABLET ORAL
Qty: 180 TABLET | Refills: 3 | Status: SHIPPED | OUTPATIENT
Start: 2021-03-08 | End: 2021-03-30 | Stop reason: SDUPTHER

## 2021-03-08 ASSESSMENT — ENCOUNTER SYMPTOMS
SHORTNESS OF BREATH: 0
BLOOD IN STOOL: 0
EYE PAIN: 0
WHEEZING: 0
COUGH: 0
DIARRHEA: 1
EYE DISCHARGE: 0
COLOR CHANGE: 0
TROUBLE SWALLOWING: 0
ABDOMINAL DISTENTION: 0

## 2021-03-08 NOTE — PROGRESS NOTES
Visit Information    Have you changed or started any medications since your last visit including any over-the-counter medicines, vitamins, or herbal medicines? no   Are you having any side effects from any of your medications? -  no  Have you stopped taking any of your medications? Is so, why? -  no    Have you seen any other physician or provider since your last visit? Yes - Records Obtained  Have you had any other diagnostic tests since your last visit? Yes - Records Obtained  Have you been seen in the emergency room and/or had an admission to a hospital since we last saw you? No  Have you had your routine dental cleaning in the past 6 months? yes -     Have you activated your [employer] account? If not, what are your barriers?  Yes     Patient Care Team:  María Ramsey MD as PCP - General (Internal Medicine)  María Ramsey MD as PCP - [facility]  Stephanie Urbano DO (Obstetrics & Gynecology)    Medical History Review  Past Medical, Family, and Social History reviewed and does not contribute to the patient presenting condition    Health Maintenance   Topic Date Due    DTaP/Tdap/Td vaccine (1 - Tdap) 09/08/1963    Shingles Vaccine (2 of 2) 09/26/2018    Lipid screen  06/24/2021    Annual Wellness Visit (AWV)  11/20/2021    DEXA (modify frequency per FRAX score)  Completed    Flu vaccine  Completed    Pneumococcal 65+ years Vaccine  Completed    COVID-19 Vaccine  Completed    Hepatitis C screen  Completed    Hepatitis A vaccine  Aged Out    Hib vaccine  Aged Out    Meningococcal (ACWY) vaccine  Aged Out

## 2021-03-08 NOTE — PROGRESS NOTES
[address] [address]  Dept: [phone]  Dept Fax: [phone]    Jason Shepherd is a 67 y.o. female who presents today for her medical conditions/complaintsas noted below. Jason Shepherd is c/o of   Chief Complaint   Patient presents with    Diabetes     2/25/21 a1c was 8.7         HPI:     diarreha  For 1 month PPI and taking Pepto-Bismol patient stopped taking semaglutide due to the possible side effect diarrhea but has been taking Metformin 1000 twice a day patient was seen last time by Dr. Minerva Amezcua C. difficile test was ordered which was negative CT abdomen was also ordered      Hemoglobin A1C (%)   Date Value   02/25/2021 8.7   10/19/2020 8.6 (H)   06/24/2020 8.7 (H)             ( goal A1Cis < 7)   Microalb/Crt.  Ratio (mcg/mg creat)   Date Value   11/19/2019 CANNOT BE CALCULATED     LDL Cholesterol (mg/dL)   Date Value   06/24/2020 62   05/16/2019 71   02/12/2018 49       (goal LDL is <100)   AST (U/L)   Date Value   02/25/2021 17     ALT (U/L)   Date Value   02/25/2021 14     BUN (mg/dL)   Date Value   02/25/2021 12     BP Readings from Last 3 Encounters:   03/08/21 118/72   02/25/21 100/60   11/19/20 108/68          (goal 120/80)    Past Medical History:   Diagnosis Date    Anxiety state, unspecified     Arthritis     Cataracts, bilateral     Esophageal reflux     Generalized osteoarthrosis, unspecified site     Hearing loss     Hyperlipidemia     Hypertension     Neuropathy     Neuropathy due to medical condition ([address]     Pure hypercholesterolemia     Thoracic or lumbosacral neuritis or radiculitis, unspecified     Type 2 diabetes mellitus without complication ([address]     Urinary retention     UTI (urinary tract infection)       Past Surgical History:   Procedure Laterality Date    BACK SURGERY      X3    BREAST BIOPSY  2007    Lt     CARPAL TUNNEL RELEASE Right     CATARACT REMOVAL WITH IMPLANT Bilateral     COLONOSCOPY  2006 each 3    Lancets MISC 1 each by Does not apply route daily 100 each 3    Calcium-Magnesium-Vitamin D (CALCIUM 1200+D3 PO) Take 2 tablets by mouth daily.  Glucosamine 500 MG CAPS Take 3 tablets by mouth daily.  acetaminophen (TYLENOL) 500 MG tablet Take 500 mg by mouth every 6 hours as needed for Pain.  aspirin 81 MG chewable tablet Take 81 mg by mouth daily.  ONETOUCH ULTRA strip USE ONE STRIP TO TEST FOUR TIMES A DAY (Patient not taking: Reported on 11/19/2020) 100 strip 0    lisinopril (PRINIVIL;ZESTRIL) 20 MG tablet Take 1 tablet by mouth 2 times daily 180 tablet 2    blood glucose monitor strips Patient checking blood sugar levels 4 times daily (Patient not taking: Reported on 11/19/2020) 200 strip 3    oxybutynin (DITROPAN) 5 MG tablet Take 1 tablet by mouth 2 times daily (Patient not taking: Reported on 11/19/2020) 180 tablet 3    Blood Glucose Monitoring Suppl (ONE TOUCH ULTRA MINI) W/DEVICE KIT 1 kit by Does not apply route daily as needed (patient is checking blood sugar levels twice daily) (Patient not taking: Reported on 11/19/2020) 1 kit 0     No current facility-administered medications for this visit. Allergies   Allergen Reactions    Acetaminophen     Hydrocodone        Health Maintenance   Topic Date Due    DTaP/Tdap/Td vaccine (1 - Tdap) 09/08/1963    Shingles Vaccine (2 of 2) 09/26/2018    Lipid screen  06/24/2021    Annual Wellness Visit (AWV)  11/20/2021    DEXA (modify frequency per FRAX score)  Completed    Flu vaccine  Completed    Pneumococcal 65+ years Vaccine  Completed    COVID-19 Vaccine  Completed    Hepatitis C screen  Completed    Hepatitis A vaccine  Aged Out    Hib vaccine  Aged Out    Meningococcal (ACWY) vaccine  Aged Out       Subjective:     Review of Systems   Constitutional: Negative for appetite change, diaphoresis and fatigue. HENT: Negative for ear discharge and trouble swallowing. Eyes: Negative for pain and discharge. Respiratory: Negative for cough, shortness of breath and wheezing. Cardiovascular: Negative for chest pain and palpitations. Gastrointestinal: Positive for diarrhea. Negative for abdominal distention and blood in stool. Endocrine: Negative for polydipsia and polyphagia. Genitourinary: Negative for difficulty urinating and frequency. Musculoskeletal: Negative for gait problem, myalgias and neck pain. Skin: Negative for color change and rash. Allergic/Immunologic: Negative for environmental allergies and food allergies. Neurological: Negative for dizziness and headaches. Hematological: Negative for adenopathy. Does not bruise/bleed easily. Psychiatric/Behavioral: Negative for behavioral problems and sleep disturbance. Objective:     Physical Exam  Constitutional:       Appearance: She is well-developed. She is not diaphoretic. HENT:      Head: Normocephalic and atraumatic. Eyes:      General:         Right eye: No discharge. Left eye: No discharge. Extraocular Movements:      Right eye: Normal extraocular motion. Left eye: Normal extraocular motion. Conjunctiva/sclera: Conjunctivae normal.      Right eye: Right conjunctiva is not injected. Left eye: Left conjunctiva is not injected. Neck:      Musculoskeletal: Normal range of motion and neck supple. No edema or erythema. Thyroid: No thyroid mass or thyromegaly. Vascular: No JVD. Cardiovascular:      Rate and Rhythm: Normal rate and regular rhythm. Heart sounds: No murmur. No friction rub. Pulmonary:      Effort: Pulmonary effort is normal. No tachypnea, bradypnea, accessory muscle usage or respiratory distress. Breath sounds: Normal breath sounds. No wheezing or rales. Abdominal:      General: Bowel sounds are normal. There is no distension. Palpations: Abdomen is soft. Tenderness: There is no abdominal tenderness. There is no rebound.    Musculoskeletal: Normal range of motion. General: No tenderness. Lymphadenopathy:      Head:      Right side of head: No submental or submandibular adenopathy. Left side of head: No submental or submandibular adenopathy. Cervical: No cervical adenopathy. Skin:     General: Skin is warm. Coloration: Skin is not pale. Findings: No bruising, ecchymosis or rash. Neurological:      Mental Status: She is alert and oriented to person, place, and time. Cranial Nerves: No cranial nerve deficit. Sensory: No sensory deficit. Motor: No atrophy or abnormal muscle tone. Coordination: Coordination normal.   Psychiatric:         Mood and Affect: Mood is not anxious. Affect is not angry. Speech: Speech is not slurred. Behavior: Behavior normal. Behavior is not aggressive. Thought Content: Thought content does not include homicidal ideation. Cognition and Memory: Memory is not impaired. /72   Pulse 78   Temp 97.8 °F (36.6 °C)   Ht 5' 5\" (1.651 m)   Wt 121 lb (54.9 kg)   SpO2 98%   BMI 20.14 kg/m²     Assessment:       Diagnosis Orders   1. Other diabetic neurological complication associated with type 2 diabetes mellitus ([facility]     2. DM type 2, uncontrolled, with neuropathy ([facility]     3. Mixed hyperlipidemia     4. Essential hypertension     5. Functional diarrhea               Plan:      Return in about 2 weeks (around 3/22/2021). No orders of the defined types were placed in this encounter.     Orders Placed This Encounter   Medications    glipiZIDE (GLUCOTROL) 10 MG tablet     Sig: Take 2 tablets by mouth 2 times daily (before meals)     Dispense:  180 tablet     Refill:  3    diarreha  For 1 month PPI and taking Pepto-Bismol patient stopped taking semaglutide due to the possible side effect diarrhea but has been taking Metformin 1000 twice a day patient was seen last time by Dr. Vinita Dewey. difficile test was ordered which was negative CT abdomen was also ordered  Ct negative  Will stop metformin  Ok to off semaglutide  incrae glipizide to 20 bid for above changes and uncotnrolled dm a1c 8.7  Patient may have stercoral colitis CT scan noted moderate stool burden if above treatment plan does not control diarrhea will have a good cathartics to clean the bowels and then reassess   Patient given educational materials - see patient instructions. Discussed use, benefit, and side effects of prescribed medications. All patientquestions answered. Pt voiced understanding. Reviewed health maintenance. Instructedto continue current medications, diet and exercise. Patient agreed with treatmentplan. Follow up as directed.      Electronically signed by Neal Masters MD on 3/8/2021 at 10:51 AM

## 2021-03-16 ENCOUNTER — OFFICE VISIT (OUTPATIENT)
Dept: INTERNAL MEDICINE CLINIC | Age: 76
End: 2021-03-16
Payer: MEDICARE

## 2021-03-16 VITALS
HEIGHT: 65 IN | DIASTOLIC BLOOD PRESSURE: 74 MMHG | BODY MASS INDEX: 20.33 KG/M2 | OXYGEN SATURATION: 98 % | WEIGHT: 122 LBS | SYSTOLIC BLOOD PRESSURE: 132 MMHG | HEART RATE: 77 BPM | TEMPERATURE: 97.3 F

## 2021-03-16 DIAGNOSIS — I10 ESSENTIAL HYPERTENSION: ICD-10-CM

## 2021-03-16 DIAGNOSIS — M81.0 AGE-RELATED OSTEOPOROSIS WITHOUT CURRENT PATHOLOGICAL FRACTURE: ICD-10-CM

## 2021-03-16 DIAGNOSIS — E78.2 MIXED HYPERLIPIDEMIA: ICD-10-CM

## 2021-03-16 DIAGNOSIS — M48.061 SPINAL STENOSIS OF LUMBAR REGION WITHOUT NEUROGENIC CLAUDICATION: ICD-10-CM

## 2021-03-16 PROBLEM — K59.1 FUNCTIONAL DIARRHEA: Status: RESOLVED | Noted: 2021-03-08 | Resolved: 2021-03-16

## 2021-03-16 PROCEDURE — G8399 PT W/DXA RESULTS DOCUMENT: HCPCS | Performed by: INTERNAL MEDICINE

## 2021-03-16 PROCEDURE — 99214 OFFICE O/P EST MOD 30 MIN: CPT | Performed by: INTERNAL MEDICINE

## 2021-03-16 PROCEDURE — G8427 DOCREV CUR MEDS BY ELIG CLIN: HCPCS | Performed by: INTERNAL MEDICINE

## 2021-03-16 PROCEDURE — G8420 CALC BMI NORM PARAMETERS: HCPCS | Performed by: INTERNAL MEDICINE

## 2021-03-16 PROCEDURE — 1123F ACP DISCUSS/DSCN MKR DOCD: CPT | Performed by: INTERNAL MEDICINE

## 2021-03-16 PROCEDURE — 3052F HG A1C>EQUAL 8.0%<EQUAL 9.0%: CPT | Performed by: INTERNAL MEDICINE

## 2021-03-16 PROCEDURE — 1036F TOBACCO NON-USER: CPT | Performed by: INTERNAL MEDICINE

## 2021-03-16 PROCEDURE — 1090F PRES/ABSN URINE INCON ASSESS: CPT | Performed by: INTERNAL MEDICINE

## 2021-03-16 PROCEDURE — 4040F PNEUMOC VAC/ADMIN/RCVD: CPT | Performed by: INTERNAL MEDICINE

## 2021-03-16 PROCEDURE — G8484 FLU IMMUNIZE NO ADMIN: HCPCS | Performed by: INTERNAL MEDICINE

## 2021-03-16 RX ORDER — REPAGLINIDE 2 MG/1
2 TABLET ORAL
Qty: 90 TABLET | Refills: 3 | Status: SHIPPED | OUTPATIENT
Start: 2021-03-16 | End: 2021-06-01 | Stop reason: SDUPTHER

## 2021-03-16 ASSESSMENT — ENCOUNTER SYMPTOMS
COLOR CHANGE: 0
BLOOD IN STOOL: 0
ABDOMINAL DISTENTION: 0
SHORTNESS OF BREATH: 0
DIARRHEA: 0
TROUBLE SWALLOWING: 0
EYE DISCHARGE: 0
WHEEZING: 0
EYE PAIN: 0
COUGH: 0

## 2021-03-16 ASSESSMENT — PATIENT HEALTH QUESTIONNAIRE - PHQ9
SUM OF ALL RESPONSES TO PHQ9 QUESTIONS 1 & 2: 0
SUM OF ALL RESPONSES TO PHQ QUESTIONS 1-9: 0
1. LITTLE INTEREST OR PLEASURE IN DOING THINGS: 0

## 2021-03-16 NOTE — PROGRESS NOTES
Visit Information    Have you changed or started any medications since your last visit including any over-the-counter medicines, vitamins, or herbal medicines? no   Are you having any side effects from any of your medications? -  no  Have you stopped taking any of your medications? Is so, why? -  no    Have you seen any other physician or provider since your last visit? No  Have you had any other diagnostic tests since your last visit? No  Have you been seen in the emergency room and/or had an admission to a hospital since we last saw you? No  Have you had your routine dental cleaning in the past 6 months? no    Have you activated your [employer] account? If not, what are your barriers?  Yes     Patient Care Team:  Erin Crews MD as PCP - General (Internal Medicine)  Erin Crews MD as PCP - [facility] Provider  Mychelle Darryle Graft, DO (Obstetrics & Gynecology)    Medical History Review  Past Medical, Family, and Social History reviewed and does not contribute to the patient presenting condition    Health Maintenance   Topic Date Due    DTaP/Tdap/Td vaccine (1 - Tdap) 09/08/1963    Shingles Vaccine (2 of 2) 09/26/2018    Lipid screen  06/24/2021    Annual Wellness Visit (AWV)  11/20/2021    DEXA (modify frequency per FRAX score)  Completed    Flu vaccine  Completed    Pneumococcal 65+ years Vaccine  Completed    COVID-19 Vaccine  Completed    Hepatitis C screen  Completed    Hepatitis A vaccine  Aged Out    Hib vaccine  Aged Out    Meningococcal (ACWY) vaccine  Aged Out

## 2021-03-16 NOTE — PROGRESS NOTES
[address] [address]  Dept: [phone]  Dept Fax: [phone]    China Escobar is a 67 y.o. female who presents today for her medical conditions/complaintsas noted below. China Escobar is c/o of   Chief Complaint   Patient presents with    Diabetes     2/25/21 a1c was 8.7         HPI:     HTN  Onset more than 2 years ago  ernesto mild to mod  Controlled with current po meds  Not associated with headaches or blurry vision  No chest pain  Diabetes   Duration more than 7 years  Modifying factors on Glucophage and other med  Severity uncontrolled sever  Associated signs and symtoms neuropathy/ckd/ CAD. aggravated with sugar diet and better with low sugar diet           Hemoglobin A1C (%)   Date Value   02/25/2021 8.7   10/19/2020 8.6 (H)   06/24/2020 8.7 (H)             ( goal A1Cis < 7)   Microalb/Crt.  Ratio (mcg/mg creat)   Date Value   11/19/2019 CANNOT BE CALCULATED     LDL Cholesterol (mg/dL)   Date Value   06/24/2020 62   05/16/2019 71   02/12/2018 49       (goal LDL is <100)   AST (U/L)   Date Value   02/25/2021 17     ALT (U/L)   Date Value   02/25/2021 14     BUN (mg/dL)   Date Value   02/25/2021 12     BP Readings from Last 3 Encounters:   03/16/21 132/74   03/08/21 118/72   02/25/21 100/60          (goal 120/80)    Past Medical History:   Diagnosis Date    Anxiety state, unspecified     Arthritis     Cataracts, bilateral     Esophageal reflux     Generalized osteoarthrosis, unspecified site     Hearing loss     Hyperlipidemia     Hypertension     Neuropathy     Neuropathy due to medical condition ([address]     Pure hypercholesterolemia     Thoracic or lumbosacral neuritis or radiculitis, unspecified     Type 2 diabetes mellitus without complication ([address]     Urinary retention     UTI (urinary tract infection)       Past Surgical History:   Procedure Laterality Date    BACK SURGERY      X3    BREAST BIOPSY  2007    Lt     CARPAL TUNNEL RELEASE Right     CATARACT REMOVAL WITH IMPLANT Bilateral     COLONOSCOPY      COLONOSCOPY  4/24/15    diverticulosis    CYSTOSCOPY  [id number]    CYSTOSCOPY  2017    CYSTOSCOPY N/A 2017    CYSTOSCOPY URODYNAMICS AND DILATION  performed by Krishna Quiros MD at [address] [place] [address] HAND SURGERY Bilateral     thumbs reconstruction    HIP ARTHROPLASTY Bilateral     JOINT REPLACEMENT Bilateral     LUMBAR FUSION  2015       Family History   Problem Relation Age of Onset    Cancer Mother         lymphoma    Hypertension Mother     Hypertension Father     Hypertension Brother     Heart Disease Brother         pt states that brother had 5 bypass surgeries. Social History     Tobacco Use    Smoking status: Former Smoker     Packs/day: 2.00     Years: 20.00     Pack years: 40.00     Types: Cigarettes     Quit date: 1984     Years since quittin.2    Smokeless tobacco: Never Used   Substance Use Topics    Alcohol use: Yes     Alcohol/week: 0.0 standard drinks     Comment: social      Current Outpatient Medications   Medication Sig Dispense Refill    repaglinide (PRANDIN) 2 MG tablet Take 1 tablet by mouth 3 times daily (before meals) 90 tablet 3    glipiZIDE (GLUCOTROL) 10 MG tablet Take 2 tablets by mouth 2 times daily (before meals) 180 tablet 3    gabapentin (NEURONTIN) 300 MG capsule TAKE 1 CAPSULE BY MOUTH IN  THE MORNING, 1 CAPSULE IN  THE AFTERNOON, AND 2  CAPSULES AT BEDTIME 360 capsule 3    atorvastatin (LIPITOR) 40 MG tablet TAKE 1 TABLET BY MOUTH  DAILY 90 tablet 3    DULoxetine (CYMBALTA) 60 MG extended release capsule Take 1 capsule by mouth daily 90 capsule 1    ONETOUCH ULTRA strip USE ONE STRIP TO TEST FOUR TIMES A  strip 0    metoprolol tartrate (LOPRESSOR) 25 MG tablet TAKE 1 TABLET BY MOUTH TWO  TIMES DAILY 180 tablet 3    blood glucose monitor strips Test four  times a day & as needed for symptoms of irregular blood glucose.  Dispense sufficient amount for indicated testing frequency plus additional to accommodate PRN testing needs. 120 strip 5    ONE TOUCH ULTRASOFT LANCETS MISC 1 each by Does not apply route 2 times daily 100 each 3    Lancets MISC 1 each by Does not apply route daily 100 each 3    Calcium-Magnesium-Vitamin D (CALCIUM 1200+D3 PO) Take 2 tablets by mouth daily.  Glucosamine 500 MG CAPS Take 3 tablets by mouth daily.  acetaminophen (TYLENOL) 500 MG tablet Take 500 mg by mouth every 6 hours as needed for Pain.  aspirin 81 MG chewable tablet Take 81 mg by mouth daily.  lisinopril (PRINIVIL;ZESTRIL) 20 MG tablet Take 1 tablet by mouth 2 times daily 180 tablet 2    blood glucose monitor strips Patient checking blood sugar levels 4 times daily (Patient not taking: Reported on 11/19/2020) 200 strip 3    oxybutynin (DITROPAN) 5 MG tablet Take 1 tablet by mouth 2 times daily (Patient not taking: Reported on 11/19/2020) 180 tablet 3    ketotifen (ALAWAY) 0.025 % ophthalmic solution Apply 1 drop to eye      Blood Glucose Monitoring Suppl (ONE TOUCH ULTRA MINI) W/DEVICE KIT 1 kit by Does not apply route daily as needed (patient is checking blood sugar levels twice daily) (Patient not taking: Reported on 11/19/2020) 1 kit 0     No current facility-administered medications for this visit.       Allergies   Allergen Reactions    Acetaminophen     Hydrocodone        Health Maintenance   Topic Date Due    DTaP/Tdap/Td vaccine (1 - Tdap) 09/08/1963    Shingles Vaccine (2 of 2) 09/26/2018    Lipid screen  06/24/2021    Annual Wellness Visit (AWV)  11/20/2021    DEXA (modify frequency per FRAX score)  Completed    Flu vaccine  Completed    Pneumococcal 65+ years Vaccine  Completed    COVID-19 Vaccine  Completed    Hepatitis C screen  Completed    Hepatitis A vaccine  Aged Out    Hib vaccine  Aged Out    Meningococcal (ACWY) vaccine  Aged Out       Subjective:     Review of Systems   Constitutional: Negative for appetite change, diaphoresis and fatigue. HENT: Negative for ear discharge and trouble swallowing. Eyes: Negative for pain and discharge. Respiratory: Negative for cough, shortness of breath and wheezing. Cardiovascular: Negative for chest pain and palpitations. Gastrointestinal: Negative for abdominal distention, blood in stool and diarrhea. Endocrine: Negative for polydipsia and polyphagia. Genitourinary: Negative for difficulty urinating and frequency. Musculoskeletal: Positive for arthralgias. Negative for gait problem, myalgias and neck pain. Skin: Negative for color change and rash. Allergic/Immunologic: Negative for environmental allergies and food allergies. Neurological: Negative for dizziness and headaches. Hematological: Negative for adenopathy. Does not bruise/bleed easily. Psychiatric/Behavioral: Negative for behavioral problems and sleep disturbance. Objective:     Physical Exam  Constitutional:       Appearance: She is well-developed. She is not diaphoretic. HENT:      Head: Normocephalic and atraumatic. Eyes:      General:         Right eye: No discharge. Left eye: No discharge. Extraocular Movements:      Right eye: Normal extraocular motion. Left eye: Normal extraocular motion. Conjunctiva/sclera: Conjunctivae normal.      Right eye: Right conjunctiva is not injected. Left eye: Left conjunctiva is not injected. Neck:      Musculoskeletal: Normal range of motion and neck supple. No edema or erythema. Thyroid: No thyroid mass or thyromegaly. Vascular: No JVD. Cardiovascular:      Rate and Rhythm: Normal rate and regular rhythm. Heart sounds: No murmur. No friction rub. Pulmonary:      Effort: Pulmonary effort is normal. No tachypnea, bradypnea, accessory muscle usage or respiratory distress. Breath sounds: Normal breath sounds. No wheezing or rales.    Abdominal:      General: Bowel sounds are normal. There is no distension. Palpations: Abdomen is soft. Tenderness: There is no abdominal tenderness. There is no rebound. Musculoskeletal: Normal range of motion. General: No tenderness. Lymphadenopathy:      Head:      Right side of head: No submental or submandibular adenopathy. Left side of head: No submental or submandibular adenopathy. Cervical: No cervical adenopathy. Skin:     General: Skin is warm. Coloration: Skin is not pale. Findings: No bruising, ecchymosis or rash. Neurological:      Mental Status: She is alert and oriented to person, place, and time. Cranial Nerves: No cranial nerve deficit. Sensory: No sensory deficit. Motor: No atrophy or abnormal muscle tone. Coordination: Coordination normal.   Psychiatric:         Mood and Affect: Mood is not anxious. Affect is not angry. Speech: Speech is not slurred. Behavior: Behavior normal. Behavior is not aggressive. Thought Content: Thought content does not include homicidal ideation. Cognition and Memory: Memory is not impaired. /74   Pulse 77   Temp 97.3 °F (36.3 °C)   Ht 5' 5\" (1.651 m)   Wt 122 lb (55.3 kg)   SpO2 98%   BMI 20.30 kg/m²     Assessment:       Diagnosis Orders   1. DM type 2, uncontrolled, with neuropathy ([address]     2. Age-related osteoporosis without current pathological fracture     3. Spinal stenosis of lumbar region without neurogenic claudication     4. Mixed hyperlipidemia     5. Essential hypertension               Plan:      Return in about 1 month (around 4/16/2021). No orders of the defined types were placed in this encounter.     Orders Placed This Encounter   Medications    repaglinide (PRANDIN) 2 MG tablet     Sig: Take 1 tablet by mouth 3 times daily (before meals)     Dispense:  90 tablet     Refill:  3    uncontroled dm  dont want to go on insulin  Pt had a list of meds whcich sherlyn be covered  Will start above  Ok to start metformin   diarhrea resolved  Pt thinks it may be dueto semaglutide  Will see back kd month     Patient given educational materials - see patient instructions. Discussed use, benefit, and side effects of prescribed medications. All patientquestions answered. Pt voiced understanding. Reviewed health maintenance. Instructedto continue current medications, diet and exercise. Patient agreed with treatmentplan. Follow up as directed.      Electronically signed by Crystal Kan MD on 3/16/2021 at 2:03 PM

## 2021-03-19 NOTE — TELEPHONE ENCOUNTER
Patient calling for refill of Cymbalta.       Medication active on med list yes      Date of last fill: 9/16/20  verified on 3/19/2021   verified by [facility] LPN      Date of last appointment 1/8/20    Next Visit Date:  4/5/2021

## 2021-03-21 RX ORDER — DULOXETIN HYDROCHLORIDE 60 MG/1
60 CAPSULE, DELAYED RELEASE ORAL DAILY
Qty: 90 CAPSULE | Refills: 0 | Status: SHIPPED | OUTPATIENT
Start: 2021-03-21 | End: 2021-08-05

## 2021-03-30 ENCOUNTER — OFFICE VISIT (OUTPATIENT)
Dept: INTERNAL MEDICINE CLINIC | Age: 76
End: 2021-03-30
Payer: MEDICARE

## 2021-03-30 VITALS
BODY MASS INDEX: 20.33 KG/M2 | DIASTOLIC BLOOD PRESSURE: 68 MMHG | WEIGHT: 122 LBS | OXYGEN SATURATION: 96 % | SYSTOLIC BLOOD PRESSURE: 120 MMHG | HEART RATE: 84 BPM | TEMPERATURE: 97.9 F | HEIGHT: 65 IN

## 2021-03-30 DIAGNOSIS — E78.2 MIXED HYPERLIPIDEMIA: ICD-10-CM

## 2021-03-30 DIAGNOSIS — R14.0 BLOATING: ICD-10-CM

## 2021-03-30 DIAGNOSIS — N32.81 OVERACTIVE BLADDER: Chronic | ICD-10-CM

## 2021-03-30 DIAGNOSIS — I10 ESSENTIAL HYPERTENSION: ICD-10-CM

## 2021-03-30 PROCEDURE — 3052F HG A1C>EQUAL 8.0%<EQUAL 9.0%: CPT | Performed by: INTERNAL MEDICINE

## 2021-03-30 PROCEDURE — 99214 OFFICE O/P EST MOD 30 MIN: CPT | Performed by: INTERNAL MEDICINE

## 2021-03-30 PROCEDURE — 1090F PRES/ABSN URINE INCON ASSESS: CPT | Performed by: INTERNAL MEDICINE

## 2021-03-30 PROCEDURE — G8420 CALC BMI NORM PARAMETERS: HCPCS | Performed by: INTERNAL MEDICINE

## 2021-03-30 PROCEDURE — G8427 DOCREV CUR MEDS BY ELIG CLIN: HCPCS | Performed by: INTERNAL MEDICINE

## 2021-03-30 PROCEDURE — 4040F PNEUMOC VAC/ADMIN/RCVD: CPT | Performed by: INTERNAL MEDICINE

## 2021-03-30 PROCEDURE — G8399 PT W/DXA RESULTS DOCUMENT: HCPCS | Performed by: INTERNAL MEDICINE

## 2021-03-30 PROCEDURE — G8484 FLU IMMUNIZE NO ADMIN: HCPCS | Performed by: INTERNAL MEDICINE

## 2021-03-30 PROCEDURE — 1036F TOBACCO NON-USER: CPT | Performed by: INTERNAL MEDICINE

## 2021-03-30 PROCEDURE — 1123F ACP DISCUSS/DSCN MKR DOCD: CPT | Performed by: INTERNAL MEDICINE

## 2021-03-30 RX ORDER — METRONIDAZOLE 500 MG/1
500 TABLET ORAL 3 TIMES DAILY
Qty: 30 TABLET | Refills: 0 | Status: SHIPPED | OUTPATIENT
Start: 2021-03-30 | End: 2021-04-09

## 2021-03-30 RX ORDER — GLIPIZIDE 10 MG/1
20 TABLET ORAL
Qty: 180 TABLET | Refills: 3 | Status: SHIPPED | OUTPATIENT
Start: 2021-03-30 | End: 2021-06-01 | Stop reason: SDUPTHER

## 2021-03-30 ASSESSMENT — ENCOUNTER SYMPTOMS
ABDOMINAL DISTENTION: 0
EYE PAIN: 0
EYE DISCHARGE: 0
SHORTNESS OF BREATH: 0
COLOR CHANGE: 0
TROUBLE SWALLOWING: 0
BLOOD IN STOOL: 0
COUGH: 0
WHEEZING: 0
DIARRHEA: 0

## 2021-03-30 NOTE — PROGRESS NOTES
[address] [address]  Dept: [phone]  Dept Fax: [phone]    Dexter Solis is a 67 y.o. female who presents today for her medical conditions/complaintsas noted below. Dexter Solis is c/o of   Chief Complaint   Patient presents with    Diabetes     2/25/21 a1c 8.7    Mass     on abdomen          HPI:     HTN  Onset more than 2 years ago  ernesto mild to mod  Controlled with current po meds  Not associated with headaches or blurry vision  No chest pain  Diabetes   Duration more than 7 years  Modifying factors on Glucophage and other med  Severity uncontrolled sever  Associated signs and symtoms neuropathy/ckd/ CAD. aggravated with sugar diet and better with low sugar diet     Bloating        Hemoglobin A1C (%)   Date Value   02/25/2021 8.7   10/19/2020 8.6 (H)   06/24/2020 8.7 (H)             ( goal A1Cis < 7)   Microalb/Crt.  Ratio (mcg/mg creat)   Date Value   11/19/2019 CANNOT BE CALCULATED     LDL Cholesterol (mg/dL)   Date Value   06/24/2020 62   05/16/2019 71   02/12/2018 49       (goal LDL is <100)   AST (U/L)   Date Value   02/25/2021 17     ALT (U/L)   Date Value   02/25/2021 14     BUN (mg/dL)   Date Value   02/25/2021 12     BP Readings from Last 3 Encounters:   03/30/21 120/68   03/16/21 132/74   03/08/21 118/72          (goal 120/80)    Past Medical History:   Diagnosis Date    Anxiety state, unspecified     Arthritis     Cataracts, bilateral     Esophageal reflux     Generalized osteoarthrosis, unspecified site     Hearing loss     Hyperlipidemia     Hypertension     Neuropathy     Neuropathy due to medical condition ([address]     Pure hypercholesterolemia     Thoracic or lumbosacral neuritis or radiculitis, unspecified     Type 2 diabetes mellitus without complication ([address]     Urinary retention     UTI (urinary tract infection)       Past Surgical History:   Procedure Laterality Date    BACK SURGERY      X3    BREAST BIOPSY  2007    Lt     CARPAL TUNNEL RELEASE Right     CATARACT REMOVAL WITH IMPLANT Bilateral     COLONOSCOPY  2006    COLONOSCOPY  4/24/15    diverticulosis    CYSTOSCOPY  [id number]    CYSTOSCOPY  2017    CYSTOSCOPY N/A 2017    CYSTOSCOPY URODYNAMICS AND DILATION  performed by Paula Tolbert MD at [address] [place] [address] HAND SURGERY Bilateral     thumbs reconstruction    HIP ARTHROPLASTY Bilateral     JOINT REPLACEMENT Bilateral     LUMBAR FUSION  2015       Family History   Problem Relation Age of Onset    Cancer Mother         lymphoma    Hypertension Mother     Hypertension Father     Hypertension Brother     Heart Disease Brother         pt states that brother had 5 bypass surgeries. Social History     Tobacco Use    Smoking status: Former Smoker     Packs/day: 2.00     Years: 20.00     Pack years: 40.00     Types: Cigarettes     Quit date: 1984     Years since quittin.2    Smokeless tobacco: Never Used   Substance Use Topics    Alcohol use:  Yes     Alcohol/week: 0.0 standard drinks     Comment: social      Current Outpatient Medications   Medication Sig Dispense Refill    glipiZIDE (GLUCOTROL) 10 MG tablet Take 2 tablets by mouth 2 times daily (before meals) 180 tablet 3    metroNIDAZOLE (FLAGYL) 500 MG tablet Take 1 tablet by mouth 3 times daily for 10 days 30 tablet 0    DULoxetine (CYMBALTA) 60 MG extended release capsule Take 1 capsule by mouth daily 90 capsule 0    repaglinide (PRANDIN) 2 MG tablet Take 1 tablet by mouth 3 times daily (before meals) 90 tablet 3    gabapentin (NEURONTIN) 300 MG capsule TAKE 1 CAPSULE BY MOUTH IN  THE MORNING, 1 CAPSULE IN  THE AFTERNOON, AND 2  CAPSULES AT BEDTIME 360 capsule 3    atorvastatin (LIPITOR) 40 MG tablet TAKE 1 TABLET BY MOUTH  DAILY 90 tablet 3    ONETOUCH ULTRA strip USE ONE STRIP TO TEST FOUR TIMES A  strip 0    metoprolol tartrate (LOPRESSOR) 25 MG tablet TAKE 1 TABLET BY MOUTH TWO  TIMES DAILY 180 tablet 3    blood glucose monitor strips Test four  times a day & as needed for symptoms of irregular blood glucose. Dispense sufficient amount for indicated testing frequency plus additional to accommodate PRN testing needs. 120 strip 5    ketotifen (ALAWAY) 0.025 % ophthalmic solution Apply 1 drop to eye      ONE TOUCH ULTRASOFT LANCETS MISC 1 each by Does not apply route 2 times daily 100 each 3    Lancets MISC 1 each by Does not apply route daily 100 each 3    Calcium-Magnesium-Vitamin D (CALCIUM 1200+D3 PO) Take 2 tablets by mouth daily.  Glucosamine 500 MG CAPS Take 3 tablets by mouth daily.  acetaminophen (TYLENOL) 500 MG tablet Take 500 mg by mouth every 6 hours as needed for Pain.  aspirin 81 MG chewable tablet Take 81 mg by mouth daily.  lisinopril (PRINIVIL;ZESTRIL) 20 MG tablet Take 1 tablet by mouth 2 times daily 180 tablet 2    blood glucose monitor strips Patient checking blood sugar levels 4 times daily (Patient not taking: Reported on 11/19/2020) 200 strip 3    oxybutynin (DITROPAN) 5 MG tablet Take 1 tablet by mouth 2 times daily (Patient not taking: Reported on 11/19/2020) 180 tablet 3    Blood Glucose Monitoring Suppl (ONE TOUCH ULTRA MINI) W/DEVICE KIT 1 kit by Does not apply route daily as needed (patient is checking blood sugar levels twice daily) (Patient not taking: Reported on 11/19/2020) 1 kit 0     No current facility-administered medications for this visit.       Allergies   Allergen Reactions    Acetaminophen     Hydrocodone        Health Maintenance   Topic Date Due    DTaP/Tdap/Td vaccine (1 - Tdap) 09/08/1963    Shingles Vaccine (2 of 2) 09/26/2018    Lipid screen  06/24/2021    Annual Wellness Visit (AWV)  11/20/2021    DEXA (modify frequency per FRAX score)  Completed    Flu vaccine  Completed    Pneumococcal 65+ years Vaccine  Completed    COVID-19 Vaccine  Completed    Hepatitis C screen  Completed    Hepatitis A vaccine  Aged Out    Hib vaccine  Aged Out    Meningococcal (ACWY) vaccine  Aged Out       Subjective:     Review of Systems   Constitutional: Negative for appetite change, diaphoresis and fatigue. HENT: Negative for ear discharge and trouble swallowing. Eyes: Negative for pain and discharge. Respiratory: Negative for cough, shortness of breath and wheezing. Cardiovascular: Negative for chest pain and palpitations. Gastrointestinal: Negative for abdominal distention, blood in stool and diarrhea. Bloating     Endocrine: Negative for polydipsia and polyphagia. Genitourinary: Negative for difficulty urinating and frequency. Musculoskeletal: Negative for gait problem, myalgias and neck pain. Skin: Negative for color change and rash. Allergic/Immunologic: Negative for environmental allergies and food allergies. Neurological: Negative for dizziness and headaches. Hematological: Negative for adenopathy. Does not bruise/bleed easily. Psychiatric/Behavioral: Negative for behavioral problems and sleep disturbance. Objective:     Physical Exam  Constitutional:       Appearance: She is well-developed. She is not diaphoretic. HENT:      Head: Normocephalic and atraumatic. Eyes:      General:         Right eye: No discharge. Left eye: No discharge. Extraocular Movements:      Right eye: Normal extraocular motion. Left eye: Normal extraocular motion. Conjunctiva/sclera: Conjunctivae normal.      Right eye: Right conjunctiva is not injected. Left eye: Left conjunctiva is not injected. Neck:      Musculoskeletal: Normal range of motion and neck supple. No edema or erythema. Thyroid: No thyroid mass or thyromegaly. Vascular: No JVD. Cardiovascular:      Rate and Rhythm: Normal rate and regular rhythm. Heart sounds: No murmur. No friction rub. Pulmonary:      Effort: Pulmonary effort is normal. No tachypnea, bradypnea, accessory muscle usage or respiratory distress. Breath sounds: Normal breath sounds. No wheezing or rales. Abdominal:      General: Bowel sounds are normal. There is no distension. Palpations: Abdomen is soft. Tenderness: There is no abdominal tenderness. There is no rebound. Comments: constipatoin  No mass noted     Musculoskeletal: Normal range of motion. General: No tenderness. Lymphadenopathy:      Head:      Right side of head: No submental or submandibular adenopathy. Left side of head: No submental or submandibular adenopathy. Cervical: No cervical adenopathy. Skin:     General: Skin is warm. Coloration: Skin is not pale. Findings: No bruising, ecchymosis or rash. Neurological:      Mental Status: She is alert and oriented to person, place, and time. Cranial Nerves: No cranial nerve deficit. Sensory: No sensory deficit. Motor: No atrophy or abnormal muscle tone. Coordination: Coordination normal.   Psychiatric:         Mood and Affect: Mood is not anxious. Affect is not angry. Speech: Speech is not slurred. Behavior: Behavior normal. Behavior is not aggressive. Thought Content: Thought content does not include homicidal ideation. Cognition and Memory: Memory is not impaired. /68   Pulse 84   Temp 97.9 °F (36.6 °C)   Ht 5' 5\" (1.651 m)   Wt 122 lb (55.3 kg)   SpO2 96%   BMI 20.30 kg/m²     Assessment:       Diagnosis Orders   1. DM type 2, uncontrolled, with neuropathy ([facility]     2. Mixed hyperlipidemia     3. Essential hypertension     4. Overactive bladder     5. Bloating  US GALLBLADDER RUQ             Plan:      Return in about 1 month (around 4/30/2021).     Orders Placed This Encounter   Procedures    US GALLBLADDER RUQ     Standing Status:   Future     Standing Expiration Date:   3/30/2022     Orders Placed This Encounter   Medications    glipiZIDE (GLUCOTROL) 10 MG tablet     Sig: Take 2 tablets by mouth 2 times daily (before meals)     Dispense:  180 tablet     Refill:  3    metroNIDAZOLE (FLAGYL) 500 MG tablet     Sig: Take 1 tablet by mouth 3 times daily for 10 days     Dispense:  30 tablet     Refill:  0    pt on ripaglnie full dose  And full dose glucotrol  Pt not wlling go on insulin  Advised on diet and life style changes    Bloating  Gas  Had diarrhea not now  sherlyn do us gallbladder   May need hida         Patient given educational materials - see patient instructions. Discussed use, benefit, and side effects of prescribed medications. All patientquestions answered. Pt voiced understanding. Reviewed health maintenance. Instructedto continue current medications, diet and exercise. Patient agreed with treatmentplan. Follow up as directed.      Electronically signed by Madelyn Colbert MD on 3/30/2021 at 4:30 PM

## 2021-04-09 DIAGNOSIS — R80.9 MICROALBUMINURIA: ICD-10-CM

## 2021-04-12 ENCOUNTER — TELEPHONE (OUTPATIENT)
Dept: INTERNAL MEDICINE CLINIC | Age: 76
End: 2021-04-12

## 2021-04-13 ENCOUNTER — HOSPITAL ENCOUNTER (OUTPATIENT)
Dept: ULTRASOUND IMAGING | Age: 76
Discharge: HOME | End: 2021-04-15
Payer: MEDICARE

## 2021-04-13 DIAGNOSIS — R14.0 BLOATING: ICD-10-CM

## 2021-04-13 PROCEDURE — 76705 ECHO EXAM OF ABDOMEN: CPT

## 2021-04-13 RX ORDER — LISINOPRIL 20 MG/1
TABLET ORAL
Qty: 180 TABLET | Refills: 3 | Status: SHIPPED | OUTPATIENT
Start: 2021-04-13 | End: 2022-05-25

## 2021-04-14 DIAGNOSIS — R93.2 ABNORMAL ULTRASOUND OF LIVER: Primary | ICD-10-CM

## 2021-04-27 ENCOUNTER — HOSPITAL ENCOUNTER (OUTPATIENT)
Age: 76
Setting detail: SPECIMEN
Discharge: HOME | End: 2021-04-27
Payer: MEDICARE

## 2021-04-27 ENCOUNTER — HOSPITAL ENCOUNTER (OUTPATIENT)
Dept: MRI IMAGING | Facility: CLINIC | Age: 76
Discharge: HOME | End: 2021-04-29
Payer: MEDICARE

## 2021-04-27 DIAGNOSIS — R93.2 ABNORMAL ULTRASOUND OF LIVER: ICD-10-CM

## 2021-04-27 LAB — POC CREATININE: 0.6 MG/DL (ref 0.6–1.4)

## 2021-04-27 PROCEDURE — 6360000004 HC RX CONTRAST MEDICATION: Performed by: INTERNAL MEDICINE

## 2021-04-27 PROCEDURE — 74183 MRI ABD W/O CNTR FLWD CNTR: CPT

## 2021-04-27 PROCEDURE — 2580000003 HC RX 258: Performed by: INTERNAL MEDICINE

## 2021-04-27 PROCEDURE — A9579 GAD-BASE MR CONTRAST NOS,1ML: HCPCS | Performed by: INTERNAL MEDICINE

## 2021-04-27 RX ORDER — SODIUM CHLORIDE 0.9 % (FLUSH) 0.9 %
10 SYRINGE (ML) INJECTION PRN
Status: DISCONTINUED | OUTPATIENT
Start: 2021-04-27 | End: 2021-04-30 | Stop reason: HOSPADM

## 2021-04-27 RX ADMIN — GADOTERIDOL 12 ML: 279.3 INJECTION, SOLUTION INTRAVENOUS at 12:09

## 2021-04-27 RX ADMIN — Medication 10 ML: at 12:10

## 2021-04-29 ENCOUNTER — OFFICE VISIT (OUTPATIENT)
Dept: INTERNAL MEDICINE CLINIC | Age: 76
End: 2021-04-29
Payer: MEDICARE

## 2021-04-29 VITALS
WEIGHT: 120 LBS | TEMPERATURE: 97.6 F | SYSTOLIC BLOOD PRESSURE: 106 MMHG | BODY MASS INDEX: 19.99 KG/M2 | HEART RATE: 73 BPM | HEIGHT: 65 IN | DIASTOLIC BLOOD PRESSURE: 68 MMHG | OXYGEN SATURATION: 97 %

## 2021-04-29 DIAGNOSIS — R10.13 DYSPEPSIA: Primary | ICD-10-CM

## 2021-04-29 DIAGNOSIS — E78.2 MIXED HYPERLIPIDEMIA: ICD-10-CM

## 2021-04-29 DIAGNOSIS — I10 ESSENTIAL HYPERTENSION: ICD-10-CM

## 2021-04-29 PROCEDURE — G8420 CALC BMI NORM PARAMETERS: HCPCS | Performed by: INTERNAL MEDICINE

## 2021-04-29 PROCEDURE — 99214 OFFICE O/P EST MOD 30 MIN: CPT | Performed by: INTERNAL MEDICINE

## 2021-04-29 PROCEDURE — 1090F PRES/ABSN URINE INCON ASSESS: CPT | Performed by: INTERNAL MEDICINE

## 2021-04-29 PROCEDURE — 4040F PNEUMOC VAC/ADMIN/RCVD: CPT | Performed by: INTERNAL MEDICINE

## 2021-04-29 PROCEDURE — 1036F TOBACCO NON-USER: CPT | Performed by: INTERNAL MEDICINE

## 2021-04-29 PROCEDURE — G8399 PT W/DXA RESULTS DOCUMENT: HCPCS | Performed by: INTERNAL MEDICINE

## 2021-04-29 PROCEDURE — 3052F HG A1C>EQUAL 8.0%<EQUAL 9.0%: CPT | Performed by: INTERNAL MEDICINE

## 2021-04-29 PROCEDURE — G8427 DOCREV CUR MEDS BY ELIG CLIN: HCPCS | Performed by: INTERNAL MEDICINE

## 2021-04-29 PROCEDURE — 1123F ACP DISCUSS/DSCN MKR DOCD: CPT | Performed by: INTERNAL MEDICINE

## 2021-04-29 ASSESSMENT — ENCOUNTER SYMPTOMS
TROUBLE SWALLOWING: 0
EYE PAIN: 0
SHORTNESS OF BREATH: 0
COUGH: 0
COLOR CHANGE: 0
DIARRHEA: 0
WHEEZING: 0
EYE DISCHARGE: 0
BLOOD IN STOOL: 0
ABDOMINAL DISTENTION: 0

## 2021-04-29 ASSESSMENT — PATIENT HEALTH QUESTIONNAIRE - PHQ9
SUM OF ALL RESPONSES TO PHQ QUESTIONS 1-9: 0
2. FEELING DOWN, DEPRESSED OR HOPELESS: 0
SUM OF ALL RESPONSES TO PHQ QUESTIONS 1-9: 0

## 2021-04-29 NOTE — PROGRESS NOTES
BIOPSY  2007    Lt     CARPAL TUNNEL RELEASE Right     CATARACT REMOVAL WITH IMPLANT Bilateral     COLONOSCOPY  2006    COLONOSCOPY  4/24/15    diverticulosis    CYSTOSCOPY  [id number]    CYSTOSCOPY  2017    CYSTOSCOPY N/A 2017    CYSTOSCOPY URODYNAMICS AND DILATION  performed by Riky Srivastava MD at [facility] HAND SURGERY Bilateral     thumbs reconstruction    HIP ARTHROPLASTY Bilateral     JOINT REPLACEMENT Bilateral     LUMBAR FUSION  2015       Family History   Problem Relation Age of Onset    Cancer Mother         lymphoma    Hypertension Mother     Hypertension Father     Hypertension Brother     Heart Disease Brother         pt states that brother had 5 bypass surgeries. Social History     Tobacco Use    Smoking status: Former Smoker     Packs/day: 2.00     Years: 20.00     Pack years: 40.00     Types: Cigarettes     Quit date: 1984     Years since quittin.3    Smokeless tobacco: Never Used   Substance Use Topics    Alcohol use:  Yes     Alcohol/week: 0.0 standard drinks     Comment: social      Current Outpatient Medications   Medication Sig Dispense Refill    metFORMIN (GLUCOPHAGE) 1000 MG tablet       lisinopril (PRINIVIL;ZESTRIL) 20 MG tablet TAKE 1 TABLET BY MOUTH  TWICE DAILY 180 tablet 3    glipiZIDE (GLUCOTROL) 10 MG tablet Take 2 tablets by mouth 2 times daily (before meals) 180 tablet 3    DULoxetine (CYMBALTA) 60 MG extended release capsule Take 1 capsule by mouth daily 90 capsule 0    repaglinide (PRANDIN) 2 MG tablet Take 1 tablet by mouth 3 times daily (before meals) 90 tablet 3    gabapentin (NEURONTIN) 300 MG capsule TAKE 1 CAPSULE BY MOUTH IN  THE MORNING, 1 CAPSULE IN  THE AFTERNOON, AND 2  CAPSULES AT BEDTIME 360 capsule 3    atorvastatin (LIPITOR) 40 MG tablet TAKE 1 TABLET BY MOUTH  DAILY 90 tablet 3    ONETOUCH ULTRA strip USE ONE STRIP TO TEST FOUR TIMES A  strip 0    metoprolol tartrate (LOPRESSOR) 25 MG tablet TAKE for abdominal distention, blood in stool and diarrhea. Endocrine: Negative for polydipsia and polyphagia. Genitourinary: Negative for difficulty urinating and frequency. Musculoskeletal: Positive for arthralgias. Negative for gait problem, myalgias and neck pain. Skin: Negative for color change and rash. Allergic/Immunologic: Negative for environmental allergies and food allergies. Neurological: Negative for dizziness and headaches. Hematological: Negative for adenopathy. Does not bruise/bleed easily. Psychiatric/Behavioral: Negative for behavioral problems and sleep disturbance. Objective:     Physical Exam  Constitutional:       Appearance: She is well-developed. She is not diaphoretic. HENT:      Head: Normocephalic and atraumatic. Eyes:      General:         Right eye: No discharge. Left eye: No discharge. Extraocular Movements:      Right eye: Normal extraocular motion. Left eye: Normal extraocular motion. Conjunctiva/sclera: Conjunctivae normal.      Right eye: Right conjunctiva is not injected. Left eye: Left conjunctiva is not injected. Neck:      Musculoskeletal: Normal range of motion and neck supple. No edema or erythema. Thyroid: No thyroid mass or thyromegaly. Vascular: No JVD. Cardiovascular:      Rate and Rhythm: Normal rate and regular rhythm. Heart sounds: No murmur. No friction rub. Pulmonary:      Effort: Pulmonary effort is normal. No tachypnea, bradypnea, accessory muscle usage or respiratory distress. Breath sounds: Normal breath sounds. No wheezing or rales. Abdominal:      General: Bowel sounds are normal. There is no distension. Palpations: Abdomen is soft. Tenderness: There is no abdominal tenderness. There is no rebound. Musculoskeletal: Normal range of motion. General: No tenderness. Lymphadenopathy:      Head:      Right side of head: No submental or submandibular adenopathy.       Left side of head: No submental or submandibular adenopathy. Cervical: No cervical adenopathy. Skin:     General: Skin is warm. Coloration: Skin is not pale. Findings: No bruising, ecchymosis or rash. Neurological:      Mental Status: She is alert and oriented to person, place, and time. Cranial Nerves: No cranial nerve deficit. Sensory: No sensory deficit. Motor: No atrophy or abnormal muscle tone. Coordination: Coordination normal.   Psychiatric:         Mood and Affect: Mood is not anxious. Affect is not angry. Speech: Speech is not slurred. Behavior: Behavior normal. Behavior is not aggressive. Thought Content: Thought content does not include homicidal ideation. Cognition and Memory: Memory is not impaired. /68   Pulse 73   Temp 97.6 °F (36.4 °C)   Ht 5' 5\" (1.651 m)   Wt 120 lb (54.4 kg)   SpO2 97%   BMI 19.97 kg/m²     Assessment:       Diagnosis Orders   1. Dyspepsia  Noel Sweet MD, Gastroenterology, [place]   2. DM type 2, uncontrolled, with neuropathy (HCC)  Urinalysis    TSH With Reflex Ft4   3. Mixed hyperlipidemia     4. Essential hypertension  TSH With Reflex Ft4             Plan:      Return in about 1 month (around 5/29/2021).     Orders Placed This Encounter   Procedures    NM HEPATOBILIARY     Standing Status:   Future     Standing Expiration Date:   4/29/2022    Urinalysis     Standing Status:   Future     Standing Expiration Date:   4/29/2022    TSH With Reflex Ft4     Standing Status:   Future     Standing Expiration Date:   4/29/2022   Jonathon Calloway MD, Gastroenterology, [place]     Referral Priority:   Routine     Referral Type:   Eval and Treat     Referral Reason:   Specialty Services Required     Referred to Provider:   Heather Merida MD     Requested Specialty:   Gastroenterology     Number of Visits Requested:   1     No orders of the defined types were placed in this encounter. dyspnea and wt loss  Ct abdo negaive  Mri abdo negative  cxr negative  Sed rate negative  Recheck tsh  Will do hisa for dyspepsia  And ref to gi  May need egd  Wt loss unintentional  Workup negative inculidng sed rate  On glipizide 20 bid  And prandin 2 tid  Also meformin  1000 ,gm, bid     Patient given educational materials - see patient instructions. Discussed use, benefit, and side effects of prescribed medications. All patientquestions answered. Pt voiced understanding. Reviewed health maintenance. Instructedto continue current medications, diet and exercise. Patient agreed with treatmentplan. Follow up as directed.      Electronically signed by Luis A Dumont MD on 4/29/2021 at 2:14 PM

## 2021-05-07 ENCOUNTER — HOSPITAL ENCOUNTER (OUTPATIENT)
Dept: NUCLEAR MEDICINE | Age: 76
Discharge: HOME | End: 2021-05-09
Payer: MEDICARE

## 2021-05-07 DIAGNOSIS — R10.13 DYSPEPSIA: ICD-10-CM

## 2021-05-07 PROCEDURE — 2580000003 HC RX 258: Performed by: INTERNAL MEDICINE

## 2021-05-07 PROCEDURE — 3430000000 HC RX DIAGNOSTIC RADIOPHARMACEUTICAL: Performed by: INTERNAL MEDICINE

## 2021-05-07 PROCEDURE — 78226 HEPATOBILIARY SYSTEM IMAGING: CPT

## 2021-05-07 PROCEDURE — A9537 TC99M MEBROFENIN: HCPCS | Performed by: INTERNAL MEDICINE

## 2021-05-07 RX ORDER — SODIUM CHLORIDE 0.9 % (FLUSH) 0.9 %
10 SYRINGE (ML) INJECTION PRN
Status: DISCONTINUED | OUTPATIENT
Start: 2021-05-07 | End: 2021-05-10 | Stop reason: HOSPADM

## 2021-05-07 RX ADMIN — SODIUM CHLORIDE, PRESERVATIVE FREE 10 ML: 5 INJECTION INTRAVENOUS at 11:51

## 2021-05-07 RX ADMIN — SODIUM CHLORIDE, PRESERVATIVE FREE 10 ML: 5 INJECTION INTRAVENOUS at 11:49

## 2021-05-07 RX ADMIN — Medication 5.2 MILLICURIE: at 11:49

## 2021-06-01 ENCOUNTER — OFFICE VISIT (OUTPATIENT)
Dept: INTERNAL MEDICINE CLINIC | Age: 76
End: 2021-06-01
Payer: MEDICARE

## 2021-06-01 ENCOUNTER — TELEPHONE (OUTPATIENT)
Dept: INTERNAL MEDICINE CLINIC | Age: 76
End: 2021-06-01

## 2021-06-01 VITALS
WEIGHT: 121 LBS | DIASTOLIC BLOOD PRESSURE: 70 MMHG | SYSTOLIC BLOOD PRESSURE: 136 MMHG | HEIGHT: 65 IN | OXYGEN SATURATION: 97 % | BODY MASS INDEX: 20.16 KG/M2 | HEART RATE: 83 BPM

## 2021-06-01 DIAGNOSIS — J43.1 PANLOBULAR EMPHYSEMA (HCC): ICD-10-CM

## 2021-06-01 DIAGNOSIS — E78.2 MIXED HYPERLIPIDEMIA: ICD-10-CM

## 2021-06-01 LAB — HBA1C MFR BLD: 9.4 %

## 2021-06-01 PROCEDURE — 1036F TOBACCO NON-USER: CPT | Performed by: INTERNAL MEDICINE

## 2021-06-01 PROCEDURE — G8399 PT W/DXA RESULTS DOCUMENT: HCPCS | Performed by: INTERNAL MEDICINE

## 2021-06-01 PROCEDURE — G8926 SPIRO NO PERF OR DOC: HCPCS | Performed by: INTERNAL MEDICINE

## 2021-06-01 PROCEDURE — 83036 HEMOGLOBIN GLYCOSYLATED A1C: CPT | Performed by: INTERNAL MEDICINE

## 2021-06-01 PROCEDURE — 1090F PRES/ABSN URINE INCON ASSESS: CPT | Performed by: INTERNAL MEDICINE

## 2021-06-01 PROCEDURE — 4040F PNEUMOC VAC/ADMIN/RCVD: CPT | Performed by: INTERNAL MEDICINE

## 2021-06-01 PROCEDURE — G8420 CALC BMI NORM PARAMETERS: HCPCS | Performed by: INTERNAL MEDICINE

## 2021-06-01 PROCEDURE — 1123F ACP DISCUSS/DSCN MKR DOCD: CPT | Performed by: INTERNAL MEDICINE

## 2021-06-01 PROCEDURE — 3023F SPIROM DOC REV: CPT | Performed by: INTERNAL MEDICINE

## 2021-06-01 PROCEDURE — 99214 OFFICE O/P EST MOD 30 MIN: CPT | Performed by: INTERNAL MEDICINE

## 2021-06-01 PROCEDURE — G8427 DOCREV CUR MEDS BY ELIG CLIN: HCPCS | Performed by: INTERNAL MEDICINE

## 2021-06-01 RX ORDER — GLIPIZIDE 10 MG/1
20 TABLET ORAL
Qty: 180 TABLET | Refills: 3 | Status: SHIPPED | OUTPATIENT
Start: 2021-06-01 | End: 2021-06-04 | Stop reason: SDUPTHER

## 2021-06-01 RX ORDER — REPAGLINIDE 2 MG/1
2 TABLET ORAL
Qty: 90 TABLET | Refills: 3 | Status: SHIPPED | OUTPATIENT
Start: 2021-06-01 | End: 2021-06-04 | Stop reason: SDUPTHER

## 2021-06-01 RX ORDER — ALBUTEROL SULFATE 90 UG/1
2 AEROSOL, METERED RESPIRATORY (INHALATION) EVERY 4 HOURS PRN
Qty: 1 INHALER | Refills: 0 | Status: SHIPPED | OUTPATIENT
Start: 2021-06-01 | End: 2022-07-26

## 2021-06-01 ASSESSMENT — ENCOUNTER SYMPTOMS
COUGH: 0
BLOOD IN STOOL: 0
ABDOMINAL DISTENTION: 0
EYE DISCHARGE: 0
COLOR CHANGE: 0
SHORTNESS OF BREATH: 1
WHEEZING: 0
TROUBLE SWALLOWING: 0
EYE PAIN: 0
DIARRHEA: 0

## 2021-06-01 NOTE — PROGRESS NOTES
[address] [address]  Dept: [phone]  Dept Fax: [phone]    Kate France is a 67 y.o. female who presents today for her medical conditions/complaintsas noted below. Kate France is c/o of   Chief Complaint   Patient presents with    Diabetes         HPI:     HTN  Onset more than 2 years ago  ernesto mild to mod  Controlled with current po meds  Not associated with headaches or blurry vision  No chest pain  Diabetes   Duration more than 7 years  Modifying factors on Glucophage and other med  Severity uncontrolled sever  Associated signs and symtoms neuropathy/ckd/ CAD. aggravated with sugar diet and better with low sugar diet           Hemoglobin A1C (%)   Date Value   06/01/2021 9.4   02/25/2021 8.7   10/19/2020 8.6 (H)             ( goal A1Cis < 7)   Microalb/Crt.  Ratio (mcg/mg creat)   Date Value   11/19/2019 CANNOT BE CALCULATED     LDL Cholesterol (mg/dL)   Date Value   06/24/2020 62   05/16/2019 71   02/12/2018 49       (goal LDL is <100)   AST (U/L)   Date Value   02/25/2021 17     ALT (U/L)   Date Value   02/25/2021 14     BUN (mg/dL)   Date Value   02/25/2021 12     BP Readings from Last 3 Encounters:   06/01/21 136/70   04/29/21 106/68   03/30/21 120/68          (goal 120/80)    Past Medical History:   Diagnosis Date    Anxiety state, unspecified     Arthritis     Cataracts, bilateral     Esophageal reflux     Generalized osteoarthrosis, unspecified site     Hearing loss     Hyperlipidemia     Hypertension     Neuropathy     Neuropathy due to medical condition ([address]     Pure hypercholesterolemia     Thoracic or lumbosacral neuritis or radiculitis, unspecified     Type 2 diabetes mellitus without complication ([address]     Urinary retention     UTI (urinary tract infection)       Past Surgical History:   Procedure Laterality Date    BACK SURGERY      X3    BREAST BIOPSY  2007    Lt     CARPAL TUNNEL RELEASE Right     CATARACT REMOVAL WITH IMPLANT Bilateral     COLONOSCOPY      COLONOSCOPY  4/24/15    diverticulosis    CYSTOSCOPY  [id number]    CYSTOSCOPY  2017    CYSTOSCOPY N/A 2017    CYSTOSCOPY URODYNAMICS AND DILATION  performed by Alexis Street MD at [address] [place] [address] HAND SURGERY Bilateral     thumbs reconstruction    HIP ARTHROPLASTY Bilateral     JOINT REPLACEMENT Bilateral     LUMBAR FUSION  2015       Family History   Problem Relation Age of Onset    Cancer Mother         lymphoma    Hypertension Mother     Hypertension Father     Hypertension Brother     Heart Disease Brother         pt states that brother had 5 bypass surgeries. Social History     Tobacco Use    Smoking status: Former Smoker     Packs/day: 2.00     Years: 20.00     Pack years: 40.00     Types: Cigarettes     Quit date: 1984     Years since quittin.4    Smokeless tobacco: Never Used   Substance Use Topics    Alcohol use:  Yes     Alcohol/week: 0.0 standard drinks     Comment: social      Current Outpatient Medications   Medication Sig Dispense Refill    glipiZIDE (GLUCOTROL) 10 MG tablet Take 2 tablets by mouth 2 times daily (before meals) 180 tablet 3    repaglinide (PRANDIN) 2 MG tablet Take 1 tablet by mouth 3 times daily (before meals) 90 tablet 3    albuterol sulfate HFA (VENTOLIN HFA) 108 (90 Base) MCG/ACT inhaler Inhale 2 puffs into the lungs every 4 hours as needed for Wheezing 1 Inhaler 0    metFORMIN (GLUCOPHAGE) 1000 MG tablet TAKE 1 TABLET BY MOUTH  TWICE DAILY WITH MEALS 180 tablet 3    lisinopril (PRINIVIL;ZESTRIL) 20 MG tablet TAKE 1 TABLET BY MOUTH  TWICE DAILY 180 tablet 3    DULoxetine (CYMBALTA) 60 MG extended release capsule Take 1 capsule by mouth daily 90 capsule 0    gabapentin (NEURONTIN) 300 MG capsule TAKE 1 CAPSULE BY MOUTH IN  THE MORNING, 1 CAPSULE IN  THE AFTERNOON, AND 2  CAPSULES AT BEDTIME 360 capsule 3    atorvastatin (LIPITOR) 40 MG tablet TAKE 1 TABLET BY MOUTH  DAILY 90 tablet 3    ONETOUCH ULTRA strip USE ONE STRIP TO TEST FOUR TIMES A  strip 0    metoprolol tartrate (LOPRESSOR) 25 MG tablet TAKE 1 TABLET BY MOUTH TWO  TIMES DAILY 180 tablet 3    blood glucose monitor strips Test four  times a day & as needed for symptoms of irregular blood glucose. Dispense sufficient amount for indicated testing frequency plus additional to accommodate PRN testing needs. 120 strip 5    ONE TOUCH ULTRASOFT LANCETS MISC 1 each by Does not apply route 2 times daily 100 each 3    Calcium-Magnesium-Vitamin D (CALCIUM 1200+D3 PO) Take 2 tablets by mouth daily.  acetaminophen (TYLENOL) 500 MG tablet Take 500 mg by mouth every 6 hours as needed for Pain.  aspirin 81 MG chewable tablet Take 81 mg by mouth daily. No current facility-administered medications for this visit. Allergies   Allergen Reactions    Acetaminophen     Hydrocodone        Health Maintenance   Topic Date Due    DTaP/Tdap/Td vaccine (1 - Tdap) 09[birth year]08/1963    Shingles Vaccine (2 of 2) 09/26/2018    Lipid screen  06/24/2021    Annual Wellness Visit (AWV)  11/20/2021    Potassium monitoring  02/25/2022    Creatinine monitoring  02/25/2022    DEXA (modify frequency per FRAX score)  Completed    Flu vaccine  Completed    Pneumococcal 65+ years Vaccine  Completed    COVID-19 Vaccine  Completed    Hepatitis C screen  Completed    Hepatitis A vaccine  Aged Out    Hib vaccine  Aged Out    Meningococcal (ACWY) vaccine  Aged Out       Subjective:     Review of Systems   Constitutional: Positive for fatigue. Negative for appetite change and diaphoresis. HENT: Negative for ear discharge and trouble swallowing. Eyes: Negative for pain and discharge. Respiratory: Positive for shortness of breath. Negative for cough and wheezing. Cardiovascular: Negative for chest pain and palpitations. Gastrointestinal: Negative for abdominal distention, blood in stool and diarrhea. Endocrine: Negative for polydipsia and polyphagia. Genitourinary: Negative for difficulty urinating and frequency. Musculoskeletal: Negative for gait problem, myalgias and neck pain. Skin: Negative for color change and rash. Allergic/Immunologic: Negative for environmental allergies and food allergies. Neurological: Negative for dizziness and headaches. Hematological: Negative for adenopathy. Does not bruise/bleed easily. Psychiatric/Behavioral: Negative for behavioral problems and sleep disturbance. Objective:     Physical Exam  Constitutional:       Appearance: She is well-developed. She is not diaphoretic. HENT:      Head: Normocephalic and atraumatic. Eyes:      General:         Right eye: No discharge. Left eye: No discharge. Extraocular Movements:      Right eye: Normal extraocular motion. Left eye: Normal extraocular motion. Conjunctiva/sclera: Conjunctivae normal.      Right eye: Right conjunctiva is not injected. Left eye: Left conjunctiva is not injected. Neck:      Thyroid: No thyroid mass or thyromegaly. Vascular: No JVD. Cardiovascular:      Rate and Rhythm: Normal rate and regular rhythm. Heart sounds: No murmur heard. No friction rub. Pulmonary:      Effort: Pulmonary effort is normal. No tachypnea, bradypnea, accessory muscle usage or respiratory distress. Breath sounds: Normal breath sounds. No wheezing or rales. Abdominal:      General: Bowel sounds are normal. There is no distension. Palpations: Abdomen is soft. Tenderness: There is no abdominal tenderness. There is no rebound. Musculoskeletal:         General: No tenderness. Normal range of motion. Cervical back: Normal range of motion and neck supple. No edema or erythema. Lymphadenopathy:      Head:      Right side of head: No submental or submandibular adenopathy. Left side of head: No submental or submandibular adenopathy.       Cervical: No cervical adenopathy. Skin:     General: Skin is warm. Coloration: Skin is not pale. Findings: No bruising, ecchymosis or rash. Neurological:      Mental Status: She is alert and oriented to person, place, and time. Cranial Nerves: No cranial nerve deficit. Sensory: No sensory deficit. Motor: No atrophy or abnormal muscle tone. Coordination: Coordination normal.   Psychiatric:         Mood and Affect: Mood is not anxious. Affect is not angry. Speech: Speech is not slurred. Behavior: Behavior normal. Behavior is not aggressive. Thought Content: Thought content does not include homicidal ideation. Cognition and Memory: Memory is not impaired. /70   Pulse 83   Ht 5' 5\" (1.651 m)   Wt 121 lb (54.9 kg)   SpO2 97%   BMI 20.14 kg/m²     Assessment:       Diagnosis Orders   1. DM type 2, uncontrolled, with neuropathy ([facility]  POCT glycosylated hemoglobin (Hb A1C)    [address]   2. Panlobular emphysema ([facility]  Full PFT Study With Bronchodilator   3. Mixed hyperlipidemia               Plan:      Return in about 1 month (around 7/1/2021).     Orders Placed This Encounter   Procedures   [facility]     Referral Priority:   Routine     Referral Type:   Eval and Treat     Referral Reason:   Specialty Services Required     Requested Specialty:   Nutrition     Number of Visits Requested:   1    POCT glycosylated hemoglobin (Hb A1C)    Full PFT Study With Bronchodilator     Standing Status:   Future     Standing Expiration Date:   7/1/2021     Orders Placed This Encounter   Medications    glipiZIDE (GLUCOTROL) 10 MG tablet     Sig: Take 2 tablets by mouth 2 times daily (before meals)     Dispense:  180 tablet     Refill:  3    repaglinide (PRANDIN) 2 MG tablet     Sig: Take 1 tablet by mouth 3 times daily (before meals)     Dispense:  90 tablet     Refill:  3    albuterol sulfate HFA (VENTOLIN HFA) 108 (90 Base) MCG/ACT inhaler     Sig: Inhale 2 puffs into the lungs every 4 hours as needed for Wheezing     Dispense:  1 Inhaler     Refill:  0    uncontroleld dm   Ref to dietian  codp on ct scan  With dyspn on exertion  sherlyn do above  Will need inhaler also for laba with steroid  sherlyn rev in next visit  Diet log advised  Copd new diag  On ct scan  sherlyn do pft      Patient given educational materials - see patient instructions. Discussed use, benefit, and side effects of prescribed medications. All patientquestions answered. Pt voiced understanding. Reviewed health maintenance. Instructedto continue current medications, diet and exercise. Patient agreed with treatmentplan. Follow up as directed.      Electronically signed by Mariann Foreman MD on 6/1/2021 at 3:32 PM

## 2021-06-01 NOTE — PROGRESS NOTES
Visit Information    Have you changed or started any medications since your last visit including any over-the-counter medicines, vitamins, or herbal medicines? no   Are you having any side effects from any of your medications? -  no  Have you stopped taking any of your medications? Is so, why? -  no    Have you seen any other physician or provider since your last visit? No  Have you had any other diagnostic tests since your last visit? No  Have you been seen in the emergency room and/or had an admission to a hospital since we last saw you? No  Have you had your routine dental cleaning in the past 6 months? yes -     Have you activated your [employer] account? If not, what are your barriers?  Yes     Patient Care Team:  Luis A Dumont MD as PCP - General (Internal Medicine)  Luis A Dumont MD as PCP - [address] [place] [address]  [facility] Provider  Stephanie Vickers DO (Obstetrics & Gynecology)    Medical History Review  Past Medical, Family, and Social History reviewed and does not contribute to the patient presenting condition    Health Maintenance   Topic Date Due    DTaP/Tdap/Td vaccine (1 - Tdap) 09/08/1963    Shingles Vaccine (2 of 2) 09/26/2018    Lipid screen  06/24/2021    Annual Wellness Visit (AWV)  11/20/2021    Potassium monitoring  02/25/2022    Creatinine monitoring  02/25/2022    DEXA (modify frequency per FRAX score)  Completed    Flu vaccine  Completed    Pneumococcal 65+ years Vaccine  Completed    COVID-19 Vaccine  Completed    Hepatitis C screen  Completed    Hepatitis A vaccine  Aged Out    Hib vaccine  Aged Out    Meningococcal (ACWY) vaccine  Aged Out

## 2021-06-04 RX ORDER — REPAGLINIDE 2 MG/1
2 TABLET ORAL
Qty: 30 TABLET | Refills: 0 | Status: SHIPPED
Start: 2021-06-04 | End: 2021-08-16 | Stop reason: SDUPTHER

## 2021-06-04 RX ORDER — GLIPIZIDE 10 MG/1
20 TABLET ORAL
Qty: 20 TABLET | Refills: 0 | Status: SHIPPED
Start: 2021-06-04 | End: 2021-08-16 | Stop reason: SDUPTHER

## 2021-06-08 ENCOUNTER — OFFICE VISIT (OUTPATIENT)
Dept: GASTROENTEROLOGY | Age: 76
End: 2021-06-08
Payer: MEDICARE

## 2021-06-08 ENCOUNTER — TELEPHONE (OUTPATIENT)
Dept: INTERNAL MEDICINE CLINIC | Age: 76
End: 2021-06-08

## 2021-06-08 VITALS
HEIGHT: 65 IN | SYSTOLIC BLOOD PRESSURE: 116 MMHG | WEIGHT: 120 LBS | DIASTOLIC BLOOD PRESSURE: 68 MMHG | BODY MASS INDEX: 19.99 KG/M2

## 2021-06-08 DIAGNOSIS — K58.8 OTHER IRRITABLE BOWEL SYNDROME: Primary | ICD-10-CM

## 2021-06-08 DIAGNOSIS — R63.4 WEIGHT LOSS: ICD-10-CM

## 2021-06-08 DIAGNOSIS — R14.0 ABDOMINAL BLOATING: ICD-10-CM

## 2021-06-08 DIAGNOSIS — D50.0 IRON DEFICIENCY ANEMIA DUE TO CHRONIC BLOOD LOSS: ICD-10-CM

## 2021-06-08 DIAGNOSIS — K21.9 GASTROESOPHAGEAL REFLUX DISEASE, UNSPECIFIED WHETHER ESOPHAGITIS PRESENT: ICD-10-CM

## 2021-06-08 DIAGNOSIS — R14.0 BLOATING SYMPTOM: ICD-10-CM

## 2021-06-08 PROCEDURE — 99204 OFFICE O/P NEW MOD 45 MIN: CPT | Performed by: INTERNAL MEDICINE

## 2021-06-08 PROCEDURE — 1123F ACP DISCUSS/DSCN MKR DOCD: CPT | Performed by: INTERNAL MEDICINE

## 2021-06-08 PROCEDURE — 1036F TOBACCO NON-USER: CPT | Performed by: INTERNAL MEDICINE

## 2021-06-08 PROCEDURE — G8420 CALC BMI NORM PARAMETERS: HCPCS | Performed by: INTERNAL MEDICINE

## 2021-06-08 PROCEDURE — 1090F PRES/ABSN URINE INCON ASSESS: CPT | Performed by: INTERNAL MEDICINE

## 2021-06-08 PROCEDURE — G8428 CUR MEDS NOT DOCUMENT: HCPCS | Performed by: INTERNAL MEDICINE

## 2021-06-08 PROCEDURE — G8399 PT W/DXA RESULTS DOCUMENT: HCPCS | Performed by: INTERNAL MEDICINE

## 2021-06-08 PROCEDURE — 4040F PNEUMOC VAC/ADMIN/RCVD: CPT | Performed by: INTERNAL MEDICINE

## 2021-06-08 RX ORDER — BISACODYL 5 MG
TABLET, DELAYED RELEASE (ENTERIC COATED) ORAL
Qty: 4 TABLET | Refills: 0 | Status: SHIPPED | OUTPATIENT
Start: 2021-06-08 | End: 2021-08-16

## 2021-06-08 RX ORDER — POLYETHYLENE GLYCOL 3350 17 G/17G
POWDER, FOR SOLUTION ORAL
Qty: 238 G | Refills: 0 | Status: SHIPPED | OUTPATIENT
Start: 2021-06-08 | End: 2021-08-16

## 2021-06-08 ASSESSMENT — ENCOUNTER SYMPTOMS
TROUBLE SWALLOWING: 0
SHORTNESS OF BREATH: 1
ABDOMINAL DISTENTION: 1
VOICE CHANGE: 0
CHOKING: 0
SORE THROAT: 0
COUGH: 0
BACK PAIN: 0

## 2021-06-08 NOTE — PROGRESS NOTES
GI NEW PATIENT OFFICE VISIT    INTERVAL HISTORY:   Luis A Dumont MD  [address] [facility]  [address] [facility],  [address] [facility]    Chief Complaint   Patient presents with    New Patient     Patient referred for dyspepsia. Patient notes having some bloating where she will get these bumps. She notes she can push on them and they go away. No pain. Notes alot of gas. Denies nausea or vomiting. No constipation. Notes back in feb she had some diarrhea for a week. Hasnt had since. Patient last had colonoscopy in 2015    Gastroesophageal Reflux     Once in awhile she notes       1. Other irritable bowel syndrome    2. Abdominal bloating    3. Iron deficiency anemia due to chronic blood loss    4. Bloating symptom    5. Weight loss    6.  Gastroesophageal reflux disease, unspecified whether esophagitis present      This patient evaluated in my office for the first time  She has history for multiple chronic medical issues  This patient has been complaining of chronic dyspeptic symptoms  Complains of burning in the chest area upper abdominal areas  Patient has been complaining of some abdominal pains, off and on cramping  Also complains of abdominal bloating and gas  Has off and on nausea without any sig vomiting  Has some alternating constipation and diarrhea  Patient complains of some weight loss  Has some anxiety issues  She had a colonoscopy done by surgeon in 2015  Had a CAT scan done in March of this year as well as an MRI MRCP and April of this year were grossly negative has hepatic cyst  She is taking gabapentin for pain issues  Taking MiraLAX for off-and-on constipation  Denies any current smoking alcohol abuse illicit drug usage    HISTORY OF PRESENT ILLNESS: Ms.Sally Gilles Tanner is a 67 y.o. female with a past history remarkable for , referred for evaluation of   Chief Complaint   Patient presents with    New Patient Patient referred for dyspepsia. Patient notes having some bloating where she will get these bumps. She notes she can push on them and they go away. No pain. Notes alot of gas. Denies nausea or vomiting. No constipation. Notes back in feb she had some diarrhea for a week. Hasnt had since. Patient last had colonoscopy in 2015    Gastroesophageal Reflux     Once in awhile she notes   . Past Medical,Family, and Social History reviewed and does contribute to the patient presenting condition. Patient's PMH/PSH,SH,PSYCH Hx, MEDs, ALLERGIES, and ROS were all reviewed and updated in the appropriate sections.     PAST MEDICAL HISTORY:  Past Medical History:   Diagnosis Date    Anxiety state, unspecified     Arthritis     Cataracts, bilateral     Esophageal reflux     Generalized osteoarthrosis, unspecified site     Hearing loss     Hyperlipidemia     Hypertension     Neuropathy     Neuropathy due to medical condition ([address]     Pure hypercholesterolemia     Thoracic or lumbosacral neuritis or radiculitis, unspecified     Type 2 diabetes mellitus without complication ([address]     Urinary retention     UTI (urinary tract infection)        Past Surgical History:   Procedure Laterality Date    BACK SURGERY      X3    BREAST BIOPSY  2007    Lt     CARPAL TUNNEL RELEASE Right     CATARACT REMOVAL WITH IMPLANT Bilateral     COLONOSCOPY  2006    COLONOSCOPY  4/24/15    diverticulosis    CYSTOSCOPY  [id number]    CYSTOSCOPY  05/26/2017    CYSTOSCOPY N/A 5/26/2017    CYSTOSCOPY URODYNAMICS AND DILATION  performed by Greg Ordaz MD at [address] [place] [address] HAND SURGERY Bilateral     thumbs reconstruction    HIP ARTHROPLASTY Bilateral     JOINT REPLACEMENT Bilateral     LUMBAR FUSION  October 27, 2015       CURRENT MEDICATIONS:    Current Outpatient Medications:     repaglinide (PRANDIN) 2 MG tablet, Take 1 tablet by mouth 3 times daily (before meals) for 10 days, Disp: 30 tablet, Rfl: 0    metFORMIN (GLUCOPHAGE) 1000 MG tablet, TAKE 1 TABLET BY MOUTH  TWICE DAILY WITH MEALS, Disp: 180 tablet, Rfl: 3    DULoxetine (CYMBALTA) 60 MG extended release capsule, Take 1 capsule by mouth daily, Disp: 90 capsule, Rfl: 0    gabapentin (NEURONTIN) 300 MG capsule, TAKE 1 CAPSULE BY MOUTH IN  THE MORNING, 1 CAPSULE IN  THE AFTERNOON, AND 2  CAPSULES AT BEDTIME, Disp: 360 capsule, Rfl: 3    atorvastatin (LIPITOR) 40 MG tablet, TAKE 1 TABLET BY MOUTH  DAILY, Disp: 90 tablet, Rfl: 3    metoprolol tartrate (LOPRESSOR) 25 MG tablet, TAKE 1 TABLET BY MOUTH TWO  TIMES DAILY, Disp: 180 tablet, Rfl: 3    Calcium-Magnesium-Vitamin D (CALCIUM 1200+D3 PO), Take 2 tablets by mouth daily. , Disp: , Rfl:     acetaminophen (TYLENOL) 500 MG tablet, Take 500 mg by mouth every 6 hours as needed for Pain., Disp: , Rfl:     aspirin 81 MG chewable tablet, Take 81 mg by mouth daily. , Disp: , Rfl:     polyethylene glycol (GLYCOLAX) 17 GM/SCOOP powder, Follow instructions provided to you from physician's office. , Disp: 238 g, Rfl: 0    bisacodyl (BISACODYL) 5 MG EC tablet, Follow instructions provided given by the physician's office. , Disp: 4 tablet, Rfl: 0    glipiZIDE (GLUCOTROL) 10 MG tablet, Take 2 tablets by mouth 2 times daily (before meals) for 10 days, Disp: 20 tablet, Rfl: 0    albuterol sulfate HFA (VENTOLIN HFA) 108 (90 Base) MCG/ACT inhaler, Inhale 2 puffs into the lungs every 4 hours as needed for Wheezing, Disp: 1 Inhaler, Rfl: 0    lisinopril (PRINIVIL;ZESTRIL) 20 MG tablet, TAKE 1 TABLET BY MOUTH  TWICE DAILY, Disp: 180 tablet, Rfl: 3    ONETOUCH ULTRA strip, USE ONE STRIP TO TEST FOUR TIMES A DAY, Disp: 100 strip, Rfl: 0    blood glucose monitor strips, Test four  times a day & as needed for symptoms of irregular blood glucose. Dispense sufficient amount for indicated testing frequency plus additional to accommodate PRN testing needs. , Disp: 120 strip, Rfl: 5    ONE TOUCH ULTRASOFT LANCETS MISC, 1 each by Does not apply route 2 times daily, Disp: 100 each, Rfl: 3    ALLERGIES:   Allergies   Allergen Reactions    Hydrocodone        FAMILY HISTORY:       Problem Relation Age of Onset    Cancer Mother         lymphoma    Hypertension Mother     Hypertension Father     Hypertension Brother     Heart Disease Brother         pt states that brother had 5 bypass surgeries. SOCIAL HISTORY:   Social History     Socioeconomic History    Marital status:      Spouse name: Not on file    Number of children: Not on file    Years of education: Not on file    Highest education level: Not on file   Occupational History    Not on file   Tobacco Use    Smoking status: Former Smoker     Packs/day: 2.00     Years: 20.00     Pack years: 40.00     Types: Cigarettes     Quit date: 1984     Years since quittin.4    Smokeless tobacco: Never Used   Vaping Use    Vaping Use: Never used   Substance and Sexual Activity    Alcohol use: Yes     Alcohol/week: 0.0 standard drinks     Comment: social    Drug use: No    Sexual activity: Yes     Partners: Male   Other Topics Concern    Not on file   Social History Narrative    Not on file     Social Determinants of Health     Financial Resource Strain:     Difficulty of Paying Living Expenses:    Food Insecurity:     Worried About Running Out of Food in the Last Year:     [address] in the Last Year:    Transportation Needs:     Lack of Transportation (Medical):      Lack of Transportation (Non-Medical):    Physical Activity:     Days of Exercise per Week:     Minutes of Exercise per Session:    Stress:     Feeling of Stress :    Social Connections:     Frequency of Communication with Friends and Family:     Frequency of Social Gatherings with Friends and Family:     Attends Buddhist Services:     Active Member of Clubs or Organizations:     Attends Club or Organization Meetings:     Marital Status:    Intimate Partner Violence:     Fear of Current or Ex-Partner: DATA: Reviewed  Lab Results   Component Value Date    WBC 7.1 02/25/2021    HGB 13.2 02/25/2021    HCT 43.4 02/25/2021    MCV 92.1 02/25/2021     02/25/2021     02/25/2021    K 4.6 02/25/2021     02/25/2021    CO2 29 02/25/2021    BUN 12 02/25/2021    CREATININE 0.6 04/27/2021    LABPROT 7.7 06/13/2012    LABALBU 4.2 02/25/2021    BILITOT 0.36 02/25/2021    ALKPHOS 80 02/25/2021    AST 17 02/25/2021    ALT 14 02/25/2021         Lab Results   Component Value Date    RBC 4.71 02/25/2021    HGB 13.2 02/25/2021    MCV 92.1 02/25/2021    MCH 28.0 02/25/2021    MCHC 30.4 02/25/2021    RDW 13.7 02/25/2021    MPV 10.6 02/25/2021    BASOPCT 0 02/25/2021    LYMPHSABS 2.15 02/25/2021    MONOSABS 0.75 02/25/2021    NEUTROABS 4.02 02/25/2021    EOSABS 0.13 02/25/2021    BASOSABS 0.03 02/25/2021         DIAGNOSTIC TESTING:     No results found. Assessment  1. Other irritable bowel syndrome    2. Abdominal bloating    3. Iron deficiency anemia due to chronic blood loss    4. Bloating symptom    5. Weight loss    6. Gastroesophageal reflux disease, unspecified whether esophagitis present        Plan    Plan EGD and Coloscopy     The Endoscopic procedure was explained to the patient in detail  The prep and NPO were explained  All the Risks, Benefits, and Alternatives were explained  Risk of Bleeding, Perforation and Cardio Respiratory risks were explained  her questions were answered  The procedure has been scheduled with the  in the office  Patient was asked to give us a call for any questions  The patient has verbalized understanding and agreement to this plan. Pt was advised in detail about some life style and dietary modifications. She was advised about avoidance of caffeine, nicotine and chocolate. Pt was also told to stay away from any kind of fast foods, soda pops.  She was also advised to avoid lots of spices, grease and fried food etc.     Instructions were also given about trying to Gastroenterology  Office #: [phone]

## 2021-06-27 DIAGNOSIS — I10 ESSENTIAL HYPERTENSION: ICD-10-CM

## 2021-07-06 ENCOUNTER — TELEPHONE (OUTPATIENT)
Dept: INTERNAL MEDICINE CLINIC | Age: 76
End: 2021-07-06

## 2021-07-06 RX ORDER — INSULIN GLARGINE 100 [IU]/ML
10 INJECTION, SOLUTION SUBCUTANEOUS EVERY EVENING
Qty: 5 PEN | Refills: 0 | Status: SHIPPED | OUTPATIENT
Start: 2021-07-06 | End: 2022-11-04 | Stop reason: SDUPTHER

## 2021-07-06 NOTE — TELEPHONE ENCOUNTER
I called her and prescribed insulin  If she cant afford  Give some lantus samples for  10 units daily  Make apt this week

## 2021-07-07 ENCOUNTER — TELEPHONE (OUTPATIENT)
Dept: INTERNAL MEDICINE CLINIC | Age: 76
End: 2021-07-07

## 2021-07-07 DIAGNOSIS — Z00.00 HEALTH CARE MAINTENANCE: Primary | ICD-10-CM

## 2021-07-07 NOTE — TELEPHONE ENCOUNTER
Patient will go  the insulin if insurance doesn't cover it she will call back to get sample. Patient will keep a log of blood sugar and if still elevated she will make a follow up appt.

## 2021-07-07 NOTE — TELEPHONE ENCOUNTER
Patient called and stated that she is having a pulmonary function test done 7/16/21 and they require a COVID test two days prior. Patient said you need to order the COVID test please place order.

## 2021-07-12 ENCOUNTER — NURSE TRIAGE (OUTPATIENT)
Dept: OTHER | Facility: CLINIC | Age: 76
End: 2021-07-12

## 2021-07-12 NOTE — TELEPHONE ENCOUNTER
Reason for Disposition   Patient wants to be seen   New-onset diabetes suspected (e.g., frequent urination, weak, weight loss)    Answer Assessment - Initial Assessment Questions  1. BLOOD GLUCOSE: \"What is your blood glucose level? \"       Just got in insulin last Thursday, not sure if she is doing it right. Has a lot of questions. 2. ONSET: \"When did you check the blood glucose? \"      Checked the BG last night: \"300 and something\". 3. USUAL RANGE: \"What is your glucose level usually? \" (e.g., usual fasting morning value, usual evening value)      *No Answer*    4. KETONES: \"Do you check for ketones (urine or blood test strips)? \" If yes, ask: \"What does the test show now? \"       No     5. TYPE 1 or 2:  \"Do you know what type of diabetes you have? \"  (e.g., Type 1, Type 2, Gestational; doesn't know)       Type 2.     6. INSULIN: \"Do you take insulin? \" \"What type of insulin(s) do you use? What is the mode of delivery? (syringe, pen; injection or pump)? \"       Yes, has question    7. DIABETES PILLS: \"Do you take any pills for your diabetes? \" If yes, ask: \"Have you missed taking any pills recently? \"      *No Answer*    8. OTHER SYMPTOMS: \"Do you have any symptoms? \" (e.g., fever, frequent urination, difficulty breathing, dizziness, weakness, vomiting)      No    9. PREGNANCY: \"Is there any chance you are pregnant? \" \"When was your last menstrual period? \"      N/a    Protocols used: DIABETES - HIGH BLOOD SUGAR-ADULT-OH    Caller hard of hearing and getting frustrated with questions. She is reaching out because he is a new diabetic and her blood sugar is \"300 something\". Stated she is having no physical symptoms at this time. Caller hung up/got disconnected and Candy with ECC stated she will call her back. Her MD is out of town and requesting to see Dr. Jose Marquez. Received call from [facility] with [employer]. Brief description of triage: hyperglycemia, new diabatic with questions. Triage indicates for patient to be seen today. Care advice provided, patient verbalizes understanding; denies any other questions or concerns; instructed to call back for any new or worsening symptoms. Writer provided warm transfer to Emilee LOGAN Verenice [facility] for appointment scheduling. Attention Provider: Thank you for allowing me to participate in the care of your patient. The patient was connected to triage in response to information provided to the E[facility]. Please do not respond through this encounter as the response is not directed to a shared pool.

## 2021-07-16 ENCOUNTER — HOSPITAL ENCOUNTER (OUTPATIENT)
Dept: PULMONOLOGY | Age: 76
Discharge: HOME | End: 2021-07-16
Payer: MEDICARE

## 2021-07-16 PROCEDURE — 94664 DEMO&/EVAL PT USE INHALER: CPT

## 2021-07-16 PROCEDURE — 94726 PLETHYSMOGRAPHY LUNG VOLUMES: CPT

## 2021-07-16 PROCEDURE — 94060 EVALUATION OF WHEEZING: CPT

## 2021-07-16 PROCEDURE — 94640 AIRWAY INHALATION TREATMENT: CPT

## 2021-07-16 PROCEDURE — 94729 DIFFUSING CAPACITY: CPT

## 2021-07-18 NOTE — PROCEDURES
[address] [facility] [address]                               PULMONARY FUNCTION    PATIENT NAME: Toney Perez                      :        1945  MED REC NO:   [id number]                             ROOM:  ACCOUNT NO:   [de-identified]                           ADMIT DATE: 2021  PROVIDER:     Jaimie Barton    DATE OF PROCEDURE:  2021    Spirometry does not meet the criteria for obstruction and has an FEV1 of  1.94 L and FVC of 2.41 L. The post-bronchodilator study does not show a significant change, and  has an FEV1 of 2.07 L and FVC of 2.69 L. Lung volume shows a normal total lung capacity at 103% predicted. Diffusion capacity is decreased at 61% predicted. Airway resistance is slightly decreased at 73% predicted. Specific conductance is normal at 84% predicted. Flow-volume loop shows normal pattern. IMPRESSION:  Normal pulmonary function test with an FEV1 of 2.07 L and  FVC of 2.69 L.         Dariusz Lucas    D: 2021 8:48:23       T: 2021 9:50:04     [id number]  Job#: [id number]     Doc#: [id number]    CC:

## 2021-07-19 ENCOUNTER — TELEPHONE (OUTPATIENT)
Dept: INTERNAL MEDICINE CLINIC | Age: 76
End: 2021-07-19

## 2021-07-19 NOTE — LETTER
Nilda Banda         [address] [facility] [address]          July 19th, 2021     Dear Nilda Banda: You recently missed a scheduled appointment with Dr. Yvette Elias on 7/19/21. This is the 1st scheduledappointment that you have missed within the last twelve months. If you miss 2 more appointment, you may be dismissed from the practice. It is your responsibility to arrive to your appointment. Please call our office as soon as possible so that we may reschedule your appointment, because your health and follow-up medical care are important to us. For future appointments that you are unable to keep, please call the office at [phone] at least 24 hours in advance to cancel and reschedule. If a traditional appointment is difficult for you to make, please keep in mind, we offer E-Visits for several diagnoses as well as virtual visits. We also have primary care walk-in clinics available. For more information on these options, please contact our office.    Sincerely,        [address] [place]

## 2021-07-20 ENCOUNTER — OFFICE VISIT (OUTPATIENT)
Dept: INTERNAL MEDICINE CLINIC | Age: 76
End: 2021-07-20
Payer: MEDICARE

## 2021-07-20 ENCOUNTER — TELEPHONE (OUTPATIENT)
Dept: PHARMACY | Age: 76
End: 2021-07-20

## 2021-07-20 VITALS
DIASTOLIC BLOOD PRESSURE: 70 MMHG | HEART RATE: 110 BPM | HEIGHT: 65 IN | OXYGEN SATURATION: 98 % | SYSTOLIC BLOOD PRESSURE: 122 MMHG | BODY MASS INDEX: 20.33 KG/M2 | WEIGHT: 122 LBS

## 2021-07-20 DIAGNOSIS — I10 ESSENTIAL HYPERTENSION: ICD-10-CM

## 2021-07-20 DIAGNOSIS — J43.1 PANLOBULAR EMPHYSEMA (HCC): ICD-10-CM

## 2021-07-20 DIAGNOSIS — E78.2 MIXED HYPERLIPIDEMIA: ICD-10-CM

## 2021-07-20 DIAGNOSIS — R06.09 DYSPNEA ON EXERTION: ICD-10-CM

## 2021-07-20 DIAGNOSIS — M48.061 SPINAL STENOSIS OF LUMBAR REGION WITHOUT NEUROGENIC CLAUDICATION: ICD-10-CM

## 2021-07-20 PROCEDURE — 99214 OFFICE O/P EST MOD 30 MIN: CPT | Performed by: INTERNAL MEDICINE

## 2021-07-20 PROCEDURE — 1036F TOBACCO NON-USER: CPT | Performed by: INTERNAL MEDICINE

## 2021-07-20 PROCEDURE — 1090F PRES/ABSN URINE INCON ASSESS: CPT | Performed by: INTERNAL MEDICINE

## 2021-07-20 PROCEDURE — 1123F ACP DISCUSS/DSCN MKR DOCD: CPT | Performed by: INTERNAL MEDICINE

## 2021-07-20 PROCEDURE — G8399 PT W/DXA RESULTS DOCUMENT: HCPCS | Performed by: INTERNAL MEDICINE

## 2021-07-20 PROCEDURE — G8427 DOCREV CUR MEDS BY ELIG CLIN: HCPCS | Performed by: INTERNAL MEDICINE

## 2021-07-20 PROCEDURE — 4040F PNEUMOC VAC/ADMIN/RCVD: CPT | Performed by: INTERNAL MEDICINE

## 2021-07-20 PROCEDURE — 3023F SPIROM DOC REV: CPT | Performed by: INTERNAL MEDICINE

## 2021-07-20 PROCEDURE — G8420 CALC BMI NORM PARAMETERS: HCPCS | Performed by: INTERNAL MEDICINE

## 2021-07-20 PROCEDURE — G8926 SPIRO NO PERF OR DOC: HCPCS | Performed by: INTERNAL MEDICINE

## 2021-07-20 SDOH — ECONOMIC STABILITY: FOOD INSECURITY: WITHIN THE PAST 12 MONTHS, YOU WORRIED THAT YOUR FOOD WOULD RUN OUT BEFORE YOU GOT MONEY TO BUY MORE.: NEVER TRUE

## 2021-07-20 SDOH — ECONOMIC STABILITY: FOOD INSECURITY: WITHIN THE PAST 12 MONTHS, THE FOOD YOU BOUGHT JUST DIDN'T LAST AND YOU DIDN'T HAVE MONEY TO GET MORE.: NEVER TRUE

## 2021-07-20 ASSESSMENT — ENCOUNTER SYMPTOMS
SHORTNESS OF BREATH: 0
COLOR CHANGE: 0
EYE PAIN: 0
ABDOMINAL DISTENTION: 0
EYE DISCHARGE: 0
BLOOD IN STOOL: 0
DIARRHEA: 0
WHEEZING: 0
COUGH: 0
TROUBLE SWALLOWING: 0

## 2021-07-20 ASSESSMENT — SOCIAL DETERMINANTS OF HEALTH (SDOH): HOW HARD IS IT FOR YOU TO PAY FOR THE VERY BASICS LIKE FOOD, HOUSING, MEDICAL CARE, AND HEATING?: NOT HARD AT ALL

## 2021-07-20 NOTE — PROGRESS NOTES
Visit Information    Have you changed or started any medications since your last visit including any over-the-counter medicines, vitamins, or herbal medicines? no   Are you having any side effects from any of your medications? -  no  Have you stopped taking any of your medications? Is so, why? -  no    Have you seen any other physician or provider since your last visit? Yes - Records Obtained  Have you had any other diagnostic tests since your last visit? Yes - Records Obtained  Have you been seen in the emergency room and/or had an admission to a hospital since we last saw you? No  Have you had your routine dental cleaning in the past 6 months? yes -     Have you activated your [email or web address] account? If not, what are your barriers?  Yes     Patient Care Team:  Ronald Ahumada MD as PCP - General (Internal Medicine)  Ronald Ahumada MD as PCP - [address] [place] [address] [facility] [place] Provider  Stephanie Hess DO (Obstetrics & Gynecology)    Medical History Review  Past Medical, Family, and Social History reviewed and does contribute to the patient presenting condition    Health Maintenance   Topic Date Due    DTaP/Tdap/Td vaccine (1 - Tdap) 06/08/2010    Shingles Vaccine (2 of 2) 09/26/2018    Lipid screen  06/24/2021    Flu vaccine (1) 09/01/2021    Annual Wellness Visit (AWV)  11/20/2021    Potassium monitoring  02/25/2022    Creatinine monitoring  02/25/2022    DEXA (modify frequency per FRAX score)  Completed    Pneumococcal 65+ years Vaccine  Completed    COVID-19 Vaccine  Completed    Hepatitis C screen  Completed    Hepatitis A vaccine  Aged Out    Hib vaccine  Aged Out    Meningococcal (ACWY) vaccine  Aged Out

## 2021-07-20 NOTE — PROGRESS NOTES
[address] [address]  Dept: [phone]  Dept Fax: [phone]    Bharati Graham is a 67 y.o. female who presents today for her medical conditions/complaintsas noted below. Bharati Graham is c/o of   Chief Complaint   Patient presents with    Diabetes     6/1/21 a1c 9.4. blood sugar has been high          HPI:     HTN  Onset more than 2 years ago  ernesto mild to mod  Controlled with current po meds  Not associated with headaches or blurry vision  No chest pain  Diabetes   Duration more than 7 years  Modifying factors on Glucophage and other med  Severity uncontrolled sever  Associated signs and symtoms neuropathy/ckd/ CAD. aggravated with sugar diet and better with low sugar diet           Hemoglobin A1C (%)   Date Value   06/01/2021 9.4   02/25/2021 8.7   10/19/2020 8.6 (H)             ( goal A1Cis < 7)   Microalb/Crt.  Ratio (mcg/mg creat)   Date Value   11/19/2019 CANNOT BE CALCULATED     LDL Cholesterol (mg/dL)   Date Value   06/24/2020 62   05/16/2019 71   02/12/2018 49       (goal LDL is <100)   AST (U/L)   Date Value   02/25/2021 17     ALT (U/L)   Date Value   02/25/2021 14     BUN (mg/dL)   Date Value   02/25/2021 12     BP Readings from Last 3 Encounters:   07/20/21 122/70   06/08/21 116/68   06/01/21 136/70          (goal 120/80)    Past Medical History:   Diagnosis Date    Anxiety state, unspecified     Arthritis     Cataracts, bilateral     Esophageal reflux     Generalized osteoarthrosis, unspecified site     Hearing loss     Hyperlipidemia     Hypertension     Neuropathy     Neuropathy due to medical condition ([address]     Pure hypercholesterolemia     Thoracic or lumbosacral neuritis or radiculitis, unspecified     Type 2 diabetes mellitus without complication ([address]     Urinary retention     UTI (urinary tract infection)       Past Surgical History:   Procedure Laterality Date    BACK SURGERY      X3    BREAST BIOPSY  2007 Lt     CARPAL TUNNEL RELEASE Right     CATARACT REMOVAL WITH IMPLANT Bilateral     COLONOSCOPY      COLONOSCOPY  4/24/15    diverticulosis    CYSTOSCOPY  038118    CYSTOSCOPY  2017    CYSTOSCOPY N/A 2017    CYSTOSCOPY URODYNAMICS AND DILATION  performed by Baudilio Mathews MD at [address] [place] [address] HAND SURGERY Bilateral     thumbs reconstruction    HIP ARTHROPLASTY Bilateral     JOINT REPLACEMENT Bilateral     LUMBAR FUSION  2015       Family History   Problem Relation Age of Onset    Cancer Mother         lymphoma    Hypertension Mother     Hypertension Father     Hypertension Brother     Heart Disease Brother         pt states that brother had 5 bypass surgeries. Social History     Tobacco Use    Smoking status: Former Smoker     Packs/day: 2.00     Years: 20.00     Pack years: 40.00     Types: Cigarettes     Quit date: 1984     Years since quittin.5    Smokeless tobacco: Never Used   Substance Use Topics    Alcohol use: Yes     Alcohol/week: 0.0 standard drinks     Comment: social      Current Outpatient Medications   Medication Sig Dispense Refill    insulin glargine (LANTUS SOLOSTAR) 100 UNIT/ML injection pen Inject 10 Units into the skin every evening 5 pen 0    metoprolol tartrate (LOPRESSOR) 25 MG tablet TAKE 1 TABLET BY MOUTH  TWICE DAILY 180 tablet 3    polyethylene glycol (GLYCOLAX) 17 GM/SCOOP powder Follow instructions provided to you from physician's office. 238 g 0    bisacodyl (BISACODYL) 5 MG EC tablet Follow instructions provided given by the physician's office.  4 tablet 0    albuterol sulfate HFA (VENTOLIN HFA) 108 (90 Base) MCG/ACT inhaler Inhale 2 puffs into the lungs every 4 hours as needed for Wheezing 1 Inhaler 0    metFORMIN (GLUCOPHAGE) 1000 MG tablet TAKE 1 TABLET BY MOUTH  TWICE DAILY WITH MEALS 180 tablet 3    lisinopril (PRINIVIL;ZESTRIL) 20 MG tablet TAKE 1 TABLET BY MOUTH  TWICE DAILY 180 tablet 3    DULoxetine (CYMBALTA) 60 MG extended release capsule Take 1 capsule by mouth daily 90 capsule 0    gabapentin (NEURONTIN) 300 MG capsule TAKE 1 CAPSULE BY MOUTH IN  THE MORNING, 1 CAPSULE IN  THE AFTERNOON, AND 2  CAPSULES AT BEDTIME 360 capsule 3    atorvastatin (LIPITOR) 40 MG tablet TAKE 1 TABLET BY MOUTH  DAILY 90 tablet 3    ONETOUCH ULTRA strip USE ONE STRIP TO TEST FOUR TIMES A  strip 0    blood glucose monitor strips Test four  times a day & as needed for symptoms of irregular blood glucose. Dispense sufficient amount for indicated testing frequency plus additional to accommodate PRN testing needs. 120 strip 5    ONE TOUCH ULTRASOFT LANCETS MISC 1 each by Does not apply route 2 times daily 100 each 3    Calcium-Magnesium-Vitamin D (CALCIUM 1200+D3 PO) Take 2 tablets by mouth daily.  acetaminophen (TYLENOL) 500 MG tablet Take 500 mg by mouth every 6 hours as needed for Pain.  aspirin 81 MG chewable tablet Take 81 mg by mouth daily.  glipiZIDE (GLUCOTROL) 10 MG tablet Take 2 tablets by mouth 2 times daily (before meals) for 10 days 20 tablet 0    repaglinide (PRANDIN) 2 MG tablet Take 1 tablet by mouth 3 times daily (before meals) for 10 days 30 tablet 0     No current facility-administered medications for this visit.      Allergies   Allergen Reactions    Hydrocodone        Health Maintenance   Topic Date Due    DTaP/Tdap/Td vaccine (1 - Tdap) 06/08/2010    Shingles Vaccine (2 of 2) 09/26/2018    Lipid screen  06/24/2021    Flu vaccine (1) 09/01/2021    Annual Wellness Visit (AWV)  11/20/2021    Potassium monitoring  02/25/2022    Creatinine monitoring  02/25/2022    DEXA (modify frequency per FRAX score)  Completed    Pneumococcal 65+ years Vaccine  Completed    COVID-19 Vaccine  Completed    Hepatitis C screen  Completed    Hepatitis A vaccine  Aged Out    Hib vaccine  Aged Out    Meningococcal (ACWY) vaccine  Aged Out       Subjective:     Review of Systems   Constitutional: Negative for appetite change, diaphoresis and fatigue. HENT: Negative for ear discharge and trouble swallowing. Eyes: Negative for pain and discharge. Respiratory: Negative for cough, shortness of breath and wheezing. Cardiovascular: Negative for chest pain and palpitations. Gastrointestinal: Negative for abdominal distention, blood in stool and diarrhea. Endocrine: Negative for polydipsia and polyphagia. Genitourinary: Negative for difficulty urinating and frequency. Musculoskeletal: Negative for gait problem, myalgias and neck pain. Skin: Negative for color change and rash. Allergic/Immunologic: Negative for environmental allergies and food allergies. Neurological: Negative for dizziness and headaches. Hematological: Negative for adenopathy. Does not bruise/bleed easily. Psychiatric/Behavioral: Negative for behavioral problems and sleep disturbance. Objective:     Physical Exam  Constitutional:       Appearance: She is well-developed. She is not diaphoretic. HENT:      Head: Normocephalic and atraumatic. Eyes:      General:         Right eye: No discharge. Left eye: No discharge. Extraocular Movements:      Right eye: Normal extraocular motion. Left eye: Normal extraocular motion. Conjunctiva/sclera: Conjunctivae normal.      Right eye: Right conjunctiva is not injected. Left eye: Left conjunctiva is not injected. Neck:      Thyroid: No thyroid mass or thyromegaly. Vascular: No JVD. Cardiovascular:      Rate and Rhythm: Normal rate and regular rhythm. Heart sounds: No murmur heard. No friction rub. Pulmonary:      Effort: Pulmonary effort is normal. No tachypnea, bradypnea, accessory muscle usage or respiratory distress. Breath sounds: Normal breath sounds. No wheezing or rales. Abdominal:      General: Bowel sounds are normal. There is no distension. Palpations: Abdomen is soft. Tenderness:  There is no abdominal tenderness. There is no rebound. Musculoskeletal:         General: No tenderness. Normal range of motion. Cervical back: Normal range of motion and neck supple. No edema or erythema. Lymphadenopathy:      Head:      Right side of head: No submental or submandibular adenopathy. Left side of head: No submental or submandibular adenopathy. Cervical: No cervical adenopathy. Skin:     General: Skin is warm. Coloration: Skin is not pale. Findings: No bruising, ecchymosis or rash. Neurological:      Mental Status: She is alert and oriented to person, place, and time. Cranial Nerves: No cranial nerve deficit. Sensory: No sensory deficit. Motor: No atrophy or abnormal muscle tone. Coordination: Coordination normal.   Psychiatric:         Mood and Affect: Mood is not anxious. Affect is not angry. Speech: Speech is not slurred. Behavior: Behavior normal. Behavior is not aggressive. Thought Content: Thought content does not include homicidal ideation. Cognition and Memory: Memory is not impaired. /70   Pulse 110   Ht 5' 5\" (1.651 m)   Wt 122 lb (55.3 kg)   SpO2 98%   BMI 20.30 kg/m²     Assessment:       Diagnosis Orders   1. DM type 2, uncontrolled, with neuropathy ([address]     2. Mixed hyperlipidemia     3. Essential hypertension     4. Panlobular emphysema ([address]     5. Spinal stenosis of lumbar region without neurogenic claudication     6. Dyspnea on exertion               Plan:      No follow-ups on file. No orders of the defined types were placed in this encounter. No orders of the defined types were placed in this encounter. increase lantus to 15  No log  a1c ove 9  Pt due  To see dr Keaton Lind  In sep 2021  pft noted normal     Patient given educational materials - see patient instructions. Discussed use, benefit, and side effects of prescribed medications. All patientquestions answered. Pt voiced understanding.  Reviewed health maintenance. Instructedto continue current medications, diet and exercise. Patient agreed with treatmentplan. Follow up as directed.      Electronically signed by William Tinsley MD on 7/20/2021 at 2:45 PM

## 2021-07-29 ENCOUNTER — TELEPHONE (OUTPATIENT)
Dept: GASTROENTEROLOGY | Age: 76
End: 2021-07-29

## 2021-07-29 ENCOUNTER — HOSPITAL ENCOUNTER (OUTPATIENT)
Dept: PREADMISSION TESTING | Age: 76
Discharge: HOME | End: 2021-07-29

## 2021-07-29 NOTE — PROGRESS NOTES
Started pre-operative questions with Mariposa Sanchez when started going over medication list she relates \"Why are you telling me my medications, I know what I take,\"she was informed that I was verifying with her that I had them correctly . Mariposa Sanchez relates I haven't been happy with office or Dr. And I am going to cancel procedure informed, that that was her prerogative and that she would need to call office, number was given.

## 2021-07-29 NOTE — TELEPHONE ENCOUNTER
Rec'd notification from PAT RN that pt became upset during PAT call and stated she was going to cancel procedure. Pt needs to be contacted to confirm cancellation.

## 2021-07-29 NOTE — TELEPHONE ENCOUNTER
Pt called & stated her One Touch Glucose Meter broke & she is requesting a new monitor.     Orders pended for approval.

## 2021-07-29 NOTE — TELEPHONE ENCOUNTER
Called pt on mobile line and spoke to her to confirm procedure cancellation. Pt confirmed she was cancelling and stated she wasn't sure she needs it. Pt also noted that the dr didn't explain what the procedure was or why she needed it. Writer offered to explain or send message to clinical to call her and explain further but pt declined and stated she will call us next week.

## 2021-08-06 DIAGNOSIS — E11.00 UNCONTROLLED TYPE 2 DM WITH HYPEROSMOLAR NONKETOTIC HYPERGLYCEMIA (HCC): ICD-10-CM

## 2021-08-06 RX ORDER — BLOOD SUGAR DIAGNOSTIC
STRIP MISCELLANEOUS
Qty: 100 STRIP | Refills: 3 | Status: SHIPPED | OUTPATIENT
Start: 2021-08-06 | End: 2021-12-09

## 2021-08-06 NOTE — TELEPHONE ENCOUNTER
Medication: One Touch Ultra Test Strips  Last visit: 7/20/21  Next visit: 8/24/2021  Last refill: 7/27/20  [facility]

## 2021-08-16 ENCOUNTER — HOSPITAL ENCOUNTER (OUTPATIENT)
Dept: PHARMACY | Age: 76
Setting detail: THERAPIES SERIES
Discharge: HOME | End: 2021-08-16
Payer: MEDICARE

## 2021-08-16 VITALS — WEIGHT: 122.6 LBS | BODY MASS INDEX: 20.4 KG/M2

## 2021-08-16 PROCEDURE — 99213 OFFICE O/P EST LOW 20 MIN: CPT

## 2021-08-16 RX ORDER — REPAGLINIDE 2 MG/1
2 TABLET ORAL
COMMUNITY
End: 2022-04-07

## 2021-08-16 RX ORDER — GLIPIZIDE 10 MG/1
20 TABLET ORAL
COMMUNITY
End: 2021-11-15

## 2021-08-16 NOTE — PROGRESS NOTES
Diabetic Medication Management   [facility]    [address] [facility] [address] Arabella Arias  Phone: [phone]  Fax: [phone]    NAME: Irby Gilford  MEDICAL RECORD NUMBER:  [id number]  AGE: 67 y.o. GENDER: female  : 1945  EPISODE DATE:  2021       Ms. Betzaida Cotton was referred to [facility] Medication Management Services by Dr. Aureliano Reardon,     Goals per referral:   Fasting blood glucose: < 130  Peak postprandial glucose: < 180  A1C: < 7    Other goals:  Blood pressure goal: 122/70 (21)  Weight loss goal.  Patient already at or below goal weight. No weight loss needed. Physical Activity goal:    Patient interested in regular exercise but states her breathing currently keeps her from regular exercise. See below. Smoking Cessation (Former smoker)   Quit   Cholesterol at target:   Date: due  Annual eye exam:    Date: 2020  Comprehensive annual foot exam:   Date: Current with PCP or neurologist  Annual urine Albumin and serum creatinine:   Date: due    Patient Specific Goals:  1. Better blood sugar control    Subjective   Ms. Betzaida Cotton is a 67 y.o. female here for the Diabetes Service for self-management education, medication review including over the counter medications and herbal products, overall wellbeing assessment, transition of care and any needed adjustments with updates and recommendations communicated to the referring physician. Patient's name and  verified. First visit to medication management. Reviewed medications and updated needed items in Epic. Patient currently taking:  repaglinide (Prandin) 2mg three times daily with meals, glipizide 10mg (2 tablets) twice a day before meals, metformin 1000mg twice a day with meals and insulin glargine 15mg SubQ once a day in the evening. Patient recently started insulin glargine and was increased to 15 units once a day at last PCP visit.       Was also started on Rybelsus but states she developed significant diarrhea and was told to stop per PCP. Patient still has Rybelsus and indicates it was quite expensive even with her insurance. Discussed hypogycemia as patient is having some fasting blood sugars in 70's. Patient states when gets low like that she feels jittery and lightheaded. Indicates when she feels like this she drinks about 4 oz of OJ or takes glucose tablets. Discussed appropriate management of retesting blood sugar 15-20 min after OJ/glucose tablet and eating something else to prevent BS from decreasing again. Patient understands. Patient brings in glucometer as well as new glucometer she just picked up from pharmacy. Notes in Epic indicate she needed a new meter but upon discussion with patient she didn't think her old meter was working correctly. No errors give but patient states the BS were being reported as high so she thought maybe meter was not working. Has not used new meter as was not sure how to use it. Turned new meter on for patient and walked her thru set up for intial start. Testing blood sugar with new meter which is reported at 214. Tested blood sugar with old meter which reports at 12. Discussed with patient that both meters appear to be working correctly in that her blood sugars are elevated. Patient indicates following schedule:  Wakes 8:30am - tests blood sugar and eats small cup of cereal (cherrios or honey bunches of oats). Drinks with almond milk. Lunch 12pm normally 1/2 sandwich on wheat - either PB&J, or tuna fish or ham/turkey. Reports very small serving of potato chips with 1/2 sandwich. Discussed better options than potato chips. Dinner 6pm consists of small piece of meat and larger serving of vegetables. Very little pasta or potatoes with dinner as tries to avoid. Patient reports snacking between lunch and dinner (3pm) and in evening (8-9pm) on fruit such as grapes/apple, plums etc or peanuts.  Discussed how fruit can affect blood sugar. Patient states she is often hungry after eating as eats such small servings. Has lost about 30 lbs in last four years per patient. Upon review of blood sugar log patient testing fasting blood sugars as well as post prandial lunch and dinner blood sugars most days (see below). Discussed possible medication changes. Hesitant to increase insulin glargine any more due to near hypoglycemia with fasting blood sugars. Patient already on repaglinide and glipizide which appear ineffective at controlling meal time BS in this patient. Discussed that as we control her BS we will possibly look to gradually discontinue these. Discussed adding meal time insulin with patient who indicates she would like to try Rybelsus first again. Patient states she is unsure if this caused her diarrhea of if just coincidence. Unsure if this will control meal time blood sugars enough but since patient already has supply and willing to try she will resume at 3mg once a day. Discussed taking on empty stomach 30 min before other food in morning with no more than 4 oz of water. If this does not control meal time BS will consider adding Humalog to some if not all meals. Patient will also start checking prior to lunch or dinner in addition to post prandial and bring in to next appt. Looked up cost of Humalog on [employer] and appears to be formulary with $35 copay. Patient intends to call insurance or check with pharmacy as Lantus was over $100. Patient reports SOB has been worse and using her MDI albuterol more. States she really dose not feel relieve from MDI. Had patient explain how she uses MDI and appears to be using correctly. States she may have a spacer at home that she was given by pulmonology. Discussed appropriate use and encouraged her to bring to next appt. If SOB continues encouraged her to contact her provider.      Patient due for lipids, microalbumin and SrCr   As not fasting today would like to get with next appt. Appt made in morning so that patient can fast prior. Patient Findings:   []  Missed doses   []  Emergency Room Visit    [x]  Medication changes  []  Hospitalization   [x]  Diet changes   []  Acute illness   []  Activity changes      Symptoms of hypoglycemia   []  None    [x]  Shakiness    [x]  Lightheadedness or dizziness   []  Confusion      []  Sweating   []  Other       Symptoms of hyperglycemia   [x]  None   []  Frequent urination    []  Increased thirst   []  Other    Medication adverse reactions   []  None   [x]  Diarrhea / Nausea / Vomiting / Constipation / flatulence   []  Hypertension   []  Peripheral edema     []  Signs of infection   []  Headache   []  Vision changes   []  Increased cholesterol    []  Weight gain   []  Change in renal function   []  Increased potassium  []  Other      Comments:  Rybelsus may have caused diarrhea. Patient wants to try again prior to trying anything else. If recent hospital admission / ED visit, was this related to Diabetes No: EDG .  Discharge date 21    Objective     PMHx:    Past Medical History:   Diagnosis Date    Anxiety state, unspecified     Arthritis     Cataracts, bilateral     COVID-19 vaccine administered 2021 & 3-    MODERNA    Esophageal reflux     Generalized osteoarthrosis, unspecified site     Hearing loss     Hyperlipidemia     Hypertension     Neuropathy     Neuropathy due to medical condition ([address]     Pure hypercholesterolemia     Thoracic or lumbosacral neuritis or radiculitis, unspecified     Type 2 diabetes mellitus without complication ([address]     Urinary retention     UTI (urinary tract infection)        Social History:    Social History     Tobacco Use    Smoking status: Former Smoker     Packs/day: 2.00     Years: 20.00     Pack years: 40.00     Types: Cigarettes     Quit date: 1984     Years since quittin.6    Smokeless tobacco: Never Used Substance Use Topics    Alcohol use: Yes     Alcohol/week: 0.0 standard drinks     Comment: social       Pertinent Labs:    Lab Results   Component Value Date    LABA1C 9.4 06/01/2021    LABA1C 8.7 02/25/2021    LABA1C 8.6 (H) 10/19/2020     Lab Results   Component Value Date    CHOL 162 05/16/2019    TRIG 89 05/16/2019    HDL 73 06/24/2020     Lab Results   Component Value Date    CREATININE 0.6 04/27/2021    BUN 12 02/25/2021     02/25/2021    K 4.6 02/25/2021     02/25/2021    CO2 29 02/25/2021     Lab Results   Component Value Date    ALT 14 02/25/2021       Weight:  Wt Readings from Last 3 Encounters:   08/16/21 122 lb 9.6 oz (55.6 kg)   07/20/21 122 lb (55.3 kg)   06/08/21 120 lb (54.4 kg)       Current medications:  Prior to Admission medications    Medication Sig Start Date End Date Taking?  Authorizing Provider   repaglinide (PRANDIN) 2 MG tablet Take 2 mg by mouth 3 times daily (before meals)   Yes Historical Provider, MD   glipiZIDE (GLUCOTROL) 10 MG tablet Take 20 mg by mouth 2 times daily (before meals)   Yes Historical Provider, MD   Multiple Vitamins-Minerals (LUTEIN-ZEAXANTHIN PO) Take 1 capsule by mouth daily   Yes Historical Provider, MD   DULoxetine (CYMBALTA) 60 MG extended release capsule TAKE 1 CAPSULE BY MOUTH  DAILY 8/5/21  Yes Lenore Dobson MD   insulin glargine (LANTUS SOLOSTAR) 100 UNIT/ML injection pen Inject 10 Units into the skin every evening  Patient taking differently: Inject 15 Units into the skin every evening  7/6/21 7/6/22 Yes Luanne Zarate MD   metoprolol tartrate (LOPRESSOR) 25 MG tablet TAKE 1 TABLET BY MOUTH  TWICE DAILY 6/28/21  Yes Avis Mayer MD   albuterol sulfate HFA (VENTOLIN HFA) 108 (90 Base) MCG/ACT inhaler Inhale 2 puffs into the lungs every 4 hours as needed for Wheezing 6/1/21 6/1/22 Yes Luanne Zarate MD   metFORMIN (GLUCOPHAGE) 1000 MG tablet TAKE 1 TABLET BY MOUTH  TWICE DAILY WITH MEALS 5/24/21  Yes Luanne Zarate MD lisinopril (PRINIVIL;ZESTRIL) 20 MG tablet TAKE 1 TABLET BY MOUTH  TWICE DAILY 4/13/21  Yes Alexander Hernandez MD   gabapentin (NEURONTIN) 300 MG capsule TAKE 1 CAPSULE BY MOUTH IN  THE MORNING, 1 CAPSULE IN  THE AFTERNOON, AND 2  CAPSULES AT BEDTIME  Patient taking differently: TAKE 1 CAPSULE BY MOUTH IN  THE MORNING, 1 CAPSULE IN  THE AFTERNOON, AND 2  CAPSULES AT BEDTIME    Patient taking 2 capsules at bedtime and 1-2 in afternoon and none in morning 2/4/21 2/4/22 Yes Roxane Sal APRN - CNP   atorvastatin (LIPITOR) 40 MG tablet TAKE 1 TABLET BY MOUTH  DAILY 12/18/20  Yes Alexander Hernandez MD   Calcium-Magnesium-Vitamin D (CALCIUM 1200+D3 PO) Take 2 tablets by mouth daily. Yes Historical Provider, MD   acetaminophen (TYLENOL) 500 MG tablet Take 500 mg by mouth 2 times daily    Yes Historical Provider, MD   aspirin 81 MG chewable tablet Take 81 mg by mouth daily. Yes Historical Provider, MD   blood glucose test strips (ONETOUCH ULTRA) strip USE ONE STRIP TO TEST FOUR TIMES A DAY 8/6/21   Azael Carrillo MD   blood glucose monitor kit and supplies 1 kit by Other route three times daily Dispense One Touch Glucose Meter. 7/30/21   Alexander Hernandez MD   polyethylene glycol (GLYCOLAX) 17 GM/SCOOP powder Follow instructions provided to you from physician's office. 6/8/21 8/16/21  Roberto Carlos Tripp MD   bisacodyl (BISACODYL) 5 MG EC tablet Follow instructions provided given by the physician's office. 6/8/21 8/16/21  Roberto Carlos Tripp MD   glipiZIDE (GLUCOTROL) 10 MG tablet Take 2 tablets by mouth 2 times daily (before meals) for 10 days 6/4/21 8/16/21  Alexander Hernandez MD   repaglinide (PRANDIN) 2 MG tablet Take 1 tablet by mouth 3 times daily (before meals) for 10 days 6/4/21 8/16/21  Alexander Hernandez MD   blood glucose monitor strips Test four  times a day & as needed for symptoms of irregular blood glucose.  Dispense sufficient amount for indicated testing frequency plus additional to accommodate PRN testing needs. 7/23/20   Rosalee Mike MD   ONE TOUCH ULTRASOFT LANCETS MISC 1 each by Does not apply route 2 times daily 4/5/17   Juan Hsu MD       Immunizations:   Most Recent Immunizations   Administered Date(s) Administered    COVID-19, Moderna, PF, 100mcg/0.5mL 03/05/2021    Influenza, High Dose (Fluzone 65 yrs and older) 12/02/2019    Influenza, Quadv, adjuvanted, 65 yrs +, IM, PF (Fluad) 11/03/2020    Pneumococcal Conjugate 13-valent ([id number]) 03/16/2018    Pneumococcal Conjugate 7-valent (Prevnar7) 12/18/2009    Pneumococcal Polysaccharide ([id number]) 01/04/2017    Td (Adult), 5 Lf Tetanus Toxoid, Pf (Tenivac, Decavac) 06/07/2010    Zoster Recombinant (Shingrix) 08/01/2018       Home Blood Glucose Results:     Blood glucose trends noted: blood sugar log reviewed. Fasting blood sugars running   Post prandial blood sugars after both lunch and dinner consistently in mid to high 200's. Patient had small cup (not bowl) of cherrios with almond mild about 8:30am and has had nothing else. BS at 11:15am reported in low 200's. Assessment     A1c at goal: No:   Blood Pressure at goal: Yes  Weight at goal: Yes  Physical activity: no formal exercise due to SOB  Physical activity at goal: No:   Smoking Cessation: Yes  Cholesterol at target: due  Annual eye exam completed: Yes  Comprehensive Foot Exam Completed: Yes  Annual urine albumin and serum creatinine: due    Statin: Yes    Appropriate?: Yes  Changes made:     ACE/ARB: Yes  Appropriate?: Yes  Changes made:     Aspirin: Yes  Appropriate?:  Changes made:     Eating patterns:    []  My plate    []  Mediterranean diet   []  Low sodium   []  DASH diet   [x]  Portion control   [x]  Reduced calorie    []  Fast food / Restaurant  []  High glycemic index foods   []  Sugary beverages   []  Other     Comment:      Current Medications Affecting Diabetes:  Repaglinide 2mg three times daily with meals.   Glipizide 10mg (2 tabs) twice a day with meals.  Metformin 1000mg twice a day with meals. Insulin glargine 15 units once a day in the evening. Compliant: yes  Barriers to medication therapy: yes - cost    Medications attempted in the past:  Rybelsus - diarrhea but unsure if cause      Last office dictation reviewed: yes        type 2 DM under poor control as evidenced by A1c. Plan     Initial diabetic education materials given to patient including the following:   *  Your Guide to a Healthy Lifestyle   * 5 Helpful Things to Know About A1C   * Using the Plate Method for a Balanced Meal Plan   * Things to Know About Hypoglycemia     Continue:  Repaglinide 2mg three times daily with meals  Glipizide 10mg (2 tabs) twice a day with meals  Metformin 1000mg twice a day with meals  Insulin glargine 15 units each evening. Resume Rybelsus 3mg once a day    Call with excessive diarrhea or hypoglycemia. Check blood sugar prior to either lunch or dinner as well as postprandial.    Fast prior to next appt for lab work (lipids, microalbumin and SrCr. Physician Follow-up:  As needed    Medication Management Follow-up:   Diabetes Service   2 weeks       COVID 19 screening completed. At this time patient denies symptoms, recent travel and exposure. Patient educated to screen for temperature and COVID-19 symptoms prior to coming to clinic for next appointment. They are instructed to call the clinic to reschedule if they have any symptoms. Electronically signed by Souleymane Crisostomo RPH on 8/16/2021 at 4:03 PM      For Pharmacy [address] in place:   Yes   Recommendation Provided To: Patient/Caregiver: 1 via In person   Intervention Detail: New Rx: 1, reason: Needs Additional Therapy   Intervention Accepted By: Patient/Caregiver: 1   Time Spent (min): 60

## 2021-08-24 ENCOUNTER — OFFICE VISIT (OUTPATIENT)
Dept: INTERNAL MEDICINE CLINIC | Age: 76
End: 2021-08-24
Payer: MEDICARE

## 2021-08-24 VITALS
WEIGHT: 120 LBS | OXYGEN SATURATION: 98 % | HEIGHT: 65 IN | DIASTOLIC BLOOD PRESSURE: 72 MMHG | BODY MASS INDEX: 19.99 KG/M2 | HEART RATE: 88 BPM | SYSTOLIC BLOOD PRESSURE: 120 MMHG

## 2021-08-24 DIAGNOSIS — K21.9 GASTROESOPHAGEAL REFLUX DISEASE WITHOUT ESOPHAGITIS: ICD-10-CM

## 2021-08-24 DIAGNOSIS — R06.09 DOE (DYSPNEA ON EXERTION): Primary | ICD-10-CM

## 2021-08-24 DIAGNOSIS — E78.2 MIXED HYPERLIPIDEMIA: ICD-10-CM

## 2021-08-24 PROCEDURE — G8427 DOCREV CUR MEDS BY ELIG CLIN: HCPCS | Performed by: INTERNAL MEDICINE

## 2021-08-24 PROCEDURE — 1090F PRES/ABSN URINE INCON ASSESS: CPT | Performed by: INTERNAL MEDICINE

## 2021-08-24 PROCEDURE — G8420 CALC BMI NORM PARAMETERS: HCPCS | Performed by: INTERNAL MEDICINE

## 2021-08-24 PROCEDURE — G8399 PT W/DXA RESULTS DOCUMENT: HCPCS | Performed by: INTERNAL MEDICINE

## 2021-08-24 PROCEDURE — 1036F TOBACCO NON-USER: CPT | Performed by: INTERNAL MEDICINE

## 2021-08-24 PROCEDURE — 4040F PNEUMOC VAC/ADMIN/RCVD: CPT | Performed by: INTERNAL MEDICINE

## 2021-08-24 PROCEDURE — 1123F ACP DISCUSS/DSCN MKR DOCD: CPT | Performed by: INTERNAL MEDICINE

## 2021-08-24 PROCEDURE — 99214 OFFICE O/P EST MOD 30 MIN: CPT | Performed by: INTERNAL MEDICINE

## 2021-08-24 RX ORDER — ORAL SEMAGLUTIDE 3 MG/1
TABLET ORAL DAILY
COMMUNITY
End: 2021-08-30 | Stop reason: DRUGHIGH

## 2021-08-24 ASSESSMENT — ENCOUNTER SYMPTOMS
ABDOMINAL DISTENTION: 0
BLOOD IN STOOL: 0
EYE DISCHARGE: 0
COUGH: 0
EYE PAIN: 0
TROUBLE SWALLOWING: 0
WHEEZING: 0
DIARRHEA: 0
COLOR CHANGE: 0
SHORTNESS OF BREATH: 1

## 2021-08-24 NOTE — PROGRESS NOTES
[address] [address]  Dept: [phone]  Dept Fax: [phone]    Mario Maza is a 67 y.o. female who presents today for her medical conditions/complaintsas noted below. Mario Maza is c/o of   Chief Complaint   Patient presents with    Diabetes     6/1/21 a1c 9.4    Shortness of Breath         HPI:     Schneider  Diabetes   Duration more than 7 years  Modifying factors on Glucophage and other med  Severity uncontrolled sever  Associated signs and symtoms neuropathy/ckd/ CAD. aggravated with sugar diet and better with low sugar diet           Hemoglobin A1C (%)   Date Value   06/01/2021 9.4   02/25/2021 8.7   10/19/2020 8.6 (H)             ( goal A1Cis < 7)   Microalb/Crt.  Ratio (mcg/mg creat)   Date Value   11/19/2019 CANNOT BE CALCULATED     LDL Cholesterol (mg/dL)   Date Value   06/24/2020 62   05/16/2019 71   02/12/2018 49       (goal LDL is <100)   AST (U/L)   Date Value   02/25/2021 17     ALT (U/L)   Date Value   02/25/2021 14     BUN (mg/dL)   Date Value   02/25/2021 12     BP Readings from Last 3 Encounters:   08/24/21 120/72   07/20/21 122/70   06/08/21 116/68          (goal 120/80)    Past Medical History:   Diagnosis Date    Anxiety state, unspecified     Arthritis     Cataracts, bilateral     COVID-19 vaccine administered 2- & 3-    MODERNA    Esophageal reflux     Generalized osteoarthrosis, unspecified site     Hearing loss     Hyperlipidemia     Hypertension     Neuropathy     Neuropathy due to medical condition ([address]     Pure hypercholesterolemia     Thoracic or lumbosacral neuritis or radiculitis, unspecified     Type 2 diabetes mellitus without complication ([address]     Urinary retention     UTI (urinary tract infection)       Past Surgical History:   Procedure Laterality Date    BACK SURGERY      X3    BREAST BIOPSY  2007    Lt     CARPAL TUNNEL RELEASE Right     CATARACT REMOVAL WITH IMPLANT TABLET BY MOUTH  TWICE DAILY 180 tablet 3    albuterol sulfate HFA (VENTOLIN HFA) 108 (90 Base) MCG/ACT inhaler Inhale 2 puffs into the lungs every 4 hours as needed for Wheezing 1 Inhaler 0    metFORMIN (GLUCOPHAGE) 1000 MG tablet TAKE 1 TABLET BY MOUTH  TWICE DAILY WITH MEALS 180 tablet 3    lisinopril (PRINIVIL;ZESTRIL) 20 MG tablet TAKE 1 TABLET BY MOUTH  TWICE DAILY 180 tablet 3    gabapentin (NEURONTIN) 300 MG capsule TAKE 1 CAPSULE BY MOUTH IN  THE MORNING, 1 CAPSULE IN  THE AFTERNOON, AND 2  CAPSULES AT BEDTIME (Patient taking differently: TAKE 1 CAPSULE BY MOUTH IN  THE MORNING, 1 CAPSULE IN  THE AFTERNOON, AND 2  CAPSULES AT BEDTIME    Patient taking 2 capsules at bedtime and 1-2 in afternoon and none in morning) 360 capsule 3    atorvastatin (LIPITOR) 40 MG tablet TAKE 1 TABLET BY MOUTH  DAILY 90 tablet 3    blood glucose monitor strips Test four  times a day & as needed for symptoms of irregular blood glucose. Dispense sufficient amount for indicated testing frequency plus additional to accommodate PRN testing needs. 120 strip 5    ONE TOUCH ULTRASOFT LANCETS MISC 1 each by Does not apply route 2 times daily 100 each 3    Calcium-Magnesium-Vitamin D (CALCIUM 1200+D3 PO) Take 2 tablets by mouth daily.  acetaminophen (TYLENOL) 500 MG tablet Take 500 mg by mouth 2 times daily       aspirin 81 MG chewable tablet Take 81 mg by mouth daily. No current facility-administered medications for this visit.      Allergies   Allergen Reactions    Hydrocodone        Health Maintenance   Topic Date Due    DTaP/Tdap/Td vaccine (1 - Tdap) 06/08/2010    Shingles Vaccine (2 of 2) 09/26/2018    Lipid screen  06/24/2021    Flu vaccine (1) 09/01/2021    Annual Wellness Visit (AWV)  11/20/2021    Potassium monitoring  02/25/2022    Creatinine monitoring  02/25/2022    DEXA (modify frequency per FRAX score)  Completed    Pneumococcal 65+ years Vaccine  Completed    COVID-19 Vaccine  Completed    Hepatitis C screen  Completed    Hepatitis A vaccine  Aged Out    Hib vaccine  Aged Out    Meningococcal (ACWY) vaccine  Aged Out       Subjective:     Review of Systems   Constitutional: Negative for appetite change, diaphoresis and fatigue. HENT: Negative for ear discharge and trouble swallowing. Eyes: Negative for pain and discharge. Respiratory: Positive for shortness of breath. Negative for cough and wheezing. Cardiovascular: Negative for chest pain and palpitations. Gastrointestinal: Negative for abdominal distention, blood in stool and diarrhea. Endocrine: Negative for polydipsia and polyphagia. Genitourinary: Negative for difficulty urinating and frequency. Musculoskeletal: Negative for gait problem, myalgias and neck pain. Skin: Negative for color change and rash. Allergic/Immunologic: Negative for environmental allergies and food allergies. Neurological: Negative for dizziness and headaches. Hematological: Negative for adenopathy. Does not bruise/bleed easily. Psychiatric/Behavioral: Negative for behavioral problems and sleep disturbance. Objective:     Physical Exam  Constitutional:       Appearance: She is well-developed. She is not diaphoretic. HENT:      Head: Normocephalic and atraumatic. Eyes:      General:         Right eye: No discharge. Left eye: No discharge. Extraocular Movements:      Right eye: Normal extraocular motion. Left eye: Normal extraocular motion. Conjunctiva/sclera: Conjunctivae normal.      Right eye: Right conjunctiva is not injected. Left eye: Left conjunctiva is not injected. Neck:      Thyroid: No thyroid mass or thyromegaly. Vascular: No JVD. Cardiovascular:      Rate and Rhythm: Normal rate and regular rhythm. Heart sounds: No murmur heard. No friction rub. Pulmonary:      Effort: Pulmonary effort is normal. No tachypnea, bradypnea, accessory muscle usage or respiratory distress. Breath sounds: Normal breath sounds. No wheezing or rales. Abdominal:      General: Bowel sounds are normal. There is no distension. Palpations: Abdomen is soft. Tenderness: There is no abdominal tenderness. There is no rebound. Musculoskeletal:         General: No tenderness. Normal range of motion. Cervical back: Normal range of motion and neck supple. No edema or erythema. Lymphadenopathy:      Head:      Right side of head: No submental or submandibular adenopathy. Left side of head: No submental or submandibular adenopathy. Cervical: No cervical adenopathy. Skin:     General: Skin is warm. Coloration: Skin is not pale. Findings: No bruising, ecchymosis or rash. Neurological:      Mental Status: She is alert and oriented to person, place, and time. Cranial Nerves: No cranial nerve deficit. Sensory: No sensory deficit. Motor: No atrophy or abnormal muscle tone. Coordination: Coordination normal.   Psychiatric:         Mood and Affect: Mood is not anxious. Affect is not angry. Speech: Speech is not slurred. Behavior: Behavior normal. Behavior is not aggressive. Thought Content: Thought content does not include homicidal ideation. Cognition and Memory: Memory is not impaired. /72   Pulse 88   Ht 5' 5\" (1.651 m)   Wt 120 lb (54.4 kg)   SpO2 98%   BMI 19.97 kg/m²     Assessment:       Diagnosis Orders   1. PINTO (dyspnea on exertion)  Echocardiogram complete    Rosy Hampton MD, Pulmonology, [place]    D-Dimer, Quantitative   2. DM type 2, uncontrolled, with neuropathy ([address]     3. Gastroesophageal reflux disease without esophagitis     4. Mixed hyperlipidemia               Plan:      No follow-ups on file.     Orders Placed This Encounter   Procedures    D-Dimer, Quantitative     Standing Status:   Future     Standing Expiration Date:   2/20/2022   Rosy Hampton MD, Pulmonology, [place]     Referral Priority:   Routine     Referral Type:   Eval and Treat     Referral Reason:   Specialty Services Required     Referred to Provider:   Shara Guillaume MD     Requested Specialty:   Pulmonology     Number of Visits Requested:   1    Echocardiogram complete     Standing Status:   Future     Standing Expiration Date:   10/23/2021     Order Specific Question:   Reason for exam:     Answer:   minaya     No orders of the defined types were placed in this encounter. minaya  Stress negative neclear  PFT negative  Ct a no pe last year  sherlyn repeat d dmier  Ventolin some hlep  High humidity air makes it worse  sherlyn ref to pulm  Echo repet rule out valvular anomaly  saples of breqtri given      Patient given educational materials - see patient instructions. Discussed use, benefit, and side effects of prescribed medications. All patientquestions answered. Pt voiced understanding. Reviewed health maintenance. Instructedto continue current medications, diet and exercise. Patient agreed with treatmentplan. Follow up as directed.      Electronically signed by Darnell Chaudhry MD on 8/24/2021 at 10:44 AM

## 2021-08-30 ENCOUNTER — HOSPITAL ENCOUNTER (OUTPATIENT)
Dept: PHARMACY | Age: 76
Setting detail: THERAPIES SERIES
Discharge: HOME | End: 2021-08-30
Payer: MEDICARE

## 2021-08-30 ENCOUNTER — HOSPITAL ENCOUNTER (OUTPATIENT)
Age: 76
Setting detail: SPECIMEN
Discharge: HOME | End: 2021-08-30
Payer: MEDICARE

## 2021-08-30 ENCOUNTER — TELEPHONE (OUTPATIENT)
Dept: PHARMACY | Age: 76
End: 2021-08-30

## 2021-08-30 VITALS — BODY MASS INDEX: 20.4 KG/M2 | WEIGHT: 122.6 LBS

## 2021-08-30 LAB
CHOLESTEROL/HDL RATIO: 2.1
CHOLESTEROL: 167 MG/DL
CREAT SERPL-MCNC: 0.53 MG/DL (ref 0.5–0.9)
CREATININE URINE: 46.2 MG/DL (ref 28–217)
ESTIMATED AVERAGE GLUCOSE: 166 MG/DL
GFR AFRICAN AMERICAN: >60 ML/MIN
GFR NON-AFRICAN AMERICAN: >60 ML/MIN
GFR SERPL CREATININE-BSD FRML MDRD: NORMAL ML/MIN/{1.73_M2}
GFR SERPL CREATININE-BSD FRML MDRD: NORMAL ML/MIN/{1.73_M2}
HBA1C MFR BLD: 7.4 % (ref 4–6)
HDLC SERPL-MCNC: 78 MG/DL
LDL CHOLESTEROL: 74 MG/DL (ref 0–130)
MICROALBUMIN/CREAT 24H UR: 15 MG/L
MICROALBUMIN/CREAT UR-RTO: 32 MCG/MG CREAT
TRIGL SERPL-MCNC: 73 MG/DL
VLDLC SERPL CALC-MCNC: NORMAL MG/DL (ref 1–30)

## 2021-08-30 PROCEDURE — 36415 COLL VENOUS BLD VENIPUNCTURE: CPT

## 2021-08-30 PROCEDURE — 82043 UR ALBUMIN QUANTITATIVE: CPT

## 2021-08-30 PROCEDURE — 82570 ASSAY OF URINE CREATININE: CPT

## 2021-08-30 PROCEDURE — 82565 ASSAY OF CREATININE: CPT

## 2021-08-30 PROCEDURE — 80061 LIPID PANEL: CPT

## 2021-08-30 PROCEDURE — 83036 HEMOGLOBIN GLYCOSYLATED A1C: CPT

## 2021-08-30 PROCEDURE — 99213 OFFICE O/P EST LOW 20 MIN: CPT

## 2021-08-30 RX ORDER — ORAL SEMAGLUTIDE 7 MG/1
1 TABLET ORAL DAILY
Qty: 30 TABLET | Refills: 2 | Status: SHIPPED | OUTPATIENT
Start: 2021-08-30 | End: 2022-02-10

## 2021-08-30 NOTE — PROGRESS NOTES
Diabetic Medication Management   [address]. [place], [address] [facility]  Phone: [phone]  Fax: [phone]    NAME: Angie Ash  MEDICAL RECORD NUMBER:  [id number]  AGE: 67 y.o. GENDER: female  : 1945  EPISODE DATE:  2021       Ms. Efra Palma was referred to [address] [place] [address] Medication Management Services by Dr. Florin Raymond,     Goals per referral:   Fasting blood glucose: < 130  Peak postprandial glucose: < 180  A1C: < 7    Other goals:  Blood pressure goal: 122/70 (21)  Weight loss goal.  Patient already at or below goal weight. No weight loss needed. Physical Activity goal:    Patient interested in regular exercise but states her breathing currently keeps her from regular exercise. See below. Smoking Cessation (Former smoker)   Quit   Cholesterol at target:   Date: due  Annual eye exam:    Date: 2020  Comprehensive annual foot exam:   Date: Current with PCP or neurologist  Annual urine Albumin and serum creatinine:   Date: due    Patient Specific Goals:  1. Better blood sugar control    Subjective   Ms. Efra Palma is a 67 y.o. female here for the Diabetes Service for self-management education, medication review including over the counter medications and herbal products, overall wellbeing assessment, transition of care and any needed adjustments with updates and recommendations communicated to the referring physician. Patient's name and  verified. Patient presents in person for diabetes medication management today. Fasting for lab work today. Patient indicates she lat ate at 6pm last evening. States blood sugar at 8am was 63. Quickly leighton venous blood draw for lipids and gave patient 4 oz of apple juice at 0942. Repeat blood sugar at was 135 at 10:02am.    Review of patient's BS (below) show patient has had two episodes of hypoglycemia (both fasting) in the last two weeks.   In both instances patient has eaten /drank glucose tablets or juice and retested blood sugar with appropriate increase. Patient resumed Rybelsus the day after her last appt and states she has been tolerating it well. Only one bought of minimal diarrhea about 2 days after restarting but nothing since. Will have patient finish month of 3mg dosing then increase to 7mg to have efficacy in reducing blood sugar. Patient educated on plan and will call with any increase in diarrhea. Patient currently taking: repaglinide (Prandin) 2mg three times daily with meals, glipizide 10mg (2 tablets) twice a day before meals, metformin 1000mg twice a day with meals and insulin glargine 15mg SubQ once a day in the evening. Discussed when to take her glipizide and repaglinide. Stressed no more than 30 min before her meal to at the beginning of her meal. States that is what she has been doing. Patient still reports being SOB. Brings in spacer today and we reviewed the appropriate use and she demonstrated correct use. Patient will use    Has appt with Dr. Kami Clark on 9/27 (initial appt)  and Dr. Juno Raman on 9/28. Patient reports stable eating schedule to documented at last visit but upon review of patient's glucose log where she has written down what she has eaten she has had goolash several days in last 10 days along with Mac n cheese, pancakes and sausage etc for dinner. Discussed that we do not want her to avoid carbohydrates but that she should avoid multiple meals so high in carbs in comparison to protein. Discussed having a serving of lean mean with vegetables and a starch. Patient appears to understand but may benefit from time with dietician. Patient will see endocrinologist in Sept and states was told visit would be 2-3 hours so will likely get more diet education at that time. Upon review of BS log (below) all of patient's fasting blood sugars are within range to low. All other BS both before lunch/dinner and post prandial for all three meals are elevated.   Again Hearing loss     Hyperlipidemia     Hypertension     Neuropathy     Neuropathy due to medical condition ([facility] [address]     Pure hypercholesterolemia     Thoracic or lumbosacral neuritis or radiculitis, unspecified     Type 2 diabetes mellitus without complication ([facility])     Urinary retention     UTI (urinary tract infection)        Social History:    Social History     Tobacco Use    Smoking status: Former Smoker     Packs/day: 2.00     Years: 20.00     Pack years: 40.00     Types: Cigarettes     Quit date: 1984     Years since quittin.6    Smokeless tobacco: Never Used   Substance Use Topics    Alcohol use: Yes     Alcohol/week: 0.0 standard drinks     Comment: social       Pertinent Labs:    Lab Results   Component Value Date    LABA1C 7.4 (H) 2021    LABA1C 9.4 2021    LABA1C 8.7 2021     Lab Results   Component Value Date    CHOL 167 2021    TRIG 73 2021    HDL 78 2021     Lab Results   Component Value Date    CREATININE 0.53 2021    BUN 12 2021     2021    K 4.6 2021     2021    CO2 29 2021     Lab Results   Component Value Date    ALT 14 2021       Weight:  Wt Readings from Last 3 Encounters:   21 122 lb 9.6 oz (55.6 kg)   21 120 lb (54.4 kg)   21 122 lb 9.6 oz (55.6 kg)       Current medications:  Prior to Admission medications    Medication Sig Start Date End Date Taking?  Authorizing Provider   Semaglutide (RYBELSUS) 3 MG TABS Take by mouth daily    Historical Provider, MD   repaglinide (PRANDIN) 2 MG tablet Take 2 mg by mouth 3 times daily (before meals)    Historical Provider, MD   glipiZIDE (GLUCOTROL) 10 MG tablet Take 20 mg by mouth 2 times daily (before meals)    Historical Provider, MD   Multiple Vitamins-Minerals (LUTEIN-ZEAXANTHIN PO) Take 1 capsule by mouth daily    Historical Provider, MD   blood glucose test strips (ONETOUCH ULTRA) strip USE ONE STRIP TO TEST FOUR TIMES A DAY 8/6/21   Curt Carrasco MD   DULoxetine (CYMBALTA) 60 MG extended release capsule TAKE 1 CAPSULE BY MOUTH  DAILY 8/5/21   Dung Bates MD   blood glucose monitor kit and supplies 1 kit by Other route three times daily Dispense One Touch Glucose Meter. 7/30/21   Ronald Ahumada MD   insulin glargine (LANTUS SOLOSTAR) 100 UNIT/ML injection pen Inject 10 Units into the skin every evening  Patient taking differently: Inject 15 Units into the skin every evening  7/6/21 7/6/22  ColletteNasrin Mancia MD   metoprolol tartrate (LOPRESSOR) 25 MG tablet TAKE 1 TABLET BY MOUTH  TWICE DAILY 6/28/21   Dylan Hernandez MD   albuterol sulfate HFA (VENTOLIN HFA) 108 (90 Base) MCG/ACT inhaler Inhale 2 puffs into the lungs every 4 hours as needed for Wheezing 6/1/21 6/1/22  Johnie Luca Mancia MD   metFORMIN (GLUCOPHAGE) 1000 MG tablet TAKE 1 TABLET BY MOUTH  TWICE DAILY WITH MEALS 5/24/21   Ronald Ahumada MD   lisinopril (PRINIVIL;ZESTRIL) 20 MG tablet TAKE 1 TABLET BY MOUTH  TWICE DAILY 4/13/21   Ronald Ahumada MD   gabapentin (NEURONTIN) 300 MG capsule TAKE 1 CAPSULE BY MOUTH IN  THE MORNING, 1 CAPSULE IN  THE AFTERNOON, AND 2  CAPSULES AT BEDTIME  Patient taking differently: TAKE 1 CAPSULE BY MOUTH IN  THE MORNING, 1 CAPSULE IN  THE AFTERNOON, AND 2  CAPSULES AT BEDTIME    Patient taking 2 capsules at bedtime and 1-2 in afternoon and none in morning 2/4/21 2/4/22  Bartolo Lundborg, APRN - CNP   atorvastatin (LIPITOR) 40 MG tablet TAKE 1 TABLET BY MOUTH  DAILY 12/18/20   Ronald Ahumada MD   blood glucose monitor strips Test four  times a day & as needed for symptoms of irregular blood glucose. Dispense sufficient amount for indicated testing frequency plus additional to accommodate PRN testing needs.  7/23/20   Ronald Ahumada MD   ONE TOUCH ULTRASOFT LANCETS MISC 1 each by Does not apply route 2 times daily 4/5/17   Daria Sal MD   Calcium-Magnesium-Vitamin D (CALCIUM 1200+D3 PO) Take 2 tablets by mouth daily.    Historical Provider, MD   acetaminophen (TYLENOL) 500 MG tablet Take 500 mg by mouth 2 times daily     Historical Provider, MD   aspirin 81 MG chewable tablet Take 81 mg by mouth daily. Historical Provider, MD       Immunizations:   Most Recent Immunizations   Administered Date(s) Administered    COVID-19, Moderna, PF, 100mcg/0.5mL 03/05/2021    Influenza, High Dose (Fluzone 65 yrs and older) 12/02/2019    Influenza, Quadv, adjuvanted, 65 yrs +, IM, PF (Fluad) 11/03/2020    Pneumococcal Conjugate 13-valent ([id number]) 03/16/2018    Pneumococcal Conjugate 7-valent (Prevnar7) 12/18/2009    Pneumococcal Polysaccharide ([id number]) 01/04/2017    Td (Adult), 5 Lf Tetanus Toxoid, Pf (Tenivac, Decavac) 06/07/2010    Zoster Recombinant (Shingrix) 08/01/2018       Home Blood Glucose Results:     Date Fasting Post bkfst  Before lunch  Post lunch  Before dinner Post dinner  8/30 63  8/29 97  236         266   237  8/28 71              236  8/27            105   263  8/26 80     245      181   209  8/25 96              267  8/24 137           239  8/23 48  192         184   257  8/22               143  8/21 62              157  8/20 61  108   271   242      271  8/19               255  8/18 73     178   144      160  8/17 72           206   180  8/16 77           226   277  8     Blood glucose trends noted: blood sugar log reviewed. Fasting blood sugars running   Post prandial blood sugars after breakfast, lunch and dinner consistently in mid to high 200's. Assessment     A1c at goal: No: but has decreased from 9.4 to 7.4 (8/30/21)  Blood Pressure at goal: Yes  Weight at goal: Yes  Physical activity: no formal exercise due to SOB  Physical activity at goal: No:   Smoking Cessation: Yes  Cholesterol at target: Yes  Annual eye exam completed: Yes  Comprehensive Foot Exam Completed:  Yes  Annual urine albumin and serum creatinine: due    Statin: Yes    Appropriate?: Yes  Changes made: ACE/ARB: Yes  Appropriate?: Yes  Changes made:     Aspirin: Yes  Appropriate?:  Changes made:     Eating patterns:    []  My plate    []  Mediterranean diet   []  Low sodium   []  DASH diet   [x]  Portion control   [x]  Reduced calorie    []  Fast food / Restaurant  []  High glycemic index foods   []  Sugary beverages   []  Other     Comment:      Current Medications Affecting Diabetes:  Repaglinide 2mg three times daily with meals. Glipizide 10mg (2 tabs) twice a day with meals. Metformin 1000mg twice a day with meals. Insulin glargine 15 units once a day in the evening. Rybelsus 3mg once a day    Compliant: yes  Barriers to medication therapy: yes - cost    Medications attempted in the past:         Last office dictation reviewed: yes        type 2 DM under improving control as evidenced by A1c of 7.4 on 8/30/21. Plan       Continue:  Repaglinide 2mg three times daily with meals  Glipizide 10mg (2 tabs) twice a day with meals  Metformin 1000mg twice a day with meals  Insulin glargine 15 units each evening. Increase Rybelsus to 7mg once a day. RX for Rybelsus 7mg tablets #30 with 2 refills sent to [facility]. Call with excessive diarrhea or hypoglycemia. Check blood sugar before and about 2 hours after breakfast.  Check blood sugar before and about 2 hours after either lunch or dinner each day. Physician Follow-up:  As needed    Medication Management Follow-up:   Diabetes Service   3 weeks  - about 2 weeks after increase in Rybelsus dose. COVID 19 screening completed. At this time patient denies symptoms, recent travel and exposure. Patient educated to screen for temperature and COVID-19 symptoms prior to coming to clinic for next appointment. They are instructed to call the clinic to reschedule if they have any symptoms. For Royce Osorio in place:   Yes   Recommendation Provided To: Patient/Caregiver: 3 via In person   Intervention Detail: Device Training, Dose Adjustment: 1, reason: Therapy Optimization and New Rx: 1, reason: Needs Additional Therapy   Intervention Accepted By: Patient/Caregiver: 3   Time Spent (min): 45     Kareen Thornton RPH,Pharm. D,, BCPS, CACP  8/30/2021  4:21 PM

## 2021-08-30 NOTE — TELEPHONE ENCOUNTER
RX for Rybelsus 7mg tablets #30 with 2 refills sent to [facility]. Kareen Thornton [facility],Pharm. D,, [facility]  8/30/2021  4:17 PM

## 2021-09-20 ENCOUNTER — HOSPITAL ENCOUNTER (OUTPATIENT)
Dept: PHARMACY | Age: 76
Setting detail: THERAPIES SERIES
Discharge: HOME | End: 2021-09-20
Payer: MEDICARE

## 2021-09-20 VITALS — BODY MASS INDEX: 20.32 KG/M2 | WEIGHT: 122.1 LBS

## 2021-09-20 PROCEDURE — 99213 OFFICE O/P EST LOW 20 MIN: CPT

## 2021-09-20 NOTE — PROGRESS NOTES
Diabetic Medication Management   \[facility] [place] [facility]\"" 167    [address] [facility]. [place], [address] [facility]  Phone: [phone]  Fax: [phone]    NAME: Christy Walker  MEDICAL RECORD NUMBER:  [id number]  AGE: 67 y.o. GENDER: female  : 1945  EPISODE DATE:  2021       Ms. Arabella Willingham was referred to [address] [place] [address] Medication Management Services by Dr. Lorrie Triplett,     Goals per referral:   Fasting blood glucose: < 130  Peak postprandial glucose: < 180  A1C: < 7    Other goals:  Blood pressure goal: 122/70 (21)  Weight loss goal.  Patient already at or below goal weight. No weight loss needed. Physical Activity goal:    Patient interested in regular exercise but states her breathing currently keeps her from regular exercise. See below. Smoking Cessation (Former smoker)   Quit   Cholesterol at target: Yes   Date: 21  Annual eye exam:    Date: 2020  Comprehensive annual foot exam:   Date: Current with PCP or neurologist  Annual urine Albumin and serum creatinine:   Date: 21 microalbumin 15 mg/L;  SrCr 0/53mg/dL    Patient Specific Goals:  1. Better blood sugar control    Subjective   Ms. Arabella Willingham is a 67 y.o. female here for the Diabetes Service for self-management education, medication review including over the counter medications and herbal products, overall wellbeing assessment, transition of care and any needed adjustments with updates and recommendations communicated to the referring physician. Patient's name and  verified. Reviewed of patient's BS (below) show patient has had four episodes of hypoglycemia (both fasting) in last two weeks. In all instances patient indicates she drank juice or had glucose tablet and rechecked her blood sugar about 15-20 min later. Patient states blood sugar always goes up to above 70.   Discussed peanut butter as a choice for hypoglycemia treatment as friend mentioned this to her and indicated it was not a good choice to bring glucose up but is a good choice after glucose comes up. Patient started the increased dose of Rybelsus (7mg) after last appt and has been tolerating it well. Patient reports no loose stools at all. Patient currently taking: repaglinide (Prandin) 2mg three times daily with meals, glipizide 10mg (2 tablets) twice a day before meals, metformin 1000mg twice a day with meals and insulin glargine 15mg SubQ once a day in the evening. No missed doses. Patient reports taking both her glipizide and repaglinde within 20 min before eating. Has appt with Dr. Maury Esparza on 9/27 (initial appt)  and Dr. Markel Bianchi on 9/28. Patient still reports being SOB. Patient reports not using the space we reviewed the use of last visit as states it does not help her. Discussed that continued use may help and again encouraged her to use her spacer. Will see pulmonologist in October. Encouraged her to call her to see if she needs to be see earlier. Review of patient documented meals show patient continues to eat meals heavy on carbs such as pasta, potatoes, etc.  Patient aware and not opposed to talking with dietician. Had called over to [address] [place] [address] to set up an appt as instructed by PCP and was told she would need to call [facility]. Discussed that we do have outpatient dietician services here at [address] [place] [address] we could connect her with . Patient will also ask Dr. Maury Esparza at her appt next week. Patient would benefit from more formal dietary education. Review of blood sugars below indicate low fasting blood sugars most days with elevated pre and post-prandial blood sugars rest of the day. Patient still not willing to go on mealtime insulin yet. As A1c was 7.4 at last visit hesitant to push patient but rather continue to monitor to see if A1c lowers any further with current treatment as well as see if diet changes can help.   Will also have patient take insulin glargine in the morning to give more control during day time hours if duration of action is not lasting full 24 hours. Patient has enough of all diabetic medications and testing supplies. Patient Findings:   []  Missed doses   []  Emergency Room Visit    [x]  Medication changes  []  Hospitalization   []  Diet changes   []  Acute illness   []  Activity changes      Symptoms of hypoglycemia   []  None    [x]  Shakiness    [x]  Lightheadedness or dizziness   []  Confusion      []  Sweating   []  Other       Symptoms of hyperglycemia   [x]  None   []  Frequent urination    []  Increased thirst   []  Other    Medication adverse reactions   []  None   [x]  Diarrhea / Nausea / Vomiting / Constipation / flatulence   []  Hypertension   []  Peripheral edema     []  Signs of infection   []  Headache   []  Vision changes   []  Increased cholesterol    []  Weight gain   []  Change in renal function   []  Increased potassium  []  Other      Comments:        If recent hospital admission / ED visit, was this related to Diabetes No: EDG . Discharge date 21    Objective     PMHx:    Past Medical History:   Diagnosis Date    Anxiety state, unspecified     Arthritis     Cataracts, bilateral     COVID-19 vaccine administered 2021 & 3-    MODERNA    Esophageal reflux     Generalized osteoarthrosis, unspecified site     Hearing loss     Hyperlipidemia     Hypertension     Neuropathy     Neuropathy due to medical condition ([address]     Pure hypercholesterolemia     Thoracic or lumbosacral neuritis or radiculitis, unspecified     Type 2 diabetes mellitus without complication ([address]     Urinary retention     UTI (urinary tract infection)        Social History:    Social History     Tobacco Use    Smoking status: Former Smoker     Packs/day: 2.00     Years: 20.00     Pack years: 40.00     Types: Cigarettes     Quit date: 1984     Years since quittin.7    Smokeless tobacco: Never Used   Substance Use Topics    Alcohol use:  Yes     Alcohol/week: 0.0 standard drinks Comment: social       Pertinent Labs:    Lab Results   Component Value Date    LABA1C 7.4 (H) 08/30/2021    LABA1C 9.4 06/01/2021    LABA1C 8.7 02/25/2021     Lab Results   Component Value Date    CHOL 167 08/30/2021    TRIG 73 08/30/2021    HDL 78 08/30/2021     Lab Results   Component Value Date    CREATININE 0.53 08/30/2021    BUN 12 02/25/2021     02/25/2021    K 4.6 02/25/2021     02/25/2021    CO2 29 02/25/2021     Lab Results   Component Value Date    ALT 14 02/25/2021       Weight:  Wt Readings from Last 3 Encounters:   09/20/21 122 lb 1.6 oz (55.4 kg)   08/30/21 122 lb 9.6 oz (55.6 kg)   08/24/21 120 lb (54.4 kg)       Current medications:  Prior to Admission medications    Medication Sig Start Date End Date Taking? Authorizing Provider   Semaglutide (RYBELSUS) 7 MG TABS Take 1 tablet by mouth daily 8/30/21   Amrit Childs MD   repaglinide (PRANDIN) 2 MG tablet Take 2 mg by mouth 3 times daily (before meals)    Historical Provider, MD   glipiZIDE (GLUCOTROL) 10 MG tablet Take 20 mg by mouth 2 times daily (before meals)    Historical Provider, MD   Multiple Vitamins-Minerals (LUTEIN-ZEAXANTHIN PO) Take 1 capsule by mouth daily    Historical Provider, MD   blood glucose test strips (ONETOUCH ULTRA) strip USE ONE STRIP TO TEST FOUR TIMES A DAY 8/6/21   Binh Mcdermott MD   DULoxetine (CYMBALTA) 60 MG extended release capsule TAKE 1 CAPSULE BY MOUTH  DAILY 8/5/21   Luiz Ferro MD   blood glucose monitor kit and supplies 1 kit by Other route three times daily Dispense One Touch Glucose Meter.  7/30/21   Amrit Childs MD   insulin glargine (LANTUS SOLOSTAR) 100 UNIT/ML injection pen Inject 10 Units into the skin every evening  Patient taking differently: Inject 15 Units into the skin every evening  7/6/21 7/6/22  Amrit Childs MD   metoprolol tartrate (LOPRESSOR) 25 MG tablet TAKE 1 TABLET BY MOUTH  TWICE DAILY 6/28/21   James Milligan MD   albuterol sulfate HFA (VENTOLIN HFA) 108 (90 Base) MCG/ACT inhaler Inhale 2 puffs into the lungs every 4 hours as needed for Wheezing 6/1/21 6/1/22  Johnie Duarte MD   metFORMIN (GLUCOPHAGE) 1000 MG tablet TAKE 1 TABLET BY MOUTH  TWICE DAILY WITH MEALS 5/24/21   Mihai Pérez MD   lisinopril (PRINIVIL;ZESTRIL) 20 MG tablet TAKE 1 TABLET BY MOUTH  TWICE DAILY 4/13/21   Mihai Pérez MD   gabapentin (NEURONTIN) 300 MG capsule TAKE 1 CAPSULE BY MOUTH IN  THE MORNING, 1 CAPSULE IN  THE AFTERNOON, AND 2  CAPSULES AT BEDTIME  Patient taking differently: TAKE 1 CAPSULE BY MOUTH IN  THE MORNING, 1 CAPSULE IN  THE AFTERNOON, AND 2  CAPSULES AT BEDTIME    Patient taking 2 capsules at bedtime and 1-2 in afternoon and none in morning 2/4/21 2/4/22  JAMES Guajardo - CNP   atorvastatin (LIPITOR) 40 MG tablet TAKE 1 TABLET BY MOUTH  DAILY 12/18/20   Mihai Pérez MD   blood glucose monitor strips Test four  times a day & as needed for symptoms of irregular blood glucose. Dispense sufficient amount for indicated testing frequency plus additional to accommodate PRN testing needs. 7/23/20   Mihai Pérez MD   ONE TOUCH ULTRASOFT LANCETS MISC 1 each by Does not apply route 2 times daily 4/5/17   Kiersten eLw MD   Calcium-Magnesium-Vitamin D (CALCIUM 1200+D3 PO) Take 2 tablets by mouth daily. Historical Provider, MD   acetaminophen (TYLENOL) 500 MG tablet Take 500 mg by mouth 2 times daily     Historical Provider, MD   aspirin 81 MG chewable tablet Take 81 mg by mouth daily.       Historical Provider, MD       Immunizations:   Most Recent Immunizations   Administered Date(s) Administered    COVID-19, Moderna, PF, 100mcg/0.5mL 03/05/2021    Influenza, High Dose (Fluzone 65 yrs and older) 12/02/2019    Influenza, Quadv, adjuvanted, 65 yrs +, IM, PF (Fluad) 11/03/2020    Pneumococcal Conjugate 13-valent (Bokopni90) 03/16/2018    Pneumococcal Conjugate 7-valent (Prevnar7) 12/18/2009    Pneumococcal Polysaccharide ([id number]) 01/04/2017    Td (Adult), 5 Lf Tetanus Toxoid, Pf (Tenivac, Decavac) 06/07/2010    Zoster Recombinant (Shingrix) 08/01/2018       Home Blood Glucose Results:     Date Fasting Post bkfst  Before lunch  Post lunch  Before dinner Post dinner  9/20 80  9/19 147  9/18 97  9/17 69     261   260   244   242  9/16 68     224   159   144   121  9/15 82  9/14 84    9/13 179     277  9/12 70  9/11 88  9/10 79           269   282  9/9 58     239      207   292  9/8 102           229   295  9/7 70     195      285   297  9/6 61           262   253       Blood glucose trends noted: blood sugar log reviewed. Fasting blood sugars running   Pre and Post prandial blood sugars before/after breakfast, lunch and dinner consistently in mid to high 200's. Assessment     A1c at goal: No: but has decreased from 9.4 to 7.4 (8/30/21)  Blood Pressure at goal: Yes  Weight at goal: Yes  Physical activity: no formal exercise due to SOB  Physical activity at goal: No:   Smoking Cessation: Yes  Cholesterol at target: Yes  Annual eye exam completed: Yes  Comprehensive Foot Exam Completed: Yes  Annual urine albumin and serum creatinine: due    Statin: Yes    Appropriate?: Yes  Changes made:     ACE/ARB: Yes  Appropriate?: Yes  Changes made:     Aspirin: Yes  Appropriate?:  Changes made:     Eating patterns:    []  My plate    []  Mediterranean diet   []  Low sodium   []  DASH diet   [x]  Portion control   [x]  Reduced calorie    []  Fast food / Restaurant  []  High glycemic index foods   []  Sugary beverages   []  Other     Comment:      Current Medications Affecting Diabetes:  Repaglinide 2mg three times daily with meals. Glipizide 10mg (2 tabs) twice a day with meals (lunch/dinner). Metformin 1000mg twice a day with meals. Insulin glargine 15 units once a day in the evening.    Rybelsus 7mg once a day    Compliant: yes  Barriers to medication therapy: yes - cost    Medications attempted in the past:         Last office dictation reviewed: yes        type 2 DM under improving control as evidenced by A1c of 7.4 on 8/30/21. Plan       Continue:  Repaglinide 2mg three times daily with meals  Glipizide 10mg (2 tabs) twice a day with meals  Metformin 1000mg twice a day with meals  Continue  Rybelsus at 7mg once a day. Continue Insulin glargine 15 units daily but change dosing to morning. Check blood sugar before and about 2 hours after breakfast.  Check blood sugar before and about 2 hours after either lunch or dinner each day. Physician Follow-up:  As needed    Medication Management Follow-up:   Diabetes Service   3 weeks  - about 2 weeks after patient sees endocrinologist.       [place] [facility] screening completed. At this time patient denies symptoms, recent travel and exposure. Patient educated to screen for temperature and COVID-19 symptoms prior to coming to clinic for next appointment. They are instructed to call the clinic to reschedule if they have any symptoms. For Royce Osorio in place: Yes   Recommendation Provided To: Patient/Caregiver: 3 via In person   Intervention Detail: Device Training, Dose Adjustment: 1, reason: Therapy Optimization and New Rx: 1, reason: Needs Additional Therapy   Intervention Accepted By: Patient/Caregiver: 3   Time Spent (min): 45     Kareen Thornton RPH,Pharm. D,, BCPS, CACP  9/20/2021  10:55 AM

## 2021-10-06 ENCOUNTER — TELEPHONE (OUTPATIENT)
Dept: INTERNAL MEDICINE CLINIC | Age: 76
End: 2021-10-06

## 2021-10-06 NOTE — TELEPHONE ENCOUNTER
Patient went to get a hearing aid eval on Monday and they couldn't do it due to too much wax. Ok to schedule for an ear wash here?

## 2021-10-08 ENCOUNTER — OFFICE VISIT (OUTPATIENT)
Dept: INTERNAL MEDICINE CLINIC | Age: 76
End: 2021-10-08
Payer: MEDICARE

## 2021-10-08 DIAGNOSIS — H61.23 IMPACTED CERUMEN OF BOTH EARS: Primary | ICD-10-CM

## 2021-10-08 PROCEDURE — 69209 REMOVE IMPACTED EAR WAX UNI: CPT | Performed by: NURSE PRACTITIONER

## 2021-10-11 ENCOUNTER — TELEPHONE (OUTPATIENT)
Dept: PHARMACY | Age: 76
End: 2021-10-11

## 2021-10-11 RX ORDER — REPAGLINIDE 2 MG/1
2 TABLET ORAL
Qty: 270 TABLET | Refills: 1 | Status: SHIPPED | OUTPATIENT
Start: 2021-10-11 | End: 2022-02-10

## 2021-10-11 NOTE — TELEPHONE ENCOUNTER
Patient left voicemail for Medication management clinic this afternoon requesting a refill of Prandin 2 mg to [facility] and to cancel appointment on Wednesday 10/13/21. Called patient back to reschedule appointment and had to leave a voicemail. Prescription for repaglinide 2 mg - Take 2 mg by mouth 3 times daily.  90 ds with 1 refill sent to [address] [place] [address], Pharm D, Peg Scott [facility]  10/11/2021 2:05 PM

## 2021-10-13 ENCOUNTER — OFFICE VISIT (OUTPATIENT)
Dept: PULMONOLOGY | Age: 76
End: 2021-10-13
Payer: MEDICARE

## 2021-10-13 VITALS
HEIGHT: 65 IN | DIASTOLIC BLOOD PRESSURE: 61 MMHG | SYSTOLIC BLOOD PRESSURE: 105 MMHG | WEIGHT: 122 LBS | BODY MASS INDEX: 20.33 KG/M2 | HEART RATE: 106 BPM | RESPIRATION RATE: 17 BRPM | OXYGEN SATURATION: 95 % | TEMPERATURE: 96.6 F

## 2021-10-13 DIAGNOSIS — I10 PRIMARY HYPERTENSION: ICD-10-CM

## 2021-10-13 DIAGNOSIS — R06.09 DYSPNEA ON EXERTION: Primary | ICD-10-CM

## 2021-10-13 DIAGNOSIS — J44.9 CHRONIC OBSTRUCTIVE PULMONARY DISEASE, UNSPECIFIED COPD TYPE (HCC): ICD-10-CM

## 2021-10-13 DIAGNOSIS — Z98.1 S/P SPINAL FUSION: ICD-10-CM

## 2021-10-13 PROCEDURE — 1090F PRES/ABSN URINE INCON ASSESS: CPT | Performed by: INTERNAL MEDICINE

## 2021-10-13 PROCEDURE — 3023F SPIROM DOC REV: CPT | Performed by: INTERNAL MEDICINE

## 2021-10-13 PROCEDURE — G8420 CALC BMI NORM PARAMETERS: HCPCS | Performed by: INTERNAL MEDICINE

## 2021-10-13 PROCEDURE — 99204 OFFICE O/P NEW MOD 45 MIN: CPT | Performed by: INTERNAL MEDICINE

## 2021-10-13 PROCEDURE — 1036F TOBACCO NON-USER: CPT | Performed by: INTERNAL MEDICINE

## 2021-10-13 PROCEDURE — G8926 SPIRO NO PERF OR DOC: HCPCS | Performed by: INTERNAL MEDICINE

## 2021-10-13 PROCEDURE — G8399 PT W/DXA RESULTS DOCUMENT: HCPCS | Performed by: INTERNAL MEDICINE

## 2021-10-13 PROCEDURE — G8484 FLU IMMUNIZE NO ADMIN: HCPCS | Performed by: INTERNAL MEDICINE

## 2021-10-13 PROCEDURE — G8427 DOCREV CUR MEDS BY ELIG CLIN: HCPCS | Performed by: INTERNAL MEDICINE

## 2021-10-13 PROCEDURE — 4040F PNEUMOC VAC/ADMIN/RCVD: CPT | Performed by: INTERNAL MEDICINE

## 2021-10-13 PROCEDURE — 1123F ACP DISCUSS/DSCN MKR DOCD: CPT | Performed by: INTERNAL MEDICINE

## 2021-10-13 NOTE — PROGRESS NOTES
REASON FOR THE CONSULTATION:  History of smoking   effort dyspnea  Systemic hypertension   spinal fusion  HISTORY OF PRESENT ILLNESS:    Evan López is a 67y.o. year old female here for evaluation of effort dyspnea. He been experiencing effort dyspnea for the last many months but it seems it could be more than a year or 2 as well. Dyspnea Karuna grade 1. He has no for no PND. No orthopnea. Denies any chest pain. Denies any thromboembolic process. She have had cardiac evaluations in no definitive cardiac cause for her dyspnea has been detected. She has not noted any pedal edema. She has no thyroid dysfunction. No esophageal reflux disease or peptic ulcer disease. Unfortunately patient is very hard of hearing and she forgot to bring her hearing aid so it was rather difficult to communicate and take the history most of the information was provided by the  who accompanied the patient. Patient is known to have systemic hypertension which is well controlled. No angina no coronary artery disease. No chest infections or fevers. No pneumonia. He is scheduled to undergo echocardiography. She did not have any recent CT of the chest.  She is a thin built. She has some component of malnutrition. I reviewed her pulmonary function study her lung capacities and ventilatory function are within normal limits although diffusion capacity reduced. He used to smoke more than 50 pack years but quit smoking more than 30 years back. No alcohol abuse no drug abuse. No occupational exposure of any kind. No family history of lung disease never had asthma as a youngster. Ready have had a Covid vaccine LUNG CANCER SCREENING     1. CRITERIA MET    []     CT ORDERED  []      2. CRITERIA NOT MET   [x]      3. REFUSED                    []        REASON CRITERIA NOT MET     1. SMOKING LESS THAN 30 PY  []      2. AGE LESS THAN 55 or GREATER 77 YEARS  []      3.  QUIT SMOKING 15 YEARS OR GREATER   [] 4. RECENT CT WITH IN 11 MONTHS    []      5. LIFE EXPECTANCY < 5 YEARS   []      6. SIGNS  AND SYMPTOMS OF LUNG CANCER   []         Immunization   Immunization History   Administered Date(s) Administered    COVID-19, Moderna, PF, 100mcg/0.5mL 02/05/2021, 03/05/2021    Influenza, High Dose (Fluzone 65 yrs and older) 12/02/2019    Influenza, Quadv, adjuvanted, 65 yrs +, IM, PF (Fluad) 11/03/2020    Pneumococcal Conjugate 13-valent ([id number]) 03/16/2018    Pneumococcal Conjugate 7-valent (Prevnar7) 12/18/2009    Pneumococcal Polysaccharide ([id number]) 01/04/2017    Td (Adult), 5 Lf Tetanus Toxoid, Pf (Tenivac, Decavac) 06/07/2010    Zoster Recombinant (Shingrix) 08/01/2018        Pneumococcal Vaccine     [x] Up to date    [] Indicated   [] Refused  [] Contraindicated       Influenza Vaccine   [x] Up to date    [] Indicated   [] Refused  [] Contraindicated          PAST MEDICAL HISTORY:       Diagnosis Date    Anxiety state, unspecified     Arthritis     Cataracts, bilateral     COVID-19 vaccine administered 2- & 3-    MODERNA    Esophageal reflux     Generalized osteoarthrosis, unspecified site     Hearing loss     Hyperlipidemia     Hypertension     Neuropathy     Neuropathy due to medical condition ([address]     Pure hypercholesterolemia     Thoracic or lumbosacral neuritis or radiculitis, unspecified     Type 2 diabetes mellitus without complication ([address]     Urinary retention     UTI (urinary tract infection)          Family History:       Problem Relation Age of Onset    Cancer Mother         lymphoma    Hypertension Mother     Hypertension Father     Hypertension Brother     Heart Disease Brother         pt states that brother had 5 bypass surgeries.         SURGICAL HISTORY:   Past Surgical History:   Procedure Laterality Date    BACK SURGERY      X3    BREAST BIOPSY  2007    Lt     CARPAL TUNNEL RELEASE Right     CATARACT REMOVAL WITH IMPLANT Bilateral     COLONOSCOPY  2006    COLONOSCOPY  4/24/15    diverticulosis    CYSTOSCOPY  [id number]    CYSTOSCOPY  05/26/2017    CYSTOSCOPY N/A 5/26/2017    CYSTOSCOPY URODYNAMICS AND DILATION  performed by Melani Asencio MD at [address] [place] [address] HAND SURGERY Bilateral     thumbs reconstruction    HIP ARTHROPLASTY Bilateral     JOINT REPLACEMENT Bilateral     LUMBAR FUSION  October 27, 2015           SOCIAL AND OCCUPATIONAL HEALTH:      There is no history of TB or TB exposure. There is no asbestos or silica dust exposure. The patient reports is no coal, foundry, quarry or [employer] exposure. Travel history reveal absent. There is no history of recreational or IV drug use. There is no hot tube exposure. Pets absent    Occupational history no occupational history    TOBACCO:   reports that she quit smoking about 37 years ago. Her smoking use included cigarettes. She has a 40.00 pack-year smoking history. She has never used smokeless tobacco.  ETOH:   reports current alcohol use. ALLERGIES:      Allergies   Allergen Reactions    Hydrocodone          Home Meds:   Prior to Admission medications    Medication Sig Start Date End Date Taking?  Authorizing Provider   repaglinide (PRANDIN) 2 MG tablet Take 1 tablet by mouth 3 times daily (before meals) 10/11/21  Yes Phoebe Hernández MD   Semaglutide (RYBELSUS) 7 MG TABS Take 1 tablet by mouth daily 8/30/21  Yes Phoebe Hernández MD   repaglinide (PRANDIN) 2 MG tablet Take 2 mg by mouth 3 times daily (before meals)   Yes Historical Provider, MD   glipiZIDE (GLUCOTROL) 10 MG tablet Take 20 mg by mouth 2 times daily (before meals)   Yes Historical Provider, MD   Multiple Vitamins-Minerals (LUTEIN-ZEAXANTHIN PO) Take 1 capsule by mouth daily   Yes Historical Provider, MD   blood glucose test strips (ONETOUCH ULTRA) strip USE ONE STRIP TO TEST FOUR TIMES A DAY 8/6/21  Yes Jaskaran Griffiths MD   DULoxetine (CYMBALTA) 60 MG extended release capsule TAKE 1 CAPSULE BY MOUTH DAILY 8/5/21  Yes Chhaya Chaidez MD   blood glucose monitor kit and supplies 1 kit by Other route three times daily Dispense One Touch Glucose Meter. 7/30/21  Yes Vira Stover MD   insulin glargine (LANTUS SOLOSTAR) 100 UNIT/ML injection pen Inject 10 Units into the skin every evening  Patient taking differently: Inject 15 Units into the skin daily  7/6/21 7/6/22 Yes Vira Stover MD   metoprolol tartrate (LOPRESSOR) 25 MG tablet TAKE 1 TABLET BY MOUTH  TWICE DAILY 6/28/21  Yes Maria G Flowers MD   albuterol sulfate HFA (VENTOLIN HFA) 108 (90 Base) MCG/ACT inhaler Inhale 2 puffs into the lungs every 4 hours as needed for Wheezing 6/1/21 6/1/22 Yes Vira Stover MD   metFORMIN (GLUCOPHAGE) 1000 MG tablet TAKE 1 TABLET BY MOUTH  TWICE DAILY WITH MEALS 5/24/21  Yes Vira Stover MD   lisinopril (PRINIVIL;ZESTRIL) 20 MG tablet TAKE 1 TABLET BY MOUTH  TWICE DAILY 4/13/21  Yes Vira Stover MD   gabapentin (NEURONTIN) 300 MG capsule TAKE 1 CAPSULE BY MOUTH IN  THE MORNING, 1 CAPSULE IN  THE AFTERNOON, AND 2  CAPSULES AT BEDTIME  Patient taking differently: TAKE 1 CAPSULE BY MOUTH IN  THE MORNING, 1 CAPSULE IN  THE AFTERNOON, AND 2  CAPSULES AT BEDTIME    Patient taking 2 capsules at bedtime and 1-2 in afternoon and none in morning 2/4/21 2/4/22 Yes JAMES De Santiago - CNP   atorvastatin (LIPITOR) 40 MG tablet TAKE 1 TABLET BY MOUTH  DAILY 12/18/20  Yes Vira Stover MD   blood glucose monitor strips Test four  times a day & as needed for symptoms of irregular blood glucose. Dispense sufficient amount for indicated testing frequency plus additional to accommodate PRN testing needs. 7/23/20  Yes Vira Stover MD   ONE TOUCH ULTRASOFT LANCETS MISC 1 each by Does not apply route 2 times daily 4/5/17  Yes Jeannine Rodriguez MD   Calcium-Magnesium-Vitamin D (CALCIUM 1200+D3 PO) Take 2 tablets by mouth daily.    Yes Historical Provider, MD   acetaminophen (TYLENOL) 500 MG tablet Take 500 mg by mouth 2 times daily    Yes Historical Provider, MD   aspirin 81 MG chewable tablet Take 81 mg by mouth daily.      Yes Historical Provider, MD              REVIEW OF SYSTEMS:    CONSTITUTIONAL:  negative for  fevers, chills, sweats, fatigue, malaise, anorexia and weight loss thin built no distress not using accessory muscles  EYES:  negative for  double vision, blurred vision, dry eyes, eye discharge and redness no Pola syndrome no jaundice  HEENT:  negative for  hearing loss, tinnitus, ear drainage, earaches and nasal congestion normal normal normal  RESPIRATORY:  See hpi as noted above history of hypertension no angina cardiac evaluation has been unremarkable  CARDIOVASCULAR:  negative for  chest pain,, palpitations, orthopnea, PND does have history of hypertension no angina no chest pain cardiac evaluation has been unremarkable  GASTROINTESTINAL:  negative for nausea, vomiting, change in bowel habits, diarrhea, constipation, abdominal pain, pruritus, abdominal mass and abdominal distention sometimes does have reflux GENITOURINARY:  negative for frequency, dysuria, nocturia, urinary incontinence and hesitancy does have urinary incontinence INTEGUMENT  negative for rash, skin lesion(s), dryness, skin color change, changes in lesion, pruritus and changes in hair  HEMATOLOGIC/LYMPHATIC:  negative for easy bruising, bleeding, lymphadenopathy, petechiae and swelling/edema completed and negative ALLERGIC/IMMUNOLOGIC:  negative for recurrent infections, urticaria and drug reactions completed and negative  ENDOCRINE:  negative for heat intolerance, cold intolerance, tremor, weight changes and change in bowel habits no diabetes  MUSCULOSKELETAL:  negative for  myalgias, arthralgias, pain, joint swelling, stiff joints and decreased range of motion does have joint pains due to degenerative joint disease  NEUROLOGICAL:  negative for headaches, dizziness, seizures, memory problems, speech problems, visual disturbance and coordination problems no motor or sensory deficit  BEHAVIOR/PSYCH:  negative for poor appetite, increased appetite, decreased sleep, increased sleep, decreased energy level, increased energy level and poor concentration no depression  Skin no rash no dermatitis negative completed and negative  Vitals:  /61   Pulse 106   Temp 96.6 °F (35.9 °C)   Resp 17   Ht 5' 5\" (1.651 m)   Wt 122 lb (55.3 kg)   SpO2 95%   BMI 20.30 kg/m²     PHYSICAL EXAM:  General Appearance:    Alert, cooperative, no distress, appears stated age 14 built very cooperative very hard of hearing not wearing her hearing aid not using any accessory muscles   Head:    Normocephalic, without obvious abnormality, atraumatic      Eyes:    PERRL, conjunctiva/corneas clear, EOM's intact no Pola's no sputum and no jaundice   Ears:    Normal  external ear canals, both ears   Nose:  No polyps no no polyps no sinus tenderness   Throat:   Lips, mucosa, and tongue normal; teeth and gums normal normal throat examination   Neck:   Supple, symmetrical, trachea midline, no adenopathy;     thyroid:  no enlargement/tenderness/nodules; no carotid    bruit , JVD not elevated neck is not felt in [place]   Back:     Symmetric, no curvature, ROM normal, no CVA tenderness   Lungs:    AP diameter is not increased percussion note is normally resonant breathing vesicular expiration not prolonged no rails rhonchi are audible no pleural friction rub is audible   Chest Wall:    No tenderness or deformity      Heart:    Regular rate and rhythm, S1 and S2 normal, no murmur, rub        or gallop no rvh                           Abdomen:                                                 Pulses:                              Skin:                  Lymph nodes:                    Neurologic:                  Soft, non-tender, bowel sounds active all four quadrants,     no masses, no organomegaly         2+ and symmetric all extremities     Skin color, texture, turgor diaphragm. She might have a component of COPD although the ventilatory functions are not supportive of that. Did not there is no significant reversible airway obstruction diffusion capacity reduced but there are no other clinical features suggestive of interstitial process such as clubbing or crackles of the bases. I discussed the situation with the patient. I advised her to continue the bronchodilators which have been advised by you. 1 is albuterol to be used on as-needed basis and the other 1 is a combination of steroids, anticholinergics and long-acting beta agonist.  I advised her to rinse her mouth and throat after using this combination of bronchodilators. I arrange for her to have a high-resolution CT of the chest to look for any interstitial process. Also very much interested in looking at the echocardiogram to look for any evidence of pulmonary artery hypertension. Patient already Covid vaccine. She had flu vaccine. Plan to see her follow-up in about 2 weeks    Keep you advised about her problems thank you very much for asking me to participate in her care dictated with Dr. Yaima Hinojosa MD dictation over thank you      Requested Prescriptions      No prescriptions requested or ordered in this encounter       There are no discontinued medications. Liam Herron received counseling on the following healthy behaviors: nutrition, exercise and medication adherence    Patient given educational materials : see patient instruction       Discussed use, benefit, and side effects of prescribed medications. Barriers to medication compliance addressed. All patient questions answered. Pt voiced understanding. I hope this updates you on my evaluation and clinical thinking. Thank you for allowing me to participate in his care.      Sincerely,      Electronically signed by Vazquez Meyer MD on   10/13/21 at 2:28 PM EDT       Please note that this chart was generated using voice recognition Dragon dictation software. Although every effort was made to ensure the accuracy of this automated transcription, some errors in transcription may have occurred.

## 2021-10-19 ENCOUNTER — HOSPITAL ENCOUNTER (OUTPATIENT)
Dept: CT IMAGING | Age: 76
Discharge: HOME | End: 2021-10-21
Payer: MEDICARE

## 2021-10-19 DIAGNOSIS — R06.09 DYSPNEA ON EXERTION: ICD-10-CM

## 2021-10-19 PROCEDURE — 71250 CT THORAX DX C-: CPT

## 2021-10-21 ENCOUNTER — TELEPHONE (OUTPATIENT)
Dept: PHARMACY | Age: 76
End: 2021-10-21

## 2021-10-21 NOTE — TELEPHONE ENCOUNTER
Patient returned call to the Medication Management clinic. Pt states she is way too busy right now to schedule an appointment. Pt would like to call back at a later date to reschedule. Attempted to schedule an appointment a few weeks out, but patient does not want to do that. Patient will call the clinic back. Will follow-up with the patient in 2 weeks if we don't hear anything next week.    Herminia Ríos, Pharm D, [facility]  [facility] [place] [facility]  10/21/2021 4:19 PM

## 2021-10-21 NOTE — TELEPHONE ENCOUNTER
Left message for patient in regards to rescheduling an appointment for diabetes follow up. Patient last seen on 09/20/21. Callback number provided.      Hussain Combs, PadminiD 10/21/2021 4:07 PM  [facility] PGY1 Resident

## 2021-10-27 ENCOUNTER — OFFICE VISIT (OUTPATIENT)
Dept: PULMONOLOGY | Age: 76
End: 2021-10-27
Payer: MEDICARE

## 2021-10-27 VITALS
SYSTOLIC BLOOD PRESSURE: 96 MMHG | BODY MASS INDEX: 20.33 KG/M2 | OXYGEN SATURATION: 97 % | HEIGHT: 65 IN | HEART RATE: 84 BPM | TEMPERATURE: 94.1 F | RESPIRATION RATE: 17 BRPM | DIASTOLIC BLOOD PRESSURE: 63 MMHG | WEIGHT: 122 LBS

## 2021-10-27 DIAGNOSIS — N32.81 OVERACTIVE BLADDER: ICD-10-CM

## 2021-10-27 DIAGNOSIS — H91.90 HEARING LOSS, UNSPECIFIED HEARING LOSS TYPE, UNSPECIFIED LATERALITY: ICD-10-CM

## 2021-10-27 DIAGNOSIS — I10 PRIMARY HYPERTENSION: ICD-10-CM

## 2021-10-27 DIAGNOSIS — R06.09 DYSPNEA ON EXERTION: Primary | ICD-10-CM

## 2021-10-27 PROCEDURE — 4040F PNEUMOC VAC/ADMIN/RCVD: CPT | Performed by: INTERNAL MEDICINE

## 2021-10-27 PROCEDURE — G8484 FLU IMMUNIZE NO ADMIN: HCPCS | Performed by: INTERNAL MEDICINE

## 2021-10-27 PROCEDURE — 1036F TOBACCO NON-USER: CPT | Performed by: INTERNAL MEDICINE

## 2021-10-27 PROCEDURE — 1090F PRES/ABSN URINE INCON ASSESS: CPT | Performed by: INTERNAL MEDICINE

## 2021-10-27 PROCEDURE — G8420 CALC BMI NORM PARAMETERS: HCPCS | Performed by: INTERNAL MEDICINE

## 2021-10-27 PROCEDURE — 99214 OFFICE O/P EST MOD 30 MIN: CPT | Performed by: INTERNAL MEDICINE

## 2021-10-27 PROCEDURE — G8399 PT W/DXA RESULTS DOCUMENT: HCPCS | Performed by: INTERNAL MEDICINE

## 2021-10-27 PROCEDURE — G8427 DOCREV CUR MEDS BY ELIG CLIN: HCPCS | Performed by: INTERNAL MEDICINE

## 2021-10-27 PROCEDURE — 1123F ACP DISCUSS/DSCN MKR DOCD: CPT | Performed by: INTERNAL MEDICINE

## 2021-10-27 ASSESSMENT — SLEEP AND FATIGUE QUESTIONNAIRES
HOW LIKELY ARE YOU TO NOD OFF OR FALL ASLEEP IN A CAR, WHILE STOPPED FOR A FEW MINUTES IN TRAFFIC: 0
HOW LIKELY ARE YOU TO NOD OFF OR FALL ASLEEP WHILE SITTING AND READING: 0
HOW LIKELY ARE YOU TO NOD OFF OR FALL ASLEEP WHEN YOU ARE A PASSENGER IN A CAR FOR AN HOUR WITHOUT A BREAK: 0
ESS TOTAL SCORE: 0
HOW LIKELY ARE YOU TO NOD OFF OR FALL ASLEEP WHILE LYING DOWN TO REST IN THE AFTERNOON WHEN CIRCUMSTANCES PERMIT: 0
HOW LIKELY ARE YOU TO NOD OFF OR FALL ASLEEP WHILE WATCHING TV: 0
HOW LIKELY ARE YOU TO NOD OFF OR FALL ASLEEP WHILE SITTING AND TALKING TO SOMEONE: 0
HOW LIKELY ARE YOU TO NOD OFF OR FALL ASLEEP WHILE SITTING INACTIVE IN A PUBLIC PLACE: 0
HOW LIKELY ARE YOU TO NOD OFF OR FALL ASLEEP WHILE SITTING QUIETLY AFTER LUNCH WITHOUT ALCOHOL: 0

## 2021-10-27 NOTE — PROGRESS NOTES
Conjugate 7-valent (Prevnar7) 12/18/2009    Pneumococcal Polysaccharide ([id number]) 01/04/2017    Td (Adult), 5 Lf Tetanus Toxoid, Pf (Tenivac, Decavac) 06/07/2010    Zoster Recombinant (Shingrix) 08/01/2018        Pneumococcal Vaccine     [x] Up to date    [] Indicated   [] Refused  [] Contraindicated       Influenza Vaccine   [x] Up to date    [] Indicated   [] Refused  [] Contraindicated          PAST MEDICAL HISTORY:       Diagnosis Date    Anxiety state, unspecified     Arthritis     Cataracts, bilateral     COVID-19 vaccine administered 2- & 3-    MODERNA    Esophageal reflux     Generalized osteoarthrosis, unspecified site     Hearing loss     Hyperlipidemia     Hypertension     Neuropathy     Neuropathy due to medical condition ([facility] [address]     Pure hypercholesterolemia     Thoracic or lumbosacral neuritis or radiculitis, unspecified     Type 2 diabetes mellitus without complication ([address]     Urinary retention     UTI (urinary tract infection)          Family History:       Problem Relation Age of Onset    Cancer Mother         lymphoma    Hypertension Mother     Hypertension Father     Hypertension Brother     Heart Disease Brother         pt states that brother had 5 bypass surgeries. SURGICAL HISTORY:   Past Surgical History:   Procedure Laterality Date    BACK SURGERY      X3    BREAST BIOPSY  2007    Lt     CARPAL TUNNEL RELEASE Right     CATARACT REMOVAL WITH IMPLANT Bilateral     COLONOSCOPY  2006    COLONOSCOPY  4/24/15    diverticulosis    CYSTOSCOPY  [id number]    CYSTOSCOPY  05/26/2017    CYSTOSCOPY N/A 5/26/2017    CYSTOSCOPY URODYNAMICS AND DILATION  performed by Charu Rodas MD at [address] [place] [address] HAND SURGERY Bilateral     thumbs reconstruction    HIP ARTHROPLASTY Bilateral     JOINT REPLACEMENT Bilateral     LUMBAR FUSION  October 27, 2015           SOCIAL AND OCCUPATIONAL HEALTH:      There is no history of TB or TB exposure.     There is no asbestos or silica dust exposure. The patient reports is no coal, foundry, quarry or [employer] exposure. Travel history reveal absent. There is no history of recreational or IV drug use. There is no hot tube exposure. Pets absent    Occupational history no occupational history    TOBACCO:   reports that she quit smoking about 37 years ago. Her smoking use included cigarettes. She has a 40.00 pack-year smoking history. She has never used smokeless tobacco.  ETOH:   reports current alcohol use. ALLERGIES:      Allergies   Allergen Reactions    Hydrocodone          Home Meds:   Prior to Admission medications    Medication Sig Start Date End Date Taking? Authorizing Provider   repaglinide (PRANDIN) 2 MG tablet Take 1 tablet by mouth 3 times daily (before meals) 10/11/21   Asad Cosme MD   Semaglutide (RYBELSUS) 7 MG TABS Take 1 tablet by mouth daily 8/30/21   Asad Cosme MD   repaglinide (PRANDIN) 2 MG tablet Take 2 mg by mouth 3 times daily (before meals)    Historical Provider, MD   glipiZIDE (GLUCOTROL) 10 MG tablet Take 20 mg by mouth 2 times daily (before meals)    Historical Provider, MD   Multiple Vitamins-Minerals (LUTEIN-ZEAXANTHIN PO) Take 1 capsule by mouth daily    Historical Provider, MD   blood glucose test strips (ONETOUCH ULTRA) strip USE ONE STRIP TO TEST FOUR TIMES A DAY 8/6/21   Jamel Boateng MD   DULoxetine (CYMBALTA) 60 MG extended release capsule TAKE 1 CAPSULE BY MOUTH  DAILY 8/5/21   Ciarra Mcintosh MD   blood glucose monitor kit and supplies 1 kit by Other route three times daily Dispense One Touch Glucose Meter.  7/30/21   Asad Cosme MD   insulin glargine (LANTUS SOLOSTAR) 100 UNIT/ML injection pen Inject 10 Units into the skin every evening  Patient taking differently: Inject 15 Units into the skin daily  7/6/21 7/6/22  Asad Cosme MD   metoprolol tartrate (LOPRESSOR) 25 MG tablet TAKE 1 TABLET BY MOUTH  TWICE DAILY 6/28/21   [facility] Claudine Friday, MD   albuterol sulfate HFA (VENTOLIN HFA) 108 (90 Base) MCG/ACT inhaler Inhale 2 puffs into the lungs every 4 hours as needed for Wheezing 6/1/21 6/1/22  Johnie Jad Hernandes MD   metFORMIN (GLUCOPHAGE) 1000 MG tablet TAKE 1 TABLET BY MOUTH  TWICE DAILY WITH MEALS 5/24/21   Katherne Sicard, MD   lisinopril (PRINIVIL;ZESTRIL) 20 MG tablet TAKE 1 TABLET BY MOUTH  TWICE DAILY 4/13/21   Katherne Sicard, MD   gabapentin (NEURONTIN) 300 MG capsule TAKE 1 CAPSULE BY MOUTH IN  THE MORNING, 1 CAPSULE IN  THE AFTERNOON, AND 2  CAPSULES AT BEDTIME  Patient taking differently: TAKE 1 CAPSULE BY MOUTH IN  THE MORNING, 1 CAPSULE IN  THE AFTERNOON, AND 2  CAPSULES AT BEDTIME    Patient taking 2 capsules at bedtime and 1-2 in afternoon and none in morning 2/4/21 2/4/22  Katina Cerna APRN - CNP   atorvastatin (LIPITOR) 40 MG tablet TAKE 1 TABLET BY MOUTH  DAILY 12/18/20   Katherne Sicard, MD   blood glucose monitor strips Test four  times a day & as needed for symptoms of irregular blood glucose. Dispense sufficient amount for indicated testing frequency plus additional to accommodate PRN testing needs. 7/23/20   Katherne Sicard, MD   ONE TOUCH ULTRASOFT LANCETS MISC 1 each by Does not apply route 2 times daily 4/5/17   Agata MonMD albert   Calcium-Magnesium-Vitamin D (CALCIUM 1200+D3 PO) Take 2 tablets by mouth daily. Historical Provider, MD   acetaminophen (TYLENOL) 500 MG tablet Take 500 mg by mouth 2 times daily     Historical Provider, MD   aspirin 81 MG chewable tablet Take 81 mg by mouth daily.       Historical Provider, MD              REVIEW OF SYSTEMS:    CONSTITUTIONAL:  negative for  fevers, chills, sweats, fatigue, malaise, anorexia and weight loss thin built no distress not using accessory muscles  EYES:  negative for  double vision, blurred vision, dry eyes, eye discharge and redness no Pola syndrome no jaundice  HEENT:  negative for  hearing loss, tinnitus, ear drainage, earaches and nasal congestion normal normal normal  RESPIRATORY:  See hpi as noted above history of hypertension no angina cardiac evaluation has been unremarkable  CARDIOVASCULAR:  negative for  chest pain,, palpitations, orthopnea, PND does have history of hypertension no angina no chest pain cardiac evaluation has been unremarkable  GASTROINTESTINAL:  negative for nausea, vomiting, change in bowel habits, diarrhea, constipation, abdominal pain, pruritus, abdominal mass and abdominal distention sometimes does have reflux GENITOURINARY:  negative for frequency, dysuria, nocturia, urinary incontinence and hesitancy does have urinary incontinence INTEGUMENT  negative for rash, skin lesion(s), dryness, skin color change, changes in lesion, pruritus and changes in hair  HEMATOLOGIC/LYMPHATIC:  negative for easy bruising, bleeding, lymphadenopathy, petechiae and swelling/edema completed and negative ALLERGIC/IMMUNOLOGIC:  negative for recurrent infections, urticaria and drug reactions completed and negative  ENDOCRINE:  negative for heat intolerance, cold intolerance, tremor, weight changes and change in bowel habits no diabetes  MUSCULOSKELETAL:  negative for  myalgias, arthralgias, pain, joint swelling, stiff joints and decreased range of motion does have joint pains due to degenerative joint disease  NEUROLOGICAL:  negative for headaches, dizziness, seizures, memory problems, speech problems, visual disturbance and coordination problems no motor or sensory deficit  BEHAVIOR/PSYCH:  negative for poor appetite, increased appetite, decreased sleep, increased sleep, decreased energy level, increased energy level and poor concentration no depression  Skin no rash no dermatitis negative completed and negative  Vitals: There were no vitals taken for this visit.     PHYSICAL EXAM:  General Appearance:    Alert, cooperative, no distress, appears stated age 14 built very cooperative very hard of hearing not wearing her hearing aid not using any accessory muscles   Head:    Normocephalic, without obvious abnormality, atraumatic      Eyes:    PERRL, conjunctiva/corneas clear, EOM's intact no Pola's no sputum and no jaundice   Ears:    Normal  external ear canals, both ears   Nose:  No polyps no no polyps no sinus tenderness   Throat:   Lips, mucosa, and tongue normal; teeth and gums normal normal throat examination   Neck:   Supple, symmetrical, trachea midline, no adenopathy;     thyroid:  no enlargement/tenderness/nodules; no carotid    bruit , JVD not elevated neck is not felt in [place] Morita   Back:     Symmetric, no curvature, ROM normal, no CVA tenderness   Lungs:    AP diameter is not increased percussion note is normally resonant breathing vesicular expiration not prolonged no rails rhonchi are audible no pleural friction rub is audible   Chest Wall:    No tenderness or deformity      Heart:    Regular rate and rhythm, S1 and S2 normal, no murmur, rub        or gallop no rvh                           Abdomen:                                                 Pulses:                              Skin:                  Lymph nodes:                    Neurologic:                  Soft, non-tender, bowel sounds active all four quadrants,     no masses, no organomegaly         2+ and symmetric all extremities     Skin color, texture, turgor normal, no rashes or lesions       Cervical, supraclavicular not enlarged or matted or tender      CNII-XII intact, normal strength 5/5 . Sensation grossly normal  and reflexes normal 2+  throughout     Clubbing No  Lower ext edema absent  Upper ext edema absent         Musculoskeletal no synovitis. No joint swelling or tenderness        CBC: No results for input(s): WBC, HGB, PLT in the last 72 hours. BMP:  No results for input(s): NA, K, CL, CO2, BUN, CREATININE, GLUCOSE in the last 72 hours. Hepatic: No results for input(s): AST, ALT, ALB, BILITOT, ALKPHOS in the last 72 hours.   Amylase: No results found for: AMYLASE  Lipase: No results found for: LIPASE  Troponin: No results for input(s): TROPONINI in the last 72 hours. BNP: No results for input(s): BNP in the last 72 hours. Lipids: No results for input(s): CHOL, HDL in the last 72 hours. Invalid input(s): LDLCALCU  ABGs: No results found for: PHART, PO2ART, AOH3MSD  INR: No results for input(s): INR in the last 72 hours. Thyroid:   Lab Results   Component Value Date    TSH 0.55 01/06/2020     Urinalysis: No results for input(s): BACTERIA, BLOODU, CLARITYU, COLORU, PHUR, PROTEINU, RBCUA, SPECGRAV, BILIRUBINUR, NITRU, WBCUA, LEUKOCYTESUR, GLUCOSEU in the last 72 hours. Cultures:-  Cultures    CXR  Chest x-ray reviewed which are consistent with hyperinflation probably related to prior smoking and some component of COPD      CT Scans  No recent CT scan    Echo    Scheduled to get an echocardiogram            IMPRESSION:    Effort dyspnea  :   Systemic hypertension           History of smoking in the past  To rule out pulmonary hypertension  PLAN:     Patient has grade 1 effort dyspnea etiology which is unclear. All the work-up we have performed including cardiac work-up there is no abnormality detected to explain the dyspnea. I discussed the results of the high-resolution CT scan with the patient. I advised her that she may continue the present bronchodilators including albuterol and combination of inhaled steroid long-acting beta agonist and anticholinergics. Which has given her some relief. I do not feel the need to do any further work-up at this time or add any other medications. If she does develop any new symptoms will be more than happy to work-up any further. At this time I do not feel the need to do any more testing or adding any medications. He already had flu vaccine. She already Covid vaccine. He continues to be hard of hearing. I plan to see her follow-up in 3 months.   If her symptoms are stable then we will continue to follow her.              .    Patient already Covid vaccine. She had flu vaccine. Plan to see her follow-up in about 2 weeks    Keep you advised about her problems thank you very much for asking me to participate in her care dictated with Dr. Yeni Jorgensen MD dictation over thank you      Requested Prescriptions      No prescriptions requested or ordered in this encounter       There are no discontinued medications. Mickey Paiz received counseling on the following healthy behaviors: nutrition, exercise and medication adherence    Patient given educational materials : see patient instruction       Discussed use, benefit, and side effects of prescribed medications. Barriers to medication compliance addressed. All patient questions answered. Pt voiced understanding. I hope this updates you on my evaluation and clinical thinking. Thank you for allowing me to participate in his care. Sincerely,      Electronically signed by Mirza Bennett MD on   10/13/21 at 2:28 PM EDT       Please note that this chart was generated using voice recognition Dragon dictation software. Although every effort was made to ensure the accuracy of this automated transcription, some errors in transcription may have occurred.

## 2021-11-03 ENCOUNTER — TELEPHONE (OUTPATIENT)
Dept: PHARMACY | Age: 76
End: 2021-11-03

## 2021-11-03 NOTE — TELEPHONE ENCOUNTER
Called patient to schedule appt for diabetes medication management as we have not heard from her since we last spoke on 10/21 and she was going to call back and schedule. Patient is not home but patient's  will give her the message to call us to schedule. Kareen Thornton [facility],Pharm. D,, BCPS, CACP  11/3/2021  4:53 PM

## 2021-11-08 ENCOUNTER — HOSPITAL ENCOUNTER (OUTPATIENT)
Age: 76
Setting detail: SPECIMEN
Discharge: HOME | End: 2021-11-08
Payer: MEDICARE

## 2021-11-08 LAB
CREATININE URINE: 95.6 MG/DL (ref 28–217)
MICROALBUMIN/CREAT 24H UR: 70 MG/L
MICROALBUMIN/CREAT UR-RTO: 73 MCG/MG CREAT

## 2021-11-09 LAB
ALBUMIN SERPL-MCNC: 4.2 G/DL (ref 3.5–5.2)
ALBUMIN/GLOBULIN RATIO: 1.6 (ref 1–2.5)
ALP BLD-CCNC: 68 U/L (ref 35–104)
ALT SERPL-CCNC: 17 U/L (ref 5–33)
ANION GAP SERPL CALCULATED.3IONS-SCNC: 16 MMOL/L (ref 9–17)
AST SERPL-CCNC: 19 U/L
BILIRUB SERPL-MCNC: 0.33 MG/DL (ref 0.3–1.2)
BUN BLDV-MCNC: 10 MG/DL (ref 8–23)
BUN/CREAT BLD: ABNORMAL (ref 9–20)
C-PEPTIDE: 1.6 NG/ML (ref 1.1–4.4)
CALCIUM SERPL-MCNC: 8.6 MG/DL (ref 8.6–10.4)
CHLORIDE BLD-SCNC: 101 MMOL/L (ref 98–107)
CO2: 20 MMOL/L (ref 20–31)
CREAT SERPL-MCNC: 0.49 MG/DL (ref 0.5–0.9)
GFR AFRICAN AMERICAN: >60 ML/MIN
GFR NON-AFRICAN AMERICAN: >60 ML/MIN
GFR SERPL CREATININE-BSD FRML MDRD: ABNORMAL ML/MIN/{1.73_M2}
GFR SERPL CREATININE-BSD FRML MDRD: ABNORMAL ML/MIN/{1.73_M2}
GLUCOSE BLD-MCNC: 226 MG/DL (ref 70–99)
POTASSIUM SERPL-SCNC: 4.4 MMOL/L (ref 3.7–5.3)
SODIUM BLD-SCNC: 137 MMOL/L (ref 135–144)
TOTAL PROTEIN: 6.9 G/DL (ref 6.4–8.3)

## 2021-11-10 LAB — ISLET CELL ANTIBODY: NORMAL

## 2021-11-11 ENCOUNTER — TELEPHONE (OUTPATIENT)
Dept: PHARMACY | Age: 76
End: 2021-11-11

## 2021-11-11 NOTE — TELEPHONE ENCOUNTER
Called patient to reschedule diabetes follow up appointment. Left voice message and callback number provided.      Sheri Malone PharmD 11/11/2021 3:02 PM  [facility] PGY1 Resident

## 2021-11-12 LAB — GLUTAMIC ACID DECARB AB: >250 IU/ML (ref 0–5)

## 2021-11-14 DIAGNOSIS — E78.2 MIXED HYPERLIPIDEMIA: Primary | ICD-10-CM

## 2021-11-15 RX ORDER — GLIPIZIDE 10 MG/1
TABLET ORAL
Qty: 360 TABLET | Refills: 3 | Status: SHIPPED | OUTPATIENT
Start: 2021-11-15

## 2021-11-15 RX ORDER — ATORVASTATIN CALCIUM 40 MG/1
TABLET, FILM COATED ORAL
Qty: 90 TABLET | Refills: 3 | Status: SHIPPED | OUTPATIENT
Start: 2021-11-15 | End: 2022-11-04 | Stop reason: SDUPTHER

## 2021-12-09 DIAGNOSIS — E11.00 UNCONTROLLED TYPE 2 DM WITH HYPEROSMOLAR NONKETOTIC HYPERGLYCEMIA (HCC): ICD-10-CM

## 2021-12-09 RX ORDER — BLOOD SUGAR DIAGNOSTIC
STRIP MISCELLANEOUS
Qty: 400 STRIP | Refills: 3 | Status: SHIPPED | OUTPATIENT
Start: 2021-12-09

## 2021-12-29 ENCOUNTER — TELEPHONE (OUTPATIENT)
Dept: PHARMACY | Age: 76
End: 2021-12-29

## 2022-01-03 DIAGNOSIS — M19.042 PRIMARY OSTEOARTHRITIS OF BOTH HANDS: ICD-10-CM

## 2022-01-03 DIAGNOSIS — M19.041 PRIMARY OSTEOARTHRITIS OF BOTH HANDS: ICD-10-CM

## 2022-01-03 DIAGNOSIS — R07.82 INTERCOSTAL PAIN: Primary | ICD-10-CM

## 2022-01-03 RX ORDER — DULOXETIN HYDROCHLORIDE 60 MG/1
60 CAPSULE, DELAYED RELEASE ORAL DAILY
Qty: 90 CAPSULE | Refills: 0 | Status: SHIPPED | OUTPATIENT
Start: 2022-01-03 | End: 2022-04-07 | Stop reason: SDUPTHER

## 2022-01-03 NOTE — TELEPHONE ENCOUNTER
Pharmacy requesting a  refill of: Cymbalta 60mg     Medication active on med list yes     Date of last prescription: 08/05/2021  with  0  refills verified on 01/03/2021  verified by SIMON KNIGHT     Date of last appointment: 01/08/2020     Next Visit Date: None, message left to schedule follow up appointment.

## 2022-01-05 ENCOUNTER — OFFICE VISIT (OUTPATIENT)
Dept: ORTHOPEDIC SURGERY | Age: 77
End: 2022-01-05
Payer: MEDICARE

## 2022-01-05 DIAGNOSIS — M25.551 HIP PAIN, RIGHT: Primary | ICD-10-CM

## 2022-01-05 PROCEDURE — 4040F PNEUMOC VAC/ADMIN/RCVD: CPT | Performed by: ORTHOPAEDIC SURGERY

## 2022-01-05 PROCEDURE — G8484 FLU IMMUNIZE NO ADMIN: HCPCS | Performed by: ORTHOPAEDIC SURGERY

## 2022-01-05 PROCEDURE — G8420 CALC BMI NORM PARAMETERS: HCPCS | Performed by: ORTHOPAEDIC SURGERY

## 2022-01-05 PROCEDURE — 99213 OFFICE O/P EST LOW 20 MIN: CPT | Performed by: ORTHOPAEDIC SURGERY

## 2022-01-05 PROCEDURE — G8399 PT W/DXA RESULTS DOCUMENT: HCPCS | Performed by: ORTHOPAEDIC SURGERY

## 2022-01-05 PROCEDURE — 1123F ACP DISCUSS/DSCN MKR DOCD: CPT | Performed by: ORTHOPAEDIC SURGERY

## 2022-01-05 PROCEDURE — 20610 DRAIN/INJ JOINT/BURSA W/O US: CPT | Performed by: ORTHOPAEDIC SURGERY

## 2022-01-05 PROCEDURE — G8428 CUR MEDS NOT DOCUMENT: HCPCS | Performed by: ORTHOPAEDIC SURGERY

## 2022-01-05 PROCEDURE — 1036F TOBACCO NON-USER: CPT | Performed by: ORTHOPAEDIC SURGERY

## 2022-01-05 PROCEDURE — 1090F PRES/ABSN URINE INCON ASSESS: CPT | Performed by: ORTHOPAEDIC SURGERY

## 2022-01-05 RX ORDER — BETAMETHASONE SODIUM PHOSPHATE AND BETAMETHASONE ACETATE 3; 3 MG/ML; MG/ML
12 INJECTION, SUSPENSION INTRA-ARTICULAR; INTRALESIONAL; INTRAMUSCULAR; SOFT TISSUE ONCE
Status: COMPLETED | OUTPATIENT
Start: 2022-01-05 | End: 2022-01-05

## 2022-01-05 RX ORDER — LIDOCAINE HYDROCHLORIDE 10 MG/ML
2 INJECTION, SOLUTION INFILTRATION; PERINEURAL ONCE
Status: COMPLETED | OUTPATIENT
Start: 2022-01-05 | End: 2022-01-05

## 2022-01-05 RX ORDER — BUPIVACAINE HYDROCHLORIDE 5 MG/ML
2 INJECTION, SOLUTION PERINEURAL ONCE
Status: COMPLETED | OUTPATIENT
Start: 2022-01-05 | End: 2022-01-05

## 2022-01-05 RX ADMIN — LIDOCAINE HYDROCHLORIDE 2 ML: 10 INJECTION, SOLUTION INFILTRATION; PERINEURAL at 15:37

## 2022-01-05 RX ADMIN — BETAMETHASONE SODIUM PHOSPHATE AND BETAMETHASONE ACETATE 12 MG: 3; 3 INJECTION, SUSPENSION INTRA-ARTICULAR; INTRALESIONAL; INTRAMUSCULAR; SOFT TISSUE at 15:35

## 2022-01-05 RX ADMIN — BUPIVACAINE HYDROCHLORIDE 10 MG: 5 INJECTION, SOLUTION PERINEURAL at 15:36

## 2022-01-05 NOTE — PROGRESS NOTES
Anthony Amado M.D.            [address] [place] [address]., [address] [facility],[address], [address] [facility]           Dept Phone: [phone]           Dept Fax:  [address], [place]          Dept Phone: [phone]           Dept Fax:  [phone]      Chief Compliant:  Chief Complaint   Patient presents with    Pain     Rt hip        History of Present Illness: This is a 67 y.o. female who presents to the clinic today for evaluation / follow up of right hip pain. Patient is a 69-year-old female who has a history of bilateral total hip arthroplasties done by me greater than 13/14 years ago. Patient's right hip was done on the lateral approach in the left side with an anterior approach. Patient states that for the last 2 months that she is having lateral right hip pain. She has no complaints in the left side she denies any history of or trauma or falls. She does have a history of having previous spine surgery but she has no radicular symptoms. She has difficulty laying on the side she points directly to her greater trochanter where she hurts. .       Review of Systems   Constitutional: Negative for fever, chills, sweats. Eyes: Negative for changes in vision, or pain. HENT: Negative for ear ache, epistaxis, or sore throat. Respiratory/Cardio: Negative for Chest pain, palpitations, SOB, or cough. Gastrointestinal: Negative for abdominal pain, N/V/D. Genitourinary: Negative for dysuria, frequency, urgency, or hematuria. Neurological: Negative for headache, numbness, or weakness. Integumentary: Negative for rash, itching, laceration, or abrasion. Musculoskeletal: Positive for Pain (Rt hip)       Physical Exam:  Constitutional: Patient is oriented to person, place, and time. Patient appears well-developed and well nourished.    HENT: Negative otherwise noted  Head: Normocephalic and Atraumatic  Nose: Normal  Eyes: Conjunctivae and EOM are normal  Neck: Normal range of motion Neck supple. Respiratory/Cardio: Effort normal. No respiratory distress. Musculoskeletal: Examination notes I can motion the patient's right hip in flexion internal and external rotation without any discomfort whatsoever. Patient is exquisitely tender over her greater trochanter going on her IT band. She has a positive Kalpana's. Examination left hip is completely unremarkable with no pain on flexion internal or external rotation no trochanteric findings. No obvious leg length discrepancy  Neurological: Patient is alert and oriented to person, place, and time. Normal strenght. No sensory deficit. Skin: Skin is warm and dry  Psychiatric: Behavior is normal. Thought content normal.  Nursing note and vitals reviewed. Labs and Imaging:     XR taken today:  XR PELVIS (1-2 VIEWS)    Result Date: 1/5/2022  X-rays taken today reviewed by me show standing AP of the pelvis. Patient status post bilateral total hips. Patient has a longer stemmed prosthesis on the right side in the left. Components to however appear to be in good position without any obvious evidence for subsidence or malpositioning. Patient may have a slight element of eccentric to the femoral head on the right but certainly nothing too severe no evidence for any retroacetabular or periprosthetic lucency. Left side is completely unremarkable          Orders Placed This Encounter   Procedures    XR PELVIS (1-2 VIEWS)     Standing Status:   Future     Number of Occurrences:   1     Standing Expiration Date:   1/4/2023  20610 - DRAIN/INJECT LARGE JOINT BURSA       Assessment and Plan:  1. Hip pain, right    2. Trochanteric bursitis right hip status post right total hip via anterior lateral approach  3.        History of left total hip arthroplasty via ASI approach    Administrations This Visit     betamethasone acetate-betamethasone sodium phosphate (CELESTONE) injection 12 mg     Admin Date  01/05/2022  15:35 Action  Given Dose  12 mg Route  Intra-artICUlar Site  Hip Right Administered By  Samson Silva LPN    Ordering Provider: Ancelmo Coates MD    NDC: 1597-7192-43    Lot#: [id number]    : [employer]    Patient Supplied?: No          bupivacaine (MARCAINE) 0.5 % injection 10 mg     Admin Date  01/05/2022  15:36 Action  Given Dose  10 mg Route  Intra-artICUlar Site  Hip Right Administered By  Samson Silva LPN    Ordering Provider: Ancelmo Coates MD    NDC: 8042-5392-33    Lot#: [id number]    : H[facility]    Patient Supplied?: No          lidocaine 1 % injection 2 mL     Admin Date  01/05/2022  15:37 Action  Given Dose  2 mL Route  Intra-artICUlar Site  Hip Right Administered By  Samson Silva LPN    Ordering Provider: Ancelmo Coates MD    [address]: 8543-8861-99    Lot#: [id number]    : [id number] [facility]    Patient Supplied?: No                This is a 67 y.o. female who presents to the clinic today for evaluation / follow up of trochanteric bursitis right hip.      Past History:    Current Outpatient Medications:     DULoxetine (CYMBALTA) 60 MG extended release capsule, TAKE 1 CAPSULE BY MOUTH  DAILY, Disp: 90 capsule, Rfl: 0    LYUMJEV KWIKPEN 100 UNIT/ML SOPN, , Disp: , Rfl:     blood glucose test strips (ONETOUCH ULTRA) strip, USE 1 STRIP TO TEST 4 TIMES DAILY, Disp: 400 strip, Rfl: 3    glipiZIDE (GLUCOTROL) 10 MG tablet, TAKE 2 TABLETS BY MOUTH  TWICE DAILY BEFORE MEALS, Disp: 360 tablet, Rfl: 3    atorvastatin (LIPITOR) 40 MG tablet, TAKE 1 TABLET BY MOUTH  DAILY, Disp: 90 tablet, Rfl: 3    Spacer/Aero-Holding Chambers BRIDGET, 1 Device by Does not apply route daily, Disp: 1 each, Rfl: 1    repaglinide (PRANDIN) 2 MG tablet, Take 1 tablet by mouth 3 times daily (before meals), Disp: 270 tablet, Rfl: 1    Semaglutide (RYBELSUS) 7 MG TABS, Take 1 tablet by mouth daily, Disp: 30 tablet, Rfl: 2    repaglinide (PRANDIN) 2 MG tablet, Take 2 mg by mouth 3 times daily (before meals), Disp: , Rfl:     Multiple Vitamins-Minerals (LUTEIN-ZEAXANTHIN PO), Take 1 capsule by mouth daily, Disp: , Rfl:     blood glucose monitor kit and supplies, 1 kit by Other route three times daily Dispense One Touch Glucose Meter., Disp: 1 kit, Rfl: 0    insulin glargine (LANTUS SOLOSTAR) 100 UNIT/ML injection pen, Inject 10 Units into the skin every evening (Patient taking differently: Inject 15 Units into the skin daily ), Disp: 5 pen, Rfl: 0    metoprolol tartrate (LOPRESSOR) 25 MG tablet, TAKE 1 TABLET BY MOUTH  TWICE DAILY, Disp: 180 tablet, Rfl: 3    albuterol sulfate HFA (VENTOLIN HFA) 108 (90 Base) MCG/ACT inhaler, Inhale 2 puffs into the lungs every 4 hours as needed for Wheezing, Disp: 1 Inhaler, Rfl: 0    metFORMIN (GLUCOPHAGE) 1000 MG tablet, TAKE 1 TABLET BY MOUTH  TWICE DAILY WITH MEALS, Disp: 180 tablet, Rfl: 3    lisinopril (PRINIVIL;ZESTRIL) 20 MG tablet, TAKE 1 TABLET BY MOUTH  TWICE DAILY, Disp: 180 tablet, Rfl: 3    gabapentin (NEURONTIN) 300 MG capsule, TAKE 1 CAPSULE BY MOUTH IN  THE MORNING, 1 CAPSULE IN  THE AFTERNOON, AND 2  CAPSULES AT BEDTIME (Patient taking differently: TAKE 1 CAPSULE BY MOUTH IN  THE MORNING, 1 CAPSULE IN  THE AFTERNOON, AND 2  CAPSULES AT BEDTIME  Patient taking 2 capsules at bedtime and 1-2 in afternoon and none in morning), Disp: 360 capsule, Rfl: 3    blood glucose monitor strips, Test four  times a day & as needed for symptoms of irregular blood glucose. Dispense sufficient amount for indicated testing frequency plus additional to accommodate PRN testing needs. , Disp: 120 strip, Rfl: 5    ONE TOUCH ULTRASOFT LANCETS MISC, 1 each by Does not apply route 2 times daily, Disp: 100 each, Rfl: 3    Calcium-Magnesium-Vitamin D (CALCIUM 1200+D3 PO), Take 2 tablets by mouth daily. , Disp: , Rfl:     acetaminophen (TYLENOL) 500 MG tablet, Take 500 mg by mouth 2 times daily , Disp: , Rfl:     aspirin 81 MG chewable tablet, Take 81 mg by mouth daily. , Disp: , Rfl:   Allergies   Allergen Reactions    Hydrocodone      Social History     Socioeconomic History    Marital status:      Spouse name: Not on file    Number of children: Not on file    Years of education: Not on file    Highest education level: Not on file   Occupational History    Not on file   Tobacco Use    Smoking status: Former Smoker     Packs/day: 2.00     Years: 20.00     Pack years: 40.00     Types: Cigarettes     Quit date: 1984     Years since quittin.0    Smokeless tobacco: Never Used   Vaping Use    Vaping Use: Never used   Substance and Sexual Activity    Alcohol use: Yes     Alcohol/week: 0.0 standard drinks     Comment: social    Drug use: No    Sexual activity: Yes     Partners: Male   Other Topics Concern    Not on file   Social History Narrative    Not on file     Social Determinants of Health     Financial Resource Strain: Low Risk     Difficulty of Paying Living Expenses: Not hard at all   Food Insecurity: No Food Insecurity    Worried About Running Out of Food in the Last Year: Never true    [address] in the Last Year: Never true   Transportation Needs:     Lack of Transportation (Medical): Not on file    Lack of Transportation (Non-Medical):  Not on file   Physical Activity:     Days of Exercise per Week: Not on file    Minutes of Exercise per Session: Not on file   Stress:     Feeling of Stress : Not on file   Social Connections:     Frequency of Communication with Friends and Family: Not on file    Frequency of Social Gatherings with Friends and Family: Not on file    Attends Cheondoism Services: Not on file    Active Member of Clubs or Organizations: Not on file    Attends Club or Organization Meetings: Not on file    Marital Status: Not on file   Intimate Partner Violence:     Fear of Current or Ex-Partner: Not on file   [employer] Abused: Not on file    Physically Abused: Not on file    Sexually Abused: Not on file   Housing Stability:     Unable to Pay for Housing in the Last Year: Not on file    Number of Places Lived in the Last Year: Not on file    Unstable Housing in the Last Year: Not on file     Past Medical History:   Diagnosis Date    Anxiety state, unspecified     Arthritis     Cataracts, bilateral     COVID-19 vaccine administered 2- & 3-    MODERNA    Esophageal reflux     Generalized osteoarthrosis, unspecified site     Hearing loss     Hyperlipidemia     Hypertension     Neuropathy     Neuropathy due to medical condition ([facility] [address]     Pure hypercholesterolemia     Thoracic or lumbosacral neuritis or radiculitis, unspecified     Type 2 diabetes mellitus without complication ([facility] [address]     Urinary retention     UTI (urinary tract infection)      Past Surgical History:   Procedure Laterality Date    BACK SURGERY      X3    BREAST BIOPSY  2007    Lt     CARPAL TUNNEL RELEASE Right     CATARACT REMOVAL WITH IMPLANT Bilateral     COLONOSCOPY  2006    COLONOSCOPY  4/24/15    diverticulosis    CYSTOSCOPY  897815    CYSTOSCOPY  05/26/2017    CYSTOSCOPY N/A 5/26/2017    CYSTOSCOPY URODYNAMICS AND DILATION  performed by Kavya Ruggiero MD at [address] Bilateral     thumbs reconstruction    HIP ARTHROPLASTY Bilateral     JOINT REPLACEMENT Bilateral     LUMBAR FUSION  October 27, 2015     Family History   Problem Relation Age of Onset    Cancer Mother         lymphoma    Hypertension Mother     Hypertension Father     Hypertension Brother     Heart Disease Brother         pt states that brother had 5 bypass surgeries.     Plan  An informed verbal consent for the procedure was obtained and risks including, but not limited to: allergy to medications, injection, bleeding, stiffness of joint, recurrence of symptoms, loss of function, swelling, drainage, irrigation, need for surgery and pseudo-septic inflammation, were explained to the patient. Also, discussed was the potential for further injections, irrigation and debridement and surgery. Alternate means of treatment have also been discussed with the patient. Administrations This Visit     betamethasone acetate-betamethasone sodium phosphate (CELESTONE) injection 12 mg     Admin Date  01/05/2022  15:35 Action  Given Dose  12 mg Route  Intra-artICUlar Site  Hip Right Administered By  Duke Nash LPN    Ordering Provider: Milan Golden MD    NDC: 7780-8747-32    Lot#: [id number]    : [employer]    Patient Supplied?: No          bupivacaine (MARCAINE) 0.5 % injection 10 mg     Admin Date  01/05/2022  15:36 Action  Given Dose  10 mg Route  Intra-artICUlar Site  Hip Right Administered By  Duke Nash LPN    Ordering Provider: Milan Golden MD    NDC: 5947-4919-23    Lot#: [id number]    : Marlys Mini    Patient Supplied?: No          lidocaine 1 % injection 2 mL     Admin Date  01/05/2022  15:37 Action  Given Dose  2 mL Route  Intra-artICUlar Site  Hip Right Administered By  Duke Nash LPN    Ordering Provider: Mlian Golden MD    [address]: 1900-5127-99    Lot#: [id number]    : [id number] [facility]    Patient Supplied?: No            Under sterile conditions the patient's right greater trochanter the point maximal tenderness was injected with lidocaine 2 cc bupivacaine 2 cc and Celestone 2 cc. Patient tolerated the procedure well    Patient was given a home stretching program.  She was reassured that there is nothing wrong with the prosthesis is just has routine bursitis. Anticipated doing well with this we will see her back in 2 years or call if any problems or concerns prior to that time                  Provider Attestation:  Lisa Mills, personally performed the services described in this documentation. All medical record entries made by the scribe were at my direction and in my presence.  I have reviewed the chart and discharge instructions and agree that the records reflect my personal performance and is accurate and complete. Sherian Leyden, MD. 01/05/22      Please note that this chart was generated using voice recognition Dragon dictation software. Although every effort was made to ensure the accuracy of this automated transcription, some errors in transcription may have occurred.

## 2022-01-25 ENCOUNTER — TELEPHONE (OUTPATIENT)
Dept: INTERNAL MEDICINE CLINIC | Age: 77
End: 2022-01-25

## 2022-01-25 NOTE — TELEPHONE ENCOUNTER
----- Message from Jonniealicejanice Rand sent at 1/25/2022  3:09 PM EST -----  Subject: Appointment Request    Reason for Call: Routine Existing Condition Follow Up (Diabetes)    QUESTIONS  Type of Appointment? Established Patient  Reason for appointment request? No appointments available during search  Additional Information for Provider? Patient would like to schedule   diabetes follow up appt asap.   ---------------------------------------------------------------------------  --------------  CALL BACK INFO  What is the best way for the office to contact you? OK to leave message on   voicemail  Preferred Call Back Phone Number? [phone]  ---------------------------------------------------------------------------  --------------  SCRIPT ANSWERS  Relationship to Patient? Self  Is this follow up request related to routine Diabetes Management? Yes  Have you been diagnosed with, awaiting test results for, or told that you   are suspected of having COVID-19 (Coronavirus)? (If patient has tested   negative or was tested as a requirement for work, school, or travel and   not based on symptoms, answer no)? No  Within the past two weeks have you developed any of the following symptoms   (answer no if symptoms have been present longer than 2 weeks or began   more than 2 weeks ago)? Fever or Chills, Cough, Shortness of breath or   difficulty breathing, Loss of taste or smell, Sore throat, Nasal   congestion, Sneezing or runny nose, Fatigue or generalized body aches   (answer no if pain is specific to a body part e.g. back pain), Diarrhea,   Headache? No  Have you had close contact with someone with COVID-19 in the last 14 days? No  (Service Expert  click yes below to proceed with [facility] As Usual   Scheduling)?  Yes

## 2022-02-10 ENCOUNTER — OFFICE VISIT (OUTPATIENT)
Dept: INTERNAL MEDICINE CLINIC | Age: 77
End: 2022-02-10
Payer: MEDICARE

## 2022-02-10 VITALS
DIASTOLIC BLOOD PRESSURE: 62 MMHG | BODY MASS INDEX: 20.83 KG/M2 | SYSTOLIC BLOOD PRESSURE: 110 MMHG | HEART RATE: 74 BPM | HEIGHT: 65 IN | WEIGHT: 125 LBS | OXYGEN SATURATION: 100 %

## 2022-02-10 DIAGNOSIS — K21.9 GASTROESOPHAGEAL REFLUX DISEASE WITHOUT ESOPHAGITIS: ICD-10-CM

## 2022-02-10 DIAGNOSIS — R06.09 DYSPNEA ON EXERTION: ICD-10-CM

## 2022-02-10 DIAGNOSIS — I10 PRIMARY HYPERTENSION: ICD-10-CM

## 2022-02-10 DIAGNOSIS — E78.2 MIXED HYPERLIPIDEMIA: ICD-10-CM

## 2022-02-10 DIAGNOSIS — J44.9 CHRONIC OBSTRUCTIVE PULMONARY DISEASE, UNSPECIFIED COPD TYPE (HCC): ICD-10-CM

## 2022-02-10 DIAGNOSIS — E11.00 UNCONTROLLED TYPE 2 DM WITH HYPEROSMOLAR NONKETOTIC HYPERGLYCEMIA (HCC): Primary | ICD-10-CM

## 2022-02-10 DIAGNOSIS — Z23 FLU VACCINE NEED: ICD-10-CM

## 2022-02-10 LAB — HBA1C MFR BLD: 6.5 %

## 2022-02-10 PROCEDURE — 3023F SPIROM DOC REV: CPT | Performed by: INTERNAL MEDICINE

## 2022-02-10 PROCEDURE — 1090F PRES/ABSN URINE INCON ASSESS: CPT | Performed by: INTERNAL MEDICINE

## 2022-02-10 PROCEDURE — G8420 CALC BMI NORM PARAMETERS: HCPCS | Performed by: INTERNAL MEDICINE

## 2022-02-10 PROCEDURE — 99214 OFFICE O/P EST MOD 30 MIN: CPT | Performed by: INTERNAL MEDICINE

## 2022-02-10 PROCEDURE — G8484 FLU IMMUNIZE NO ADMIN: HCPCS | Performed by: INTERNAL MEDICINE

## 2022-02-10 PROCEDURE — 1123F ACP DISCUSS/DSCN MKR DOCD: CPT | Performed by: INTERNAL MEDICINE

## 2022-02-10 PROCEDURE — 4040F PNEUMOC VAC/ADMIN/RCVD: CPT | Performed by: INTERNAL MEDICINE

## 2022-02-10 PROCEDURE — 1036F TOBACCO NON-USER: CPT | Performed by: INTERNAL MEDICINE

## 2022-02-10 PROCEDURE — 83036 HEMOGLOBIN GLYCOSYLATED A1C: CPT | Performed by: INTERNAL MEDICINE

## 2022-02-10 PROCEDURE — G8427 DOCREV CUR MEDS BY ELIG CLIN: HCPCS | Performed by: INTERNAL MEDICINE

## 2022-02-10 PROCEDURE — 90694 VACC AIIV4 NO PRSRV 0.5ML IM: CPT | Performed by: INTERNAL MEDICINE

## 2022-02-10 PROCEDURE — G0008 ADMIN INFLUENZA VIRUS VAC: HCPCS | Performed by: INTERNAL MEDICINE

## 2022-02-10 PROCEDURE — G8399 PT W/DXA RESULTS DOCUMENT: HCPCS | Performed by: INTERNAL MEDICINE

## 2022-02-10 RX ORDER — GABAPENTIN 300 MG/1
CAPSULE ORAL
Qty: 360 CAPSULE | Refills: 3 | Status: SHIPPED | OUTPATIENT
Start: 2022-02-10 | End: 2023-02-10

## 2022-02-10 ASSESSMENT — ENCOUNTER SYMPTOMS
ABDOMINAL DISTENTION: 0
TROUBLE SWALLOWING: 0
SHORTNESS OF BREATH: 1
EYE DISCHARGE: 0
DIARRHEA: 0
COLOR CHANGE: 0
COUGH: 0
BLOOD IN STOOL: 0
EYE PAIN: 0
WHEEZING: 0

## 2022-02-10 NOTE — PROGRESS NOTES
Procedure Laterality Date    BACK SURGERY      X3    BREAST BIOPSY      Lt     CARPAL TUNNEL RELEASE Right     CATARACT REMOVAL WITH IMPLANT Bilateral     COLONOSCOPY  2006    COLONOSCOPY  4/24/15    diverticulosis    CYSTOSCOPY  717290    CYSTOSCOPY  2017    CYSTOSCOPY N/A 2017    CYSTOSCOPY URODYNAMICS AND DILATION  performed by Alexis Street MD at [address] [place] [address] Drive HAND SURGERY Bilateral     thumbs reconstruction    HIP ARTHROPLASTY Bilateral     JOINT REPLACEMENT Bilateral     LUMBAR FUSION  2015       Family History   Problem Relation Age of Onset    Cancer Mother         lymphoma    Hypertension Mother     Hypertension Father     Hypertension Brother     Heart Disease Brother         pt states that brother had 5 bypass surgeries. Social History     Tobacco Use    Smoking status: Former Smoker     Packs/day: 2.00     Years: 20.00     Pack years: 40.00     Types: Cigarettes     Quit date: 1984     Years since quittin.1    Smokeless tobacco: Never Used   Substance Use Topics    Alcohol use:  Yes     Alcohol/week: 0.0 standard drinks     Comment: social      Current Outpatient Medications   Medication Sig Dispense Refill    gabapentin (NEURONTIN) 300 MG capsule TAKE 2 CAPSULE BY MOUTH IN  THE MORNING, 1 CAPSULE IN  THE AFTERNOON, AND 2  CAPSULES AT BEDTIME 360 capsule 3    DULoxetine (CYMBALTA) 60 MG extended release capsule TAKE 1 CAPSULE BY MOUTH  DAILY 90 capsule 0    LYUMJEV KWIKPEN 100 UNIT/ML SOPN       blood glucose test strips (ONETOUCH ULTRA) strip USE 1 STRIP TO TEST 4 TIMES DAILY 400 strip 3    glipiZIDE (GLUCOTROL) 10 MG tablet TAKE 2 TABLETS BY MOUTH  TWICE DAILY BEFORE MEALS 360 tablet 3    atorvastatin (LIPITOR) 40 MG tablet TAKE 1 TABLET BY MOUTH  DAILY 90 tablet 3    repaglinide (PRANDIN) 2 MG tablet Take 2 mg by mouth 3 times daily (before meals)      Multiple Vitamins-Minerals (LUTEIN-ZEAXANTHIN PO) Take 1 capsule by mouth daily  blood glucose monitor kit and supplies 1 kit by Other route three times daily Dispense One Touch Glucose Meter. 1 kit 0    insulin glargine (LANTUS SOLOSTAR) 100 UNIT/ML injection pen Inject 10 Units into the skin every evening (Patient taking differently: Inject 15 Units into the skin daily ) 5 pen 0    metoprolol tartrate (LOPRESSOR) 25 MG tablet TAKE 1 TABLET BY MOUTH  TWICE DAILY 180 tablet 3    albuterol sulfate HFA (VENTOLIN HFA) 108 (90 Base) MCG/ACT inhaler Inhale 2 puffs into the lungs every 4 hours as needed for Wheezing 1 Inhaler 0    metFORMIN (GLUCOPHAGE) 1000 MG tablet TAKE 1 TABLET BY MOUTH  TWICE DAILY WITH MEALS 180 tablet 3    lisinopril (PRINIVIL;ZESTRIL) 20 MG tablet TAKE 1 TABLET BY MOUTH  TWICE DAILY 180 tablet 3    blood glucose monitor strips Test four  times a day & as needed for symptoms of irregular blood glucose. Dispense sufficient amount for indicated testing frequency plus additional to accommodate PRN testing needs. 120 strip 5    ONE TOUCH ULTRASOFT LANCETS MISC 1 each by Does not apply route 2 times daily 100 each 3    Calcium-Magnesium-Vitamin D (CALCIUM 1200+D3 PO) Take 2 tablets by mouth daily.  acetaminophen (TYLENOL) 500 MG tablet Take 500 mg by mouth 2 times daily       aspirin 81 MG chewable tablet Take 81 mg by mouth daily.  Spacer/Aero-Holding Chambers BRIDGET 1 Device by Does not apply route daily 1 each 1     No current facility-administered medications for this visit.      Allergies   Allergen Reactions    Hydrocodone        Health Maintenance   Topic Date Due    DTaP/Tdap/Td vaccine (1 - Tdap) 06/08/2010    Shingles Vaccine (2 of 2) 09/26/2018    COVID-19 Vaccine (3 - Booster for Moderna series) 08/05/2021    Flu vaccine (1) 09/01/2021    Annual Wellness Visit (AWV)  11/20/2021    Depression Screen  04/29/2022    Lipid screen  08/30/2022    Potassium monitoring  11/08/2022    Creatinine monitoring  11/08/2022    DEXA (modify frequency per FRAX score)  Completed    Pneumococcal 65+ years Vaccine  Completed    Hepatitis C screen  Completed    Hepatitis A vaccine  Aged Out    Hib vaccine  Aged Out    Meningococcal (ACWY) vaccine  Aged Out       Subjective:     Review of Systems   Constitutional: Negative for appetite change, diaphoresis and fatigue. HENT: Negative for ear discharge and trouble swallowing. Eyes: Negative for pain and discharge. Respiratory: Positive for shortness of breath. Negative for cough and wheezing. Cardiovascular: Negative for chest pain and palpitations. Gastrointestinal: Negative for abdominal distention, blood in stool and diarrhea. Endocrine: Negative for polydipsia and polyphagia. Genitourinary: Negative for difficulty urinating and frequency. Musculoskeletal: Negative for gait problem, myalgias and neck pain. Skin: Negative for color change and rash. Allergic/Immunologic: Negative for environmental allergies and food allergies. Neurological: Negative for dizziness and headaches. Hematological: Negative for adenopathy. Does not bruise/bleed easily. Psychiatric/Behavioral: Negative for behavioral problems and sleep disturbance. Objective:     Physical Exam  Constitutional:       Appearance: She is well-developed. She is not diaphoretic. HENT:      Head: Normocephalic and atraumatic. Eyes:      General:         Right eye: No discharge. Left eye: No discharge. Extraocular Movements:      Right eye: Normal extraocular motion. Left eye: Normal extraocular motion. Conjunctiva/sclera: Conjunctivae normal.      Right eye: Right conjunctiva is not injected. Left eye: Left conjunctiva is not injected. Neck:      Thyroid: No thyroid mass or thyromegaly. Vascular: No JVD. Cardiovascular:      Rate and Rhythm: Normal rate and regular rhythm. Heart sounds: No murmur heard. No friction rub.    Pulmonary:      Effort: Pulmonary effort is normal. No tachypnea, bradypnea, accessory muscle usage or respiratory distress. Breath sounds: Normal breath sounds. No wheezing or rales. Abdominal:      General: Bowel sounds are normal. There is no distension. Palpations: Abdomen is soft. Tenderness: There is no abdominal tenderness. There is no rebound. Musculoskeletal:         General: No tenderness. Normal range of motion. Cervical back: Normal range of motion and neck supple. No edema or erythema. Lymphadenopathy:      Head:      Right side of head: No submental or submandibular adenopathy. Left side of head: No submental or submandibular adenopathy. Cervical: No cervical adenopathy. Skin:     General: Skin is warm. Coloration: Skin is not pale. Findings: No bruising, ecchymosis or rash. Neurological:      Mental Status: She is alert and oriented to person, place, and time. Cranial Nerves: No cranial nerve deficit. Sensory: No sensory deficit. Motor: No atrophy or abnormal muscle tone. Coordination: Coordination normal.   Psychiatric:         Mood and Affect: Mood is not anxious. Affect is not angry. Speech: Speech is not slurred. Behavior: Behavior normal. Behavior is not aggressive. Thought Content: Thought content does not include homicidal ideation. Cognition and Memory: Memory is not impaired. /62   Pulse 74   Ht 5' 5\" (1.651 m)   Wt 125 lb (56.7 kg)   SpO2 100%   BMI 20.80 kg/m²     Assessment:       Diagnosis Orders   1. Uncontrolled type 2 DM with hyperosmolar nonketotic hyperglycemia (HCC)  POCT glycosylated hemoglobin (Hb A1C)   2. DM type 2, uncontrolled, with neuropathy ([address]     3. Chronic obstructive pulmonary disease, unspecified COPD type ([address]     4. Gastroesophageal reflux disease without esophagitis     5. Primary hypertension     6. Mixed hyperlipidemia     7.  Dyspnea on exertion               Plan:      Return in about 4 months (around 6/10/2022). Orders Placed This Encounter   Procedures    POCT glycosylated hemoglobin (Hb A1C)     Orders Placed This Encounter   Medications    gabapentin (NEURONTIN) 300 MG capsule     Sig: TAKE 2 CAPSULE BY MOUTH IN  THE MORNING, 1 CAPSULE IN  THE AFTERNOON, AND 2  CAPSULES AT BEDTIME     Dispense:  360 capsule     Refill:  3     Requesting 1 year supply    dm is better controlled a1c 6.5  Seen dr Castillo Scott    Metformin  lantus 15 daily  Short acting insulin  Dyspnea on exertion  Extensive workup including cad all negative  Rev dr jacy Raman noted  He recommend conservative tt  If recommended to see  [facility] or [facility]  Pt wants to wait and watch  Pt to get booster covid  advsied to get    Patient given educational materials - see patient instructions. Discussed use, benefit, and side effects of prescribed medications. All patientquestions answered. Pt voiced understanding. Reviewed health maintenance. Instructedto continue current medications, diet and exercise. Patient agreed with treatmentplan. Follow up as directed.      Electronically signed by Savanah Ndiaye MD on 2/10/2022 at 11:35 AM

## 2022-02-10 NOTE — PROGRESS NOTES
Are you sick today? no    Are you allergic to eggs? no    Have you ever had a reaction to the flu vaccine? no    Have you ever had Estephania Antis- barre's Syndrome? no    Per order from provider VIS given to patient, consent received,  flu vaccine was administered and documented in vaccine part of chart patient tolerated well.

## 2022-02-10 NOTE — PROGRESS NOTES
Visit Information    Have you changed or started any medications since your last visit including any over-the-counter medicines, vitamins, or herbal medicines? no   Are you having any side effects from any of your medications? -  no  Have you stopped taking any of your medications? Is so, why? -  no    Have you seen any other physician or provider since your last visit? Yes - Records Obtained  Have you had any other diagnostic tests since your last visit? Yes - Records Obtained  Have you been seen in the emergency room and/or had an admission to a hospital since we last saw you? No  Have you had your routine dental cleaning in the past 6 months? yes -     Have you activated your [employer] account? If not, what are your barriers?  Yes     Patient Care Team:  Augustus Duron MD as PCP - General (Internal Medicine)  Augustus Duron MD as PCP [facility]  Stephanie Ricardo Jim, DO (Obstetrics & Gynecology)    Medical History Review  Past Medical, Family, and Social History reviewed and does contribute to the patient presenting condition    Health Maintenance   Topic Date Due    DTaP/Tdap/Td vaccine (1 - Tdap) 06/08/2010    Shingles Vaccine (2 of 2) 09/26/2018    COVID-19 Vaccine (3 - Booster for Lorraine Asper series) 08/05/2021    Flu vaccine (1) 09/01/2021    Annual Wellness Visit (AWV)  11/20/2021    Depression Screen  04/29/2022    Lipid screen  08/30/2022    Potassium monitoring  11/08/2022    Creatinine monitoring  11/08/2022    DEXA (modify frequency per FRAX score)  Completed    Pneumococcal 65+ years Vaccine  Completed    Hepatitis C screen  Completed    Hepatitis A vaccine  Aged Out    Hib vaccine  Aged Out    Meningococcal (ACWY) vaccine  Aged Out

## 2022-02-15 DIAGNOSIS — R06.09 DYSPNEA ON EXERTION: Primary | ICD-10-CM

## 2022-02-21 ENCOUNTER — TELEPHONE (OUTPATIENT)
Dept: PHARMACY | Age: 77
End: 2022-02-21

## 2022-02-21 NOTE — TELEPHONE ENCOUNTER
Called and spoke to patient about our diabetes medication management program to see if patient interested in continuing. We did see patient but since 10/21 have been trying to reschedule appt patient missed. Patient indicates she wants to check her insurance. Explained that we could hold her referral for another week or two but then have to schedule her or cancel referral.  Patient indicates she will be out of town and needs more than a week or two. Agreed to wait until March 15th. If no call received from patient to schedule appt will discharge from our service and inform referring provider. Kareen Thornton [facility],Pharm. D,, BCPS, CACP  2/21/2022  4:45 PM

## 2022-03-06 DIAGNOSIS — M19.042 PRIMARY OSTEOARTHRITIS OF BOTH HANDS: ICD-10-CM

## 2022-03-06 DIAGNOSIS — M19.041 PRIMARY OSTEOARTHRITIS OF BOTH HANDS: ICD-10-CM

## 2022-03-07 RX ORDER — DULOXETIN HYDROCHLORIDE 60 MG/1
60 CAPSULE, DELAYED RELEASE ORAL DAILY
Qty: 90 CAPSULE | Refills: 3 | OUTPATIENT
Start: 2022-03-07

## 2022-04-05 ENCOUNTER — TELEPHONE (OUTPATIENT)
Dept: PHARMACY | Age: 77
End: 2022-04-05

## 2022-04-05 NOTE — TELEPHONE ENCOUNTER
Multiple attempts have been made to contact patient to schedule follow-up appointment in the medication management clinic for diabetes. Patient has not returned telephone calls. Last appointment October 2021.  Patient will be discharged from the medication management clinic  Padmini Pulido D, [address]   4/5/2022 4:36 PM

## 2022-04-07 ENCOUNTER — OFFICE VISIT (OUTPATIENT)
Dept: NEUROLOGY | Age: 77
End: 2022-04-07
Payer: MEDICARE

## 2022-04-07 VITALS
SYSTOLIC BLOOD PRESSURE: 107 MMHG | HEART RATE: 75 BPM | WEIGHT: 128 LBS | DIASTOLIC BLOOD PRESSURE: 60 MMHG | BODY MASS INDEX: 20.09 KG/M2 | HEIGHT: 67 IN

## 2022-04-07 DIAGNOSIS — M48.061 SPINAL STENOSIS OF LUMBAR REGION WITHOUT NEUROGENIC CLAUDICATION: Primary | ICD-10-CM

## 2022-04-07 DIAGNOSIS — Z98.890 HISTORY OF LUMBOSACRAL SPINE SURGERY: ICD-10-CM

## 2022-04-07 PROCEDURE — 1123F ACP DISCUSS/DSCN MKR DOCD: CPT | Performed by: NURSE PRACTITIONER

## 2022-04-07 PROCEDURE — G8427 DOCREV CUR MEDS BY ELIG CLIN: HCPCS | Performed by: NURSE PRACTITIONER

## 2022-04-07 PROCEDURE — 99214 OFFICE O/P EST MOD 30 MIN: CPT | Performed by: NURSE PRACTITIONER

## 2022-04-07 PROCEDURE — 1036F TOBACCO NON-USER: CPT | Performed by: NURSE PRACTITIONER

## 2022-04-07 PROCEDURE — G8420 CALC BMI NORM PARAMETERS: HCPCS | Performed by: NURSE PRACTITIONER

## 2022-04-07 PROCEDURE — 4040F PNEUMOC VAC/ADMIN/RCVD: CPT | Performed by: NURSE PRACTITIONER

## 2022-04-07 PROCEDURE — 3044F HG A1C LEVEL LT 7.0%: CPT | Performed by: NURSE PRACTITIONER

## 2022-04-07 PROCEDURE — G8399 PT W/DXA RESULTS DOCUMENT: HCPCS | Performed by: NURSE PRACTITIONER

## 2022-04-07 PROCEDURE — 1090F PRES/ABSN URINE INCON ASSESS: CPT | Performed by: NURSE PRACTITIONER

## 2022-04-07 RX ORDER — DULOXETIN HYDROCHLORIDE 60 MG/1
60 CAPSULE, DELAYED RELEASE ORAL DAILY
Qty: 90 CAPSULE | Refills: 3 | Status: SHIPPED | OUTPATIENT
Start: 2022-04-07

## 2022-04-07 NOTE — PROGRESS NOTES
[facility]            [address]          [facility], [address] [facility]          Dept: [phone]          Dept Fax: [phone]    MD Delphine Jamison MD Ahmed B. Richrd Gold, MD Fronie Furth, MD Carlette Flavors, CNP            4/7/2022      HISTORY OF PRESENT ILLNESS:       I had the pleasure of seeing Bree Sandoval, who returns for continuing neurologic care. The patient was seen last on January 8, 2020 for treatment of high-grade lumbar stenosis. The patient has high-grade lumbar stenosis associated with saddle paresthesias. The patient also likely suffers from a mild diabetic neuropathy. The patient had a previous spinal fusion from T10-S1 with decompression of the causa equina and conus medullaris in October 2015. The patient states they can be 8/10 in intensity which is typically during the night when she is tring to go to sleep. The patient is prescribed Cymbalta 60 mg at bedtime daily. The patient is prescribed Gabapentin 300 mg in the morning, 300 mg in the afternoon, and 600 mg at bedtime. The patient is here today reporting she continues to have pain in her saddle area. The patient reports Cymbalta and gabapentin are effective in giving her significant relief in saddle area pain to help her sleep at night. The patient states she notices an increase in pain if she does not take Cymbalta and gabapentin. Testing reviewed:     MRI lumbar spine 2015  Impression   IMPRESSION:        1. Postsurgical changes S1-L2 described above. 2. Circumferential epidural/dural enhancement from the level of L1/2 to the visualized lower thoracic spine. Findings could be reactive to patient's 3 prior surgeries. Inflammatory, infectious, or neoplastic process not entirely excluded. Clinical    correlation and followup is recommended.    3. Degenerative changes result in severe central canal stenosis and severe narrowing bilateral neural foramina at L1/2 which has progressed from prior exam. Degenerative changes present to lesser extent at other levels. See above for description of    findings at each level. 4. 2 foci of intermediate signal intensity posterior to the right lobe of the liver with dimensions given above. These are potentially related to lymph nodes. This area was not covered in the field-of-view on prior exam. Recommend attention to this area    on followup exam to confirm stability. 5. 13 mm cystic lesion laterally mid pole left kidney. Further evaluation can begin with ultrasound.       A [employer] message communicated  via EHR and through phone conversation via the Radiology Hub connecting radiologist to health care provider on 7/10/2015 2:34 PM, [facility].  Radiology hub asked to fax copy of report to Dr. Val Friedman office and to call office to confirm receipt of fax.                PAST MEDICAL HISTORY:         Diagnosis Date    Anxiety state, unspecified     Arthritis     Cataracts, bilateral     COVID-19 vaccine administered 2- & 3-    MODERNA    Esophageal reflux     Generalized osteoarthrosis, unspecified site     Hearing loss     Hyperlipidemia     Hypertension     Neuropathy     Neuropathy due to medical condition ([address]     Pure hypercholesterolemia     Thoracic or lumbosacral neuritis or radiculitis, unspecified     Type 2 diabetes mellitus without complication ([address]     Urinary retention     UTI (urinary tract infection)         PAST SURGICAL HISTORY:         Procedure Laterality Date    BACK SURGERY      X3    BREAST BIOPSY  2007    Lt     CARPAL TUNNEL RELEASE Right     CATARACT REMOVAL WITH IMPLANT Bilateral     COLONOSCOPY  2006    COLONOSCOPY  4/24/15    diverticulosis    CYSTOSCOPY  [id number]    CYSTOSCOPY  05/26/2017    CYSTOSCOPY N/A 5/26/2017    CYSTOSCOPY URODYNAMICS AND DILATION  performed by Janusz Jean-Baptiste MD at [address] Bilateral thumbs reconstruction    HIP ARTHROPLASTY Bilateral     JOINT REPLACEMENT Bilateral     LUMBAR FUSION  2015        SOCIAL HISTORY:     Social History     Socioeconomic History    Marital status:      Spouse name: Not on file    Number of children: Not on file    Years of education: Not on file    Highest education level: Not on file   Occupational History    Not on file   Tobacco Use    Smoking status: Former Smoker     Packs/day: 2.00     Years: 20.00     Pack years: 40.00     Types: Cigarettes     Quit date: 1984     Years since quittin.2    Smokeless tobacco: Never Used   Vaping Use    Vaping Use: Never used   Substance and Sexual Activity    Alcohol use: Yes     Alcohol/week: 0.0 standard drinks     Comment: social    Drug use: No    Sexual activity: Yes     Partners: Male   Other Topics Concern    Not on file   Social History Narrative    Not on file     Social Determinants of Health     Financial Resource Strain: Low Risk     Difficulty of Paying Living Expenses: Not hard at all   Food Insecurity: No Food Insecurity    Worried About Running Out of Food in the Last Year: Never true    [address] in the Last Year: Never true   Transportation Needs:     Lack of Transportation (Medical): Not on file    Lack of Transportation (Non-Medical):  Not on file   Physical Activity:     Days of Exercise per Week: Not on file    Minutes of Exercise per Session: Not on file   Stress:     Feeling of Stress : Not on file   Social Connections:     Frequency of Communication with Friends and Family: Not on file    Frequency of Social Gatherings with Friends and Family: Not on file    Attends Shinto Services: Not on file    Active Member of Clubs or Organizations: Not on file    Attends Club or Organization Meetings: Not on file    Marital Status: Not on file   Intimate Partner Violence:     Fear of Current or Ex-Partner: Not on file    Emotionally Abused: Not on file    Physically Abused: Not on file    Sexually Abused: Not on file   Housing Stability:     Unable to Pay for Housing in the Last Year: Not on file    Number of Places Lived in the Last Year: Not on file    Unstable Housing in the Last Year: Not on file       CURRENT MEDICATIONS:     Current Outpatient Medications   Medication Sig Dispense Refill    DULoxetine (CYMBALTA) 60 MG extended release capsule Take 1 capsule by mouth daily 90 capsule 3    gabapentin (NEURONTIN) 300 MG capsule TAKE 2 CAPSULE BY MOUTH IN  THE MORNING, 1 CAPSULE IN  THE AFTERNOON, AND 2  CAPSULES AT BEDTIME 360 capsule 3    LYUMJEV KWIKPEN 100 UNIT/ML SOPN       blood glucose test strips (ONETOUCH ULTRA) strip USE 1 STRIP TO TEST 4 TIMES DAILY 400 strip 3    atorvastatin (LIPITOR) 40 MG tablet TAKE 1 TABLET BY MOUTH  DAILY 90 tablet 3    Spacer/Aero-Holding Chambers BRIDGET 1 Device by Does not apply route daily 1 each 1    blood glucose monitor kit and supplies 1 kit by Other route three times daily Dispense One Touch Glucose Meter. 1 kit 0    insulin glargine (LANTUS SOLOSTAR) 100 UNIT/ML injection pen Inject 10 Units into the skin every evening (Patient taking differently: Inject 15 Units into the skin daily ) 5 pen 0    metoprolol tartrate (LOPRESSOR) 25 MG tablet TAKE 1 TABLET BY MOUTH  TWICE DAILY 180 tablet 3    albuterol sulfate HFA (VENTOLIN HFA) 108 (90 Base) MCG/ACT inhaler Inhale 2 puffs into the lungs every 4 hours as needed for Wheezing 1 Inhaler 0    metFORMIN (GLUCOPHAGE) 1000 MG tablet TAKE 1 TABLET BY MOUTH  TWICE DAILY WITH MEALS 180 tablet 3    lisinopril (PRINIVIL;ZESTRIL) 20 MG tablet TAKE 1 TABLET BY MOUTH  TWICE DAILY 180 tablet 3    blood glucose monitor strips Test four  times a day & as needed for symptoms of irregular blood glucose. Dispense sufficient amount for indicated testing frequency plus additional to accommodate PRN testing needs.  120 strip 5    ONE TOUCH ULTRASOFT LANCETS MISC 1 each by Does not apply route 2 times daily 100 each 3    Calcium-Magnesium-Vitamin D (CALCIUM 1200+D3 PO) Take 2 tablets by mouth daily.  acetaminophen (TYLENOL) 500 MG tablet Take 500 mg by mouth 2 times daily       aspirin 81 MG chewable tablet Take 81 mg by mouth daily.  glipiZIDE (GLUCOTROL) 10 MG tablet TAKE 2 TABLETS BY MOUTH  TWICE DAILY BEFORE MEALS (Patient not taking: Reported on 4/7/2022) 360 tablet 3     No current facility-administered medications for this visit. ALLERGIES:     Allergies   Allergen Reactions    Hydrocodone                                  REVIEW OF SYSTEMS        All items selected indicate a positive finding. Those items not selected are negative.   Constitutional [] Weight loss/gain   [] Fatigue  [] Fever/Chills   HEENT [] Hearing Loss  [] Visual Disturbance  [] Tinnitus  [] Eye pain   Respiratory [] Shortness of Breath  [] Cough  [] Snoring   Cardiovascular [] Chest Pain  [] Palpitations  [] Lightheaded   GI [] Constipation  [] Diarrhea  [] Swallowing change  [] Nausea/vomiting    [] Urinary Frequency  [] Urinary Urgency   Musculoskeletal [] Neck pain  [x] Back pain  [] Muscle pain  [] Restless legs   Dermatologic [] Skin changes   Neurologic [] Memory loss/confusion  [] Seizures  [] Trouble walking or imbalance  [] Dizziness  [] Sleep disturbance  [x] Weakness  [x] Numbness  [] Tremors  [] Speech Difficulty  [] Headaches  [] Light Sensitivity  [] Sound Sensitivity   Endocrinology []Excessive thirst  []Excessive hunger   Psychiatric [] Anxiety/Depression  [] Hallucination   Allergy/immunology []Hives/environmental allergies   Hematologic/lymph [] Abnormal bleeding  [] Abnormal bruising         PHYSICAL EXAMINATION:       Vitals:    04/07/22 0947   BP: 107/60   Pulse: 75                                              .                                                                                                    General Appearance:  Alert, cooperative, no signs of distress, appears stated age   Head:  Normocephalic, no signs of trauma   Eyes:  Conjunctiva/corneas clear;  eyelids intact   Ears:  Normal external ear and canals   Nose: Nares normal, mucosa normal, no drainage    Throat: Lips and tongue normal; teeth normal;  gums normal   Neck: Supple, intact flexion, extension and rotation;   trachea midline;  no adenopathy;   thyroid: not enlarged;   no carotid pulse abnormality   Back:   Symmetric, no curvature, ROM adequate   Lungs:   Respirations unlabored   Heart:  Regular rate and rhythm           Extremities: Extremities normal, no cyanosis, no edema   Pulses: Symmetric over head and neck   Skin: Skin color, texture normal, no rashes, no lesions                                     NEUROLOGIC EXAMINATION    Neurologic Exam  Mental status    Alert and oriented x 3; intact memory with no confusion, speech or language problems; no hallucinations or delusions  Fund of information appropriate for level of education    Cranial nerves    II - visual fields intact to confrontation bilaterally  III, IV, VI - extra-ocular muscles full: no pupillary defect; no CLAUDAI, no nystagmus, no ptosis   V - normal facial sensation                                                               VII - normal facial symmetry                                                             VIII - intact hearing                                                                             IX, X - symmetrical palate                                                                  XI - symmetrical shoulder shrug                                                       XII - tongue midline without atrophy or fasciculation      Motor function  Normal muscle bulk and tone; strength 5/5 on all 4 extremities, no pronator drift      Sensory function Diminished vibration,pinprick, and temperature sensation up to 50% in the bilateral lower extremities   Cerebellar Intact fine motor movement.  No involuntary movements or tremors. No ataxia or dysmetria on finger to nose or heel to shin testing      Reflex function DTR 2+ on bilateral UE and LE, symmetric. Down going toes bilaterally      Gait                   normal base and arm swing                  Medical Decision Making: In summary, your patient, Ekaterina Zaragoza exhibits the following, with associated plan:      High-grade lumbar stenosis associated with saddle paresthesias. Patient also likely suffers from a mild diabetic neuropathy. The patient reports Cymbalta and Gabapentin are effective in relieving pain in the saddle area which decreases the pain intensity so she can sleep at night. 1. Continue Cymbalta to 60 mg at bedtime daily  2. Continue Gabapentin 300 mg in the morning, 300 mg in the afternoon, and 600 mg at bedtime. 3. The patient will continue to be seen on an annual basis per her request.    Signed: Yamile Wise CNP      *Please note that portions of this note were completed with a voice recognition program.  Although every effort was made to insure the accuracy of this automated transcription, some errors in transcription may have occurred, occasionally words and are mis-transcribed    Provider Attestation: The documentation recorded by the scribe accurately reflects the service I personally performed and the decisions made by myself. Portions of this note were transcribed by a scribe. I personally performed the history, physical exam, and medical decision-making and confirm the accuracy of the information in the transcribed note. Scribe Attestation:   By signing my name below, Mica Speaker, attest that this documentation has been prepared under the direction and in the presence of Yamile Wise CNP.

## 2022-05-16 ENCOUNTER — OFFICE VISIT (OUTPATIENT)
Dept: INTERNAL MEDICINE CLINIC | Age: 77
End: 2022-05-16
Payer: MEDICARE

## 2022-05-16 VITALS
DIASTOLIC BLOOD PRESSURE: 66 MMHG | WEIGHT: 131 LBS | BODY MASS INDEX: 20.56 KG/M2 | HEART RATE: 80 BPM | OXYGEN SATURATION: 99 % | SYSTOLIC BLOOD PRESSURE: 106 MMHG | HEIGHT: 67 IN

## 2022-05-16 DIAGNOSIS — J34.2 DNS (DEVIATED NASAL SEPTUM): ICD-10-CM

## 2022-05-16 DIAGNOSIS — J43.1 PANLOBULAR EMPHYSEMA (HCC): ICD-10-CM

## 2022-05-16 DIAGNOSIS — Z91.81 AT HIGH RISK FOR FALLS: ICD-10-CM

## 2022-05-16 DIAGNOSIS — I10 PRIMARY HYPERTENSION: ICD-10-CM

## 2022-05-16 LAB — HBA1C MFR BLD: 6.7 %

## 2022-05-16 PROCEDURE — 99214 OFFICE O/P EST MOD 30 MIN: CPT | Performed by: INTERNAL MEDICINE

## 2022-05-16 PROCEDURE — 3044F HG A1C LEVEL LT 7.0%: CPT | Performed by: INTERNAL MEDICINE

## 2022-05-16 PROCEDURE — G8427 DOCREV CUR MEDS BY ELIG CLIN: HCPCS | Performed by: INTERNAL MEDICINE

## 2022-05-16 PROCEDURE — G8399 PT W/DXA RESULTS DOCUMENT: HCPCS | Performed by: INTERNAL MEDICINE

## 2022-05-16 PROCEDURE — G8420 CALC BMI NORM PARAMETERS: HCPCS | Performed by: INTERNAL MEDICINE

## 2022-05-16 PROCEDURE — 83036 HEMOGLOBIN GLYCOSYLATED A1C: CPT | Performed by: INTERNAL MEDICINE

## 2022-05-16 PROCEDURE — 1123F ACP DISCUSS/DSCN MKR DOCD: CPT | Performed by: INTERNAL MEDICINE

## 2022-05-16 PROCEDURE — 3023F SPIROM DOC REV: CPT | Performed by: INTERNAL MEDICINE

## 2022-05-16 PROCEDURE — 1036F TOBACCO NON-USER: CPT | Performed by: INTERNAL MEDICINE

## 2022-05-16 PROCEDURE — 1090F PRES/ABSN URINE INCON ASSESS: CPT | Performed by: INTERNAL MEDICINE

## 2022-05-16 PROCEDURE — 4040F PNEUMOC VAC/ADMIN/RCVD: CPT | Performed by: INTERNAL MEDICINE

## 2022-05-16 RX ORDER — FLASH GLUCOSE SENSOR
1 KIT MISCELLANEOUS 3 TIMES DAILY
Qty: 2 EACH | Refills: 5 | Status: SHIPPED | OUTPATIENT
Start: 2022-05-16

## 2022-05-16 ASSESSMENT — ENCOUNTER SYMPTOMS
BLOOD IN STOOL: 0
ABDOMINAL DISTENTION: 0
EYE DISCHARGE: 0
EYE PAIN: 0
TROUBLE SWALLOWING: 0
SHORTNESS OF BREATH: 0
WHEEZING: 0
COLOR CHANGE: 0
COUGH: 0
DIARRHEA: 0

## 2022-05-16 ASSESSMENT — PATIENT HEALTH QUESTIONNAIRE - PHQ9
SUM OF ALL RESPONSES TO PHQ QUESTIONS 1-9: 0
SUM OF ALL RESPONSES TO PHQ9 QUESTIONS 1 & 2: 0
2. FEELING DOWN, DEPRESSED OR HOPELESS: 0
1. LITTLE INTEREST OR PLEASURE IN DOING THINGS: 0
SUM OF ALL RESPONSES TO PHQ QUESTIONS 1-9: 0

## 2022-05-16 NOTE — PROGRESS NOTES
Visit Information    Have you changed or started any medications since your last visit including any over-the-counter medicines, vitamins, or herbal medicines? no   Are you having any side effects from any of your medications? -  no  Have you stopped taking any of your medications? Is so, why? -  no    Have you seen any other physician or provider since your last visit? No  Have you had any other diagnostic tests since your last visit? No  Have you been seen in the emergency room and/or had an admission to a hospital since we last saw you? No  Have you had your routine dental cleaning in the past 6 months? yes -     Have you activated your [employer] account? If not, what are your barriers?  Yes     Patient Care Team:  Tonia Adkins MD as PCP - General (Internal Medicine)  Tonia Adkins MD as PCP - [facility]  Stephanie Ignacio DO (Obstetrics & Gynecology)    Medical History Review  Past Medical, Family, and Social History reviewed and does contribute to the patient presenting condition    Health Maintenance   Topic Date Due    DTaP/Tdap/Td vaccine (1 - Tdap) 06/08/2010    Shingles vaccine (2 of 2) 09/26/2018    Annual Wellness Visit (AWV)  11/20/2021    Depression Screen  04/29/2022    Lipids  08/30/2022    DEXA (modify frequency per FRAX score)  Completed    Flu vaccine  Completed    Pneumococcal 65+ years Vaccine  Completed    COVID-19 Vaccine  Completed    Hepatitis C screen  Completed    Hepatitis A vaccine  Aged Out    Hib vaccine  Aged Out    Meningococcal (ACWY) vaccine  Aged Out

## 2022-05-16 NOTE — PROGRESS NOTES
[address] [address]  Dept: [phone]  Dept Fax: [phone]    Jaci Hagan is a 67 y.o. female who presents today for her medical conditions/complaintsas noted below. Jaci Hagan is c/o of   Chief Complaint   Patient presents with    Diabetes    Knee Pain     right knee     Fall     hurt nose          HPI:     HTN  Onset more than 2 years ago  ernesto mild to mod  Controlled with current po meds  Not associated with headaches or blurry vision  No chest pain  Diabetes   Duration more than 7 years  Modifying factors on Glucophage and other med  Severity uncontrolled sever  Associated signs and symtoms neuropathy/ckd/ CAD. aggravated with sugar diet and better with low sugar diet       Fall knee left pain        Hemoglobin A1C (%)   Date Value   05/16/2022 6.7   02/10/2022 6.5   08/30/2021 7.4 (H)             ( goal A1Cis < 7)   Microalb/Crt.  Ratio (mcg/mg creat)   Date Value   11/08/2021 73 (H)     LDL Cholesterol (mg/dL)   Date Value   08/30/2021 74   06/24/2020 62   05/16/2019 71       (goal LDL is <100)   AST (U/L)   Date Value   11/08/2021 19     ALT (U/L)   Date Value   11/08/2021 17     BUN (mg/dL)   Date Value   11/08/2021 10     BP Readings from Last 3 Encounters:   05/16/22 106/66   04/07/22 107/60   02/10/22 110/62          (goal 120/80)    Past Medical History:   Diagnosis Date    Anxiety state, unspecified     Arthritis     Cataracts, bilateral     COVID-19 vaccine administered 2- & 3-    MODERNA    Esophageal reflux     Generalized osteoarthrosis, unspecified site     Hearing loss     Hyperlipidemia     Hypertension     Neuropathy     Neuropathy due to medical condition ([address]     Pure hypercholesterolemia     Thoracic or lumbosacral neuritis or radiculitis, unspecified     Type 2 diabetes mellitus without complication ([address]     Urinary retention     UTI (urinary tract infection)       Past Surgical History:   Procedure Laterality Date    BACK SURGERY      X3    BREAST BIOPSY      Lt     CARPAL TUNNEL RELEASE Right     CATARACT REMOVAL WITH IMPLANT Bilateral     COLONOSCOPY  2006    COLONOSCOPY  4/24/15    diverticulosis    CYSTOSCOPY  [id number]    CYSTOSCOPY  2017    CYSTOSCOPY N/A 2017    CYSTOSCOPY URODYNAMICS AND DILATION  performed by Emerald Anne MD at [address] [place] [address] HAND SURGERY Bilateral     thumbs reconstruction    HIP ARTHROPLASTY Bilateral     JOINT REPLACEMENT Bilateral     LUMBAR FUSION  2015       Family History   Problem Relation Age of Onset    Cancer Mother         lymphoma    Hypertension Mother     Hypertension Father     Hypertension Brother     Heart Disease Brother         pt states that brother had 5 bypass surgeries. Social History     Tobacco Use    Smoking status: Former Smoker     Packs/day: 2.00     Years: 20.00     Pack years: 40.00     Types: Cigarettes     Quit date: 1984     Years since quittin.3    Smokeless tobacco: Never Used   Substance Use Topics    Alcohol use:  Yes     Alcohol/week: 0.0 standard drinks     Comment: social      Current Outpatient Medications   Medication Sig Dispense Refill    Continuous Blood Gluc Sensor (FREESTYLE WARREN 2 SENSOR) MISC 1 Units by Does not apply route in the morning, at noon, and at bedtime 2 each 5    DULoxetine (CYMBALTA) 60 MG extended release capsule Take 1 capsule by mouth daily 90 capsule 3    gabapentin (NEURONTIN) 300 MG capsule TAKE 2 CAPSULE BY MOUTH IN  THE MORNING, 1 CAPSULE IN  THE AFTERNOON, AND 2  CAPSULES AT BEDTIME 360 capsule 3    LYUMJEV KWIKPEN 100 UNIT/ML SOPN       blood glucose test strips (ONETOUCH ULTRA) strip USE 1 STRIP TO TEST 4 TIMES DAILY 400 strip 3    glipiZIDE (GLUCOTROL) 10 MG tablet TAKE 2 TABLETS BY MOUTH  TWICE DAILY BEFORE MEALS 360 tablet 3    atorvastatin (LIPITOR) 40 MG tablet TAKE 1 TABLET BY MOUTH  DAILY 90 tablet 3    blood glucose monitor kit and supplies 1 kit by Other route three times daily Dispense One Touch Glucose Meter. 1 kit 0    insulin glargine (LANTUS SOLOSTAR) 100 UNIT/ML injection pen Inject 10 Units into the skin every evening (Patient taking differently: Inject 15 Units into the skin daily ) 5 pen 0    metoprolol tartrate (LOPRESSOR) 25 MG tablet TAKE 1 TABLET BY MOUTH  TWICE DAILY 180 tablet 3    albuterol sulfate HFA (VENTOLIN HFA) 108 (90 Base) MCG/ACT inhaler Inhale 2 puffs into the lungs every 4 hours as needed for Wheezing 1 Inhaler 0    metFORMIN (GLUCOPHAGE) 1000 MG tablet TAKE 1 TABLET BY MOUTH  TWICE DAILY WITH MEALS 180 tablet 3    lisinopril (PRINIVIL;ZESTRIL) 20 MG tablet TAKE 1 TABLET BY MOUTH  TWICE DAILY 180 tablet 3    blood glucose monitor strips Test four  times a day & as needed for symptoms of irregular blood glucose. Dispense sufficient amount for indicated testing frequency plus additional to accommodate PRN testing needs. 120 strip 5    ONE TOUCH ULTRASOFT LANCETS MISC 1 each by Does not apply route 2 times daily 100 each 3    Calcium-Magnesium-Vitamin D (CALCIUM 1200+D3 PO) Take 2 tablets by mouth daily.  acetaminophen (TYLENOL) 500 MG tablet Take 500 mg by mouth 2 times daily       aspirin 81 MG chewable tablet Take 81 mg by mouth daily.  Spacer/Aero-Holding Chambers BRIDGET 1 Device by Does not apply route daily 1 each 1     No current facility-administered medications for this visit.      Allergies   Allergen Reactions    Hydrocodone        Health Maintenance   Topic Date Due    DTaP/Tdap/Td vaccine (1 - Tdap) 06/08/2010    Shingles vaccine (2 of 2) 09/26/2018    Annual Wellness Visit (AWV)  11/20/2021    Depression Screen  04/29/2022    Lipids  08/30/2022    DEXA (modify frequency per FRAX score)  Completed    Flu vaccine  Completed    Pneumococcal 65+ years Vaccine  Completed    COVID-19 Vaccine  Completed    Hepatitis C screen  Completed    Hepatitis A vaccine  Aged Out    Hib vaccine  Aged Out    Meningococcal (ACWY) vaccine  Aged Out       Subjective:     Review of Systems   Constitutional: Negative for appetite change, diaphoresis and fatigue. HENT: Negative for ear discharge and trouble swallowing. Eyes: Negative for pain and discharge. Respiratory: Negative for cough, shortness of breath and wheezing. Cardiovascular: Negative for chest pain and palpitations. Gastrointestinal: Negative for abdominal distention, blood in stool and diarrhea. Endocrine: Negative for polydipsia and polyphagia. Genitourinary: Negative for difficulty urinating and frequency. Musculoskeletal: Positive for arthralgias, myalgias and neck pain. Negative for gait problem. Skin: Negative for color change and rash. Allergic/Immunologic: Negative for environmental allergies and food allergies. Neurological: Negative for dizziness and headaches. Hematological: Negative for adenopathy. Does not bruise/bleed easily. Psychiatric/Behavioral: Negative for behavioral problems and sleep disturbance. Objective:     Physical Exam  Constitutional:       Appearance: She is well-developed. She is not diaphoretic. HENT:      Head: Normocephalic and atraumatic. Eyes:      General:         Right eye: No discharge. Left eye: No discharge. Extraocular Movements:      Right eye: Normal extraocular motion. Left eye: Normal extraocular motion. Conjunctiva/sclera: Conjunctivae normal.      Right eye: Right conjunctiva is not injected. Left eye: Left conjunctiva is not injected. Neck:      Thyroid: No thyroid mass or thyromegaly. Vascular: No JVD. Cardiovascular:      Rate and Rhythm: Normal rate and regular rhythm. Heart sounds: No murmur heard. No friction rub. Pulmonary:      Effort: Pulmonary effort is normal. No tachypnea, bradypnea, accessory muscle usage or respiratory distress. Breath sounds: Normal breath sounds.  No wheezing or rales.   Abdominal:      General: Bowel sounds are normal. There is no distension. Palpations: Abdomen is soft. Tenderness: There is no abdominal tenderness. There is no rebound. Musculoskeletal:         General: No tenderness. Normal range of motion. Cervical back: Normal range of motion and neck supple. No edema or erythema. Comments: Tender knee cap right     Lymphadenopathy:      Head:      Right side of head: No submental or submandibular adenopathy. Left side of head: No submental or submandibular adenopathy. Cervical: No cervical adenopathy. Skin:     General: Skin is warm. Coloration: Skin is not pale. Findings: No bruising, ecchymosis or rash. Neurological:      Mental Status: She is alert and oriented to person, place, and time. Cranial Nerves: No cranial nerve deficit. Sensory: No sensory deficit. Motor: No atrophy or abnormal muscle tone. Coordination: Coordination normal.   Psychiatric:         Mood and Affect: Mood is not anxious. Affect is not angry. Speech: Speech is not slurred. Behavior: Behavior normal. Behavior is not aggressive. Thought Content: Thought content does not include homicidal ideation. Cognition and Memory: Memory is not impaired. /66   Pulse 80   Ht 5' 7\" (1.702 m)   Wt 131 lb (59.4 kg)   SpO2 99%   BMI 20.52 kg/m²     Assessment:       Diagnosis Orders   1. DM type 2, uncontrolled, with neuropathy ([facility]  POCT glycosylated hemoglobin (Hb A1C)    DME Order for Diabetic Testing Supplies as OP   2. Panlobular emphysema ([address]     3. Primary hypertension     4. DNS (deviated nasal septum)  VENUS - Cayden Song MD, Otolaryngology, [facility]:      No follow-ups on file.     Orders Placed This Encounter   Procedures   Padmaja Montiel MD, Otolaryngology, [facility]     Referral Priority:   Routine     Referral Type:   Eval and Treat     Referral Reason: Specialty Services Required     Referred to Provider:   Marlon Adkins MD     Requested Specialty:   Otolaryngology     Number of Visits Requested:   1    POCT glycosylated hemoglobin (Hb A1C)    DME Order for Diabetic Testing Supplies as OP     Testing Strips: Insulin dependent- 300 per 3 months    Lancets: Insulin dependent- 300 per 3 months    Lancet device: 1 unit per 6 months    Calibration/Control solution for glucometer: 1 bottle per 3 months  Need free style warren for freq check     Orders Placed This Encounter   Medications    Continuous Blood Gluc Sensor (FREESTYLE WARREN 2 SENSOR) MISC     Si Units by Does not apply route in the morning, at noon, and at bedtime     Dispense:  2 each     Refill:  5    fall   Knee cap pain conservative tt  Dm  Pt don't want to follow up with [facility]  Will keep the regimen  a1c is better    . will do free style warren      Patient given educational materials - see patient instructions. Discussed use, benefit, and side effects of prescribed medications. All patientquestions answered. Pt voiced understanding. Reviewed health maintenance. Instructedto continue current medications, diet and exercise. Patient agreed with treatmentplan. Follow up as directed. Please note that this chart was generated using voice recognition Dragon dictation software. Although every effort was made to ensure the accuracy of this automated transcription, some errors in transcription may have occurred. Electronically signed by Do Pritchard MD on 2022 at 4:06 PM  On the basis of positive falls risk screening, assessment and plan is as follows: medications adjusted- insulin .

## 2022-05-23 ENCOUNTER — TELEPHONE (OUTPATIENT)
Dept: INTERNAL MEDICINE CLINIC | Age: 77
End: 2022-05-23

## 2022-05-23 NOTE — TELEPHONE ENCOUNTER
They did her annual in home exam and her circulation test two years ago was normal.    When they did it today, it showed moderate circulation impairment дмитрий the right lower extremity.     JOSS

## 2022-05-24 DIAGNOSIS — R80.9 MICROALBUMINURIA: ICD-10-CM

## 2022-05-25 RX ORDER — LISINOPRIL 20 MG/1
TABLET ORAL
Qty: 180 TABLET | Refills: 3 | Status: SHIPPED | OUTPATIENT
Start: 2022-05-25 | End: 2022-07-26 | Stop reason: SDUPTHER

## 2022-07-13 DIAGNOSIS — I10 ESSENTIAL HYPERTENSION: ICD-10-CM

## 2022-07-13 NOTE — TELEPHONE ENCOUNTER
Can you please send 2 week supply to [facility] while patient wanted on her Mail order.  Thank you

## 2022-07-26 ENCOUNTER — OFFICE VISIT (OUTPATIENT)
Dept: INTERNAL MEDICINE CLINIC | Age: 77
End: 2022-07-26
Payer: MEDICARE

## 2022-07-26 VITALS
BODY MASS INDEX: 21.03 KG/M2 | OXYGEN SATURATION: 98 % | HEART RATE: 88 BPM | HEIGHT: 67 IN | DIASTOLIC BLOOD PRESSURE: 70 MMHG | SYSTOLIC BLOOD PRESSURE: 114 MMHG | WEIGHT: 134 LBS

## 2022-07-26 DIAGNOSIS — E78.2 MIXED HYPERLIPIDEMIA: ICD-10-CM

## 2022-07-26 DIAGNOSIS — J43.1 PANLOBULAR EMPHYSEMA (HCC): ICD-10-CM

## 2022-07-26 DIAGNOSIS — I10 PRIMARY HYPERTENSION: ICD-10-CM

## 2022-07-26 DIAGNOSIS — M19.041 PRIMARY OSTEOARTHRITIS OF BOTH HANDS: ICD-10-CM

## 2022-07-26 DIAGNOSIS — M19.042 PRIMARY OSTEOARTHRITIS OF BOTH HANDS: ICD-10-CM

## 2022-07-26 DIAGNOSIS — M48.061 SPINAL STENOSIS OF LUMBAR REGION WITHOUT NEUROGENIC CLAUDICATION: ICD-10-CM

## 2022-07-26 DIAGNOSIS — R06.09 DYSPNEA ON EXERTION: ICD-10-CM

## 2022-07-26 DIAGNOSIS — I10 ESSENTIAL HYPERTENSION: ICD-10-CM

## 2022-07-26 DIAGNOSIS — R80.9 MICROALBUMINURIA: ICD-10-CM

## 2022-07-26 PROCEDURE — 1123F ACP DISCUSS/DSCN MKR DOCD: CPT | Performed by: INTERNAL MEDICINE

## 2022-07-26 PROCEDURE — 3044F HG A1C LEVEL LT 7.0%: CPT | Performed by: INTERNAL MEDICINE

## 2022-07-26 PROCEDURE — 99214 OFFICE O/P EST MOD 30 MIN: CPT | Performed by: INTERNAL MEDICINE

## 2022-07-26 RX ORDER — LISINOPRIL 20 MG/1
TABLET ORAL
Qty: 180 TABLET | Refills: 3 | Status: SHIPPED | OUTPATIENT
Start: 2022-07-26

## 2022-07-26 ASSESSMENT — ENCOUNTER SYMPTOMS
ABDOMINAL DISTENTION: 0
TROUBLE SWALLOWING: 0
BLOOD IN STOOL: 0
EYE DISCHARGE: 0
SHORTNESS OF BREATH: 1
COUGH: 0
WHEEZING: 0
DIARRHEA: 0
COLOR CHANGE: 0
EYE PAIN: 0

## 2022-07-26 NOTE — PROGRESS NOTES
[address] [place] [id number]  Dept: [phone]  Dept Fax: [phone]    Sujata Fong is a 67 y.o. female who presents today for her medical conditions/complaintsas noted below. Sujata Fong is c/o of   Chief Complaint   Patient presents with    Diabetes     5/16/22 a1c 6.7         HPI:     HTN  Onset more than 2 years ago  ernesto mild to mod  Controlled with current po meds  Not associated with headaches or blurry vision  No chest pain    Diabetes   Duration more than 7 years  Modifying factors on Glucophage and other med  Severity uncontrolled sever  Associated signs and symtoms neuropathy/ckd/ CAD. aggravated with sugar diet and better with low sugar diet             Hemoglobin A1C (%)   Date Value   05/16/2022 6.7   02/10/2022 6.5   08/30/2021 7.4 (H)             ( goal A1Cis < 7)   Microalb/Crt.  Ratio (mcg/mg creat)   Date Value   11/08/2021 73 (H)     LDL Cholesterol (mg/dL)   Date Value   08/30/2021 74   06/24/2020 62   05/16/2019 71       (goal LDL is <100)   AST (U/L)   Date Value   11/08/2021 19     ALT (U/L)   Date Value   11/08/2021 17     BUN (mg/dL)   Date Value   11/08/2021 10     BP Readings from Last 3 Encounters:   07/26/22 114/70   05/16/22 106/66   04/07/22 107/60          (goal 120/80)    Past Medical History:   Diagnosis Date    Anxiety state, unspecified     Arthritis     Cataracts, bilateral     COVID-19 vaccine administered 2- & 3-    MODERNA    Esophageal reflux     Generalized osteoarthrosis, unspecified site     Hearing loss     Hyperlipidemia     Hypertension     Neuropathy     Neuropathy due to medical condition ([address]     Pure hypercholesterolemia     Thoracic or lumbosacral neuritis or radiculitis, unspecified     Type 2 diabetes mellitus without complication ([address]     Urinary retention     UTI (urinary tract infection)       Past Surgical History:   Procedure Laterality Date    BACK SURGERY      X3    BREAST BIOPSY      Lt     CARPAL TUNNEL RELEASE Right     CATARACT REMOVAL WITH IMPLANT Bilateral     COLONOSCOPY  2006    COLONOSCOPY  4/24/15    diverticulosis    CYSTOSCOPY  [id number]    CYSTOSCOPY  2017    CYSTOSCOPY N/A 2017    CYSTOSCOPY URODYNAMICS AND DILATION  performed by Binh Beverly MD at [address] Bilateral     thumbs reconstruction    HIP ARTHROPLASTY Bilateral     JOINT REPLACEMENT Bilateral     LUMBAR FUSION  2015       Family History   Problem Relation Age of Onset    Cancer Mother         lymphoma    Hypertension Mother     Hypertension Father     Hypertension Brother     Heart Disease Brother         pt states that brother had 5 bypass surgeries. Social History     Tobacco Use    Smoking status: Former     Packs/day: 2.00     Years: 20.00     Pack years: 40.00     Types: Cigarettes     Quit date: 1984     Years since quittin.5    Smokeless tobacco: Never   Substance Use Topics    Alcohol use:  Yes     Alcohol/week: 0.0 standard drinks     Comment: social      Current Outpatient Medications   Medication Sig Dispense Refill    metoprolol tartrate (LOPRESSOR) 25 MG tablet TAKE 1 TABLET BY MOUTH  TWICE DAILY 180 tablet 3    lisinopril (PRINIVIL;ZESTRIL) 20 MG tablet TAKE 1 TABLET BY MOUTH  TWICE DAILY 180 tablet 3    metoprolol tartrate (LOPRESSOR) 25 MG tablet TAKE 1 TABLET BY MOUTH  TWICE DAILY 180 tablet 3    metFORMIN (GLUCOPHAGE) 1000 MG tablet TAKE 1 TABLET BY MOUTH  TWICE DAILY WITH MEALS 180 tablet 3    Continuous Blood Gluc Sensor (FREESTYLE WARREN 2 SENSOR) MISC 1 Units by Does not apply route in the morning, at noon, and at bedtime 2 each 5    DULoxetine (CYMBALTA) 60 MG extended release capsule Take 1 capsule by mouth daily 90 capsule 3    gabapentin (NEURONTIN) 300 MG capsule TAKE 2 CAPSULE BY MOUTH IN  THE MORNING, 1 CAPSULE IN  THE AFTERNOON, AND 2  CAPSULES AT BEDTIME 360 capsule 3    LYUMJEV KWIKPEN 100 UNIT/ML SOPN       blood glucose test strips (ONETOUCH ULTRA) strip USE 1 STRIP TO TEST 4 TIMES DAILY 400 strip 3    glipiZIDE (GLUCOTROL) 10 MG tablet TAKE 2 TABLETS BY MOUTH  TWICE DAILY BEFORE MEALS 360 tablet 3    atorvastatin (LIPITOR) 40 MG tablet TAKE 1 TABLET BY MOUTH  DAILY 90 tablet 3    Spacer/Aero-Holding Chambers BRIDGET 1 Device by Does not apply route daily 1 each 1    blood glucose monitor kit and supplies 1 kit by Other route three times daily Dispense One Touch Glucose Meter. 1 kit 0    albuterol sulfate HFA (VENTOLIN HFA) 108 (90 Base) MCG/ACT inhaler Inhale 2 puffs into the lungs every 4 hours as needed for Wheezing 1 Inhaler 0    blood glucose monitor strips Test four  times a day & as needed for symptoms of irregular blood glucose. Dispense sufficient amount for indicated testing frequency plus additional to accommodate PRN testing needs. 120 strip 5    ONE TOUCH ULTRASOFT LANCETS MISC 1 each by Does not apply route 2 times daily 100 each 3    Calcium-Magnesium-Vitamin D (CALCIUM 1200+D3 PO) Take 2 tablets by mouth daily. acetaminophen (TYLENOL) 500 MG tablet Take 500 mg by mouth in the morning and 500 mg before bedtime. aspirin 81 MG chewable tablet Take 81 mg by mouth daily. insulin glargine (LANTUS SOLOSTAR) 100 UNIT/ML injection pen Inject 10 Units into the skin every evening (Patient taking differently: Inject 15 Units into the skin daily ) 5 pen 0     No current facility-administered medications for this visit.      Allergies   Allergen Reactions    Hydrocodone        Health Maintenance   Topic Date Due    DTaP/Tdap/Td vaccine (1 - Tdap) 06/08/2010    Shingles vaccine (2 of 2) 09/26/2018    Annual Wellness Visit (AWV)  11/20/2021    COVID-19 Vaccine (4 - Booster for Moderna series) 06/13/2022    Lipids  08/30/2022    Flu vaccine (1) 09/01/2022    Depression Screen  05/16/2023    DEXA (modify frequency per FRAX score)  Completed    Pneumococcal 65+ years Vaccine  Completed    Hepatitis C screen  Completed Hepatitis A vaccine  Aged Out    Hib vaccine  Aged Out    Meningococcal (ACWY) vaccine  Aged Out       Subjective:     Review of Systems   Constitutional:  Negative for appetite change, diaphoresis and fatigue. HENT:  Negative for ear discharge and trouble swallowing. Eyes:  Negative for pain and discharge. Respiratory:  Positive for shortness of breath. Negative for cough and wheezing. Cardiovascular:  Negative for chest pain and palpitations. Gastrointestinal:  Negative for abdominal distention, blood in stool and diarrhea. Endocrine: Negative for polydipsia and polyphagia. Genitourinary:  Negative for difficulty urinating and frequency. Musculoskeletal:  Negative for gait problem, myalgias and neck pain. Skin:  Negative for color change and rash. Allergic/Immunologic: Negative for environmental allergies and food allergies. Neurological:  Negative for dizziness and headaches. Hematological:  Negative for adenopathy. Does not bruise/bleed easily. Psychiatric/Behavioral:  Negative for behavioral problems and sleep disturbance. Objective:     Physical Exam  Constitutional:       Appearance: She is well-developed. She is not diaphoretic. HENT:      Head: Normocephalic and atraumatic. Eyes:      General:         Right eye: No discharge. Left eye: No discharge. Extraocular Movements:      Right eye: Normal extraocular motion. Left eye: Normal extraocular motion. Conjunctiva/sclera: Conjunctivae normal.      Right eye: Right conjunctiva is not injected. Left eye: Left conjunctiva is not injected. Neck:      Thyroid: No thyroid mass or thyromegaly. Vascular: No JVD. Cardiovascular:      Rate and Rhythm: Normal rate and regular rhythm. Heart sounds: No murmur heard. No friction rub. Pulmonary:      Effort: Pulmonary effort is normal. No tachypnea, bradypnea, accessory muscle usage or respiratory distress.       Breath sounds: Normal breath sounds. No wheezing or rales. Abdominal:      General: Bowel sounds are normal. There is no distension. Palpations: Abdomen is soft. Tenderness: There is no abdominal tenderness. There is no rebound. Musculoskeletal:         General: No tenderness. Normal range of motion. Cervical back: Normal range of motion and neck supple. No edema or erythema. Lymphadenopathy:      Head:      Right side of head: No submental or submandibular adenopathy. Left side of head: No submental or submandibular adenopathy. Cervical: No cervical adenopathy. Skin:     General: Skin is warm. Coloration: Skin is not pale. Findings: No bruising, ecchymosis or rash. Neurological:      Mental Status: She is alert and oriented to person, place, and time. Cranial Nerves: No cranial nerve deficit. Sensory: No sensory deficit. Motor: No atrophy or abnormal muscle tone. Coordination: Coordination normal.   Psychiatric:         Mood and Affect: Mood is not anxious. Affect is not angry. Speech: Speech is not slurred. Behavior: Behavior normal. Behavior is not aggressive. Thought Content: Thought content does not include homicidal ideation. Cognition and Memory: Memory is not impaired. /70   Pulse 88   Ht 5' 7\" (1.702 m)   Wt 134 lb (60.8 kg)   SpO2 98%   BMI 20.99 kg/m²     Assessment:       Diagnosis Orders   1. DM type 2, uncontrolled, with neuropathy (HCC)  Hemoglobin A1C      2. Dyspnea on exertion        3. Panlobular emphysema ([facility]        4. Spinal stenosis of lumbar region without neurogenic claudication        5. Primary osteoarthritis of both hands        6. Mixed hyperlipidemia        7. Primary hypertension        8. Essential hypertension  metoprolol tartrate (LOPRESSOR) 25 MG tablet      9. Microalbuminuria  lisinopril (PRINIVIL;ZESTRIL) 20 MG tablet                Plan:      No follow-ups on file.     Orders Placed This Encounter   Procedures    Hemoglobin A1C     Standing Status:   Future     Standing Expiration Date:   7/26/2023       Orders Placed This Encounter   Medications    metoprolol tartrate (LOPRESSOR) 25 MG tablet     Sig: TAKE 1 TABLET BY MOUTH  TWICE DAILY     Dispense:  180 tablet     Refill:  3     Requesting 1 year supply    lisinopril (PRINIVIL;ZESTRIL) 20 MG tablet     Sig: TAKE 1 TABLET BY MOUTH  TWICE DAILY     Dispense:  180 tablet     Refill:  3     Requesting 1 year supply      Dm is better  A1c less than 7  On lantus 15 and pre meal insulin 5  Extensive dyspnea workup is negative         Patient given educational materials - see patient instructions. Discussed use, benefit, and side effects of prescribed medications. All patientquestions answered. Pt voiced understanding. Reviewed health maintenance. Instructedto continue current medications, diet and exercise. Patient agreed with treatmentplan. Follow up as directed. Please note that this chart was generated using voice recognition Dragon dictation software. Although every effort was made to ensure the accuracy of this automated transcription, some errors in transcription may have occurred.      Electronically signed by Cristian Ellis MD on 7/26/2022 at 10:50 AM

## 2022-09-29 ENCOUNTER — HOSPITAL ENCOUNTER (OUTPATIENT)
Age: 77
Setting detail: SPECIMEN
Discharge: HOME | End: 2022-09-29

## 2022-10-04 LAB
ESTIMATED AVERAGE GLUCOSE: 217 MG/DL
HBA1C MFR BLD: 9.2 % (ref 4–6)

## 2022-10-11 PROBLEM — E11.9 TYPE 2 DIABETES MELLITUS WITHOUT COMPLICATION, WITH LONG-TERM CURRENT USE OF INSULIN (HCC): Status: ACTIVE | Noted: 2017-07-24

## 2022-10-11 PROBLEM — Z79.4 TYPE 2 DIABETES MELLITUS WITHOUT COMPLICATION, WITH LONG-TERM CURRENT USE OF INSULIN (HCC): Status: ACTIVE | Noted: 2017-07-24

## 2022-10-12 ENCOUNTER — OFFICE VISIT (OUTPATIENT)
Dept: INTERNAL MEDICINE CLINIC | Age: 77
End: 2022-10-12
Payer: MEDICARE

## 2022-10-12 VITALS
BODY MASS INDEX: 20.88 KG/M2 | DIASTOLIC BLOOD PRESSURE: 64 MMHG | HEIGHT: 67 IN | WEIGHT: 133 LBS | HEART RATE: 81 BPM | SYSTOLIC BLOOD PRESSURE: 108 MMHG | OXYGEN SATURATION: 98 %

## 2022-10-12 DIAGNOSIS — J43.1 PANLOBULAR EMPHYSEMA (HCC): ICD-10-CM

## 2022-10-12 DIAGNOSIS — E11.9 TYPE 2 DIABETES MELLITUS WITHOUT COMPLICATION, WITH LONG-TERM CURRENT USE OF INSULIN (HCC): Primary | ICD-10-CM

## 2022-10-12 DIAGNOSIS — K21.9 GASTROESOPHAGEAL REFLUX DISEASE WITHOUT ESOPHAGITIS: ICD-10-CM

## 2022-10-12 DIAGNOSIS — E78.2 MIXED HYPERLIPIDEMIA: ICD-10-CM

## 2022-10-12 DIAGNOSIS — Z79.4 TYPE 2 DIABETES MELLITUS WITHOUT COMPLICATION, WITH LONG-TERM CURRENT USE OF INSULIN (HCC): Primary | ICD-10-CM

## 2022-10-12 DIAGNOSIS — Z00.00 MEDICARE ANNUAL WELLNESS VISIT, SUBSEQUENT: ICD-10-CM

## 2022-10-12 DIAGNOSIS — Z12.31 ENCOUNTER FOR SCREENING MAMMOGRAM FOR MALIGNANT NEOPLASM OF BREAST: ICD-10-CM

## 2022-10-12 DIAGNOSIS — I10 PRIMARY HYPERTENSION: ICD-10-CM

## 2022-10-12 DIAGNOSIS — Z23 NEED FOR VACCINATION FOR H FLU TYPE B: ICD-10-CM

## 2022-10-12 PROCEDURE — G8484 FLU IMMUNIZE NO ADMIN: HCPCS | Performed by: INTERNAL MEDICINE

## 2022-10-12 PROCEDURE — 3046F HEMOGLOBIN A1C LEVEL >9.0%: CPT | Performed by: INTERNAL MEDICINE

## 2022-10-12 PROCEDURE — 90694 VACC AIIV4 NO PRSRV 0.5ML IM: CPT | Performed by: INTERNAL MEDICINE

## 2022-10-12 PROCEDURE — G0439 PPPS, SUBSEQ VISIT: HCPCS | Performed by: INTERNAL MEDICINE

## 2022-10-12 PROCEDURE — G0008 ADMIN INFLUENZA VIRUS VAC: HCPCS | Performed by: INTERNAL MEDICINE

## 2022-10-12 PROCEDURE — 1123F ACP DISCUSS/DSCN MKR DOCD: CPT | Performed by: INTERNAL MEDICINE

## 2022-10-12 RX ORDER — INSULIN GLARGINE 100 [IU]/ML
10 INJECTION, SOLUTION SUBCUTANEOUS EVERY EVENING
Qty: 3 ML | Refills: 11 | Status: CANCELLED | OUTPATIENT
Start: 2022-10-12 | End: 2023-10-12

## 2022-10-12 RX ORDER — BLOOD-GLUCOSE SENSOR
1 EACH MISCELLANEOUS
Qty: 2 EACH | Refills: 3 | Status: SHIPPED | OUTPATIENT
Start: 2022-10-12

## 2022-10-12 RX ORDER — BLOOD-GLUCOSE,RECEIVER,CONT
1 EACH MISCELLANEOUS
Qty: 2 EACH | Refills: 3 | Status: SHIPPED | OUTPATIENT
Start: 2022-10-12

## 2022-10-12 RX ORDER — BLOOD-GLUCOSE TRANSMITTER
1 EACH MISCELLANEOUS
Qty: 2 EACH | Refills: 3 | Status: SHIPPED | OUTPATIENT
Start: 2022-10-12

## 2022-10-12 SDOH — ECONOMIC STABILITY: FOOD INSECURITY: WITHIN THE PAST 12 MONTHS, THE FOOD YOU BOUGHT JUST DIDN'T LAST AND YOU DIDN'T HAVE MONEY TO GET MORE.: NEVER TRUE

## 2022-10-12 SDOH — ECONOMIC STABILITY: FOOD INSECURITY: WITHIN THE PAST 12 MONTHS, YOU WORRIED THAT YOUR FOOD WOULD RUN OUT BEFORE YOU GOT MONEY TO BUY MORE.: NEVER TRUE

## 2022-10-12 ASSESSMENT — LIFESTYLE VARIABLES
HOW OFTEN DO YOU HAVE A DRINK CONTAINING ALCOHOL: 2-3 TIMES A WEEK
HOW MANY STANDARD DRINKS CONTAINING ALCOHOL DO YOU HAVE ON A TYPICAL DAY: 1 OR 2

## 2022-10-12 ASSESSMENT — PATIENT HEALTH QUESTIONNAIRE - PHQ9
SUM OF ALL RESPONSES TO PHQ QUESTIONS 1-9: 0
SUM OF ALL RESPONSES TO PHQ QUESTIONS 1-9: 0
2. FEELING DOWN, DEPRESSED OR HOPELESS: 0
SUM OF ALL RESPONSES TO PHQ QUESTIONS 1-9: 0
SUM OF ALL RESPONSES TO PHQ QUESTIONS 1-9: 0
1. LITTLE INTEREST OR PLEASURE IN DOING THINGS: 0
SUM OF ALL RESPONSES TO PHQ9 QUESTIONS 1 & 2: 0

## 2022-10-12 ASSESSMENT — SOCIAL DETERMINANTS OF HEALTH (SDOH): HOW HARD IS IT FOR YOU TO PAY FOR THE VERY BASICS LIKE FOOD, HOUSING, MEDICAL CARE, AND HEATING?: NOT HARD AT ALL

## 2022-10-12 NOTE — PROGRESS NOTES
Medicare Annual Wellness Visit    Ryley Solano is here for Medicare AWV    Assessment & Plan   Type 2 diabetes mellitus without complication, with long-term current use of insulin ([facility] [address]  -     Lipid Panel; Future  -     Comprehensive Metabolic Panel; Future  -     CBC; Future  Primary hypertension  Panlobular emphysema (HCC)  Mixed hyperlipidemia  -     Lipid Panel; Future  Gastroesophageal reflux disease without esophagitis  Medicare annual wellness visit, subsequent  -     [facility]I DIGITAL SCREEN W OR WO CAD BILATERAL; Future  Encounter for screening mammogram for malignant neoplasm of breast   -     CAMI DIGITAL SCREEN W OR WO CAD BILATERAL; Future    Recommendations for Preventive Services Due: see orders and patient instructions/AVS.  Recommended screening schedule for the next 5-10 years is provided to the patient in written form: see Patient Instructions/AVS.    Uncontrolled dm  On lantus 15  Pt has not been eating well  Now agrees for compliance  Pt claims had covid shtos boosters and shingles also  Due for flu shot    Return in 1 month (on 11/12/2022) for Medicare Annual Wellness Visit in 1 year. Subjective   Uncontrolled      Patient's complete Health Risk Assessment and screening values have been reviewed and are found in Flowsheets. The following problems were reviewed today and where indicated follow up appointments were made and/or referrals ordered.     Positive Risk Factor Screenings with Interventions:             General Health and ACP:  General  In general, how would you say your health is?: Very Good  In the past 7 days, have you experienced any of the following: New or Increased Pain, New or Increased Fatigue, Loneliness, Social Isolation, Stress or Anger?: No  Do you get the social and emotional support that you need?: Yes  Do you have a Living Will?: Yes    Advance Directives       Power of  Living Will ACP-Advance Directive ACP-Power of     Not on File Not on File Not on File Not on File        General Health Risk Interventions:  Uncontrolled dm     Hearing/Vision:  Do you or your family notice any trouble with your hearing that hasn't been managed with hearing aids?: (!) Yes  Do you have difficulty driving, watching TV, or doing any of your daily activities because of your eyesight?: (!) Yes  Have you had an eye exam within the past year?: Appointment is scheduled  No results found. Hearing/Vision Interventions:  none            Objective   Vitals:    10/12/22 1317   BP: 108/64   Pulse: 81   SpO2: 98%   Weight: 133 lb (60.3 kg)   Height: 5' 7\" (1.702 m)      Body mass index is 20.83 kg/m². General Appearance: alert and oriented to person, place and time, well developed and well- nourished, in no acute distress  Skin: warm and dry, no rash or erythema  Head: normocephalic and atraumatic  Eyes: pupils equal, round, and reactive to light, extraocular eye movements intact, conjunctivae normal  ENT: tympanic membrane, external ear and ear canal normal bilaterally, nose without deformity, nasal mucosa and turbinates normal without polyps  Neck: supple and non-tender without mass, no thyromegaly or thyroid nodules, no cervical lymphadenopathy  Pulmonary/Chest: clear to auscultation bilaterally- no wheezes, rales or rhonchi, normal air movement, no respiratory distress  Cardiovascular: normal rate, regular rhythm, normal S1 and S2, no murmurs, rubs, clicks, or gallops, distal pulses intact, no carotid bruits  Abdomen: soft, non-tender, non-distended, normal bowel sounds, no masses or organomegaly  Extremities: no cyanosis, clubbing or edema  Musculoskeletal: normal range of motion, no joint swelling, deformity or tenderness  Neurologic: reflexes normal and symmetric, no cranial nerve deficit, gait, coordination and speech normal       Allergies   Allergen Reactions    Hydrocodone      Prior to Visit Medications    Medication Sig Taking?  Authorizing Provider   Continuous Blood Gluc  ([address]) BRIDGET 1 each by Does not apply route every 14 days Yes Yasmani Ledesma MD   Continuous Blood Gluc Sensor (DEXCOM G6 SENSOR) MISC 1 each by Does not apply route every 14 days Yes Yasmani Ledesma MD   Continuous Blood Gluc Transmit (DEXCOM G6 TRANSMITTER) MISC 1 each by Does not apply route every 14 days Yes Yasmani Ledesma MD   metoprolol tartrate (LOPRESSOR) 25 MG tablet TAKE 1 TABLET BY MOUTH  TWICE DAILY Yes Yasmani Ledesma MD   lisinopril (PRINIVIL;ZESTRIL) 20 MG tablet TAKE 1 TABLET BY MOUTH  TWICE DAILY Yes Yasmani Ledesma MD   metoprolol tartrate (LOPRESSOR) 25 MG tablet TAKE 1 TABLET BY MOUTH  TWICE DAILY Yes Yasmani Ledesma MD   metFORMIN (GLUCOPHAGE) 1000 MG tablet TAKE 1 TABLET BY MOUTH  TWICE DAILY WITH MEALS Yes Yasmani Ledesma MD   Continuous Blood Gluc Sensor (FREESTYLE WARREN 2 SENSOR) MISC 1 Units by Does not apply route in the morning, at noon, and at bedtime Yes Yasmani Ledesma MD   DULoxetine (CYMBALTA) 60 MG extended release capsule Take 1 capsule by mouth daily Yes JAMES Dominguez - CNP   gabapentin (NEURONTIN) 300 MG capsule TAKE 2 CAPSULE BY MOUTH IN  THE MORNING, 1 CAPSULE IN  THE AFTERNOON, AND 2  CAPSULES AT BEDTIME Yes Yasmani Ledesma MD   LYALONDRA MADISON 100 UNIT/ML SOPN  Yes Rd Ku MD   blood glucose test strips (ONETOUCH ULTRA) strip USE 1 STRIP TO TEST 4 TIMES DAILY Yes Yasmani Ledesma MD   glipiZIDE (GLUCOTROL) 10 MG tablet TAKE 2 TABLETS BY MOUTH  TWICE DAILY BEFORE MEALS Yes Yasmani Ledesma MD   atorvastatin (LIPITOR) 40 MG tablet TAKE 1 TABLET BY MOUTH  DAILY Yes Yasmani Ledesma MD   blood glucose monitor kit and supplies 1 kit by Other route three times daily Dispense One Touch Glucose Meter. Yes Yasmani eLdesma MD   blood glucose monitor strips Test four  times a day & as needed for symptoms of irregular blood glucose.  Dispense sufficient amount for indicated testing frequency plus additional to accommodate PRN testing needs. Yes Isidra Mitchell MD   ONE TOUCH ULTRASOFT LANCETS MISC 1 each by Does not apply route 2 times daily Yes Quincy Gooden MD   Calcium-Magnesium-Vitamin D (CALCIUM 1200+D3 PO) Take 2 tablets by mouth daily. Yes Historical Provider, MD   acetaminophen (TYLENOL) 500 MG tablet Take 500 mg by mouth in the morning and 500 mg before bedtime. Yes Historical Provider, MD   aspirin 81 MG chewable tablet Take 81 mg by mouth daily.    Yes Historical Provider, MD   Spacer/Aero-Holding Chambers BRIDGET 1 Device by Does not apply route daily  Clarence Aguilar MD   insulin glargine (LANTUS SOLOSTAR) 100 UNIT/ML injection pen Inject 10 Units into the skin every evening  Patient taking differently: Inject 15 Units into the skin daily   Isidra Mitchell MD   albuterol sulfate HFA (VENTOLIN HFA) 108 (90 Base) MCG/ACT inhaler Inhale 2 puffs into the lungs every 4 hours as needed for Wheezing  Isidra Mitchell MD       [facility] (Including outside providers/suppliers regularly involved in providing care):   Patient Care Team:  Isidra Mitchell MD as PCP - General (Internal Medicine)  Isidra Mitchell MD as PCP - [facility] Empaneled Provider  Stephanie Le DO (Obstetrics & Gynecology)     Reviewed and updated this visit:  Tobacco  Allergies  Meds  Med Hx  Surg Hx  Soc Hx  Fam Hx

## 2022-10-12 NOTE — PROGRESS NOTES
Are you sick today? no    Are you allergic to eggs? no    Have you ever had a reaction to the flu vaccine? no    Have you ever had Glennda Lorraine- barre's Syndrome? no    Per order from provider VIS given to patient, consent received,  flu vaccine was administered and documented in vaccine part of chart patient tolerated well.

## 2022-10-12 NOTE — PROGRESS NOTES
Visit Information    Have you changed or started any medications since your last visit including any over-the-counter medicines, vitamins, or herbal medicines? no   Are you having any side effects from any of your medications? -  no  Have you stopped taking any of your medications? Is so, why? -  no    Have you seen any other physician or provider since your last visit? No  Have you had any other diagnostic tests since your last visit? No  Have you been seen in the emergency room and/or had an admission to a hospital since we last saw you? No  Have you had your routine dental cleaning in the past 6 months? no    Have you activated your [employer] account? If not, what are your barriers?  Yes     Patient Care Team:  Cuate Mcduffie MD as PCP - General (Internal Medicine)  Cuate Mcduffie MD as PCP - [address] [place] [address] Provider  Stephanie Connelly DO (Obstetrics & Gynecology)    Medical History Review  Past Medical, Family, and Social History reviewed and does contribute to the patient presenting condition    Health Maintenance   Topic Date Due    DTaP/Tdap/Td vaccine (1 - Tdap) 06/08/2010    Shingles vaccine (2 of 2) 09/26/2018    Annual Wellness Visit (AWV)  11/20/2021    COVID-19 Vaccine (4 - Booster for [place] series) 06/13/2022    Flu vaccine (1) 08/01/2022    Lipids  08/30/2022    Depression Screen  05/16/2023    DEXA (modify frequency per FRAX score)  Completed    Pneumococcal 65+ years Vaccine  Completed    Hepatitis C screen  Completed    Hepatitis A vaccine  Aged Out    Hib vaccine  Aged Out    Meningococcal (ACWY) vaccine  Aged Out

## 2022-10-12 NOTE — PATIENT INSTRUCTIONS
Personalized Preventive Plan for Reanna Banuelos - 10/12/2022  Medicare offers a range of preventive health benefits. Some of the tests and screenings are paid in full while other may be subject to a deductible, co-insurance, and/or copay. Some of these benefits include a comprehensive review of your medical history including lifestyle, illnesses that may run in your family, and various assessments and screenings as appropriate. After reviewing your medical record and screening and assessments performed today your provider may have ordered immunizations, labs, imaging, and/or referrals for you. A list of these orders (if applicable) as well as your Preventive Care list are included within your After Visit Summary for your review. Other Preventive Recommendations:    A preventive eye exam performed by an eye specialist is recommended every 1-2 years to screen for glaucoma; cataracts, macular degeneration, and other eye disorders. A preventive dental visit is recommended every 6 months. Try to get at least 150 minutes of exercise per week or 10,000 steps per day on a pedometer . Order or download the FREE \"Exercise & Physical Activity: Your Everyday Guide\" from [employer]. Call 2-619.921.8355 or search [employer] online. You need 2029-2053 mg of calcium and 5266-5524 IU of vitamin D per day. It is possible to meet your calcium requirement with diet alone, but a vitamin D supplement is usually necessary to meet this goal.  When exposed to the sun, use a sunscreen that protects against both UVA and UVB radiation with an SPF of 30 or greater. Reapply every 2 to 3 hours or after sweating, drying off with a towel, or swimming. Always wear a seat belt when traveling in a car. Always wear a helmet when riding a bicycle or motorcycle.

## 2022-11-04 DIAGNOSIS — E78.2 MIXED HYPERLIPIDEMIA: ICD-10-CM

## 2022-11-04 RX ORDER — ATORVASTATIN CALCIUM 40 MG/1
40 TABLET, FILM COATED ORAL DAILY
Qty: 90 TABLET | Refills: 3 | Status: SHIPPED | OUTPATIENT
Start: 2022-11-04

## 2022-11-04 RX ORDER — INSULIN GLARGINE 100 [IU]/ML
15 INJECTION, SOLUTION SUBCUTANEOUS DAILY
Qty: 4.5 ML | Refills: 11 | Status: SHIPPED | OUTPATIENT
Start: 2022-11-04 | End: 2023-11-04

## 2022-11-11 RX ORDER — GABAPENTIN 300 MG/1
CAPSULE ORAL
Qty: 450 CAPSULE | Refills: 3 | Status: SHIPPED | OUTPATIENT
Start: 2022-11-11 | End: 2023-02-10

## 2022-11-17 ENCOUNTER — HOSPITAL ENCOUNTER (OUTPATIENT)
Age: 77
Setting detail: SPECIMEN
Discharge: HOME | End: 2022-11-17

## 2022-11-17 DIAGNOSIS — E11.9 TYPE 2 DIABETES MELLITUS WITHOUT COMPLICATION, WITH LONG-TERM CURRENT USE OF INSULIN (HCC): ICD-10-CM

## 2022-11-17 DIAGNOSIS — Z79.4 TYPE 2 DIABETES MELLITUS WITHOUT COMPLICATION, WITH LONG-TERM CURRENT USE OF INSULIN (HCC): ICD-10-CM

## 2022-11-17 DIAGNOSIS — E78.2 MIXED HYPERLIPIDEMIA: ICD-10-CM

## 2022-11-17 LAB
HCT VFR BLD CALC: 35.3 % (ref 36.3–47.1)
HEMOGLOBIN: 9.6 G/DL (ref 11.9–15.1)
MCH RBC QN AUTO: 21.1 PG (ref 25.2–33.5)
MCHC RBC AUTO-ENTMCNC: 27.2 G/DL (ref 28.4–34.8)
MCV RBC AUTO: 77.4 FL (ref 82.6–102.9)
NRBC AUTOMATED: 0 PER 100 WBC
PDW BLD-RTO: 18.6 % (ref 11.8–14.4)
PLATELET # BLD: 448 K/UL (ref 138–453)
PMV BLD AUTO: 10.4 FL (ref 8.1–13.5)
RBC # BLD: 4.56 M/UL (ref 3.95–5.11)
WBC # BLD: 5.6 K/UL (ref 3.5–11.3)

## 2022-11-18 DIAGNOSIS — D64.9 ANEMIA, UNSPECIFIED TYPE: Primary | ICD-10-CM

## 2022-11-18 LAB
ALBUMIN SERPL-MCNC: 4 G/DL (ref 3.5–5.2)
ALBUMIN/GLOBULIN RATIO: 1.5 (ref 1–2.5)
ALP BLD-CCNC: 64 U/L (ref 35–104)
ALT SERPL-CCNC: 14 U/L (ref 5–33)
ANION GAP SERPL CALCULATED.3IONS-SCNC: 12 MMOL/L (ref 9–17)
AST SERPL-CCNC: 19 U/L
BILIRUB SERPL-MCNC: 0.4 MG/DL (ref 0.3–1.2)
BUN BLDV-MCNC: 19 MG/DL (ref 8–23)
CALCIUM SERPL-MCNC: 9.4 MG/DL (ref 8.6–10.4)
CHLORIDE BLD-SCNC: 96 MMOL/L (ref 98–107)
CHOLESTEROL/HDL RATIO: 2.3
CHOLESTEROL: 157 MG/DL
CO2: 26 MMOL/L (ref 20–31)
CREAT SERPL-MCNC: 0.62 MG/DL (ref 0.5–0.9)
GFR SERPL CREATININE-BSD FRML MDRD: >60 ML/MIN/1.73M2
GLUCOSE BLD-MCNC: 169 MG/DL (ref 70–99)
HDLC SERPL-MCNC: 68 MG/DL
LDL CHOLESTEROL: 71 MG/DL (ref 0–130)
POTASSIUM SERPL-SCNC: 4.5 MMOL/L (ref 3.7–5.3)
SODIUM BLD-SCNC: 134 MMOL/L (ref 135–144)
TOTAL PROTEIN: 6.6 G/DL (ref 6.4–8.3)
TRIGL SERPL-MCNC: 88 MG/DL

## 2023-01-03 ENCOUNTER — OFFICE VISIT (OUTPATIENT)
Dept: INTERNAL MEDICINE CLINIC | Age: 78
End: 2023-01-03
Payer: MEDICARE

## 2023-01-03 VITALS
BODY MASS INDEX: 20.99 KG/M2 | OXYGEN SATURATION: 97 % | DIASTOLIC BLOOD PRESSURE: 62 MMHG | HEART RATE: 81 BPM | SYSTOLIC BLOOD PRESSURE: 120 MMHG | WEIGHT: 134 LBS

## 2023-01-03 DIAGNOSIS — J43.1 PANLOBULAR EMPHYSEMA (HCC): ICD-10-CM

## 2023-01-03 DIAGNOSIS — E11.40 TYPE 2 DIABETES MELLITUS WITH DIABETIC NEUROPATHY, WITH LONG-TERM CURRENT USE OF INSULIN (HCC): Primary | ICD-10-CM

## 2023-01-03 DIAGNOSIS — Z79.4 TYPE 2 DIABETES MELLITUS WITH DIABETIC NEUROPATHY, WITH LONG-TERM CURRENT USE OF INSULIN (HCC): Primary | ICD-10-CM

## 2023-01-03 LAB — HBA1C MFR BLD: 9.9 %

## 2023-01-03 PROCEDURE — 3078F DIAST BP <80 MM HG: CPT | Performed by: INTERNAL MEDICINE

## 2023-01-03 PROCEDURE — G8427 DOCREV CUR MEDS BY ELIG CLIN: HCPCS | Performed by: INTERNAL MEDICINE

## 2023-01-03 PROCEDURE — 1090F PRES/ABSN URINE INCON ASSESS: CPT | Performed by: INTERNAL MEDICINE

## 2023-01-03 PROCEDURE — G8399 PT W/DXA RESULTS DOCUMENT: HCPCS | Performed by: INTERNAL MEDICINE

## 2023-01-03 PROCEDURE — 99213 OFFICE O/P EST LOW 20 MIN: CPT | Performed by: INTERNAL MEDICINE

## 2023-01-03 PROCEDURE — G8420 CALC BMI NORM PARAMETERS: HCPCS | Performed by: INTERNAL MEDICINE

## 2023-01-03 PROCEDURE — 1123F ACP DISCUSS/DSCN MKR DOCD: CPT | Performed by: INTERNAL MEDICINE

## 2023-01-03 PROCEDURE — 3046F HEMOGLOBIN A1C LEVEL >9.0%: CPT | Performed by: INTERNAL MEDICINE

## 2023-01-03 PROCEDURE — 1036F TOBACCO NON-USER: CPT | Performed by: INTERNAL MEDICINE

## 2023-01-03 PROCEDURE — 3074F SYST BP LT 130 MM HG: CPT | Performed by: INTERNAL MEDICINE

## 2023-01-03 PROCEDURE — 83036 HEMOGLOBIN GLYCOSYLATED A1C: CPT | Performed by: INTERNAL MEDICINE

## 2023-01-03 PROCEDURE — 3023F SPIROM DOC REV: CPT | Performed by: INTERNAL MEDICINE

## 2023-01-03 PROCEDURE — G8484 FLU IMMUNIZE NO ADMIN: HCPCS | Performed by: INTERNAL MEDICINE

## 2023-01-03 RX ORDER — INSULIN GLARGINE 100 [IU]/ML
20 INJECTION, SOLUTION SUBCUTANEOUS DAILY
Qty: 6 ML | Refills: 11 | Status: SHIPPED | OUTPATIENT
Start: 2023-01-03 | End: 2024-01-03

## 2023-01-03 ASSESSMENT — PATIENT HEALTH QUESTIONNAIRE - PHQ9
SUM OF ALL RESPONSES TO PHQ QUESTIONS 1-9: 0
SUM OF ALL RESPONSES TO PHQ QUESTIONS 1-9: 0
2. FEELING DOWN, DEPRESSED OR HOPELESS: 0
SUM OF ALL RESPONSES TO PHQ9 QUESTIONS 1 & 2: 0
SUM OF ALL RESPONSES TO PHQ QUESTIONS 1-9: 0
SUM OF ALL RESPONSES TO PHQ QUESTIONS 1-9: 0
1. LITTLE INTEREST OR PLEASURE IN DOING THINGS: 0

## 2023-01-03 ASSESSMENT — ENCOUNTER SYMPTOMS
SHORTNESS OF BREATH: 1
WHEEZING: 1

## 2023-01-03 ASSESSMENT — COPD QUESTIONNAIRES: COPD: 1

## 2023-01-03 NOTE — PROGRESS NOTES
[address] [address]  Dept: [phone]  Dept Fax: [phone]    Karma Flowers is a 65 y.o. female who presents today for her medicalconditions/complaints as noted below. Karma Flowers is c/o of Diabetes (Follow up)      HPI:     Diabetes  She presents for her follow-up diabetic visit. She has type 2 diabetes mellitus. Her disease course has been stable. Diabetic complications include peripheral neuropathy. Risk factors for coronary artery disease include dyslipidemia and hypertension. Current diabetic treatment includes insulin injections and oral agent (monotherapy). She is compliant with treatment most of the time. An ACE inhibitor/angiotensin II receptor blocker is being taken. COPD  She complains of shortness of breath and wheezing. This is a chronic problem. The current episode started more than 1 year ago. Associated symptoms include dyspnea on exertion. Her symptoms are aggravated by exposure to smoke. Her symptoms are alleviated by beta-agonist (rarely needs to take). She reports significant improvement on treatment. Risk factors for lung disease include smoking/tobacco exposure. Her past medical history is significant for COPD.      Past Medical History:   Diagnosis Date    Anxiety state, unspecified     Arthritis     Cataracts, bilateral     COVID-19 vaccine administered 2- & 3-    [facility]    Esophageal reflux     Generalized osteoarthrosis, unspecified site     Hearing loss     Hyperlipidemia     Hypertension     Neuropathy     Neuropathy due to medical condition [facility])     Pure hypercholesterolemia     Thoracic or lumbosacral neuritis or radiculitis, unspecified     Type 2 diabetes mellitus without complication (HCC)     Urinary retention     UTI (urinary tract infection)         Current Outpatient Medications   Medication Sig Dispense Refill    gabapentin (NEURONTIN) 300 MG capsule TAKE 2 CAPSULES BY MOUTH IN THE MORNING 1 CAPSULE BY  MOUTH IN THE AFTERNOON AND  2 CAPSULES BY MOUTH AT  BEDTIME 450 capsule 3    insulin glargine (LANTUS SOLOSTAR) 100 UNIT/ML injection pen Inject 15 Units into the skin daily 4.5 mL 11    atorvastatin (LIPITOR) 40 MG tablet Take 1 tablet by mouth daily 90 tablet 3    Continuous Blood Gluc  (DEXCOM G6 ) BRIDGET 1 each by Does not apply route every 14 days 2 each 3    Continuous Blood Gluc Sensor (DEXCOM G6 SENSOR) MISC 1 each by Does not apply route every 14 days 2 each 3    Continuous Blood Gluc Transmit (DEXCOM G6 TRANSMITTER) MISC 1 each by Does not apply route every 14 days 2 each 3    metoprolol tartrate (LOPRESSOR) 25 MG tablet TAKE 1 TABLET BY MOUTH  TWICE DAILY 180 tablet 3    lisinopril (PRINIVIL;ZESTRIL) 20 MG tablet TAKE 1 TABLET BY MOUTH  TWICE DAILY 180 tablet 3    metoprolol tartrate (LOPRESSOR) 25 MG tablet TAKE 1 TABLET BY MOUTH  TWICE DAILY 180 tablet 3    metFORMIN (GLUCOPHAGE) 1000 MG tablet TAKE 1 TABLET BY MOUTH  TWICE DAILY WITH MEALS 180 tablet 3    Continuous Blood Gluc Sensor (FREESTYLE WARREN 2 SENSOR) MISC 1 Units by Does not apply route in the morning, at noon, and at bedtime 2 each 5    DULoxetine (CYMBALTA) 60 MG extended release capsule Take 1 capsule by mouth daily 90 capsule 3    LYUMJEV KWIKPEN 100 UNIT/ML SOPN       blood glucose test strips (ONETOUCH ULTRA) strip USE 1 STRIP TO TEST 4 TIMES DAILY 400 strip 3    glipiZIDE (GLUCOTROL) 10 MG tablet TAKE 2 TABLETS BY MOUTH  TWICE DAILY BEFORE MEALS 360 tablet 3    blood glucose monitor kit and supplies 1 kit by Other route three times daily Dispense One Touch Glucose Meter. 1 kit 0    blood glucose monitor strips Test four  times a day & as needed for symptoms of irregular blood glucose. Dispense sufficient amount for indicated testing frequency plus additional to accommodate PRN testing needs.  120 strip 5    ONE TOUCH ULTRASOFT LANCETS MISC 1 each by Does not apply route 2 times daily 100 each 3 Calcium-Magnesium-Vitamin D (CALCIUM 1200+D3 PO) Take 2 tablets by mouth daily. acetaminophen (TYLENOL) 500 MG tablet Take 500 mg by mouth in the morning and 500 mg before bedtime. aspirin 81 MG chewable tablet Take 81 mg by mouth daily. Spacer/Aero-Holding Brock GILL 1 Device by Does not apply route daily 1 each 1    albuterol sulfate HFA (VENTOLIN HFA) 108 (90 Base) MCG/ACT inhaler Inhale 2 puffs into the lungs every 4 hours as needed for Wheezing 1 Inhaler 0     No current facility-administered medications for this visit. Allergies   Allergen Reactions    Hydrocodone        Health Maintenance   Topic Date Due    DTaP/Tdap/Td vaccine (1 - Tdap) 06/08/2010    Shingles vaccine (2 of 2) 09/26/2018    COVID-19 Vaccine (4 - Booster for Moderna series) 04/10/2022    Depression Screen  10/12/2023    Annual Wellness Visit (AWV)  10/13/2023    Lipids  11/17/2023    DEXA (modify frequency per FRAX score)  Completed    Flu vaccine  Completed    Pneumococcal 65+ years Vaccine  Completed    Hepatitis C screen  Completed    Hepatitis A vaccine  Aged Out    Hib vaccine  Aged Out    Meningococcal (ACWY) vaccine  Aged Out       Subjective:      Review of Systems   Respiratory:  Positive for shortness of breath and wheezing. Cardiovascular:  Positive for dyspnea on exertion. Objective:     Physical Exam  /62 (Site: Right Upper Arm, Position: Sitting)   Pulse 81   Wt 134 lb (60.8 kg)   SpO2 97%   BMI 20.99 kg/m²       Assessment:       Diagnosis Orders   1. Panlobular emphysema ([address]     Continue present treatment   2. Type 2 diabetes mellitus with diabetic neuropathy, with long-term current use of insulin (HCC)     The current medical regimen is effective;  continue present plan and medications. Plan:      No follow-ups on file. No orders of the defined types were placed in this encounter.     Orders Placed This Encounter   Procedures    Hemoglobin A1C     Standing Status:   Future Standing Expiration Date:   1/3/2024     Her A1C is 9.9, higher than before. She has left but I will have the nurse call her and have her increase the insulin to 20 U a day. Patient given educational materials - see patient instructions. Discussed use, benefit, and side effects of prescribed medications. All patientquestions answered. Pt voiced understanding.     Electronically signed by Asim Olivera MD on 1/3/2023at 11:42 AM

## 2023-02-07 ENCOUNTER — OFFICE VISIT (OUTPATIENT)
Dept: INTERNAL MEDICINE CLINIC | Age: 78
End: 2023-02-07

## 2023-02-07 VITALS
SYSTOLIC BLOOD PRESSURE: 122 MMHG | HEIGHT: 67 IN | WEIGHT: 133 LBS | DIASTOLIC BLOOD PRESSURE: 66 MMHG | BODY MASS INDEX: 20.88 KG/M2 | HEART RATE: 94 BPM | OXYGEN SATURATION: 99 %

## 2023-02-07 DIAGNOSIS — E78.2 MIXED HYPERLIPIDEMIA: ICD-10-CM

## 2023-02-07 DIAGNOSIS — D50.9 IRON DEFICIENCY ANEMIA, UNSPECIFIED IRON DEFICIENCY ANEMIA TYPE: ICD-10-CM

## 2023-02-07 DIAGNOSIS — E11.40 TYPE 2 DIABETES MELLITUS WITH DIABETIC NEUROPATHY, WITH LONG-TERM CURRENT USE OF INSULIN (HCC): Primary | ICD-10-CM

## 2023-02-07 DIAGNOSIS — J43.1 PANLOBULAR EMPHYSEMA (HCC): ICD-10-CM

## 2023-02-07 DIAGNOSIS — Z79.4 TYPE 2 DIABETES MELLITUS WITH DIABETIC NEUROPATHY, WITH LONG-TERM CURRENT USE OF INSULIN (HCC): Primary | ICD-10-CM

## 2023-02-07 DIAGNOSIS — N32.81 OVERACTIVE BLADDER: Chronic | ICD-10-CM

## 2023-02-07 DIAGNOSIS — I10 PRIMARY HYPERTENSION: ICD-10-CM

## 2023-02-07 RX ORDER — DULOXETIN HYDROCHLORIDE 60 MG/1
60 CAPSULE, DELAYED RELEASE ORAL DAILY
Qty: 90 CAPSULE | Refills: 0 | Status: SHIPPED | OUTPATIENT
Start: 2023-02-07

## 2023-02-07 SDOH — ECONOMIC STABILITY: HOUSING INSECURITY
IN THE LAST 12 MONTHS, WAS THERE A TIME WHEN YOU DID NOT HAVE A STEADY PLACE TO SLEEP OR SLEPT IN A SHELTER (INCLUDING NOW)?: NO

## 2023-02-07 SDOH — ECONOMIC STABILITY: FOOD INSECURITY: WITHIN THE PAST 12 MONTHS, THE FOOD YOU BOUGHT JUST DIDN'T LAST AND YOU DIDN'T HAVE MONEY TO GET MORE.: NEVER TRUE

## 2023-02-07 SDOH — ECONOMIC STABILITY: FOOD INSECURITY: WITHIN THE PAST 12 MONTHS, YOU WORRIED THAT YOUR FOOD WOULD RUN OUT BEFORE YOU GOT MONEY TO BUY MORE.: NEVER TRUE

## 2023-02-07 SDOH — ECONOMIC STABILITY: INCOME INSECURITY: HOW HARD IS IT FOR YOU TO PAY FOR THE VERY BASICS LIKE FOOD, HOUSING, MEDICAL CARE, AND HEATING?: NOT HARD AT ALL

## 2023-02-07 ASSESSMENT — ENCOUNTER SYMPTOMS
COUGH: 0
BLOOD IN STOOL: 0
EYE PAIN: 0
TROUBLE SWALLOWING: 0
WHEEZING: 0
DIARRHEA: 0
EYE DISCHARGE: 0
ABDOMINAL DISTENTION: 0
SHORTNESS OF BREATH: 0
COLOR CHANGE: 0

## 2023-02-07 NOTE — PROGRESS NOTES
[address] [address]  Dept: [phone]  Dept Fax: [phone]    Katie Morales is a 65 y.o. female who presents today for her medical conditions/complaintsas noted below. Katie Morales is c/o of   Chief Complaint   Patient presents with    Diabetes     A1C- 1/3/23= 9.9    Discuss Labs         HPI:     HTN  Onset more than 2 years ago  ernesto mild to mod  Controlled with current po meds  Not associated with headaches or blurry vision  No chest pain    Diabetes   Duration more than 7 years  Modifying factors on Glucophage and other med  Severity uncontrolled sever  Associated signs and symtoms neuropathy/ckd/ CAD. aggravated with sugar diet and better with low sugar diet             Hemoglobin A1C (%)   Date Value   01/03/2023 9.9   09/29/2022 9.2 (H)   05/16/2022 6.7             ( goal A1Cis < 7)   Microalb/Crt.  Ratio (mcg/mg creat)   Date Value   11/08/2021 73 (H)     LDL Cholesterol (mg/dL)   Date Value   11/17/2022 71   08/30/2021 74   06/24/2020 62       (goal LDL is <100)   AST (U/L)   Date Value   11/17/2022 19     ALT (U/L)   Date Value   11/17/2022 14     BUN (mg/dL)   Date Value   11/17/2022 19     BP Readings from Last 3 Encounters:   02/07/23 122/66   01/03/23 120/62   10/12/22 108/64          (goal 120/80)    Past Medical History:   Diagnosis Date    Anxiety state, unspecified     Arthritis     Cataracts, bilateral     COVID-19 vaccine administered 2- & 3-    MODERNA    Esophageal reflux     Generalized osteoarthrosis, unspecified site     Hearing loss     Hyperlipidemia     Hypertension     Neuropathy     Neuropathy due to medical condition ([address]     Pure hypercholesterolemia     Thoracic or lumbosacral neuritis or radiculitis, unspecified     Type 2 diabetes mellitus without complication ([address]     Urinary retention     UTI (urinary tract infection)       Past Surgical History:   Procedure Laterality Date    BACK SURGERY X3    BREAST BIOPSY      Lt     CARPAL TUNNEL RELEASE Right     CATARACT REMOVAL WITH IMPLANT Bilateral     COLONOSCOPY  2006    COLONOSCOPY  4/24/15    diverticulosis    CYSTOSCOPY  [id number]    CYSTOSCOPY  2017    CYSTOSCOPY N/A 2017    CYSTOSCOPY URODYNAMICS AND DILATION  performed by Reg Sapp MD at [address] Bilateral     thumbs reconstruction    HIP ARTHROPLASTY Bilateral     JOINT REPLACEMENT Bilateral     LUMBAR FUSION  2015       Family History   Problem Relation Age of Onset    Cancer Mother         lymphoma    Hypertension Mother     Hypertension Father     Hypertension Brother     Heart Disease Brother         pt states that brother had 5 bypass surgeries. Social History     Tobacco Use    Smoking status: Former     Packs/day: 2.00     Years: 20.00     Pack years: 40.00     Types: Cigarettes     Quit date: 1984     Years since quittin.1    Smokeless tobacco: Never   Substance Use Topics    Alcohol use:  Yes     Alcohol/week: 0.0 standard drinks     Comment: social      Current Outpatient Medications   Medication Sig Dispense Refill    DULoxetine (CYMBALTA) 60 MG extended release capsule TAKE 1 CAPSULE BY MOUTH  DAILY 90 capsule 0    insulin glargine (LANTUS SOLOSTAR) 100 UNIT/ML injection pen Inject 20 Units into the skin daily 6 mL 11    gabapentin (NEURONTIN) 300 MG capsule TAKE 2 CAPSULES BY MOUTH IN THE MORNING 1 CAPSULE BY  MOUTH IN THE AFTERNOON AND  2 CAPSULES BY MOUTH AT  BEDTIME 450 capsule 3    atorvastatin (LIPITOR) 40 MG tablet Take 1 tablet by mouth daily 90 tablet 3    metoprolol tartrate (LOPRESSOR) 25 MG tablet TAKE 1 TABLET BY MOUTH  TWICE DAILY 180 tablet 3    lisinopril (PRINIVIL;ZESTRIL) 20 MG tablet TAKE 1 TABLET BY MOUTH  TWICE DAILY 180 tablet 3    metoprolol tartrate (LOPRESSOR) 25 MG tablet TAKE 1 TABLET BY MOUTH  TWICE DAILY 180 tablet 3    metFORMIN (GLUCOPHAGE) 1000 MG tablet TAKE 1 TABLET BY MOUTH  TWICE DAILY WITH MEALS 180 tablet 3    LYUMJEV KWIKPEN 100 UNIT/ML SOPN       blood glucose test strips (ONETOUCH ULTRA) strip USE 1 STRIP TO TEST 4 TIMES DAILY 400 strip 3    glipiZIDE (GLUCOTROL) 10 MG tablet TAKE 2 TABLETS BY MOUTH  TWICE DAILY BEFORE MEALS 360 tablet 3    blood glucose monitor kit and supplies 1 kit by Other route three times daily Dispense One Touch Glucose Meter. 1 kit 0    blood glucose monitor strips Test four  times a day & as needed for symptoms of irregular blood glucose. Dispense sufficient amount for indicated testing frequency plus additional to accommodate PRN testing needs. 120 strip 5    ONE TOUCH ULTRASOFT LANCETS MISC 1 each by Does not apply route 2 times daily 100 each 3    Calcium-Magnesium-Vitamin D (CALCIUM 1200+D3 PO) Take 2 tablets by mouth daily. acetaminophen (TYLENOL) 500 MG tablet Take 500 mg by mouth in the morning and 500 mg before bedtime. aspirin 81 MG chewable tablet Take 81 mg by mouth daily. Continuous Blood Gluc  ([address]) BRIDGET 1 each by Does not apply route every 14 days (Patient not taking: Reported on 2/7/2023) 2 each 3    Continuous Blood Gluc Sensor (DEXCOM G6 SENSOR) MISC 1 each by Does not apply route every 14 days (Patient not taking: Reported on 2/7/2023) 2 each 3    Continuous Blood Gluc Transmit (DEXCOM G6 TRANSMITTER) MISC 1 each by Does not apply route every 14 days (Patient not taking: Reported on 2/7/2023) 2 each 3    Continuous Blood Gluc Sensor (FREESTYLE WARREN 2 SENSOR) MISC 1 Units by Does not apply route in the morning, at noon, and at bedtime (Patient not taking: Reported on 2/7/2023) 2 each 5    Spacer/Aero-Holding Chambers BRIDGET 1 Device by Does not apply route daily 1 each 1    albuterol sulfate HFA (VENTOLIN HFA) 108 (90 Base) MCG/ACT inhaler Inhale 2 puffs into the lungs every 4 hours as needed for Wheezing 1 Inhaler 0     No current facility-administered medications for this visit.      Allergies   Allergen Reactions Hydrocodone        Health Maintenance   Topic Date Due    DTaP/Tdap/Td vaccine (1 - Tdap) 06/08/2010    Shingles vaccine (2 of 2) 09/26/2018    COVID-19 Vaccine (4 - Booster for Moderna series) 04/10/2022    Annual Wellness Visit (AWV)  10/13/2023    Lipids  11/17/2023    Depression Screen  01/03/2024    DEXA (modify frequency per FRAX score)  Completed    Flu vaccine  Completed    Pneumococcal 65+ years Vaccine  Completed    Hepatitis C screen  Completed    Hepatitis A vaccine  Aged Out    Hib vaccine  Aged Out    Meningococcal (ACWY) vaccine  Aged Out       Subjective:     Review of Systems   Constitutional:  Negative for appetite change, diaphoresis and fatigue. HENT:  Negative for ear discharge and trouble swallowing. Eyes:  Negative for pain and discharge. Respiratory:  Negative for cough, shortness of breath and wheezing. Cardiovascular:  Negative for chest pain and palpitations. Gastrointestinal:  Negative for abdominal distention, blood in stool and diarrhea. Endocrine: Negative for polydipsia and polyphagia. Genitourinary:  Negative for difficulty urinating and frequency. Musculoskeletal:  Positive for arthralgias. Negative for gait problem, myalgias and neck pain. Skin:  Negative for color change and rash. Allergic/Immunologic: Negative for environmental allergies and food allergies. Neurological:  Negative for dizziness and headaches. Hematological:  Negative for adenopathy. Does not bruise/bleed easily. Psychiatric/Behavioral:  Negative for behavioral problems and sleep disturbance. Objective:     Physical Exam  Constitutional:       Appearance: She is well-developed. She is not diaphoretic. HENT:      Head: Normocephalic and atraumatic. Eyes:      General:         Right eye: No discharge. Left eye: No discharge. Extraocular Movements:      Right eye: Normal extraocular motion. Left eye: Normal extraocular motion.       Conjunctiva/sclera: Conjunctivae normal.      Right eye: Right conjunctiva is not injected. Left eye: Left conjunctiva is not injected. Neck:      Thyroid: No thyroid mass or thyromegaly. Vascular: No JVD. Cardiovascular:      Rate and Rhythm: Normal rate and regular rhythm. Heart sounds: No murmur heard. No friction rub. Pulmonary:      Effort: Pulmonary effort is normal. No tachypnea, bradypnea, accessory muscle usage or respiratory distress. Breath sounds: Normal breath sounds. No wheezing or rales. Abdominal:      General: Bowel sounds are normal. There is no distension. Palpations: Abdomen is soft. Tenderness: There is no abdominal tenderness. There is no rebound. Musculoskeletal:         General: No tenderness. Normal range of motion. Cervical back: Normal range of motion and neck supple. No edema or erythema. Lymphadenopathy:      Head:      Right side of head: No submental or submandibular adenopathy. Left side of head: No submental or submandibular adenopathy. Cervical: No cervical adenopathy. Skin:     General: Skin is warm. Coloration: Skin is not pale. Findings: No bruising, ecchymosis or rash. Neurological:      Mental Status: She is alert and oriented to person, place, and time. Cranial Nerves: No cranial nerve deficit. Sensory: No sensory deficit. Motor: No atrophy or abnormal muscle tone. Coordination: Coordination normal.   Psychiatric:         Mood and Affect: Mood is not anxious. Affect is not angry. Speech: Speech is not slurred. Behavior: Behavior normal. Behavior is not aggressive. Thought Content: Thought content does not include homicidal ideation. Cognition and Memory: Memory is not impaired. /66   Pulse 94   Ht 5' 7\" (1.702 m)   Wt 133 lb (60.3 kg)   SpO2 99%   BMI 20.83 kg/m²     Assessment:       Diagnosis Orders   1.  Type 2 diabetes mellitus with diabetic neuropathy, with long-term current use of insulin ([address]        2. Primary hypertension  CBC with Auto Differential    Basic Metabolic Panel      3. Panlobular emphysema ([address]        4. Overactive bladder        5. Mixed hyperlipidemia        6. Iron deficiency anemia, unspecified iron deficiency anemia type  Iron and TIBC    Vitamin B12 & Folate    Ferritin                Plan:      Return in about 2 weeks (around 2/21/2023). Orders Placed This Encounter   Procedures    CBC with Auto Differential     Standing Status:   Future     Standing Expiration Date:   3/5/8730    Basic Metabolic Panel     Standing Status:   Future     Standing Expiration Date:   2/7/2024    Iron and TIBC     Standing Status:   Future     Standing Expiration Date:   2/7/2024    Vitamin B12 & Folate     Standing Status:   Future     Standing Expiration Date:   2/7/2024    Ferritin     Standing Status:   Future     Standing Expiration Date:   2/7/2024       No orders of the defined types were placed in this encounter. A1c is  9.1   Lantus 20 daily  Checked this am  151  Placido increase to 24   Will see  monthly    Anemia workup     Patient given educational materials - see patient instructions. Discussed use, benefit, and side effects of prescribed medications. All patientquestions answered. Pt voiced understanding. Reviewed health maintenance. Instructedto continue current medications, diet and exercise. Patient agreed with treatmentplan. Follow up as directed. Please note that this chart was generated using voice recognition Dragon dictation software. Although every effort was made to ensure the accuracy of this automated transcription, some errors in transcription may have occurred.      Electronically signed by Wellington Messina MD on 2/7/2023 at 11:08 AM

## 2023-02-07 NOTE — PROGRESS NOTES
Visit Information    Have you changed or started any medications since your last visit including any over-the-counter medicines, vitamins, or herbal medicines? no   Are you having any side effects from any of your medications? -  no  Have you stopped taking any of your medications? Is so, why? -  no    Have you seen any other physician or provider since your last visit? No  Have you had any other diagnostic tests since your last visit? No  Have you been seen in the emergency room and/or had an admission to a hospital since we last saw you? No  Have you had your routine dental cleaning in the past 6 months? no    Have you activated your [employer] account? If not, what are your barriers?  Yes     Patient Care Team:  Jazz Mtz MD as PCP - General (Internal Medicine)  Jazz Mtz MD as PCP - Empaneled Provider  Stephanie Sharma DO (Obstetrics & Gynecology)    Medical History Review  Past Medical, Family, and Social History reviewed and does contribute to the patient presenting condition    Health Maintenance   Topic Date Due    DTaP/Tdap/Td vaccine (1 - Tdap) 06/08/2010    Shingles vaccine (2 of 2) 09/26/2018    COVID-19 Vaccine (4 - Booster for Paul Kiowa series) 04/10/2022    Annual Wellness Visit (AWV)  10/13/2023    Lipids  11/17/2023    Depression Screen  01/03/2024    DEXA (modify frequency per FRAX score)  Completed    Flu vaccine  Completed    Pneumococcal 65+ years Vaccine  Completed    Hepatitis C screen  Completed    Hepatitis A vaccine  Aged Out    Hib vaccine  Aged Out    Meningococcal (ACWY) vaccine  Aged Out

## 2023-02-07 NOTE — TELEPHONE ENCOUNTER
Pharmacy requesting refill of Cymbalta 60 mg.      Medication active on med list yes      Date of last Rx: 4/7/2022 with 3 refills          verified by MARK ALDRICH      Date of last appointment 4/7/2022    Next Visit Date:  4/11/2023

## 2023-02-08 ENCOUNTER — HOSPITAL ENCOUNTER (OUTPATIENT)
Age: 78
Setting detail: SPECIMEN
Discharge: HOME | End: 2023-02-08

## 2023-02-08 DIAGNOSIS — I10 PRIMARY HYPERTENSION: ICD-10-CM

## 2023-02-08 DIAGNOSIS — D64.9 ANEMIA, UNSPECIFIED TYPE: ICD-10-CM

## 2023-02-08 DIAGNOSIS — D50.9 IRON DEFICIENCY ANEMIA, UNSPECIFIED IRON DEFICIENCY ANEMIA TYPE: ICD-10-CM

## 2023-02-08 LAB
FOLATE SERPL-MCNC: 12.7 NG/ML
VIT B12 SERPL-MCNC: 315 PG/ML (ref 232–1245)

## 2023-02-09 DIAGNOSIS — E87.5 HYPERKALEMIA: Primary | ICD-10-CM

## 2023-02-09 LAB
ABSOLUTE EOS #: 0.28 K/UL (ref 0–0.44)
ABSOLUTE IMMATURE GRANULOCYTE: 0 K/UL (ref 0–0.3)
ABSOLUTE LYMPH #: 2.52 K/UL (ref 1.1–3.7)
ABSOLUTE MONO #: 0.7 K/UL (ref 0.1–1.2)
ANION GAP SERPL CALCULATED.3IONS-SCNC: 17 MMOL/L (ref 9–17)
BASOPHILS # BLD: 1 % (ref 0–2)
BASOPHILS ABSOLUTE: 0.07 K/UL (ref 0–0.2)
BUN SERPL-MCNC: 16 MG/DL (ref 8–23)
CALCIUM SERPL-MCNC: 9.6 MG/DL (ref 8.6–10.4)
CHLORIDE SERPL-SCNC: 102 MMOL/L (ref 98–107)
CO2 SERPL-SCNC: 21 MMOL/L (ref 20–31)
CREAT SERPL-MCNC: 0.51 MG/DL (ref 0.5–0.9)
EOSINOPHILS RELATIVE PERCENT: 4 % (ref 1–4)
FERRITIN SERPL-MCNC: 9 NG/ML (ref 13–150)
GFR SERPL CREATININE-BSD FRML MDRD: >60 ML/MIN/1.73M2
GLUCOSE SERPL-MCNC: 94 MG/DL (ref 70–99)
HCT VFR BLD AUTO: 36.4 % (ref 36.3–47.1)
HGB BLD-MCNC: 9.2 G/DL (ref 11.9–15.1)
IMMATURE GRANULOCYTES: 0 %
IRON SATURATION: 6 % (ref 20–55)
IRON SERPL-MCNC: 22 UG/DL (ref 37–145)
LYMPHOCYTES # BLD: 36 % (ref 24–43)
MCH RBC QN AUTO: 19.7 PG (ref 25.2–33.5)
MCHC RBC AUTO-ENTMCNC: 25.3 G/DL (ref 28.4–34.8)
MCV RBC AUTO: 78.1 FL (ref 82.6–102.9)
MONOCYTES # BLD: 10 % (ref 3–12)
MORPHOLOGY: ABNORMAL
NRBC AUTOMATED: 0 PER 100 WBC
PDW BLD-RTO: 18.7 % (ref 11.8–14.4)
PLATELET # BLD AUTO: 543 K/UL (ref 138–453)
PMV BLD AUTO: 9.7 FL (ref 8.1–13.5)
POTASSIUM SERPL-SCNC: 6 MMOL/L (ref 3.7–5.3)
RBC # BLD: 4.66 M/UL (ref 3.95–5.11)
SEG NEUTROPHILS: 49 % (ref 36–65)
SEGMENTED NEUTROPHILS ABSOLUTE COUNT: 3.43 K/UL (ref 1.5–8.1)
SODIUM SERPL-SCNC: 140 MMOL/L (ref 135–144)
TIBC SERPL-MCNC: 396 UG/DL (ref 250–450)
UNSATURATED IRON BINDING CAPACITY: 374 UG/DL (ref 112–347)
WBC # BLD AUTO: 7 K/UL (ref 3.5–11.3)

## 2023-02-09 NOTE — RESULT ENCOUNTER NOTE
I called pt and talked to her and   Re her K  6  Pt was requested by dr Jamal Barahona to stop lisinopril  I advised low k diet  No orange juice or banan  Do bo ,called in her [employer] and bmp in am

## 2023-02-10 ENCOUNTER — HOSPITAL ENCOUNTER (OUTPATIENT)
Age: 78
Setting detail: SPECIMEN
Discharge: HOME | End: 2023-02-10

## 2023-02-10 DIAGNOSIS — E87.5 HYPERKALEMIA: ICD-10-CM

## 2023-02-10 LAB
ANION GAP SERPL CALCULATED.3IONS-SCNC: 18 MMOL/L (ref 9–17)
BUN SERPL-MCNC: 17 MG/DL (ref 8–23)
CALCIUM SERPL-MCNC: 9.2 MG/DL (ref 8.6–10.4)
CHLORIDE SERPL-SCNC: 101 MMOL/L (ref 98–107)
CO2 SERPL-SCNC: 21 MMOL/L (ref 20–31)
CREAT SERPL-MCNC: 0.47 MG/DL (ref 0.5–0.9)
GFR SERPL CREATININE-BSD FRML MDRD: >60 ML/MIN/1.73M2
GLIADIN IGA SER IA-ACNC: 1.1 U/ML
GLIADIN IGG SER IA-ACNC: <0.4 U/ML
GLUCOSE SERPL-MCNC: 140 MG/DL (ref 70–99)
IGA SERPL-MCNC: 256 MG/DL (ref 70–400)
POTASSIUM SERPL-SCNC: 4.7 MMOL/L (ref 3.7–5.3)
SODIUM SERPL-SCNC: 140 MMOL/L (ref 135–144)
TTG IGA SER IA-ACNC: 0.8 U/ML

## 2023-02-23 ENCOUNTER — OFFICE VISIT (OUTPATIENT)
Dept: INTERNAL MEDICINE CLINIC | Age: 78
End: 2023-02-23

## 2023-02-23 VITALS
BODY MASS INDEX: 21.35 KG/M2 | HEART RATE: 62 BPM | SYSTOLIC BLOOD PRESSURE: 122 MMHG | WEIGHT: 136 LBS | DIASTOLIC BLOOD PRESSURE: 62 MMHG | HEIGHT: 67 IN | OXYGEN SATURATION: 99 %

## 2023-02-23 DIAGNOSIS — E11.40 TYPE 2 DIABETES MELLITUS WITH DIABETIC NEUROPATHY, WITH LONG-TERM CURRENT USE OF INSULIN (HCC): ICD-10-CM

## 2023-02-23 DIAGNOSIS — J43.1 PANLOBULAR EMPHYSEMA (HCC): ICD-10-CM

## 2023-02-23 DIAGNOSIS — I10 PRIMARY HYPERTENSION: ICD-10-CM

## 2023-02-23 DIAGNOSIS — E87.5 HYPERKALEMIA: Primary | ICD-10-CM

## 2023-02-23 DIAGNOSIS — Z79.4 TYPE 2 DIABETES MELLITUS WITH DIABETIC NEUROPATHY, WITH LONG-TERM CURRENT USE OF INSULIN (HCC): ICD-10-CM

## 2023-02-23 DIAGNOSIS — E78.2 MIXED HYPERLIPIDEMIA: ICD-10-CM

## 2023-02-23 ASSESSMENT — ENCOUNTER SYMPTOMS
COUGH: 0
WHEEZING: 0
EYE DISCHARGE: 0
SHORTNESS OF BREATH: 0
DIARRHEA: 0
ABDOMINAL DISTENTION: 0
COLOR CHANGE: 0
BLOOD IN STOOL: 0
TROUBLE SWALLOWING: 0
EYE PAIN: 0

## 2023-02-23 NOTE — PROGRESS NOTES
Visit Information    Have you changed or started any medications since your last visit including any over-the-counter medicines, vitamins, or herbal medicines? no   Are you having any side effects from any of your medications? -  no  Have you stopped taking any of your medications? Is so, why? -  no    Have you seen any other physician or provider since your last visit? No  Have you had any other diagnostic tests since your last visit? No  Have you been seen in the emergency room and/or had an admission to a hospital since we last saw you? No  Have you had your routine dental cleaning in the past 6 months? no    Have you activated your [employer] account? If not, what are your barriers?  Yes     Patient Care Team:  Daisy Garduno MD as PCP - General (Internal Medicine)  Daisy Garduno MD as PCP - Empaneled Provider  Stephanie Goodson DO (Obstetrics & Gynecology)    Medical History Review  Past Medical, Family, and Social History reviewed and does contribute to the patient presenting condition    Health Maintenance   Topic Date Due    DTaP/Tdap/Td vaccine (1 - Tdap) 06/08/2010    Shingles vaccine (2 of 2) 09/26/2018    COVID-19 Vaccine (4 - Booster for Dariana Pluck series) 04/10/2022    Annual Wellness Visit (AWV)  10/13/2023    Lipids  11/17/2023    Depression Screen  01/03/2024    DEXA (modify frequency per FRAX score)  Completed    Flu vaccine  Completed    Pneumococcal 65+ years Vaccine  Completed    Hepatitis C screen  Completed    Hepatitis A vaccine  Aged Out    Hib vaccine  Aged Out    Meningococcal (ACWY) vaccine  Aged Out

## 2023-02-23 NOTE — PROGRESS NOTES
[address] [address]  Dept: [phone]  Dept Fax: [phone]    Larry Zazueta is a 65 y.o. female who presents today for her medical conditions/complaintsas noted below. Larry Zazueta is c/o of   Chief Complaint   Patient presents with    Diabetes     A1C- 1/3/23= 9.9    Discuss Labs         HPI:     Pt had hyperkalemia  Treated with low k diet  Repeat k is better  Diabetes   Duration more than 7 years  Modifying factors on Glucophage and other med  Severity uncontrolled sever  Associated signs and symtoms neuropathy/ckd/ CAD. aggravated with sugar diet and better with low sugar diet             Hemoglobin A1C (%)   Date Value   01/03/2023 9.9   09/29/2022 9.2 (H)   05/16/2022 6.7             ( goal A1Cis < 7)   Microalb/Crt.  Ratio (mcg/mg creat)   Date Value   11/08/2021 73 (H)     LDL Cholesterol (mg/dL)   Date Value   11/17/2022 71   08/30/2021 74   06/24/2020 62       (goal LDL is <100)   AST (U/L)   Date Value   11/17/2022 19     ALT (U/L)   Date Value   11/17/2022 14     BUN (mg/dL)   Date Value   02/10/2023 17     BP Readings from Last 3 Encounters:   02/23/23 122/62   02/07/23 122/66   01/03/23 120/62          (goal 120/80)    Past Medical History:   Diagnosis Date    Anxiety state, unspecified     Arthritis     Cataracts, bilateral     COVID-19 vaccine administered 2- & 3-    MODERNA    Esophageal reflux     Generalized osteoarthrosis, unspecified site     Hearing loss     Hyperlipidemia     Hypertension     Neuropathy     Neuropathy due to medical condition ([address]     Pure hypercholesterolemia     Thoracic or lumbosacral neuritis or radiculitis, unspecified     Type 2 diabetes mellitus without complication ([address]     Urinary retention     UTI (urinary tract infection)       Past Surgical History:   Procedure Laterality Date    BACK SURGERY      X3    BREAST BIOPSY  2007    Lt     CARPAL TUNNEL RELEASE Right     CATARACT REMOVAL WITH IMPLANT Bilateral     COLONOSCOPY      COLONOSCOPY  4/24/15    diverticulosis    CYSTOSCOPY  [id number]    CYSTOSCOPY  2017    CYSTOSCOPY N/A 2017    CYSTOSCOPY URODYNAMICS AND DILATION  performed by Cynthia Montana MD at [address] Bilateral     thumbs reconstruction    HIP ARTHROPLASTY Bilateral     JOINT REPLACEMENT Bilateral     LUMBAR FUSION  2015       Family History   Problem Relation Age of Onset    Cancer Mother         lymphoma    Hypertension Mother     Hypertension Father     Hypertension Brother     Heart Disease Brother         pt states that brother had 5 bypass surgeries. Social History     Tobacco Use    Smoking status: Former     Packs/day: 2.00     Years: 20.00     Pack years: 40.00     Types: Cigarettes     Quit date: 1984     Years since quittin.1    Smokeless tobacco: Never   Substance Use Topics    Alcohol use:  Yes     Alcohol/week: 0.0 standard drinks     Comment: social      Current Outpatient Medications   Medication Sig Dispense Refill    sodium zirconium cyclosilicate (LOKELMA) 10 g PACK oral suspension Take 10 g by mouth daily 6 each 0    DULoxetine (CYMBALTA) 60 MG extended release capsule TAKE 1 CAPSULE BY MOUTH  DAILY 90 capsule 0    insulin glargine (LANTUS SOLOSTAR) 100 UNIT/ML injection pen Inject 20 Units into the skin daily 6 mL 11    atorvastatin (LIPITOR) 40 MG tablet Take 1 tablet by mouth daily 90 tablet 3    Continuous Blood Gluc  (DEXCOM G6 ) BRIDGET 1 each by Does not apply route every 14 days 2 each 3    Continuous Blood Gluc Sensor (DEXCOM G6 SENSOR) MISC 1 each by Does not apply route every 14 days 2 each 3    Continuous Blood Gluc Transmit (DEXCOM G6 TRANSMITTER) MISC 1 each by Does not apply route every 14 days 2 each 3    lisinopril (PRINIVIL;ZESTRIL) 20 MG tablet TAKE 1 TABLET BY MOUTH  TWICE DAILY 180 tablet 3    metFORMIN (GLUCOPHAGE) 1000 MG tablet TAKE 1 TABLET BY MOUTH  TWICE DAILY WITH MEALS 180 tablet 3    Continuous Blood Gluc Sensor (FREESTYLE WARREN 2 SENSOR) MISC 1 Units by Does not apply route in the morning, at noon, and at bedtime 2 each 5    LYUMJEV KWIKPEN 100 UNIT/ML SOPN       blood glucose test strips (ONETOUCH ULTRA) strip USE 1 STRIP TO TEST 4 TIMES DAILY 400 strip 3    glipiZIDE (GLUCOTROL) 10 MG tablet TAKE 2 TABLETS BY MOUTH  TWICE DAILY BEFORE MEALS 360 tablet 3    blood glucose monitor kit and supplies 1 kit by Other route three times daily Dispense One Touch Glucose Meter. 1 kit 0    blood glucose monitor strips Test four  times a day & as needed for symptoms of irregular blood glucose. Dispense sufficient amount for indicated testing frequency plus additional to accommodate PRN testing needs. 120 strip 5    ONE TOUCH ULTRASOFT LANCETS MISC 1 each by Does not apply route 2 times daily 100 each 3    Calcium-Magnesium-Vitamin D (CALCIUM 1200+D3 PO) Take 2 tablets by mouth daily. acetaminophen (TYLENOL) 500 MG tablet Take 500 mg by mouth in the morning and 500 mg before bedtime. aspirin 81 MG chewable tablet Take 81 mg by mouth daily. gabapentin (NEURONTIN) 300 MG capsule TAKE 2 CAPSULES BY MOUTH IN THE MORNING 1 CAPSULE BY  MOUTH IN THE AFTERNOON AND  2 CAPSULES BY MOUTH AT  BEDTIME 450 capsule 3    Spacer/Aero-Holding Chambers BRIDGET 1 Device by Does not apply route daily 1 each 1    albuterol sulfate HFA (VENTOLIN HFA) 108 (90 Base) MCG/ACT inhaler Inhale 2 puffs into the lungs every 4 hours as needed for Wheezing 1 Inhaler 0     No current facility-administered medications for this visit.      Allergies   Allergen Reactions    Hydrocodone        Health Maintenance   Topic Date Due    DTaP/Tdap/Td vaccine (1 - Tdap) 06/08/2010    Shingles vaccine (2 of 2) 09/26/2018    COVID-19 Vaccine (4 - Booster for Moderna series) 04/10/2022    Annual Wellness Visit (AWV)  10/13/2023    Lipids  11/17/2023    Depression Screen  01/03/2024    DEXA (modify frequency per FRAX score) Completed    Flu vaccine  Completed    Pneumococcal 65+ years Vaccine  Completed    Hepatitis C screen  Completed    Hepatitis A vaccine  Aged Out    Hib vaccine  Aged Out    Meningococcal (ACWY) vaccine  Aged Out       Subjective:     Review of Systems   Constitutional:  Positive for fatigue. Negative for appetite change and diaphoresis. HENT:  Negative for ear discharge and trouble swallowing. Eyes:  Negative for pain and discharge. Respiratory:  Negative for cough, shortness of breath and wheezing. Cardiovascular:  Negative for chest pain and palpitations. Gastrointestinal:  Negative for abdominal distention, blood in stool and diarrhea. Endocrine: Negative for polydipsia and polyphagia. Genitourinary:  Negative for difficulty urinating and frequency. Musculoskeletal:  Negative for gait problem, myalgias and neck pain. Skin:  Negative for color change and rash. Allergic/Immunologic: Negative for environmental allergies and food allergies. Neurological:  Negative for dizziness and headaches. Hematological:  Negative for adenopathy. Does not bruise/bleed easily. Psychiatric/Behavioral:  Negative for behavioral problems and sleep disturbance. Objective:     Physical Exam  Constitutional:       Appearance: She is well-developed. She is not diaphoretic. HENT:      Head: Normocephalic and atraumatic. Eyes:      General:         Right eye: No discharge. Left eye: No discharge. Extraocular Movements:      Right eye: Normal extraocular motion. Left eye: Normal extraocular motion. Conjunctiva/sclera: Conjunctivae normal.      Right eye: Right conjunctiva is not injected. Left eye: Left conjunctiva is not injected. Neck:      Thyroid: No thyroid mass or thyromegaly. Vascular: No JVD. Cardiovascular:      Rate and Rhythm: Normal rate and regular rhythm. Heart sounds: No murmur heard. No friction rub.    Pulmonary:      Effort: Pulmonary effort is normal. No tachypnea, bradypnea, accessory muscle usage or respiratory distress. Breath sounds: Normal breath sounds. No wheezing or rales. Abdominal:      General: Bowel sounds are normal. There is no distension. Palpations: Abdomen is soft. Tenderness: There is no abdominal tenderness. There is no rebound. Musculoskeletal:         General: No tenderness. Normal range of motion. Cervical back: Normal range of motion and neck supple. No edema or erythema. Lymphadenopathy:      Head:      Right side of head: No submental or submandibular adenopathy. Left side of head: No submental or submandibular adenopathy. Cervical: No cervical adenopathy. Skin:     General: Skin is warm. Coloration: Skin is not pale. Findings: No bruising, ecchymosis or rash. Neurological:      Mental Status: She is alert and oriented to person, place, and time. Cranial Nerves: No cranial nerve deficit. Sensory: No sensory deficit. Motor: No atrophy or abnormal muscle tone. Coordination: Coordination normal.   Psychiatric:         Mood and Affect: Mood is not anxious. Affect is not angry. Speech: Speech is not slurred. Behavior: Behavior normal. Behavior is not aggressive. Thought Content: Thought content does not include homicidal ideation. Cognition and Memory: Memory is not impaired. /62   Pulse 62   Ht 5' 7\" (1.702 m)   Wt 136 lb (61.7 kg)   SpO2 99%   BMI 21.30 kg/m²     Assessment:       Diagnosis Orders   1. Hyperkalemia        2. Primary hypertension        3. Panlobular emphysema ([address]        4. Type 2 diabetes mellitus with diabetic neuropathy, with long-term current use of insulin ([address]        5. Mixed hyperlipidemia                  Plan:      Return in about 1 month (around 3/23/2023). No orders of the defined types were placed in this encounter. No orders of the defined types were placed in this encounter. Pt had hyperkalemia  Treated with low k diet  Repeat k is better    Uncontrolled dm  Not good with diet  On lantus 23 daily  Will increase to 30  Pt hard of hearing  Advsied  to get hearing tested  She has one but affective  Low k diet  Recheck k  4.7  No banana and orange juice  Cut back on \high sugar food  Pt eating a lot of sugary fruits       Patient given educational materials - see patient instructions. Discussed use, benefit, and side effects of prescribed medications. All patientquestions answered. Pt voiced understanding. Reviewed health maintenance. Instructedto continue current medications, diet and exercise. Patient agreed with treatmentplan. Follow up as directed. Please note that this chart was generated using voice recognition Dragon dictation software. Although every effort was made to ensure the accuracy of this automated transcription, some errors in transcription may have occurred.      Electronically signed by Alisha Garcia MD on 2/23/2023 at 10:07 AM

## 2023-03-23 ENCOUNTER — OFFICE VISIT (OUTPATIENT)
Dept: INTERNAL MEDICINE CLINIC | Age: 78
End: 2023-03-23
Payer: MEDICARE

## 2023-03-23 VITALS
HEART RATE: 108 BPM | BODY MASS INDEX: 20.81 KG/M2 | DIASTOLIC BLOOD PRESSURE: 70 MMHG | OXYGEN SATURATION: 98 % | HEIGHT: 67 IN | SYSTOLIC BLOOD PRESSURE: 128 MMHG | WEIGHT: 132.6 LBS

## 2023-03-23 DIAGNOSIS — I10 PRIMARY HYPERTENSION: ICD-10-CM

## 2023-03-23 DIAGNOSIS — E11.40 TYPE 2 DIABETES MELLITUS WITH DIABETIC NEUROPATHY, WITH LONG-TERM CURRENT USE OF INSULIN (HCC): ICD-10-CM

## 2023-03-23 DIAGNOSIS — E11.00 UNCONTROLLED TYPE 2 DM WITH HYPEROSMOLAR NONKETOTIC HYPERGLYCEMIA (HCC): ICD-10-CM

## 2023-03-23 DIAGNOSIS — R06.09 DOE (DYSPNEA ON EXERTION): ICD-10-CM

## 2023-03-23 DIAGNOSIS — Z79.4 TYPE 2 DIABETES MELLITUS WITH DIABETIC NEUROPATHY, WITH LONG-TERM CURRENT USE OF INSULIN (HCC): ICD-10-CM

## 2023-03-23 DIAGNOSIS — E78.2 MIXED HYPERLIPIDEMIA: Primary | ICD-10-CM

## 2023-03-23 PROCEDURE — 3078F DIAST BP <80 MM HG: CPT | Performed by: INTERNAL MEDICINE

## 2023-03-23 PROCEDURE — 1090F PRES/ABSN URINE INCON ASSESS: CPT | Performed by: INTERNAL MEDICINE

## 2023-03-23 PROCEDURE — 3046F HEMOGLOBIN A1C LEVEL >9.0%: CPT | Performed by: INTERNAL MEDICINE

## 2023-03-23 PROCEDURE — 99214 OFFICE O/P EST MOD 30 MIN: CPT | Performed by: INTERNAL MEDICINE

## 2023-03-23 PROCEDURE — G8427 DOCREV CUR MEDS BY ELIG CLIN: HCPCS | Performed by: INTERNAL MEDICINE

## 2023-03-23 PROCEDURE — G8420 CALC BMI NORM PARAMETERS: HCPCS | Performed by: INTERNAL MEDICINE

## 2023-03-23 PROCEDURE — G8484 FLU IMMUNIZE NO ADMIN: HCPCS | Performed by: INTERNAL MEDICINE

## 2023-03-23 PROCEDURE — 1036F TOBACCO NON-USER: CPT | Performed by: INTERNAL MEDICINE

## 2023-03-23 PROCEDURE — 3074F SYST BP LT 130 MM HG: CPT | Performed by: INTERNAL MEDICINE

## 2023-03-23 PROCEDURE — G8399 PT W/DXA RESULTS DOCUMENT: HCPCS | Performed by: INTERNAL MEDICINE

## 2023-03-23 PROCEDURE — 1123F ACP DISCUSS/DSCN MKR DOCD: CPT | Performed by: INTERNAL MEDICINE

## 2023-03-23 RX ORDER — BLOOD SUGAR DIAGNOSTIC
STRIP MISCELLANEOUS
Qty: 400 STRIP | Refills: 3 | Status: SHIPPED | OUTPATIENT
Start: 2023-03-23

## 2023-03-23 RX ORDER — INSULIN GLARGINE 100 [IU]/ML
30 INJECTION, SOLUTION SUBCUTANEOUS DAILY
Qty: 9 ADJUSTABLE DOSE PRE-FILLED PEN SYRINGE | Refills: 3 | Status: SHIPPED | OUTPATIENT
Start: 2023-03-23 | End: 2023-06-21

## 2023-03-23 ASSESSMENT — ENCOUNTER SYMPTOMS
BLOOD IN STOOL: 0
COLOR CHANGE: 0
TROUBLE SWALLOWING: 0
WHEEZING: 0
DIARRHEA: 0
COUGH: 0
ABDOMINAL DISTENTION: 0
SHORTNESS OF BREATH: 0
EYE DISCHARGE: 0
EYE PAIN: 0

## 2023-03-23 NOTE — PROGRESS NOTES
LOV11/21/2019  Upcoming visit none  Last [id number]/27/2019  Refill per standing order  
Visit Information    Have you changed or started any medications since your last visit including any over-the-counter medicines, vitamins, or herbal medicines? no   Are you having any side effects from any of your medications? -  no  Have you stopped taking any of your medications? Is so, why? -  no    Have you seen any other physician or provider since your last visit? No  Have you had any other diagnostic tests since your last visit? No  Have you been seen in the emergency room and/or had an admission to a hospital since we last saw you? No  Have you had your routine dental cleaning in the past 6 months? no    Have you activated your [employer] account? If not, what are your barriers?  Yes     Patient Care Team:  Dennis Xei MD as PCP - General (Internal Medicine)  Dennis Xie MD as PCP - Empaneled Provider  Stephanie Hayes DO (Obstetrics & Gynecology)    Medical History Review  Past Medical, Family, and Social History reviewed and does contribute to the patient presenting condition    Health Maintenance   Topic Date Due    DTaP/Tdap/Td vaccine (1 - Tdap) 06/08/2010    Shingles vaccine (2 of 2) 09/26/2018    COVID-19 Vaccine (4 - Booster for Kavitha Yung series) 04/10/2022    Annual Wellness Visit (AWV)  10/13/2023    Lipids  11/17/2023    Depression Screen  01/03/2024    DEXA (modify frequency per FRAX score)  Completed    Flu vaccine  Completed    Pneumococcal 65+ years Vaccine  Completed    Hepatitis C screen  Completed    Hepatitis A vaccine  Aged Out    Hib vaccine  Aged Out    Meningococcal (ACWY) vaccine  Aged Out
Normal range of motion and neck supple. No edema or erythema. Lymphadenopathy:      Head:      Right side of head: No submental or submandibular adenopathy. Left side of head: No submental or submandibular adenopathy. Cervical: No cervical adenopathy. Skin:     General: Skin is warm. Coloration: Skin is not pale. Findings: No bruising, ecchymosis or rash. Neurological:      Mental Status: She is alert and oriented to person, place, and time. Cranial Nerves: No cranial nerve deficit. Sensory: No sensory deficit. Motor: No atrophy or abnormal muscle tone. Coordination: Coordination normal.   Psychiatric:         Mood and Affect: Mood is not anxious. Affect is not angry. Speech: Speech is not slurred. Behavior: Behavior normal. Behavior is not aggressive. Thought Content: Thought content does not include homicidal ideation. Cognition and Memory: Memory is not impaired. /70   Pulse (!) 108   Ht 5' 7\" (1.702 m)   Wt 132 lb 9.6 oz (60.1 kg)   SpO2 98%   BMI 20.77 kg/m²     Assessment:       Diagnosis Orders   1. Mixed hyperlipidemia        2. Type 2 diabetes mellitus with diabetic neuropathy, with long-term current use of insulin (HCC)  insulin glargine (LANTUS SOLOSTAR) 100 UNIT/ML injection pen    Hemoglobin A1C      3. Primary hypertension        4. PINTO (dyspnea on exertion)        5. Uncontrolled type 2 DM with hyperosmolar nonketotic hyperglycemia (HCC)  blood glucose test strips (ONETOUCH ULTRA) strip                Plan:      Return in about 1 month (around 4/23/2023).     Orders Placed This Encounter   Procedures    Hemoglobin A1C     Standing Status:   Future     Standing Expiration Date:   3/22/2024       Orders Placed This Encounter   Medications    insulin glargine (LANTUS SOLOSTAR) 100 UNIT/ML injection pen     Sig: Inject 30 Units into the skin daily     Dispense:  9 Adjustable Dose Pre-filled Pen Syringe     Refill:

## 2023-04-03 ENCOUNTER — HOSPITAL ENCOUNTER (OUTPATIENT)
Age: 78
Setting detail: SPECIMEN
Discharge: HOME | End: 2023-04-03

## 2023-04-03 DIAGNOSIS — E11.40 TYPE 2 DIABETES MELLITUS WITH DIABETIC NEUROPATHY, WITH LONG-TERM CURRENT USE OF INSULIN (HCC): ICD-10-CM

## 2023-04-03 DIAGNOSIS — Z79.4 TYPE 2 DIABETES MELLITUS WITH DIABETIC NEUROPATHY, WITH LONG-TERM CURRENT USE OF INSULIN (HCC): ICD-10-CM

## 2023-04-03 LAB
EST. AVERAGE GLUCOSE BLD GHB EST-MCNC: 166 MG/DL
HBA1C MFR BLD: 7.4 % (ref 4–6)

## 2023-04-03 RX ORDER — INSULIN GLARGINE 100 [IU]/ML
30 INJECTION, SOLUTION SUBCUTANEOUS DAILY
Qty: 9 ADJUSTABLE DOSE PRE-FILLED PEN SYRINGE | Refills: 3 | Status: SHIPPED | OUTPATIENT
Start: 2023-04-03 | End: 2023-07-02

## 2023-04-03 NOTE — TELEPHONE ENCOUNTER
----- Message from Cristopher Peckclair sent at 4/3/2023  9:03 AM EDT -----  Subject: Refill Request    QUESTIONS  Name of Medication? insulin glargine (LANTUS SOLOSTAR) 100 UNIT/ML   injection pen  Patient-reported dosage and instructions? as needed  How many days do you have left? 14  Preferred Pharmacy? [facility] ([address] )  Pharmacy phone number (if available)? [phone]  ---------------------------------------------------------------------------  --------------  CALL BACK INFO  What is the best way for the office to contact you? OK to leave message on   voicemail  Preferred Call Back Phone Number? [phone]  ---------------------------------------------------------------------------  --------------  SCRIPT ANSWERS  Relationship to Patient?  Self

## 2023-04-05 DIAGNOSIS — Z79.4 TYPE 2 DIABETES MELLITUS WITH DIABETIC NEUROPATHY, WITH LONG-TERM CURRENT USE OF INSULIN (HCC): Primary | ICD-10-CM

## 2023-04-05 DIAGNOSIS — E11.40 TYPE 2 DIABETES MELLITUS WITH DIABETIC NEUROPATHY, WITH LONG-TERM CURRENT USE OF INSULIN (HCC): Primary | ICD-10-CM

## 2023-05-07 DIAGNOSIS — R80.9 MICROALBUMINURIA: ICD-10-CM

## 2023-05-08 RX ORDER — LISINOPRIL 20 MG/1
TABLET ORAL
Qty: 200 TABLET | Refills: 2 | OUTPATIENT
Start: 2023-05-08

## 2023-06-29 ENCOUNTER — OFFICE VISIT (OUTPATIENT)
Dept: INTERNAL MEDICINE CLINIC | Age: 78
End: 2023-06-29

## 2023-06-29 VITALS
WEIGHT: 138 LBS | BODY MASS INDEX: 21.66 KG/M2 | HEIGHT: 67 IN | OXYGEN SATURATION: 96 % | HEART RATE: 93 BPM | DIASTOLIC BLOOD PRESSURE: 62 MMHG | SYSTOLIC BLOOD PRESSURE: 104 MMHG

## 2023-06-29 DIAGNOSIS — F33.1 MAJOR DEPRESSIVE DISORDER, RECURRENT, MODERATE (HCC): ICD-10-CM

## 2023-06-29 DIAGNOSIS — F33.0 MAJOR DEPRESSIVE DISORDER, RECURRENT, MILD (HCC): ICD-10-CM

## 2023-06-29 DIAGNOSIS — E11.40 TYPE 2 DIABETES MELLITUS WITH DIABETIC NEUROPATHY, WITH LONG-TERM CURRENT USE OF INSULIN (HCC): Primary | ICD-10-CM

## 2023-06-29 DIAGNOSIS — J43.1 PANLOBULAR EMPHYSEMA (HCC): ICD-10-CM

## 2023-06-29 DIAGNOSIS — Z79.4 TYPE 2 DIABETES MELLITUS WITH DIABETIC NEUROPATHY, WITH LONG-TERM CURRENT USE OF INSULIN (HCC): Primary | ICD-10-CM

## 2023-06-29 DIAGNOSIS — I10 PRIMARY HYPERTENSION: ICD-10-CM

## 2023-06-29 DIAGNOSIS — K21.9 GASTROESOPHAGEAL REFLUX DISEASE WITHOUT ESOPHAGITIS: ICD-10-CM

## 2023-06-29 DIAGNOSIS — R06.09 DOE (DYSPNEA ON EXERTION): ICD-10-CM

## 2023-06-29 DIAGNOSIS — E78.2 MIXED HYPERLIPIDEMIA: ICD-10-CM

## 2023-06-29 DIAGNOSIS — N32.81 OVERACTIVE BLADDER: Chronic | ICD-10-CM

## 2023-06-29 DIAGNOSIS — F33.41 RECURRENT MAJOR DEPRESSIVE DISORDER, IN PARTIAL REMISSION (HCC): ICD-10-CM

## 2023-06-29 PROBLEM — F33.9 MAJOR DEPRESSIVE DISORDER, RECURRENT, UNSPECIFIED (HCC): Status: ACTIVE | Noted: 2023-06-29

## 2023-06-29 RX ORDER — BLOOD-GLUCOSE SENSOR
1 EACH MISCELLANEOUS 3 TIMES DAILY
Qty: 6 EACH | Refills: 3 | Status: SHIPPED | OUTPATIENT
Start: 2023-06-29

## 2023-06-29 ASSESSMENT — ENCOUNTER SYMPTOMS
ABDOMINAL DISTENTION: 0
EYE PAIN: 0
COLOR CHANGE: 0
TROUBLE SWALLOWING: 0
BLOOD IN STOOL: 0
DIARRHEA: 0
SHORTNESS OF BREATH: 0
COUGH: 0
WHEEZING: 0
EYE DISCHARGE: 0

## 2023-07-03 DIAGNOSIS — Z79.4 TYPE 2 DIABETES MELLITUS WITH DIABETIC NEUROPATHY, WITH LONG-TERM CURRENT USE OF INSULIN (HCC): Primary | ICD-10-CM

## 2023-07-03 DIAGNOSIS — E11.40 TYPE 2 DIABETES MELLITUS WITH DIABETIC NEUROPATHY, WITH LONG-TERM CURRENT USE OF INSULIN (HCC): Primary | ICD-10-CM

## 2023-07-03 NOTE — TELEPHONE ENCOUNTER
Please advise PCP on [facility]     Accidentally sent in a [address] 3 sensor in and not a [address] 2. I have pended the right sensor, so please sign  if appropriate.

## 2023-07-03 NOTE — TELEPHONE ENCOUNTER
----- Message from Blayne Wilson sent at 7/3/2023 11:13 AM EDT -----  Subject: Medication Problem    Medication: Continuous Blood Gluc Sensor (FREESTYLE WARREN 3 SENSOR) [facility]  Dosage: 1 Units by Does not apply route in the morning, at noon, and at   bedtime  Ordering Provider: Natalie Bell: Maren from [address] [facility] called on 7/3/2023 stating they   need a prescription sent over for a [facility] 2 Sensor not a   [facility] 3 Sensor      Pharmacy: [facility] [id number], [address]   [phone] Imani Longoria [phone]    ---------------------------------------------------------------------------  --------------  [place]  [phone]; OK to leave message on voicemail  ---------------------------------------------------------------------------  --------------    SCRIPT ANSWERS  Relationship to Patient: Covered Entity  Covered Entity Type: Pharmacy?   Representative Name: David Perales

## 2023-07-05 ENCOUNTER — HOSPITAL ENCOUNTER (OUTPATIENT)
Age: 78
Setting detail: SPECIMEN
Discharge: HOME | End: 2023-07-05

## 2023-07-05 DIAGNOSIS — E11.40 TYPE 2 DIABETES MELLITUS WITH DIABETIC NEUROPATHY, WITH LONG-TERM CURRENT USE OF INSULIN (HCC): ICD-10-CM

## 2023-07-05 DIAGNOSIS — Z79.4 TYPE 2 DIABETES MELLITUS WITH DIABETIC NEUROPATHY, WITH LONG-TERM CURRENT USE OF INSULIN (HCC): ICD-10-CM

## 2023-07-06 LAB
EST. AVERAGE GLUCOSE BLD GHB EST-MCNC: 160 MG/DL
HBA1C MFR BLD: 7.2 % (ref 4–6)

## 2023-07-21 ENCOUNTER — HOSPITAL ENCOUNTER (EMERGENCY)
Age: 78
Discharge: HOME | End: 2023-07-21
Attending: SPECIALIST
Payer: MEDICARE

## 2023-07-21 VITALS
HEART RATE: 98 BPM | RESPIRATION RATE: 18 BRPM | TEMPERATURE: 98.1 F | BODY MASS INDEX: 21.19 KG/M2 | OXYGEN SATURATION: 98 % | DIASTOLIC BLOOD PRESSURE: 58 MMHG | HEIGHT: 67 IN | WEIGHT: 135 LBS | SYSTOLIC BLOOD PRESSURE: 119 MMHG

## 2023-07-21 DIAGNOSIS — T16.2XXA EAR FOREIGN BODY, LEFT, INITIAL ENCOUNTER: Primary | ICD-10-CM

## 2023-07-21 PROCEDURE — 99282 EMERGENCY DEPT VISIT SF MDM: CPT

## 2023-07-21 ASSESSMENT — ENCOUNTER SYMPTOMS
SHORTNESS OF BREATH: 0
DIARRHEA: 0
ABDOMINAL PAIN: 0
VOMITING: 0
BACK PAIN: 0
COUGH: 0
SORE THROAT: 0
NAUSEA: 0

## 2023-07-21 ASSESSMENT — PAIN - FUNCTIONAL ASSESSMENT: PAIN_FUNCTIONAL_ASSESSMENT: NONE - DENIES PAIN

## 2023-07-22 DIAGNOSIS — R80.9 MICROALBUMINURIA: ICD-10-CM

## 2023-07-22 NOTE — DISCHARGE INSTRUCTIONS
Return to the ER: Fevers, redness warmth swelling to the outer ear, left ear pain,, atypical headaches, abnormal drainage from the left ear; or any other concerning symptoms.

## 2023-07-22 NOTE — ED PROVIDER NOTES
impacted cerumen. Ears:      Comments: Small circular clear less than 1 cm circular foreign body noted against the tympanic membrane, on initial exam, left ear irrigation completed to attempt to remove the foreign body from the ear twice unsuccessfully with lukewarm water, total of 120 mL. Nose: Nose normal.      Mouth/Throat:      Mouth: Mucous membranes are moist.   Eyes:      General:         Right eye: No discharge. Left eye: No discharge. Conjunctiva/sclera: Conjunctivae normal.   Cardiovascular:      Rate and Rhythm: Normal rate and regular rhythm. Pulmonary:      Effort: Pulmonary effort is normal.      Breath sounds: Normal breath sounds. Musculoskeletal:         General: No swelling or signs of injury. Cervical back: Normal range of motion and neck supple. No rigidity or tenderness. Right lower leg: No edema. Left lower leg: No edema. Skin:     General: Skin is warm and dry. Capillary Refill: Capillary refill takes less than 2 seconds. Neurological:      General: No focal deficit present. Mental Status: She is alert and oriented to person, place, and time. Mental status is at baseline.       Gait: Gait normal.   Psychiatric:         Mood and Affect: Mood normal.         Behavior: Behavior normal.       DIAGNOSTIC RESULTS     EKG: All EKG's are interpreted by the Emergency Department Physician who either signs or Co-signs this chart in the absence of a cardiologist.        RADIOLOGY:   Non-plain film images such as CT, Ultrasound and MRI are read by the radiologist. Plain radiographic images are visualized and preliminarily interpreted by the emergency physician with the below findings:        Interpretation per the Radiologist below, if available at the time of this note:    No orders to display         ED BEDSIDE ULTRASOUND:   Performed by ED Physician - none    LABS:  Labs Reviewed - No data to display    All other labs were within normal range or not [address]    Schedule an appointment as soon as possible for a visit   On Monday morning; for left ear foreign body removal.    Delia Toscano MD  [address], [address] [place] [address]  [phone]            DISCHARGE MEDICATIONS:  New Prescriptions    No medications on file     Controlled Substances Monitoring:     No flowsheet data found. (Please note that portions of this note were completed with a voice recognition program.  Efforts were made to edit the dictations but occasionally words are mis-transcribed. )    JAMES Peralta CNP (electronically signed)  Attending Emergency Physician           JAMES Peralta CNP  07/21/23 [id number]

## 2023-07-24 DIAGNOSIS — T16.9XXA FOREIGN BODY IN EAR, UNSPECIFIED LATERALITY, INITIAL ENCOUNTER: Primary | ICD-10-CM

## 2023-07-24 RX ORDER — LISINOPRIL 20 MG/1
TABLET ORAL
Qty: 160 TABLET | Refills: 3 | OUTPATIENT
Start: 2023-07-24

## 2023-08-10 DIAGNOSIS — E78.2 MIXED HYPERLIPIDEMIA: ICD-10-CM

## 2023-08-11 RX ORDER — ATORVASTATIN CALCIUM 40 MG/1
40 TABLET, FILM COATED ORAL DAILY
Qty: 100 TABLET | Refills: 2 | Status: SHIPPED | OUTPATIENT
Start: 2023-08-11

## 2023-08-23 ENCOUNTER — OFFICE VISIT (OUTPATIENT)
Dept: ORTHOPEDIC SURGERY | Age: 78
End: 2023-08-23

## 2023-08-23 VITALS — HEIGHT: 67 IN | BODY MASS INDEX: 20.72 KG/M2 | RESPIRATION RATE: 14 BRPM | WEIGHT: 132 LBS

## 2023-08-23 DIAGNOSIS — M70.61 TROCHANTERIC BURSITIS OF RIGHT HIP: ICD-10-CM

## 2023-08-23 DIAGNOSIS — M25.561 CHRONIC PAIN OF RIGHT KNEE: Primary | ICD-10-CM

## 2023-08-23 DIAGNOSIS — Z96.641 HISTORY OF TOTAL HIP ARTHROPLASTY, RIGHT: ICD-10-CM

## 2023-08-23 DIAGNOSIS — G89.29 CHRONIC PAIN OF RIGHT KNEE: Primary | ICD-10-CM

## 2023-08-23 RX ORDER — BETAMETHASONE SODIUM PHOSPHATE AND BETAMETHASONE ACETATE 3; 3 MG/ML; MG/ML
12 INJECTION, SUSPENSION INTRA-ARTICULAR; INTRALESIONAL; INTRAMUSCULAR; SOFT TISSUE ONCE
Status: COMPLETED | OUTPATIENT
Start: 2023-08-23 | End: 2023-08-23

## 2023-08-23 RX ORDER — BUPIVACAINE HYDROCHLORIDE 2.5 MG/ML
2 INJECTION, SOLUTION INFILTRATION; PERINEURAL ONCE
Status: COMPLETED | OUTPATIENT
Start: 2023-08-23 | End: 2023-08-23

## 2023-08-23 RX ORDER — LIDOCAINE HYDROCHLORIDE 10 MG/ML
2 INJECTION, SOLUTION INFILTRATION; PERINEURAL ONCE
Status: COMPLETED | OUTPATIENT
Start: 2023-08-23 | End: 2023-08-23

## 2023-08-23 RX ADMIN — LIDOCAINE HYDROCHLORIDE 2 ML: 10 INJECTION, SOLUTION INFILTRATION; PERINEURAL at 16:18

## 2023-08-23 RX ADMIN — BUPIVACAINE HYDROCHLORIDE 5 MG: 2.5 INJECTION, SOLUTION INFILTRATION; PERINEURAL at 16:17

## 2023-08-23 RX ADMIN — BETAMETHASONE SODIUM PHOSPHATE AND BETAMETHASONE ACETATE 12 MG: 3; 3 INJECTION, SUSPENSION INTRA-ARTICULAR; INTRALESIONAL; INTRAMUSCULAR; SOFT TISSUE at 16:17

## 2023-08-23 NOTE — PROGRESS NOTES
[address], Deidra Ornelas [address] [facility], [address] [facility], [address] [place] [address] [address]           Dept Phone: [phone]           Dept Fax:  [address]          Dept Phone: [phone]           Dept Fax:  [phone]      Chief Compliant:  Chief Complaint   Patient presents with    Hip Pain     NP: R Hip    Knee Pain     NP: R Hip        History of Present Illness: This is a 65 y.o. female who presents to the clinic today for evaluation of had concerns including Hip Pain (NP: R Hip) and Knee Pain (NP: R Hip). Ms. Muna Gonzalez is a 69-year-old female who returns today for reevaluation of right hip and knee pain. Patient with history of bilateral total hip arthroplasty. She reports pain in this right hip/lateral leg has been present for a couple years but especially worse over the last several months. She is actually seen by Dr. Aidan Gagnon in August 2022 for similar pain and underwent a trochanteric bursal injection which did provide significantly for pain. She does feel that the knee pain is new compared to the pain she had last year but the hip pain feels very similar. She reports the knee pain is most severe over the anterior lateral knee and feels like the knee is \"giving way\" especially going up and down stairs. She denies any new injury or trauma.        Past History:    Current Outpatient Medications:     atorvastatin (LIPITOR) 40 MG tablet, TAKE 1 TABLET BY MOUTH DAILY, Disp: 100 tablet, Rfl: 2    Continuous Blood Gluc Sensor (FREESTYLE WARREN 2 SENSOR) MISC, 1 Units by Does not apply route 3 times daily, Disp: 6 each, Rfl: 3    Continuous Blood Gluc Sensor (FREESTYLE WARREN 3 SENSOR) MISC, 1 Units by Does not apply route in the morning, at noon, and at bedtime, Disp: 6 each, Rfl: 3    Continuous Blood Gluc  (FREESTYLE WARREN 2

## 2023-09-07 ENCOUNTER — TELEPHONE (OUTPATIENT)
Dept: INTERNAL MEDICINE CLINIC | Age: 78
End: 2023-09-07

## 2023-09-08 DIAGNOSIS — E11.40 TYPE 2 DIABETES MELLITUS WITH DIABETIC NEUROPATHY, WITH LONG-TERM CURRENT USE OF INSULIN (HCC): ICD-10-CM

## 2023-09-08 DIAGNOSIS — Z79.4 TYPE 2 DIABETES MELLITUS WITH DIABETIC NEUROPATHY, WITH LONG-TERM CURRENT USE OF INSULIN (HCC): ICD-10-CM

## 2023-09-19 ENCOUNTER — TELEPHONE (OUTPATIENT)
Dept: INTERNAL MEDICINE CLINIC | Age: 78
End: 2023-09-19

## 2023-09-19 NOTE — TELEPHONE ENCOUNTER
Patient called stating her and her  mayo received letters about a no show. The patient stated that she did cancel their appts and talked with the pre-service. The pre-service never cancelled the appts and they were given no show letters.

## 2023-10-05 ENCOUNTER — HOSPITAL ENCOUNTER (EMERGENCY)
Age: 78
Discharge: ANOTHER HOSPITAL | DRG: 041 | End: 2023-10-06
Attending: EMERGENCY MEDICINE
Payer: MEDICARE

## 2023-10-05 ENCOUNTER — APPOINTMENT (OUTPATIENT)
Dept: CT IMAGING | Age: 78
DRG: 041 | End: 2023-10-05
Payer: MEDICARE

## 2023-10-05 ENCOUNTER — APPOINTMENT (OUTPATIENT)
Dept: GENERAL RADIOLOGY | Age: 78
DRG: 041 | End: 2023-10-05
Payer: MEDICARE

## 2023-10-05 DIAGNOSIS — I63.9 CEREBROVASCULAR ACCIDENT (CVA), UNSPECIFIED MECHANISM (HCC): Primary | ICD-10-CM

## 2023-10-05 LAB
ALBUMIN SERPL-MCNC: 3.8 G/DL (ref 3.5–5.2)
ALP SERPL-CCNC: 76 U/L (ref 35–104)
ALT SERPL-CCNC: 16 U/L (ref 5–33)
ANION GAP SERPL CALCULATED.3IONS-SCNC: 12 MMOL/L (ref 9–17)
AST SERPL-CCNC: 24 U/L
BACTERIA URNS QL MICRO: ABNORMAL
BASOPHILS # BLD: 0.1 K/UL (ref 0–0.2)
BASOPHILS NFR BLD: 1 % (ref 0–2)
BILIRUB SERPL-MCNC: 0.6 MG/DL (ref 0.3–1.2)
BILIRUB UR QL STRIP: NEGATIVE
BUN SERPL-MCNC: 16 MG/DL (ref 8–23)
CALCIUM SERPL-MCNC: 8.9 MG/DL (ref 8.6–10.4)
CASTS #/AREA URNS LPF: ABNORMAL /LPF
CHLORIDE SERPL-SCNC: 103 MMOL/L (ref 98–107)
CLARITY UR: ABNORMAL
CO2 SERPL-SCNC: 24 MMOL/L (ref 20–31)
COLOR UR: YELLOW
CREAT SERPL-MCNC: 0.5 MG/DL (ref 0.5–0.9)
EOSINOPHIL # BLD: 0.1 K/UL (ref 0–0.4)
EOSINOPHILS RELATIVE PERCENT: 1 % (ref 0–4)
EPI CELLS #/AREA URNS HPF: ABNORMAL /HPF
ERYTHROCYTE [DISTWIDTH] IN BLOOD BY AUTOMATED COUNT: 20 % (ref 11.5–14.9)
GFR SERPL CREATININE-BSD FRML MDRD: >60 ML/MIN/1.73M2
GLUCOSE BLD-MCNC: 147 MG/DL (ref 65–105)
GLUCOSE SERPL-MCNC: 161 MG/DL (ref 70–99)
GLUCOSE UR STRIP-MCNC: ABNORMAL MG/DL
HCT VFR BLD AUTO: 30 % (ref 36–46)
HGB BLD-MCNC: 8.7 G/DL (ref 12–16)
HGB UR QL STRIP.AUTO: NEGATIVE
INR PPP: 1
KETONES UR STRIP-MCNC: ABNORMAL MG/DL
LEUKOCYTE ESTERASE UR QL STRIP: ABNORMAL
LIPASE SERPL-CCNC: 18 U/L (ref 13–60)
LYMPHOCYTES NFR BLD: 1.29 K/UL (ref 1–4.8)
LYMPHOCYTES RELATIVE PERCENT: 13 % (ref 24–44)
MAGNESIUM SERPL-MCNC: 1.8 MG/DL (ref 1.6–2.6)
MCH RBC QN AUTO: 19.2 PG (ref 26–34)
MCHC RBC AUTO-ENTMCNC: 28.8 G/DL (ref 31–37)
MCV RBC AUTO: 66.7 FL (ref 80–100)
MONOCYTES NFR BLD: 0.5 K/UL (ref 0.1–1.3)
MONOCYTES NFR BLD: 5 % (ref 1–7)
MORPHOLOGY: ABNORMAL
NEUTROPHILS NFR BLD: 80 % (ref 36–66)
NEUTS SEG NFR BLD: 7.91 K/UL (ref 1.3–9.1)
NITRITE UR QL STRIP: POSITIVE
PH UR STRIP: 6 [PH] (ref 5–8)
PLATELET # BLD AUTO: 461 K/UL (ref 150–450)
PMV BLD AUTO: 7.1 FL (ref 6–12)
POTASSIUM SERPL-SCNC: 4 MMOL/L (ref 3.7–5.3)
PROT SERPL-MCNC: 6.9 G/DL (ref 6.4–8.3)
PROT UR STRIP-MCNC: ABNORMAL MG/DL
PROTHROMBIN TIME: 13.7 SEC (ref 11.8–14.6)
RBC # BLD AUTO: 4.5 M/UL (ref 4–5.2)
RBC #/AREA URNS HPF: ABNORMAL /HPF
SODIUM SERPL-SCNC: 139 MMOL/L (ref 135–144)
SP GR UR STRIP: 1.05 (ref 1–1.03)
TROPONIN I SERPL HS-MCNC: 7 NG/L (ref 0–14)
TROPONIN I SERPL HS-MCNC: <6 NG/L (ref 0–14)
UROBILINOGEN UR STRIP-ACNC: NORMAL EU/DL (ref 0–1)
WBC #/AREA URNS HPF: ABNORMAL /HPF
WBC OTHER # BLD: 9.9 K/UL (ref 3.5–11)

## 2023-10-05 PROCEDURE — 99285 EMERGENCY DEPT VISIT HI MDM: CPT

## 2023-10-05 PROCEDURE — 80053 COMPREHEN METABOLIC PANEL: CPT

## 2023-10-05 PROCEDURE — 4A03X5D MEASUREMENT OF ARTERIAL FLOW, INTRACRANIAL, EXTERNAL APPROACH: ICD-10-PCS | Performed by: PSYCHIATRY & NEUROLOGY

## 2023-10-05 PROCEDURE — 81001 URINALYSIS AUTO W/SCOPE: CPT

## 2023-10-05 PROCEDURE — 85610 PROTHROMBIN TIME: CPT

## 2023-10-05 PROCEDURE — 6360000004 HC RX CONTRAST MEDICATION: Performed by: EMERGENCY MEDICINE

## 2023-10-05 PROCEDURE — 70450 CT HEAD/BRAIN W/O DYE: CPT

## 2023-10-05 PROCEDURE — 2580000003 HC RX 258: Performed by: EMERGENCY MEDICINE

## 2023-10-05 PROCEDURE — 96365 THER/PROPH/DIAG IV INF INIT: CPT

## 2023-10-05 PROCEDURE — 6360000002 HC RX W HCPCS: Performed by: EMERGENCY MEDICINE

## 2023-10-05 PROCEDURE — 93005 ELECTROCARDIOGRAM TRACING: CPT | Performed by: EMERGENCY MEDICINE

## 2023-10-05 PROCEDURE — 82947 ASSAY GLUCOSE BLOOD QUANT: CPT

## 2023-10-05 PROCEDURE — 83690 ASSAY OF LIPASE: CPT

## 2023-10-05 PROCEDURE — 84484 ASSAY OF TROPONIN QUANT: CPT

## 2023-10-05 PROCEDURE — 36415 COLL VENOUS BLD VENIPUNCTURE: CPT

## 2023-10-05 PROCEDURE — 6370000000 HC RX 637 (ALT 250 FOR IP): Performed by: EMERGENCY MEDICINE

## 2023-10-05 PROCEDURE — 70498 CT ANGIOGRAPHY NECK: CPT

## 2023-10-05 PROCEDURE — 99443 PR PHYS/QHP TELEPHONE EVALUATION 21-30 MIN: CPT | Performed by: PSYCHIATRY & NEUROLOGY

## 2023-10-05 PROCEDURE — 85025 COMPLETE CBC W/AUTO DIFF WBC: CPT

## 2023-10-05 PROCEDURE — 83735 ASSAY OF MAGNESIUM: CPT

## 2023-10-05 PROCEDURE — 72125 CT NECK SPINE W/O DYE: CPT

## 2023-10-05 PROCEDURE — 71045 X-RAY EXAM CHEST 1 VIEW: CPT

## 2023-10-05 RX ORDER — CLOPIDOGREL BISULFATE 75 MG/1
300 TABLET ORAL ONCE
Status: COMPLETED | OUTPATIENT
Start: 2023-10-05 | End: 2023-10-05

## 2023-10-05 RX ORDER — ASPIRIN 81 MG/1
324 TABLET, CHEWABLE ORAL ONCE
Status: COMPLETED | OUTPATIENT
Start: 2023-10-05 | End: 2023-10-05

## 2023-10-05 RX ORDER — SODIUM CHLORIDE 0.9 % (FLUSH) 0.9 %
10 SYRINGE (ML) INJECTION PRN
Status: DISCONTINUED | OUTPATIENT
Start: 2023-10-05 | End: 2023-10-06 | Stop reason: HOSPADM

## 2023-10-05 RX ORDER — 0.9 % SODIUM CHLORIDE 0.9 %
100 INTRAVENOUS SOLUTION INTRAVENOUS ONCE
Status: COMPLETED | OUTPATIENT
Start: 2023-10-05 | End: 2023-10-05

## 2023-10-05 RX ADMIN — SODIUM CHLORIDE, PRESERVATIVE FREE 10 ML: 5 INJECTION INTRAVENOUS at 11:27

## 2023-10-05 RX ADMIN — CLOPIDOGREL BISULFATE 300 MG: 75 TABLET ORAL at 13:44

## 2023-10-05 RX ADMIN — IOPAMIDOL 75 ML: 755 INJECTION, SOLUTION INTRAVENOUS at 11:27

## 2023-10-05 RX ADMIN — SODIUM CHLORIDE 100 ML: 9 INJECTION, SOLUTION INTRAVENOUS at 11:27

## 2023-10-05 RX ADMIN — ASPIRIN 324 MG: 81 TABLET, CHEWABLE ORAL at 13:41

## 2023-10-05 RX ADMIN — CEFTRIAXONE SODIUM 1000 MG: 1 INJECTION, POWDER, FOR SOLUTION INTRAMUSCULAR; INTRAVENOUS at 12:53

## 2023-10-05 ASSESSMENT — LIFESTYLE VARIABLES
HOW OFTEN DO YOU HAVE A DRINK CONTAINING ALCOHOL: NEVER
HOW MANY STANDARD DRINKS CONTAINING ALCOHOL DO YOU HAVE ON A TYPICAL DAY: PATIENT DOES NOT DRINK

## 2023-10-05 NOTE — PROGRESS NOTES
"  Anatoly Faust Pharmacy Medication History Note      List of current medications patient is taking is complete. Source of information: [place]ARRS, dispense report    Changes made to medication list:  Medications flagged for removal (include reason, ex. noncompliance):  Lyumjev - last filled April 2022  Vira Rodriguez - no fill history    Medications removed (include reason, ex. therapy complete or physician discontinued):  None     Medications added/doses adjusted:  None     Other notes (ex. Recent course of antibiotics, Coumadin dosing):  Per OARRS, last fill of gabapentin was on 7/26/23 with quantity 700 for 100 days. Denies use of other OTC or herbal medications.       Allergies clarified    Medication list provided to the patient: no   Medication education provided to the patient: none      Electronically signed by Liberty Tellez, [address] [place] [address] on 10/5/2023 at 1:48 PM Kevin Knutson NP  12/9/2020

## 2023-10-05 NOTE — ED PROVIDER NOTES
EMERGENCY DEPARTMENT ENCOUNTER    Pt Name: Lesa Lindquist  MRN: [id number]  [address] [place] 1945  Date of evaluation: 10/5/23  CHIEF COMPLAINT       Chief Complaint   Patient presents with    Altered Mental Status    Fall    Aphasia     Unable to form sentences     HISTORY OF PRESENT ILLNESS   76-year-old female presents for reports of altered mental status and difficulty speaking and facial droop.  reports that patient began to act \"off\" on Tuesday evening when she was trying to make dinner he reports that she was not following the recipe correctly. Reports that symptoms continued throughout the day yesterday and into this morning. He reports that patient fell this morning and this prompted him to have her brought for evaluation. He reports that she been having a difficult time speaking. The history is provided by the patient and the spouse.            REVIEW OF SYSTEMS     Review of Systems   Unable to perform ROS: Other     PASTMEDICAL HISTORY     Past Medical History:   Diagnosis Date    Anxiety state, unspecified     Arthritis     Cataracts, bilateral     COVID-19 vaccine administered 2- & 3-    MODERNA    Esophageal reflux     Generalized osteoarthrosis, unspecified site     Hearing loss     Hyperlipidemia     Hypertension     Neuropathy     Neuropathy due to medical condition ([address])     Pure hypercholesterolemia     Thoracic or lumbosacral neuritis or radiculitis, unspecified     Type 2 diabetes mellitus without complication ([address])     Urinary retention     UTI (urinary tract infection)      Past Problem List  Patient Active Problem List   Diagnosis Code    Hypertension I10    Hyperlipidemia E78.5    Overactive bladder N32.81    Lumbar stenosis M48.061    History of lumbosacral spine surgery Z98.890    S/P spinal fusion Z98.1    Osteoporosis M81.0    Intercostal pain R07.82    Microhematuria R31.29    History of recurrent UTIs Z87.440    Type 2 diabetes mellitus with diabetic neuropathy, equal, round, and reactive to light. Cardiovascular:      Rate and Rhythm: Normal rate and regular rhythm. Pulses: Normal pulses. Heart sounds: Normal heart sounds. Pulmonary:      Effort: Pulmonary effort is normal.      Breath sounds: Normal breath sounds. Abdominal:      General: Bowel sounds are normal. There is no distension. Palpations: Abdomen is soft. Tenderness: There is no abdominal tenderness. Musculoskeletal:         General: Normal range of motion. Cervical back: Normal range of motion. No spinous process tenderness or muscular tenderness. Skin:     General: Skin is warm and dry. Capillary Refill: Capillary refill takes less than 2 seconds. Neurological:      General: No focal deficit present. Mental Status: She is alert. Cranial Nerves: Facial asymmetry present. Sensory: Sensation is intact. No sensory deficit. Motor: Motor function is intact. No weakness. Coordination: Finger-Nose-Finger Test abnormal.      Comments: Right-sided facial droop, dysarthria and aphasia present   Psychiatric:         Mood and Affect: Mood normal.         Thought Content: Thought content does not include homicidal or suicidal ideation. MEDICAL DECISION MAKING / ED COURSE:     29-year-old female presents for altered mental status, difficulty speaking. On initial exam patient in no acute distress vital stable    1)  Number and Complexity of Problems Addressed at this Encounter  Problem List This Visit: Altered mental status, difficulty speaking    Differential Diagnosis: CVA    Diagnoses Considered but Do Not Suspect: Intracranial hemorrhage, infection    Pertinent Comorbid Conditions: None    2)  Data Reviewed and Analyzed  (Lab and radiology tests/orders below in next section)      Decision Rules/Scores utilized:  NIH Stroke Scale  NIH Stroke Scale Assessed: Yes  Interval: Reassessment  Level of Consciousness (1a): Alert  LOC Questions (1b):  Answers stenosis or occlusion within the head. 3. Fibrocalcific plaque within the left proximal cervical ICA contributing to   approximately 40% stenosis per NASCET criteria. 4. No acute osseous abnormality of the cervical spine. 5. Chronic left sphenoid sinusitis. CT CSpine W/O Contrast   Final Result   1. Evolving subacute cortical infarct within the left frontal lobe involving   the left middle and inferior frontal gyri. No space-occupying parenchymal   hemorrhage. No significant mass effect. 2.  No flow limiting stenosis or occlusion within the head. 3. Fibrocalcific plaque within the left proximal cervical ICA contributing to   approximately 40% stenosis per NASCET criteria. 4. No acute osseous abnormality of the cervical spine. 5. Chronic left sphenoid sinusitis. CT Head W/O Contrast   Final Result   1. Evolving subacute cortical infarct within the left frontal lobe involving   the left middle and inferior frontal gyri. No space-occupying parenchymal   hemorrhage. No significant mass effect. 2.  No flow limiting stenosis or occlusion within the head. 3. Fibrocalcific plaque within the left proximal cervical ICA contributing to   approximately 40% stenosis per NASCET criteria. 4. No acute osseous abnormality of the cervical spine. 5. Chronic left sphenoid sinusitis. XR CHEST PORTABLE   Final Result   Mild hypoventilatory changes in the lung bases. Examination otherwise   unremarkable. LABS: Lab orders shown below, the results are reviewed by myself, and all abnormals are listed below.   Labs Reviewed   CBC WITH AUTO DIFFERENTIAL - Abnormal; Notable for the following components:       Result Value    Hemoglobin 8.7 (*)     Hematocrit 30.0 (*)     MCV 66.7 (*)     MCH 19.2 (*)     MCHC 28.8 (*)     RDW 20.0 (*)     Platelets 568 (*)     Neutrophils % 80 (*)     Lymphocytes % 13 (*)     All other components within normal limits   COMPREHENSIVE METABOLIC PANEL -

## 2023-10-05 NOTE — ED TRIAGE NOTES
Mode of arrival (squad #, walk in, police, etc) : squad        Chief complaint(s): altered mental status, fall        Arrival Note (brief scenario, treatment PTA, etc). : Patient brought in by squad from home c/o altered mental status, fall and difficulty speaking. Per , patient is not acting her usual self since Tuesday. EMS established IV G20 on the R AC.        C= \"Have you ever felt that you should Cut down on your drinking? \"  No  A= \"Have people Annoyed you by criticizing your drinking? \"  No  G= \"Have you ever felt bad or Guilty about your drinking? \"  No  E= \"Have you ever had a drink as an Eye-opener first thing in the morning to steady your nerves or to help a hangover? \"  No      Deferred []      Reason for deferring: N/A    *If yes to two or more: probable alcohol abuse. *

## 2023-10-05 NOTE — VIRTUAL HEALTH
[facility] Stroke and Telestroke Consult for  [employer] Stroke Alert [facility] @   10/5/2023 2:19 PM    Pt Name: Gladis Costa  MRN: [id number]  YOB: 1945  Date of evaluation: 10/5/2023  Primary Care Physician: Tejinder Pang MD  Reason for Evaluation: Stroke evaluation with Phone Consult, Discussion and Review of imaging    Gladis Costa is a 77 y.o. female who presents with 2 days of confusion/aphasia. LKW: tues  NIH:  5    Allergies  is allergic to hydrocodone. Medications  Prior to Admission medications    Medication Sig Start Date End Date Taking?  Authorizing Provider   Insulin Pen Needle 32G X 4 MM MISC 1 each by Does not apply route daily 9/8/23   Tejinder Pang MD   atorvastatin (LIPITOR) 40 MG tablet TAKE 1 TABLET BY MOUTH DAILY 8/11/23   Miki Kelly MD   Continuous Blood Gluc Sensor (FREESTYLE WARREN 2 SENSOR) MISC 1 Units by Does not apply route 3 times daily 7/3/23   Nataliya Mccarthy MD   Continuous Blood Gluc Sensor (FREESTYLE WARREN 3 SENSOR) MISC 1 Units by Does not apply route in the morning, at noon, and at bedtime 6/29/23   Tejinder Pang MD   Continuous Blood Gluc  (FREESTYLE WARREN 2 READER) BRIDGET 1 Units by Does not apply route in the morning, at noon, and at bedtime 6/29/23   Tejinder Pang MD   metoprolol tartrate (LOPRESSOR) 25 MG tablet TAKE 1 TABLET BY MOUTH  TWICE DAILY 5/8/23   Ashley Terry MD   DULoxetine (CYMBALTA) 60 MG extended release capsule Take 1 capsule by mouth daily 4/11/23   JAMES Brito CNP   gabapentin (NEURONTIN) 300 MG capsule TAKE 2 CAPSULES BY MOUTH IN THE MORNING 2 CAPSULEs BY  MOUTH IN THE AFTERNOON AND  3 CAPSULES BY MOUTH AT  BEDTIME 4/11/23 9/9/23  JAMES Brito CNP   insulin glargine (LANTUS SOLOSTAR) 100 UNIT/ML injection pen Inject 30 Units into the skin daily 4/3/23 7/2/23  Tejinder Pang MD   blood glucose test strips (ONETOUCH ULTRA) strip USE 1 STRIP TO TEST 4 TIMES DAILY 3/23/23   Daniel Vickers MD   metFORMIN (GLUCOPHAGE) 1000 MG tablet Take 1 tablet by mouth 2 times daily (with meals) 3/23/23 8/21/23  Daniel Vickers MD   sodium zirconium cyclosilicate (LOKELMA) 10 g PACK oral suspension Take 10 g by mouth daily  Patient not taking: Reported on 8/21/2023 2/9/23   MD Yuliana Ramírez [facility] 100 UNIT/ML [facility]  11/18/21   Rd Ku MD   Spacer/Aero-Holding [facility] BRIDGET 1 Device by Does not apply route daily 10/27/21 7/26/22  Loni Westfall MD   blood glucose monitor kit and supplies 1 kit by Other route three times daily Dispense One Touch Glucose Meter. 7/30/21   Daniel Vickers MD   albuterol sulfate HFA (VENTOLIN HFA) 108 (90 Base) MCG/ACT inhaler Inhale 2 puffs into the lungs every 4 hours as needed for Wheezing  Patient not taking: Reported on 8/21/2023 6/1/21 7/26/22  Daniel Vickers MD   blood glucose monitor strips Test four  times a day & as needed for symptoms of irregular blood glucose. Dispense sufficient amount for indicated testing frequency plus additional to accommodate PRN testing needs. 7/23/20   Daniel Vickers MD   ONE TOUCH ULTRASOFT LANCETS MISC 1 each by Does not apply route 2 times daily 4/5/17   Ramón Phillips MD   Calcium-Magnesium-Vitamin D (CALCIUM 1200+D3 PO) Take 2 tablets by mouth daily.     Rd Ku MD   acetaminophen (TYLENOL) 500 MG tablet Take 1 tablet by mouth 2 times daily    Rd Ku MD   aspirin 81 MG chewable tablet Take 1 tablet by mouth daily    Rd Ku MD    Scheduled Meds:  Continuous Infusions:  PRN Meds:.sodium chloride flush  Past Medical History   has a past medical history of Anxiety state, unspecified, Arthritis, Cataracts, bilateral, COVID-19 vaccine administered, Esophageal reflux, Generalized osteoarthrosis, unspecified site, Hearing loss, Hyperlipidemia, Hypertension, Neuropathy, Neuropathy due to medical condition ([address]), Pure GENTRY  Atorvastatin 40mg  Permissive HTN OK for now  Euthermia/glycemia/volemia  PT/OT/SLP for [facility]        Discussed with ED Physician      Spent 30 mins phone consultation & review of images w/ ED MD & communicating w/ neuro colleague accepting. Telestroke:  This is a Phone Consult, I have not seen the patient face to face, there is no telemedicine service available at the consulting hospital  [address] [place], [place] [address]T      Radha Officer, MD, MD   Stroke, Neurocritical Care And/or [address] [facility]  [address] [place] [address]  Electronically signed 10/5/2023 at 2:19 PM

## 2023-10-06 ENCOUNTER — HOSPITAL ENCOUNTER (INPATIENT)
Age: 78
LOS: 4 days | Discharge: HOME HEALTH | DRG: 041 | End: 2023-10-10
Attending: PSYCHIATRY & NEUROLOGY | Admitting: PSYCHIATRY & NEUROLOGY
Payer: MEDICARE

## 2023-10-06 ENCOUNTER — APPOINTMENT (OUTPATIENT)
Dept: MRI IMAGING | Age: 78
DRG: 041 | End: 2023-10-06
Attending: PSYCHIATRY & NEUROLOGY
Payer: MEDICARE

## 2023-10-06 VITALS
DIASTOLIC BLOOD PRESSURE: 70 MMHG | BODY MASS INDEX: 20.72 KG/M2 | OXYGEN SATURATION: 98 % | TEMPERATURE: 98.2 F | WEIGHT: 132 LBS | HEART RATE: 120 BPM | RESPIRATION RATE: 11 BRPM | SYSTOLIC BLOOD PRESSURE: 154 MMHG | HEIGHT: 67 IN

## 2023-10-06 DIAGNOSIS — I63.512 ACUTE ISCHEMIC LEFT MCA STROKE (HCC): Primary | ICD-10-CM

## 2023-10-06 DIAGNOSIS — I63.9 STROKE OF UNKNOWN ETIOLOGY (HCC): ICD-10-CM

## 2023-10-06 DIAGNOSIS — R29.90 STROKE-LIKE SYMPTOMS: ICD-10-CM

## 2023-10-06 DIAGNOSIS — R47.01 EXPRESSIVE APHASIA: ICD-10-CM

## 2023-10-06 PROBLEM — I65.22 CAROTID STENOSIS, LEFT: Status: ACTIVE | Noted: 2023-10-06

## 2023-10-06 LAB
ANION GAP SERPL CALCULATED.3IONS-SCNC: 19 MMOL/L (ref 9–17)
BUN SERPL-MCNC: 34 MG/DL (ref 8–23)
CALCIUM SERPL-MCNC: 8.3 MG/DL (ref 8.6–10.4)
CHLORIDE SERPL-SCNC: 105 MMOL/L (ref 98–107)
CHOLEST SERPL-MCNC: 140 MG/DL
CHOLESTEROL/HDL RATIO: 1.9
CO2 SERPL-SCNC: 14 MMOL/L (ref 20–31)
CREAT SERPL-MCNC: 0.5 MG/DL (ref 0.5–0.9)
EKG ATRIAL RATE: 121 BPM
EKG ATRIAL RATE: 124 BPM
EKG P AXIS: 65 DEGREES
EKG P AXIS: 69 DEGREES
EKG P-R INTERVAL: 162 MS
EKG P-R INTERVAL: 164 MS
EKG Q-T INTERVAL: 314 MS
EKG Q-T INTERVAL: 328 MS
EKG QRS DURATION: 66 MS
EKG QRS DURATION: 72 MS
EKG QTC CALCULATION (BAZETT): 451 MS
EKG QTC CALCULATION (BAZETT): 465 MS
EKG R AXIS: 49 DEGREES
EKG R AXIS: 58 DEGREES
EKG T AXIS: 60 DEGREES
EKG T AXIS: 68 DEGREES
EKG VENTRICULAR RATE: 121 BPM
EKG VENTRICULAR RATE: 124 BPM
ERYTHROCYTE [DISTWIDTH] IN BLOOD BY AUTOMATED COUNT: 20.3 % (ref 11.8–14.4)
EST. AVERAGE GLUCOSE BLD GHB EST-MCNC: 157 MG/DL
GFR SERPL CREATININE-BSD FRML MDRD: >60 ML/MIN/1.73M2
GLUCOSE BLD-MCNC: 266 MG/DL (ref 65–105)
GLUCOSE BLD-MCNC: 284 MG/DL (ref 65–105)
GLUCOSE BLD-MCNC: 298 MG/DL (ref 65–105)
GLUCOSE BLD-MCNC: 350 MG/DL (ref 65–105)
GLUCOSE BLD-MCNC: 369 MG/DL (ref 65–105)
GLUCOSE SERPL-MCNC: 295 MG/DL (ref 70–99)
HBA1C MFR BLD: 7.1 % (ref 4–6)
HCT VFR BLD AUTO: 30.3 % (ref 36.3–47.1)
HDLC SERPL-MCNC: 74 MG/DL
HGB BLD-MCNC: 7.5 G/DL (ref 11.9–15.1)
LDLC SERPL CALC-MCNC: 51 MG/DL (ref 0–130)
MCH RBC QN AUTO: 18.8 PG (ref 25.2–33.5)
MCHC RBC AUTO-ENTMCNC: 24.8 G/DL (ref 28.4–34.8)
MCV RBC AUTO: 75.9 FL (ref 82.6–102.9)
NRBC BLD-RTO: 0.2 PER 100 WBC
PLATELET # BLD AUTO: 468 K/UL (ref 138–453)
PMV BLD AUTO: 9.6 FL (ref 8.1–13.5)
POTASSIUM SERPL-SCNC: 4.3 MMOL/L (ref 3.7–5.3)
RBC # BLD AUTO: 3.99 M/UL (ref 3.95–5.11)
SODIUM SERPL-SCNC: 138 MMOL/L (ref 135–144)
TRIGL SERPL-MCNC: 76 MG/DL
WBC OTHER # BLD: 10.5 K/UL (ref 3.5–11.3)

## 2023-10-06 PROCEDURE — 95816 EEG AWAKE AND DROWSY: CPT

## 2023-10-06 PROCEDURE — 82947 ASSAY GLUCOSE BLOOD QUANT: CPT

## 2023-10-06 PROCEDURE — 93005 ELECTROCARDIOGRAM TRACING: CPT | Performed by: PSYCHIATRY & NEUROLOGY

## 2023-10-06 PROCEDURE — 93010 ELECTROCARDIOGRAM REPORT: CPT | Performed by: INTERNAL MEDICINE

## 2023-10-06 PROCEDURE — 80061 LIPID PANEL: CPT

## 2023-10-06 PROCEDURE — 6360000002 HC RX W HCPCS

## 2023-10-06 PROCEDURE — 70551 MRI BRAIN STEM W/O DYE: CPT

## 2023-10-06 PROCEDURE — 95816 EEG AWAKE AND DROWSY: CPT | Performed by: PSYCHIATRY & NEUROLOGY

## 2023-10-06 PROCEDURE — 2000000000 HC ICU R&B

## 2023-10-06 PROCEDURE — 83036 HEMOGLOBIN GLYCOSYLATED A1C: CPT

## 2023-10-06 PROCEDURE — 2060000000 HC ICU INTERMEDIATE R&B

## 2023-10-06 PROCEDURE — 6360000002 HC RX W HCPCS: Performed by: STUDENT IN AN ORGANIZED HEALTH CARE EDUCATION/TRAINING PROGRAM

## 2023-10-06 PROCEDURE — 6370000000 HC RX 637 (ALT 250 FOR IP)

## 2023-10-06 PROCEDURE — 2580000003 HC RX 258: Performed by: STUDENT IN AN ORGANIZED HEALTH CARE EDUCATION/TRAINING PROGRAM

## 2023-10-06 PROCEDURE — 6370000000 HC RX 637 (ALT 250 FOR IP): Performed by: STUDENT IN AN ORGANIZED HEALTH CARE EDUCATION/TRAINING PROGRAM

## 2023-10-06 PROCEDURE — 36415 COLL VENOUS BLD VENIPUNCTURE: CPT

## 2023-10-06 PROCEDURE — 80048 BASIC METABOLIC PNL TOTAL CA: CPT

## 2023-10-06 PROCEDURE — 99223 1ST HOSP IP/OBS HIGH 75: CPT | Performed by: PSYCHIATRY & NEUROLOGY

## 2023-10-06 PROCEDURE — 85027 COMPLETE CBC AUTOMATED: CPT

## 2023-10-06 RX ORDER — INSULIN LISPRO 100 [IU]/ML
0-4 INJECTION, SOLUTION INTRAVENOUS; SUBCUTANEOUS NIGHTLY
Status: DISCONTINUED | OUTPATIENT
Start: 2023-10-06 | End: 2023-10-06

## 2023-10-06 RX ORDER — ENOXAPARIN SODIUM 100 MG/ML
40 INJECTION SUBCUTANEOUS DAILY
Status: DISCONTINUED | OUTPATIENT
Start: 2023-10-06 | End: 2023-10-09

## 2023-10-06 RX ORDER — ONDANSETRON 2 MG/ML
4 INJECTION INTRAMUSCULAR; INTRAVENOUS EVERY 6 HOURS PRN
Status: DISCONTINUED | OUTPATIENT
Start: 2023-10-06 | End: 2023-10-10 | Stop reason: HOSPADM

## 2023-10-06 RX ORDER — DEXTROSE MONOHYDRATE 100 MG/ML
INJECTION, SOLUTION INTRAVENOUS CONTINUOUS PRN
Status: DISCONTINUED | OUTPATIENT
Start: 2023-10-06 | End: 2023-10-10 | Stop reason: HOSPADM

## 2023-10-06 RX ORDER — POLYETHYLENE GLYCOL 3350 17 G/17G
17 POWDER, FOR SOLUTION ORAL DAILY PRN
Status: DISCONTINUED | OUTPATIENT
Start: 2023-10-06 | End: 2023-10-10 | Stop reason: HOSPADM

## 2023-10-06 RX ORDER — DULOXETIN HYDROCHLORIDE 30 MG/1
60 CAPSULE, DELAYED RELEASE ORAL DAILY
Status: DISCONTINUED | OUTPATIENT
Start: 2023-10-06 | End: 2023-10-10 | Stop reason: HOSPADM

## 2023-10-06 RX ORDER — INSULIN LISPRO 100 [IU]/ML
0-4 INJECTION, SOLUTION INTRAVENOUS; SUBCUTANEOUS NIGHTLY
Status: DISCONTINUED | OUTPATIENT
Start: 2023-10-06 | End: 2023-10-10 | Stop reason: HOSPADM

## 2023-10-06 RX ORDER — INSULIN LISPRO 100 [IU]/ML
0-16 INJECTION, SOLUTION INTRAVENOUS; SUBCUTANEOUS
Status: DISCONTINUED | OUTPATIENT
Start: 2023-10-06 | End: 2023-10-10 | Stop reason: HOSPADM

## 2023-10-06 RX ORDER — ASPIRIN 81 MG/1
81 TABLET ORAL DAILY
Status: DISCONTINUED | OUTPATIENT
Start: 2023-10-06 | End: 2023-10-10 | Stop reason: HOSPADM

## 2023-10-06 RX ORDER — LORAZEPAM 2 MG/ML
2 INJECTION INTRAMUSCULAR
Status: ACTIVE | OUTPATIENT
Start: 2023-10-07 | End: 2023-10-08

## 2023-10-06 RX ORDER — CLOPIDOGREL BISULFATE 75 MG/1
75 TABLET ORAL DAILY
Status: DISCONTINUED | OUTPATIENT
Start: 2023-10-06 | End: 2023-10-10 | Stop reason: HOSPADM

## 2023-10-06 RX ORDER — 0.9 % SODIUM CHLORIDE 0.9 %
1000 INTRAVENOUS SOLUTION INTRAVENOUS ONCE
Status: COMPLETED | OUTPATIENT
Start: 2023-10-06 | End: 2023-10-06

## 2023-10-06 RX ORDER — SODIUM CHLORIDE 9 MG/ML
INJECTION, SOLUTION INTRAVENOUS PRN
Status: DISCONTINUED | OUTPATIENT
Start: 2023-10-06 | End: 2023-10-10 | Stop reason: HOSPADM

## 2023-10-06 RX ORDER — ONDANSETRON 4 MG/1
4 TABLET, ORALLY DISINTEGRATING ORAL EVERY 8 HOURS PRN
Status: DISCONTINUED | OUTPATIENT
Start: 2023-10-06 | End: 2023-10-10 | Stop reason: HOSPADM

## 2023-10-06 RX ORDER — LORAZEPAM 2 MG/ML
1 INJECTION INTRAMUSCULAR ONCE
Status: COMPLETED | OUTPATIENT
Start: 2023-10-06 | End: 2023-10-06

## 2023-10-06 RX ORDER — ROSUVASTATIN CALCIUM 20 MG/1
40 TABLET, COATED ORAL NIGHTLY
Status: DISCONTINUED | OUTPATIENT
Start: 2023-10-06 | End: 2023-10-10 | Stop reason: HOSPADM

## 2023-10-06 RX ORDER — SODIUM CHLORIDE 0.9 % (FLUSH) 0.9 %
5-40 SYRINGE (ML) INJECTION PRN
Status: DISCONTINUED | OUTPATIENT
Start: 2023-10-06 | End: 2023-10-10 | Stop reason: HOSPADM

## 2023-10-06 RX ORDER — INSULIN GLARGINE 100 [IU]/ML
15 INJECTION, SOLUTION SUBCUTANEOUS ONCE
Status: COMPLETED | OUTPATIENT
Start: 2023-10-06 | End: 2023-10-06

## 2023-10-06 RX ORDER — INSULIN LISPRO 100 [IU]/ML
0-8 INJECTION, SOLUTION INTRAVENOUS; SUBCUTANEOUS
Status: DISCONTINUED | OUTPATIENT
Start: 2023-10-06 | End: 2023-10-06

## 2023-10-06 RX ORDER — SODIUM CHLORIDE 0.9 % (FLUSH) 0.9 %
5-40 SYRINGE (ML) INJECTION EVERY 12 HOURS SCHEDULED
Status: DISCONTINUED | OUTPATIENT
Start: 2023-10-06 | End: 2023-10-10 | Stop reason: HOSPADM

## 2023-10-06 RX ADMIN — METOPROLOL TARTRATE 25 MG: 25 TABLET, FILM COATED ORAL at 20:07

## 2023-10-06 RX ADMIN — DULOXETINE HYDROCHLORIDE 60 MG: 30 CAPSULE, DELAYED RELEASE ORAL at 13:56

## 2023-10-06 RX ADMIN — INSULIN GLARGINE 15 UNITS: 100 INJECTION, SOLUTION SUBCUTANEOUS at 20:07

## 2023-10-06 RX ADMIN — SODIUM CHLORIDE 1000 ML: 9 INJECTION, SOLUTION INTRAVENOUS at 04:01

## 2023-10-06 RX ADMIN — CLOPIDOGREL BISULFATE 75 MG: 75 TABLET ORAL at 12:00

## 2023-10-06 RX ADMIN — LORAZEPAM 1 MG: 2 INJECTION INTRAMUSCULAR; INTRAVENOUS at 12:27

## 2023-10-06 RX ADMIN — INSULIN LISPRO 4 UNITS: 100 INJECTION, SOLUTION INTRAVENOUS; SUBCUTANEOUS at 13:53

## 2023-10-06 RX ADMIN — INSULIN LISPRO 16 UNITS: 100 INJECTION, SOLUTION INTRAVENOUS; SUBCUTANEOUS at 16:52

## 2023-10-06 RX ADMIN — ASPIRIN 81 MG: 81 TABLET, COATED ORAL at 12:00

## 2023-10-06 RX ADMIN — METOPROLOL TARTRATE 25 MG: 25 TABLET, FILM COATED ORAL at 12:00

## 2023-10-06 RX ADMIN — SODIUM CHLORIDE, PRESERVATIVE FREE 10 ML: 5 INJECTION INTRAVENOUS at 13:56

## 2023-10-06 RX ADMIN — ENOXAPARIN SODIUM 40 MG: 100 INJECTION SUBCUTANEOUS at 12:00

## 2023-10-06 RX ADMIN — ROSUVASTATIN CALCIUM 40 MG: 20 TABLET, FILM COATED ORAL at 20:07

## 2023-10-06 RX ADMIN — SODIUM CHLORIDE, PRESERVATIVE FREE 10 ML: 5 INJECTION INTRAVENOUS at 20:07

## 2023-10-06 ASSESSMENT — PAIN - FUNCTIONAL ASSESSMENT: PAIN_FUNCTIONAL_ASSESSMENT: NONE - DENIES PAIN

## 2023-10-06 NOTE — ED NOTES
Left a voicemail for the  (Nicko Rodas ) to call back for an update.      Narendra Spangler, [address]  48/02/29 9618

## 2023-10-06 NOTE — H&P
[facility] Neurology   [address] [facility]    HISTORY AND PHYSICAL EXAMINATION            Date:   10/6/2023  Patient name:  Tricia Chandler  Date of admission:  10/6/2023  9:06 AM  MRN:   [id number]  Account:  [de-identified]  YOB: 1945  PCP:    Guerda Hansen MD  Room:   [address] [place] [address]  Code Status:    Full Code    Chief Complaint:   Aphasia    History Obtained From:     patient    History of Present Illness: The patient is a 77 y.o. Non- / non  female w PMH of T2DM with neruopathy, former smoker, HTN, HLD, who presents with aphasia since 10/5/23. Patient reportedly was not at her baseline for the past week, was confused per . He states that she made a meal on Tuesday which was \"inedible\" and was just valente and flour which is unlike herself. He also noticed she was having mood swings. On 10/5/23 morning, patient had difficulty expressing herself and right sided weakness.  believed she was having a stroke and took her to the ER where she was evaluated by telestroke. NIH SS 5 for minor facial paralysis, severe aphasia, and answering neither question correctly. CTH showed subacute L frontal cortical infarct involving the middle and inf frontal gyri and fibrocalcific plaque within the left proximal cervical ICA contributing to 40% stenosis. Patient takes aspirin 81 mg at home off and on. She was loaded with Plavix 300 mg and transferred to [facility] for further management. On arrival, NIH SS 2. The patient has severe expressive aphasia. Speech is nonfluent, intact repetition and can name simple objects with some paraphasic errors.        Past Medical History:     Past Medical History:   Diagnosis Date    Anxiety state, unspecified     Arthritis     Cataracts, bilateral     COVID-19 vaccine administered 2- & 3-    [facility]    Esophageal reflux     Generalized osteoarthrosis, unspecified site     Hearing loss

## 2023-10-06 NOTE — PROGRESS NOTES
[facility]  Speech Language Pathology    Date: 10/6/2023  Patient Name: Autumn Christine  YOB: 1945   AGE: 77 y.o. MRN: [id number]        Patient Not Available for Speech Therapy     Due to:  [] Testing  [] Hemodialysis  [] Cancelled by RN  [] Surgery   [] Intubation/Sedation/Pain Medication  [] Medical instability  [x] Other: Pt. Eating lunch, did not respond to questions asked by ST.  RN reports pt. Was given ativan earlier. States pt. Is not appropriate for ST evaluation at this time. Next scheduled treatment: 10/7  Completed by: Ewelina Espinoza, SLP, M.A.  CCC-SLP

## 2023-10-06 NOTE — PROCEDURES
NEUROLOGY INPATIENT EEG     10/6/2023           Abnormal EEG with interhemispheric asymmetry with left hemispheric high amplitude delta slowing intermixed with theta frequencies compatible with underlying structural defect. No lateralizing epileptiform discharges and no electrographic seizures noted.           Michelle Paul MD 10/6/2023 5:42 PM

## 2023-10-06 NOTE — ED NOTES
Assisted patient to the bathroom via wheelchair and back. When patient came back to the bed, patient started to have nausea, cold clammy skin. BGL taken, EKG taken-sinus tachycardia resulted, notified Dr Lucia Calloway. Patient felt better after she lay back to the bed. NIHSS assessment performed, same as the last assessment.      [address]  33/30/33 7515

## 2023-10-06 NOTE — CARE COORDINATION
Case Management Assessment  Initial Evaluation    Date/Time of Evaluation: 10/6/2023 1:30 PM  Assessment Completed by: Nadya Arnett RN    If patient is discharged prior to next notation, then this note serves as note for discharge by case management. Patient Name: Avinash Hendrickson                   YOB: 1945  Diagnosis: Stroke of unknown etiology [facility]) [I63.9]  Stroke-like symptoms [R29.90]                   Date / Time: 10/6/2023  9:06 AM    Patient Admission Status: Inpatient   Readmission Risk (Low < 19, Mod (19-27), High > 27): Readmission Risk Score: 16.3    Current PCP: Araceli Vu MD  PCP verified by CM? (P) Yes (Dr. Jordan Rowland)    Chart Reviewed: Yes      History Provided by: (P) Significant Other  Patient Orientation: (P) Alert and Oriented, Person    Patient Cognition: (P) Alert    Hospitalization in the last 30 days (Readmission):  No    If yes, Readmission Assessment in CM Navigator will be completed. Advance Directives:      Code Status: Full Code   Patient's Primary Decision Maker is: (P) Legal Next of Kin    Primary Decision Maker: Nicko Rodas - Spouse - [phone]    Discharge Planning:    Patient lives with: (P) Spouse/Significant Other Type of Home: (P) House  Primary Care Giver: (P) Self  Patient Support Systems include: (P) Spouse/Significant Other, Children, Family Members   Current Financial resources: (P) Medicare  Current community resources: (P) None  Current services prior to admission: (P) None            Current DME:              Type of Home Care services:  (P) None    ADLS  Prior functional level: (P) Independent in ADLs/IADLs  Current functional level: (P) Assistance with the following:, Toileting, Mobility    PT AM-PAC:   /24  OT AM-PAC:   /24    Family can provide assistance at DC: (P) Yes  Would you like Case Management to discuss the discharge plan with any other family members/significant others, and if so, who?     Plans to Return to Present with basic dialogue that supports the patient's individualized plan of care/goals and shares the quality data associated with the providers was provided to: (P) Patient Representative   Patient Representative Name: (P) Cindy Lujan spouse     The Patient and/or Patient Representative Agree with the Discharge Plan?  (P) Yes    Zhen Nunez RN  Case Management Department  Ph: [phone]

## 2023-10-06 NOTE — PLAN OF CARE
Problem: Chronic Conditions and Co-morbidities  Goal: Patient's chronic conditions and co-morbidity symptoms are monitored and maintained or improved  Outcome: Progressing     Problem: Chronic Conditions and Co-morbidities  Goal: Patient's chronic conditions and co-morbidity symptoms are monitored and maintained or improved  Outcome: Progressing     Problem: Discharge Planning  Goal: Discharge to home or other facility with appropriate resources  Outcome: Progressing     Problem: Safety - Adult  Goal: Free from fall injury  Outcome: Progressing

## 2023-10-07 LAB
ANION GAP SERPL CALCULATED.3IONS-SCNC: 10 MMOL/L (ref 9–17)
BUN SERPL-MCNC: 36 MG/DL (ref 8–23)
CALCIUM SERPL-MCNC: 8.6 MG/DL (ref 8.6–10.4)
CHLORIDE SERPL-SCNC: 107 MMOL/L (ref 98–107)
CO2 SERPL-SCNC: 19 MMOL/L (ref 20–31)
CREAT SERPL-MCNC: 0.4 MG/DL (ref 0.5–0.9)
ERYTHROCYTE [DISTWIDTH] IN BLOOD BY AUTOMATED COUNT: 20 % (ref 11.8–14.4)
GFR SERPL CREATININE-BSD FRML MDRD: >60 ML/MIN/1.73M2
GLUCOSE BLD-MCNC: 160 MG/DL (ref 65–105)
GLUCOSE BLD-MCNC: 203 MG/DL (ref 65–105)
GLUCOSE BLD-MCNC: 207 MG/DL (ref 65–105)
GLUCOSE BLD-MCNC: 212 MG/DL (ref 65–105)
GLUCOSE SERPL-MCNC: 199 MG/DL (ref 70–99)
HCT VFR BLD AUTO: 22.3 % (ref 36.3–47.1)
HCT VFR BLD AUTO: 23.4 % (ref 36.3–47.1)
HCT VFR BLD AUTO: 26 % (ref 36.3–47.1)
HCT VFR BLD AUTO: 29.8 % (ref 36.3–47.1)
HCT VFR BLD AUTO: 30.3 % (ref 36.3–47.1)
HGB BLD-MCNC: 5.8 G/DL (ref 11.9–15.1)
HGB BLD-MCNC: 6.1 G/DL (ref 11.9–15.1)
HGB BLD-MCNC: 7.1 G/DL (ref 11.9–15.1)
HGB BLD-MCNC: 9 G/DL (ref 11.9–15.1)
HGB BLD-MCNC: 9.1 G/DL (ref 11.9–15.1)
MCH RBC QN AUTO: 19.2 PG (ref 25.2–33.5)
MCHC RBC AUTO-ENTMCNC: 26.1 G/DL (ref 28.4–34.8)
MCV RBC AUTO: 73.8 FL (ref 82.6–102.9)
NRBC BLD-RTO: 0.2 PER 100 WBC
PLATELET # BLD AUTO: 406 K/UL (ref 138–453)
PMV BLD AUTO: 9.4 FL (ref 8.1–13.5)
POTASSIUM SERPL-SCNC: 3.8 MMOL/L (ref 3.7–5.3)
RBC # BLD AUTO: 3.17 M/UL (ref 3.95–5.11)
SODIUM SERPL-SCNC: 136 MMOL/L (ref 135–144)
WBC OTHER # BLD: 13.4 K/UL (ref 3.5–11.3)

## 2023-10-07 PROCEDURE — 85014 HEMATOCRIT: CPT

## 2023-10-07 PROCEDURE — 6370000000 HC RX 637 (ALT 250 FOR IP): Performed by: STUDENT IN AN ORGANIZED HEALTH CARE EDUCATION/TRAINING PROGRAM

## 2023-10-07 PROCEDURE — 99222 1ST HOSP IP/OBS MODERATE 55: CPT | Performed by: INTERNAL MEDICINE

## 2023-10-07 PROCEDURE — 82947 ASSAY GLUCOSE BLOOD QUANT: CPT

## 2023-10-07 PROCEDURE — 6370000000 HC RX 637 (ALT 250 FOR IP)

## 2023-10-07 PROCEDURE — A4216 STERILE WATER/SALINE, 10 ML: HCPCS | Performed by: INTERNAL MEDICINE

## 2023-10-07 PROCEDURE — 99223 1ST HOSP IP/OBS HIGH 75: CPT | Performed by: PSYCHIATRY & NEUROLOGY

## 2023-10-07 PROCEDURE — 36415 COLL VENOUS BLD VENIPUNCTURE: CPT

## 2023-10-07 PROCEDURE — 36430 TRANSFUSION BLD/BLD COMPNT: CPT

## 2023-10-07 PROCEDURE — 85027 COMPLETE CBC AUTOMATED: CPT

## 2023-10-07 PROCEDURE — C9113 INJ PANTOPRAZOLE SODIUM, VIA: HCPCS | Performed by: INTERNAL MEDICINE

## 2023-10-07 PROCEDURE — 92523 SPEECH SOUND LANG COMPREHEN: CPT

## 2023-10-07 PROCEDURE — 86900 BLOOD TYPING SEROLOGIC ABO: CPT

## 2023-10-07 PROCEDURE — P9016 RBC LEUKOCYTES REDUCED: HCPCS

## 2023-10-07 PROCEDURE — 86920 COMPATIBILITY TEST SPIN: CPT

## 2023-10-07 PROCEDURE — 86850 RBC ANTIBODY SCREEN: CPT

## 2023-10-07 PROCEDURE — 2060000000 HC ICU INTERMEDIATE R&B

## 2023-10-07 PROCEDURE — 2580000003 HC RX 258: Performed by: INTERNAL MEDICINE

## 2023-10-07 PROCEDURE — 6360000002 HC RX W HCPCS: Performed by: INTERNAL MEDICINE

## 2023-10-07 PROCEDURE — 2580000003 HC RX 258: Performed by: STUDENT IN AN ORGANIZED HEALTH CARE EDUCATION/TRAINING PROGRAM

## 2023-10-07 PROCEDURE — 99233 SBSQ HOSP IP/OBS HIGH 50: CPT | Performed by: PSYCHIATRY & NEUROLOGY

## 2023-10-07 PROCEDURE — 86901 BLOOD TYPING SEROLOGIC RH(D): CPT

## 2023-10-07 PROCEDURE — 85018 HEMOGLOBIN: CPT

## 2023-10-07 PROCEDURE — 80048 BASIC METABOLIC PNL TOTAL CA: CPT

## 2023-10-07 RX ORDER — SODIUM CHLORIDE 9 MG/ML
INJECTION, SOLUTION INTRAVENOUS PRN
Status: DISCONTINUED | OUTPATIENT
Start: 2023-10-07 | End: 2023-10-10 | Stop reason: HOSPADM

## 2023-10-07 RX ORDER — INSULIN GLARGINE 100 [IU]/ML
10 INJECTION, SOLUTION SUBCUTANEOUS NIGHTLY
Status: DISCONTINUED | OUTPATIENT
Start: 2023-10-07 | End: 2023-10-10 | Stop reason: HOSPADM

## 2023-10-07 RX ORDER — CIPROFLOXACIN 2 MG/ML
400 INJECTION, SOLUTION INTRAVENOUS EVERY 12 HOURS
Status: DISCONTINUED | OUTPATIENT
Start: 2023-10-07 | End: 2023-10-08

## 2023-10-07 RX ADMIN — SODIUM CHLORIDE, PRESERVATIVE FREE 10 ML: 5 INJECTION INTRAVENOUS at 21:00

## 2023-10-07 RX ADMIN — PANTOPRAZOLE SODIUM 8 MG/HR: 40 INJECTION, POWDER, FOR SOLUTION INTRAVENOUS at 15:54

## 2023-10-07 RX ADMIN — METOPROLOL TARTRATE 25 MG: 25 TABLET, FILM COATED ORAL at 08:15

## 2023-10-07 RX ADMIN — ROSUVASTATIN CALCIUM 40 MG: 20 TABLET, FILM COATED ORAL at 21:26

## 2023-10-07 RX ADMIN — INSULIN GLARGINE 10 UNITS: 100 INJECTION, SOLUTION SUBCUTANEOUS at 21:24

## 2023-10-07 RX ADMIN — SODIUM CHLORIDE, PRESERVATIVE FREE 10 ML: 5 INJECTION INTRAVENOUS at 08:15

## 2023-10-07 RX ADMIN — INSULIN LISPRO 4 UNITS: 100 INJECTION, SOLUTION INTRAVENOUS; SUBCUTANEOUS at 13:43

## 2023-10-07 RX ADMIN — INSULIN LISPRO 4 UNITS: 100 INJECTION, SOLUTION INTRAVENOUS; SUBCUTANEOUS at 08:17

## 2023-10-07 RX ADMIN — DULOXETINE HYDROCHLORIDE 60 MG: 30 CAPSULE, DELAYED RELEASE ORAL at 08:15

## 2023-10-07 RX ADMIN — METOPROLOL TARTRATE 25 MG: 25 TABLET, FILM COATED ORAL at 21:25

## 2023-10-07 RX ADMIN — SODIUM CHLORIDE 80 MG: 9 INJECTION INTRAMUSCULAR; INTRAVENOUS; SUBCUTANEOUS at 15:52

## 2023-10-07 NOTE — CONSULTS
GASTROENTEROLOGY CONSULTATION NOTE    10/7/2023      REASON FOR CONSULT: Acute anemia    REQUESTING PHYSICIAN:  Collin Velázquez DO    HISTORY OF PRESENT ILLNESS:    The patient is a 77 y.o. female who presents with aphasia and confusion. Patient was brought in and was found to have signs of acute stroke. CT and MRI imaging confirmed left frontal cortical infarct involving the middle and inferior frontal gyri in the left proximal cervical ICA with 40% stenosis. Patient was given loading dose of Plavix 300 mg. Her hemoglobin on presentation was 8.7 g/dL. Hemoglobin this morning was 6.1 and repeat hemoglobin was 5.8 g/dL. There is no evidence of bleeding at this time. Denies any nausea, vomiting, fever, chills, black or bloody stools, diarrhea or constipation. Of note the patient has had chronic anemia with significantly low iron levels. There is no prior history of any EGD or colonoscopy found in the EHR. Patient has been on baby aspirin at home.     Medical History:   Past Medical History:   Diagnosis Date    Anxiety state, unspecified     Arthritis     Cataracts, bilateral     COVID-19 vaccine administered 2- & 3-    MODERNA    Esophageal reflux     Generalized osteoarthrosis, unspecified site     Hearing loss     Hyperlipidemia     Hypertension     Neuropathy     Neuropathy due to medical condition ([address])     Pure hypercholesterolemia     Thoracic or lumbosacral neuritis or radiculitis, unspecified     Type 2 diabetes mellitus without complication ([address])     Urinary retention     UTI (urinary tract infection)        SURGICAL HISTORY:  Past Surgical History:   Procedure Laterality Date    BACK SURGERY      X3    BREAST BIOPSY  2007    Lt     CARPAL TUNNEL RELEASE Right     CATARACT REMOVAL WITH IMPLANT Bilateral     COLONOSCOPY  2006    COLONOSCOPY  4/24/15    diverticulosis    CYSTOSCOPY  [id number]    CYSTOSCOPY 05/26/2017    CYSTOSCOPY N/A 5/26/2017    CYSTOSCOPY URODYNAMICS AND DILATION  performed by Elvia Jefferson MD at [facility] Bilateral     thumbs reconstruction    HIP ARTHROPLASTY Bilateral     JOINT REPLACEMENT Bilateral     LUMBAR FUSION  October 27, 2015       MEDS:  Prior to Admission medications    Medication Sig Start Date End Date Taking?  Authorizing Provider   Insulin Pen Needle 32G X 4 MM MISC 1 each by Does not apply route daily 9/8/23   Daniel Yi MD   atorvastatin (LIPITOR) 40 MG tablet TAKE 1 TABLET BY MOUTH DAILY 8/11/23   Erica Cochran MD   Continuous Blood Gluc Sensor (FREESTYLE WARREN 2 SENSOR) MISC 1 Units by Does not apply route 3 times daily 7/3/23   Ara Nesbitt MD   Continuous Blood Gluc Sensor (FREESTYLE WARREN 3 SENSOR) MISC 1 Units by Does not apply route in the morning, at noon, and at bedtime 6/29/23   Daniel Yi MD   Continuous Blood Gluc  (FREESTYLE WARREN 2 READER) BRIDGET 1 Units by Does not apply route in the morning, at noon, and at bedtime 6/29/23   Danile Yi MD   metoprolol tartrate (LOPRESSOR) 25 MG tablet TAKE 1 TABLET BY MOUTH  TWICE DAILY 5/8/23   Rosa Maria Ruiz MD   DULoxetine (CYMBALTA) 60 MG extended release capsule Take 1 capsule by mouth daily 4/11/23   JAMES Booth CNP   gabapentin (NEURONTIN) 300 MG capsule TAKE 2 CAPSULES BY MOUTH IN THE MORNING 2 CAPSULEs BY  MOUTH IN THE AFTERNOON AND  3 CAPSULES BY MOUTH AT  BEDTIME 4/11/23 9/9/23  JAMES Booth CNP   insulin glargine (LANTUS SOLOSTAR) 100 UNIT/ML injection pen Inject 30 Units into the skin daily 4/3/23 7/2/23  Daniel Yi MD   blood glucose test strips (ONETOUCH ULTRA) strip USE 1 STRIP TO TEST 4 TIMES DAILY 3/23/23   Daniel Yi MD   metFORMIN (GLUCOPHAGE) 1000 MG tablet Take 1 tablet by mouth 2 times daily (with meals) 3/23/23 8/21/23  Daniel Yi MD   sodium zirconium cyclosilicate (LOKELMA) 10 g PACK oral suspension

## 2023-10-07 NOTE — PLAN OF CARE
Problem: Chronic Conditions and Co-morbidities  Goal: Patient's chronic conditions and co-morbidity symptoms are monitored and maintained or improved  10/7/2023 0552 by Sohan Day RN  Outcome: Progressing  10/6/2023 1727 by Rafa Arana RN  Outcome: Progressing     Problem: Discharge Planning  Goal: Discharge to home or other facility with appropriate resources  10/7/2023 0552 by Sohan Day RN  Outcome: Progressing  10/6/2023 1727 by Rafa Arana RN  Outcome: Progressing     Problem: Safety - Adult  Goal: Free from fall injury  10/7/2023 0552 by Sohan Day RN  Outcome: Progressing  10/6/2023 1727 by Rafa Arana RN  Outcome: Progressing

## 2023-10-07 NOTE — PROGRESS NOTES
SLP ALL NOTES  Facility/Department: [address] NEURO ICU  Initial Speech/Language/Cognitive Assessment    NAME: Lesa Lindquist  : 1945   MRN: [id number]  ADMISSION DATE: 10/6/2023  ADMITTING DIAGNOSIS: has Hypertension; Hyperlipidemia; Overactive bladder; Lumbar stenosis; History of lumbosacral spine surgery; S/P spinal fusion; Osteoporosis; Intercostal pain; Microhematuria; History of recurrent UTIs; Type 2 diabetes mellitus with diabetic neuropathy, with long-term current use of insulin ([address]); Osteoarthritis of both hands; Osteopenia of necks of both femurs; Bloating symptom; Dyspnea on exertion; Iron deficiency anemia due to chronic blood loss; Panlobular emphysema ([address]); Abdominal bloating; Other irritable bowel syndrome; Weight loss; Gastroesophageal reflux disease; PINTO (dyspnea on exertion); Major depressive disorder, recurrent, mild; Major depressive disorder, recurrent, moderate; Major depressive disorder, recurrent, unspecified; Acute ischemic left MCA stroke ([address]); Stroke-like symptoms; Expressive aphasia; and Carotid stenosis, left on their problem list.  DATE ONSET: 10/6/23    Date of Eval: 10/7/2023   Evaluating Therapist: Alice Tovar    RECENT RESULTS  CT OF HEAD/MRI:   MRI 10/6/23     IMPRESSION:  Acute left MCA territory infarcts. Petechial hemorrhage within the infarct in the left frontal lobe. Primary Complaint:   Obtained from H&P:     The patient is a 77 y.o. Non- / non  female w PMH of T2DM with neruopathy, former smoker, HTN, HLD, who presents with aphasia since 10/5/23. Patient reportedly was not at her baseline for the past week, was confused per . He states that she made a meal on Tuesday which was \"inedible\" and was just valente and flour which is unlike herself. He also noticed she was having mood swings. On 10/5/23 morning, patient had difficulty expressing herself and right sided weakness.   believed she was having a stroke and took her to the ER

## 2023-10-07 NOTE — CONSENT
Informed Consent for Blood Component Transfusion Note    I have discussed with the patient and spouse the rationale for blood component transfusion; its benefits in treating or preventing fatigue, organ damage, or death; and its risk which includes mild transfusion reactions, rare risk of blood borne infection, or more serious but rare reactions. I have discussed the alternatives to transfusion, including the risk and consequences of not receiving transfusion. The patient and spouse had an opportunity to ask questions and had agreed to proceed with transfusion of blood components.     Electronically signed by Elba Godfrey MD on 10/7/23 at 8:49 AM EDT

## 2023-10-07 NOTE — CONSULTS
Endovascular Neurosurgery Consult    Pt Name: Brain Alves  MRN: [id number]  YOB: 1945  Date of evaluation: 10/7/2023  Primary Care Physician: Michell Hernandez MD  Patient evaluated at the request of  Dr. Cisse January  Reason for evaluation: L ICA symptomatic stenosis     SUBJECTIVE:   History of Chief Complaint:    Brain Alves is a 77 y.o. female who presents with PMH of T2DM with neruopathy, former smoker, HTN, HLD, who presents with aphasia since 10/5/23. CTH showed subacute L frontal cortical infarct involving the middle and inf frontal gyri and fibrocalcific plaque within the left proximal cervical ICA contributing to 40% stenosis. Patient takes aspirin 81 mg at home off and on. She was loaded with Plavix 300 mg and transferred to [address] [place] [address] for further management. This morning the patient was noted to have drop in Hgb 6.1 with no active signs of bleeding. MRI shows L MCA infarct with petechial hemorrhage. MARIA M consulted for carotid stenting. Allergies  is allergic to hydrocodone. Medications  Prior to Admission medications    Medication Sig Start Date End Date Taking?  Authorizing Provider   Insulin Pen Needle 32G X 4 MM MISC 1 each by Does not apply route daily 9/8/23   Michell Hernandez MD   atorvastatin (LIPITOR) 40 MG tablet TAKE 1 TABLET BY MOUTH DAILY 8/11/23   Donya Merrill MD   Continuous Blood Gluc Sensor (FREESTYLE WARREN 2 SENSOR) MISC 1 Units by Does not apply route 3 times daily 7/3/23   Ulysses Nunez MD   Continuous Blood Gluc Sensor (FREESTYLE WARREN 3 SENSOR) MISC 1 Units by Does not apply route in the morning, at noon, and at bedtime 6/29/23   Michell Hernandez MD   Continuous Blood Gluc  (FREESTYLE WARREN 2 READER) BRIDGET 1 Units by Does not apply route in the morning, at noon, and at bedtime 6/29/23   Michell Hernandez MD   metoprolol tartrate (LOPRESSOR) 25 MG tablet TAKE 1 TABLET BY MOUTH  TWICE DAILY 5/8/23   Nicho Yañez MD   DULoxetine Acute left MCA territory infarcts. Petechial hemorrhage within the infarct in the left frontal lobe. IMPRESSIONS:     Darrell Bender is a 77 y.o. female who presents with aphasia since 10/5/23. CTH showed subacute L frontal cortical infarct involving the middle and inf frontal gyri and fibrocalcific plaque within the left proximal cervical ICA contributing to 40% stenosis. Patient takes aspirin 81 mg at home off and on. She was loaded with Plavix 300 mg and transferred to [facility] for further management. This morning the patient was noted to have drop in Hgb 6.1 with no active signs of bleeding. MRI shows L MCA infarct with petechial hemorrhage. Stroke mechanism: artery-artery in the setting of large vessel atherosclerosis     PLANS:   Please resume antiplatelets as soon as possible, ideally DAPT it possible. High intensity statins if possible   Will consider carotid artery stenting as an outpatient. Follow up with Dr Beth Sutherland in 2 weeks after discharge. Discussed with Dr Beth Sutherland  Thank you for the interesting evaluation. Further recommendations to follow.     Majo Paige MD, MD  Pager [phone]  Stroke, [id number] [facility]  [address] [place] [address]  Electronically signed 10/7/2023 at 10:40 AM

## 2023-10-07 NOTE — PROGRESS NOTES
[facility]   [facility]    Progress Note             Date:   10/7/2023  Patient name:  Piedad Majano  Date of admission:  10/6/2023  9:06 AM  MRN:   [id number]  Account:  [de-identified]  YOB: 1945  PCP:    Bebeto Gant MD  Room:   [address] [place] [address]  Code Status:    Full Code    Chief Complaint:     Aphasia    Interval hx: The patient was seen and examined at bedside. Labs and charts reviewed. - No acute events overnight patient was found to have a drop of 1.4 g in hemoglobin that is 7.5 >> 6.1. No active signs of bleeding or bruising. Denies melena. - Hemodynamically stable, afebrile and maintaining oxygen sats on room air.  - Patient still has significant expressive aphasia but was able to understand questions this morning and respond. No significant weakness or neurological deficits.    - BMP shows uptrending BUN 36 with elevated BUN to creatinine ratio of > 36.        - Patient's  was present at bedside. Explained about the acute drop in hemoglobin while on blood thinners and need for transfusion. He is not exactly sure if she has ever had any blood transfusion in the past but does mentions that she had extensive hip surgeries and must have received some blood during those. They were explained the risks and benefits of blood transfusion and current situation. His questions were answered to their satisfaction. He provided the consent for blood transfusion.    - Held all antiplatelets at this time. Ordered 1 unit PRBC with H&H posttransfusion. Brief History of Present Illness: The patient is a 77 y.o. Non- / non  female w PMH of T2DM with neruopathy, former smoker, HTN, HLD, who presents with aphasia since 10/5/23. Patient reportedly was not at her baseline for the past week, was confused per .  He states that she made a meal on Tuesday which was \"inedible\" and was just valente and flour which is

## 2023-10-07 NOTE — PROGRESS NOTES
Dr. Jarrett Elmore with GI at bedside to perform rectal exam, stated black tarry stools noted from exam, orders to follow.

## 2023-10-08 ENCOUNTER — APPOINTMENT (OUTPATIENT)
Dept: MRI IMAGING | Age: 78
DRG: 041 | End: 2023-10-08
Attending: PSYCHIATRY & NEUROLOGY
Payer: MEDICARE

## 2023-10-08 LAB
ABO/RH: NORMAL
ANION GAP SERPL CALCULATED.3IONS-SCNC: 12 MMOL/L (ref 9–17)
ANTIBODY SCREEN: NEGATIVE
ARM BAND NUMBER: NORMAL
BLOOD BANK BLOOD PRODUCT EXPIRATION DATE: NORMAL
BLOOD BANK BLOOD PRODUCT EXPIRATION DATE: NORMAL
BLOOD BANK DISPENSE STATUS: NORMAL
BLOOD BANK DISPENSE STATUS: NORMAL
BLOOD BANK ISBT PRODUCT BLOOD TYPE: 600
BLOOD BANK ISBT PRODUCT BLOOD TYPE: 6200
BLOOD BANK PRODUCT CODE: NORMAL
BLOOD BANK PRODUCT CODE: NORMAL
BLOOD BANK SAMPLE EXPIRATION: NORMAL
BLOOD BANK UNIT TYPE AND RH: NORMAL
BLOOD BANK UNIT TYPE AND RH: NORMAL
BPU ID: NORMAL
BPU ID: NORMAL
BUN SERPL-MCNC: 24 MG/DL (ref 8–23)
CALCIUM SERPL-MCNC: 7.9 MG/DL (ref 8.6–10.4)
CHLORIDE SERPL-SCNC: 106 MMOL/L (ref 98–107)
CO2 SERPL-SCNC: 20 MMOL/L (ref 20–31)
COMPONENT: NORMAL
COMPONENT: NORMAL
CREAT SERPL-MCNC: 0.5 MG/DL (ref 0.5–0.9)
CROSSMATCH RESULT: NORMAL
CROSSMATCH RESULT: NORMAL
ERYTHROCYTE [DISTWIDTH] IN BLOOD BY AUTOMATED COUNT: 20.1 % (ref 11.8–14.4)
GFR SERPL CREATININE-BSD FRML MDRD: >60 ML/MIN/1.73M2
GLUCOSE BLD-MCNC: 171 MG/DL (ref 65–105)
GLUCOSE BLD-MCNC: 189 MG/DL (ref 65–105)
GLUCOSE BLD-MCNC: 219 MG/DL (ref 65–105)
GLUCOSE BLD-MCNC: 224 MG/DL (ref 65–105)
GLUCOSE SERPL-MCNC: 181 MG/DL (ref 70–99)
HCT VFR BLD AUTO: 28.5 % (ref 36.3–47.1)
HCT VFR BLD AUTO: 29.1 % (ref 36.3–47.1)
HCT VFR BLD AUTO: 29.1 % (ref 36.3–47.1)
HGB BLD-MCNC: 8 G/DL (ref 11.9–15.1)
HGB BLD-MCNC: 8.2 G/DL (ref 11.9–15.1)
HGB BLD-MCNC: 8.3 G/DL (ref 11.9–15.1)
MAGNESIUM SERPL-MCNC: 1.8 MG/DL (ref 1.6–2.6)
MCH RBC QN AUTO: 21.9 PG (ref 25.2–33.5)
MCHC RBC AUTO-ENTMCNC: 28.8 G/DL (ref 28.4–34.8)
MCV RBC AUTO: 76 FL (ref 82.6–102.9)
NRBC BLD-RTO: 0.5 PER 100 WBC
PLATELET # BLD AUTO: 374 K/UL (ref 138–453)
PMV BLD AUTO: 9.5 FL (ref 8.1–13.5)
POTASSIUM SERPL-SCNC: 3.3 MMOL/L (ref 3.7–5.3)
RBC # BLD AUTO: 3.75 M/UL (ref 3.95–5.11)
SODIUM SERPL-SCNC: 138 MMOL/L (ref 135–144)
TRANSFUSION STATUS: NORMAL
TRANSFUSION STATUS: NORMAL
UNIT DIVISION: 0
UNIT DIVISION: 0
UNIT ISSUE DATE/TIME: NORMAL
UNIT ISSUE DATE/TIME: NORMAL
WBC OTHER # BLD: 9.7 K/UL (ref 3.5–11.3)

## 2023-10-08 PROCEDURE — 82947 ASSAY GLUCOSE BLOOD QUANT: CPT

## 2023-10-08 PROCEDURE — 2060000000 HC ICU INTERMEDIATE R&B

## 2023-10-08 PROCEDURE — A9579 GAD-BASE MR CONTRAST NOS,1ML: HCPCS | Performed by: STUDENT IN AN ORGANIZED HEALTH CARE EDUCATION/TRAINING PROGRAM

## 2023-10-08 PROCEDURE — 2580000003 HC RX 258: Performed by: INTERNAL MEDICINE

## 2023-10-08 PROCEDURE — 99233 SBSQ HOSP IP/OBS HIGH 50: CPT | Performed by: PSYCHIATRY & NEUROLOGY

## 2023-10-08 PROCEDURE — 6370000000 HC RX 637 (ALT 250 FOR IP)

## 2023-10-08 PROCEDURE — 94761 N-INVAS EAR/PLS OXIMETRY MLT: CPT

## 2023-10-08 PROCEDURE — 6360000002 HC RX W HCPCS: Performed by: STUDENT IN AN ORGANIZED HEALTH CARE EDUCATION/TRAINING PROGRAM

## 2023-10-08 PROCEDURE — 85014 HEMATOCRIT: CPT

## 2023-10-08 PROCEDURE — 6360000004 HC RX CONTRAST MEDICATION: Performed by: STUDENT IN AN ORGANIZED HEALTH CARE EDUCATION/TRAINING PROGRAM

## 2023-10-08 PROCEDURE — C9113 INJ PANTOPRAZOLE SODIUM, VIA: HCPCS | Performed by: INTERNAL MEDICINE

## 2023-10-08 PROCEDURE — 70552 MRI BRAIN STEM W/DYE: CPT

## 2023-10-08 PROCEDURE — 85027 COMPLETE CBC AUTOMATED: CPT

## 2023-10-08 PROCEDURE — 85018 HEMOGLOBIN: CPT

## 2023-10-08 PROCEDURE — APPSS15 APP SPLIT SHARED TIME 0-15 MINUTES: Performed by: NURSE PRACTITIONER

## 2023-10-08 PROCEDURE — 6360000002 HC RX W HCPCS

## 2023-10-08 PROCEDURE — 83735 ASSAY OF MAGNESIUM: CPT

## 2023-10-08 PROCEDURE — 6370000000 HC RX 637 (ALT 250 FOR IP): Performed by: STUDENT IN AN ORGANIZED HEALTH CARE EDUCATION/TRAINING PROGRAM

## 2023-10-08 PROCEDURE — 80048 BASIC METABOLIC PNL TOTAL CA: CPT

## 2023-10-08 PROCEDURE — 2580000003 HC RX 258: Performed by: STUDENT IN AN ORGANIZED HEALTH CARE EDUCATION/TRAINING PROGRAM

## 2023-10-08 PROCEDURE — 6360000002 HC RX W HCPCS: Performed by: INTERNAL MEDICINE

## 2023-10-08 PROCEDURE — 99232 SBSQ HOSP IP/OBS MODERATE 35: CPT | Performed by: PSYCHIATRY & NEUROLOGY

## 2023-10-08 PROCEDURE — 36415 COLL VENOUS BLD VENIPUNCTURE: CPT

## 2023-10-08 RX ORDER — SODIUM CHLORIDE 0.9 % (FLUSH) 0.9 %
10 SYRINGE (ML) INJECTION PRN
Status: DISCONTINUED | OUTPATIENT
Start: 2023-10-08 | End: 2023-10-10 | Stop reason: HOSPADM

## 2023-10-08 RX ORDER — GABAPENTIN 100 MG/1
200 CAPSULE ORAL 2 TIMES DAILY
Status: DISCONTINUED | OUTPATIENT
Start: 2023-10-09 | End: 2023-10-10 | Stop reason: HOSPADM

## 2023-10-08 RX ORDER — GABAPENTIN 100 MG/1
200 CAPSULE ORAL 3 TIMES DAILY
Status: DISCONTINUED | OUTPATIENT
Start: 2023-10-08 | End: 2023-10-08

## 2023-10-08 RX ORDER — CIPROFLOXACIN 500 MG/1
500 TABLET, FILM COATED ORAL EVERY 12 HOURS SCHEDULED
Status: DISCONTINUED | OUTPATIENT
Start: 2023-10-08 | End: 2023-10-10 | Stop reason: HOSPADM

## 2023-10-08 RX ORDER — POTASSIUM CHLORIDE 7.45 MG/ML
10 INJECTION INTRAVENOUS PRN
Status: DISCONTINUED | OUTPATIENT
Start: 2023-10-08 | End: 2023-10-10 | Stop reason: HOSPADM

## 2023-10-08 RX ORDER — GABAPENTIN 300 MG/1
300 CAPSULE ORAL NIGHTLY
Status: DISCONTINUED | OUTPATIENT
Start: 2023-10-08 | End: 2023-10-10 | Stop reason: HOSPADM

## 2023-10-08 RX ORDER — GABAPENTIN 100 MG/1
100 CAPSULE ORAL NIGHTLY
Status: DISCONTINUED | OUTPATIENT
Start: 2023-10-08 | End: 2023-10-08

## 2023-10-08 RX ORDER — POTASSIUM CHLORIDE 20 MEQ/1
40 TABLET, EXTENDED RELEASE ORAL PRN
Status: DISCONTINUED | OUTPATIENT
Start: 2023-10-08 | End: 2023-10-10 | Stop reason: HOSPADM

## 2023-10-08 RX ORDER — MAGNESIUM SULFATE IN WATER 40 MG/ML
2000 INJECTION, SOLUTION INTRAVENOUS PRN
Status: DISCONTINUED | OUTPATIENT
Start: 2023-10-08 | End: 2023-10-10 | Stop reason: HOSPADM

## 2023-10-08 RX ADMIN — CLOPIDOGREL BISULFATE 75 MG: 75 TABLET ORAL at 07:57

## 2023-10-08 RX ADMIN — ASPIRIN 81 MG: 81 TABLET, COATED ORAL at 07:57

## 2023-10-08 RX ADMIN — CIPROFLOXACIN HYDROCHLORIDE 500 MG: 500 TABLET, FILM COATED ORAL at 20:25

## 2023-10-08 RX ADMIN — GABAPENTIN 300 MG: 300 CAPSULE ORAL at 20:24

## 2023-10-08 RX ADMIN — CIPROFLOXACIN 400 MG: 400 INJECTION, SOLUTION INTRAVENOUS at 11:15

## 2023-10-08 RX ADMIN — CIPROFLOXACIN 400 MG: 400 INJECTION, SOLUTION INTRAVENOUS at 00:29

## 2023-10-08 RX ADMIN — GADOTERIDOL 10 ML: 279.3 INJECTION, SOLUTION INTRAVENOUS at 10:07

## 2023-10-08 RX ADMIN — SODIUM CHLORIDE, PRESERVATIVE FREE 10 ML: 5 INJECTION INTRAVENOUS at 10:07

## 2023-10-08 RX ADMIN — DULOXETINE HYDROCHLORIDE 60 MG: 30 CAPSULE, DELAYED RELEASE ORAL at 07:57

## 2023-10-08 RX ADMIN — PANTOPRAZOLE SODIUM 8 MG/HR: 40 INJECTION, POWDER, FOR SOLUTION INTRAVENOUS at 14:29

## 2023-10-08 RX ADMIN — POTASSIUM BICARBONATE 40 MEQ: 782 TABLET, EFFERVESCENT ORAL at 05:17

## 2023-10-08 RX ADMIN — ROSUVASTATIN CALCIUM 40 MG: 20 TABLET, FILM COATED ORAL at 20:24

## 2023-10-08 RX ADMIN — INSULIN GLARGINE 10 UNITS: 100 INJECTION, SOLUTION SUBCUTANEOUS at 20:24

## 2023-10-08 RX ADMIN — MAGNESIUM SULFATE HEPTAHYDRATE 2000 MG: 40 INJECTION, SOLUTION INTRAVENOUS at 05:17

## 2023-10-08 RX ADMIN — PANTOPRAZOLE SODIUM 8 MG/HR: 40 INJECTION, POWDER, FOR SOLUTION INTRAVENOUS at 00:31

## 2023-10-08 RX ADMIN — METOPROLOL TARTRATE 25 MG: 25 TABLET, FILM COATED ORAL at 20:24

## 2023-10-08 RX ADMIN — METOPROLOL TARTRATE 25 MG: 25 TABLET, FILM COATED ORAL at 07:57

## 2023-10-08 RX ADMIN — INSULIN LISPRO 4 UNITS: 100 INJECTION, SOLUTION INTRAVENOUS; SUBCUTANEOUS at 16:49

## 2023-10-08 NOTE — PROGRESS NOTES
Physical Therapy Cancel Note      DATE: 10/8/2023    NAME: Sonya Bhatti  MRN: [id number]   : 1945      Patient not seen this date for Physical Therapy due to: Other: possible active GI bleed; EGD scheduled for Monday 10/9; hgb was 5.8, after transfusion  9, most recent 8.2.  Check status 10/9 later in the day      Electronically signed by Rosa Carvalho PT on 10/8/2023 at 7:36 AM

## 2023-10-08 NOTE — PROGRESS NOTES
SW following to complete the PHQ-9 depression scale but patient current in MRI and has expressive aphasia as well. SW will attempt again when patient better able to participate. Cathy Barbosa.  Nandini Wood

## 2023-10-08 NOTE — PROGRESS NOTES
Blood Type 6200     Blood Bank Blood Product Expiration Date 014562975233     Transfusion Status OK TO TRANSFUSE     Crossmatch Result COMPATIBLE     Unit Number [id number]     Component Leukocyte Reduced Red Cell     Unit Divison 00     Dispense Status Blood Bank ISSUED     Unit Issue Date/Time 168113493766     Product Code Blood Bank [id number]     Blood Bank Unit Type and Rh A NEG     Blood Bank ISBT Product Blood Type 0600     Blood Bank Blood Product Expiration Date 882490821430     Transfusion Status OK TO TRANSFUSE     Crossmatch Result COMPATIBLE    Hemoglobin and Hematocrit    Collection Time: 10/07/23 10:34 AM   Result Value Ref Range    Hemoglobin 5.8 (LL) 11.9 - 15.1 g/dL    Hematocrit 22.3 (L) 36.3 - 47.1 %   POC Glucose Fingerstick    Collection Time: 10/07/23 11:50 AM   Result Value Ref Range    POC Glucose 203 (H) 65 - 105 mg/dL   Hemoglobin and Hematocrit    Collection Time: 10/07/23  2:25 PM   Result Value Ref Range    Hemoglobin 7.1 (L) 11.9 - 15.1 g/dL    Hematocrit 26.0 (L) 36.3 - 47.1 %   POC Glucose Fingerstick    Collection Time: 10/07/23  4:56 PM   Result Value Ref Range    POC Glucose 160 (H) 65 - 105 mg/dL   Hemoglobin and Hematocrit    Collection Time: 10/07/23  6:21 PM   Result Value Ref Range    Hemoglobin 9.1 (L) 11.9 - 15.1 g/dL    Hematocrit 29.8 (L) 36.3 - 47.1 %         Assessment :      Primary Problem  Acute ischemic left MCA stroke [facility])    Active Hospital Problems    Diagnosis Date Noted    Acute ischemic left MCA stroke ([address]) [I63.512] 10/06/2023    Stroke-like symptoms [R29.90] 10/06/2023    Expressive aphasia [R47.01] 10/06/2023    Carotid stenosis, left [I65.22] 10/06/2023    Blood loss anemia [D50.0] 03/03/2020          Patient is a 77 y.o. Non- / non  female w hx of prior smoking, T2DM, HLD, HTN presenting with severe expressive aphasia and confusion. Loaded with Plavix at OSH and maintained on DAPT.  MRI ordered for stroke work-up showing acute L MCA Followup    Patient or family members expressed understanding on topics that were discussed and all their questions were answered. Consultations:   IP CONSULT TO ENDOVASCULAR NEUROSURGERY  IP CONSULT TO PHYSICAL MEDICINE REHAB  IP CONSULT TO GI      Follow-up further recommendations after discussing the case with attending  The plan was discussed with the patient, patient's family and the medical staff. Patient is admitted as inpatient status because of co-morbidities listed above, severity of signs and symptoms as outlined, requirement for current medical therapies and most importantly because of direct risk to patient if care not provided in a hospital setting.     Nasreen Russell MD  Neurology Resident PGY-4  10/7/2023  8:49 PM    Copy sent to Dr. Yasmany Parnell MD

## 2023-10-09 ENCOUNTER — ANESTHESIA EVENT (OUTPATIENT)
Dept: OPERATING ROOM | Age: 78
DRG: 041 | End: 2023-10-09
Payer: MEDICARE

## 2023-10-09 ENCOUNTER — OFFICE VISIT (OUTPATIENT)
Dept: OPERATING ROOM | Age: 78
End: 2023-10-09
Attending: PSYCHIATRY & NEUROLOGY

## 2023-10-09 ENCOUNTER — HOSPITAL ENCOUNTER (INPATIENT)
Age: 78
Discharge: HOME | DRG: 041 | End: 2023-10-11
Attending: PSYCHIATRY & NEUROLOGY
Payer: MEDICARE

## 2023-10-09 ENCOUNTER — ANESTHESIA (OUTPATIENT)
Dept: OPERATING ROOM | Age: 78
DRG: 041 | End: 2023-10-09
Payer: MEDICARE

## 2023-10-09 DIAGNOSIS — R29.90 STROKE-LIKE SYMPTOMS: Primary | ICD-10-CM

## 2023-10-09 PROBLEM — Z79.02: Status: ACTIVE | Noted: 2023-10-09

## 2023-10-09 PROBLEM — D62 ACUTE POSTHEMORRHAGIC ANEMIA: Status: ACTIVE | Noted: 2020-03-03

## 2023-10-09 PROBLEM — K92.1 MELENA: Status: ACTIVE | Noted: 2023-10-09

## 2023-10-09 LAB
ANION GAP SERPL CALCULATED.3IONS-SCNC: 12 MMOL/L (ref 9–17)
BASOPHILS # BLD: 0.08 K/UL (ref 0–0.2)
BASOPHILS NFR BLD: 1 % (ref 0–2)
BUN SERPL-MCNC: 12 MG/DL (ref 8–23)
CALCIUM SERPL-MCNC: 8.1 MG/DL (ref 8.6–10.4)
CHLORIDE SERPL-SCNC: 105 MMOL/L (ref 98–107)
CO2 SERPL-SCNC: 22 MMOL/L (ref 20–31)
CREAT SERPL-MCNC: 0.4 MG/DL (ref 0.5–0.9)
ECHO BSA: 1.68 M2
ECHO BSA: 1.68 M2
EOSINOPHIL # BLD: 0.08 K/UL (ref 0–0.4)
EOSINOPHILS RELATIVE PERCENT: 1 % (ref 1–4)
ERYTHROCYTE [DISTWIDTH] IN BLOOD BY AUTOMATED COUNT: 21.6 % (ref 11.8–14.4)
GFR SERPL CREATININE-BSD FRML MDRD: >60 ML/MIN/1.73M2
GLUCOSE BLD-MCNC: 105 MG/DL (ref 65–105)
GLUCOSE BLD-MCNC: 151 MG/DL (ref 65–105)
GLUCOSE BLD-MCNC: 152 MG/DL (ref 65–105)
GLUCOSE BLD-MCNC: 156 MG/DL (ref 65–105)
GLUCOSE BLD-MCNC: 269 MG/DL (ref 65–105)
GLUCOSE SERPL-MCNC: 121 MG/DL (ref 70–99)
HCT VFR BLD AUTO: 29.2 % (ref 36.3–47.1)
HCT VFR BLD AUTO: 29.9 % (ref 36.3–47.1)
HCT VFR BLD AUTO: 30.6 % (ref 36.3–47.1)
HCT VFR BLD AUTO: 31.2 % (ref 36.3–47.1)
HCT VFR BLD AUTO: 31.5 % (ref 36.3–47.1)
HGB BLD-MCNC: 8.4 G/DL (ref 11.9–15.1)
HGB BLD-MCNC: 8.4 G/DL (ref 11.9–15.1)
HGB BLD-MCNC: 8.7 G/DL (ref 11.9–15.1)
HGB BLD-MCNC: 8.8 G/DL (ref 11.9–15.1)
HGB BLD-MCNC: 9.2 G/DL (ref 11.9–15.1)
IMM GRANULOCYTES # BLD AUTO: 0 K/UL (ref 0–0.3)
IMM GRANULOCYTES NFR BLD: 0 %
LYMPHOCYTES NFR BLD: 2.16 K/UL (ref 1–4.8)
LYMPHOCYTES RELATIVE PERCENT: 26 % (ref 24–44)
MCH RBC QN AUTO: 21.8 PG (ref 25.2–33.5)
MCHC RBC AUTO-ENTMCNC: 27.9 G/DL (ref 28.4–34.8)
MCV RBC AUTO: 78 FL (ref 82.6–102.9)
MONOCYTES NFR BLD: 0.58 K/UL (ref 0.1–0.8)
MONOCYTES NFR BLD: 7 % (ref 1–7)
MORPHOLOGY: ABNORMAL
NEUTROPHILS NFR BLD: 65 % (ref 36–66)
NEUTS SEG NFR BLD: 5.4 K/UL (ref 1.8–7.7)
NRBC BLD-RTO: 0 PER 100 WBC
PLATELET # BLD AUTO: 347 K/UL (ref 138–453)
PMV BLD AUTO: 9.7 FL (ref 8.1–13.5)
POTASSIUM SERPL-SCNC: 4 MMOL/L (ref 3.7–5.3)
RBC # BLD AUTO: 4 M/UL (ref 3.95–5.11)
SODIUM SERPL-SCNC: 139 MMOL/L (ref 135–144)
WBC OTHER # BLD: 8.3 K/UL (ref 3.5–11.3)

## 2023-10-09 PROCEDURE — 82947 ASSAY GLUCOSE BLOOD QUANT: CPT

## 2023-10-09 PROCEDURE — 2580000003 HC RX 258

## 2023-10-09 PROCEDURE — 6360000002 HC RX W HCPCS: Performed by: INTERNAL MEDICINE

## 2023-10-09 PROCEDURE — 99153 MOD SED SAME PHYS/QHP EA: CPT | Performed by: INTERNAL MEDICINE

## 2023-10-09 PROCEDURE — C9113 INJ PANTOPRAZOLE SODIUM, VIA: HCPCS | Performed by: INTERNAL MEDICINE

## 2023-10-09 PROCEDURE — 99223 1ST HOSP IP/OBS HIGH 75: CPT | Performed by: SURGERY

## 2023-10-09 PROCEDURE — 93312 ECHO TRANSESOPHAGEAL: CPT | Performed by: INTERNAL MEDICINE

## 2023-10-09 PROCEDURE — 7100000000 HC PACU RECOVERY - FIRST 15 MIN: Performed by: INTERNAL MEDICINE

## 2023-10-09 PROCEDURE — 2580000003 HC RX 258: Performed by: STUDENT IN AN ORGANIZED HEALTH CARE EDUCATION/TRAINING PROGRAM

## 2023-10-09 PROCEDURE — 80048 BASIC METABOLIC PNL TOTAL CA: CPT

## 2023-10-09 PROCEDURE — 2580000003 HC RX 258: Performed by: INTERNAL MEDICINE

## 2023-10-09 PROCEDURE — 36415 COLL VENOUS BLD VENIPUNCTURE: CPT

## 2023-10-09 PROCEDURE — 7100000001 HC PACU RECOVERY - ADDTL 15 MIN: Performed by: INTERNAL MEDICINE

## 2023-10-09 PROCEDURE — 3609017100 HC EGD: Performed by: INTERNAL MEDICINE

## 2023-10-09 PROCEDURE — 43235 EGD DIAGNOSTIC BRUSH WASH: CPT | Performed by: INTERNAL MEDICINE

## 2023-10-09 PROCEDURE — 2060000000 HC ICU INTERMEDIATE R&B

## 2023-10-09 PROCEDURE — 2500000003 HC RX 250 WO HCPCS: Performed by: INTERNAL MEDICINE

## 2023-10-09 PROCEDURE — 97530 THERAPEUTIC ACTIVITIES: CPT

## 2023-10-09 PROCEDURE — 93325 DOPPLER ECHO COLOR FLOW MAPG: CPT

## 2023-10-09 PROCEDURE — 85014 HEMATOCRIT: CPT

## 2023-10-09 PROCEDURE — 0DJ08ZZ INSPECTION OF UPPER INTESTINAL TRACT, VIA NATURAL OR ARTIFICIAL OPENING ENDOSCOPIC: ICD-10-PCS | Performed by: INTERNAL MEDICINE

## 2023-10-09 PROCEDURE — 6370000000 HC RX 637 (ALT 250 FOR IP): Performed by: INTERNAL MEDICINE

## 2023-10-09 PROCEDURE — 93325 DOPPLER ECHO COLOR FLOW MAPG: CPT | Performed by: INTERNAL MEDICINE

## 2023-10-09 PROCEDURE — 0JH602Z INSERTION OF MONITORING DEVICE INTO CHEST SUBCUTANEOUS TISSUE AND FASCIA, OPEN APPROACH: ICD-10-PCS | Performed by: INTERNAL MEDICINE

## 2023-10-09 PROCEDURE — 99152 MOD SED SAME PHYS/QHP 5/>YRS: CPT | Performed by: INTERNAL MEDICINE

## 2023-10-09 PROCEDURE — 99233 SBSQ HOSP IP/OBS HIGH 50: CPT | Performed by: PSYCHIATRY & NEUROLOGY

## 2023-10-09 PROCEDURE — 6360000002 HC RX W HCPCS

## 2023-10-09 PROCEDURE — 92507 TX SP LANG VOICE COMM INDIV: CPT

## 2023-10-09 PROCEDURE — C1764 EVENT RECORDER, CARDIAC: HCPCS | Performed by: INTERNAL MEDICINE

## 2023-10-09 PROCEDURE — 85025 COMPLETE CBC W/AUTO DIFF WBC: CPT

## 2023-10-09 PROCEDURE — 97166 OT EVAL MOD COMPLEX 45 MIN: CPT

## 2023-10-09 PROCEDURE — 33285 INSJ SUBQ CAR RHYTHM MNTR: CPT | Performed by: INTERNAL MEDICINE

## 2023-10-09 PROCEDURE — 2500000003 HC RX 250 WO HCPCS

## 2023-10-09 PROCEDURE — 85018 HEMOGLOBIN: CPT

## 2023-10-09 PROCEDURE — 3700000000 HC ANESTHESIA ATTENDED CARE: Performed by: INTERNAL MEDICINE

## 2023-10-09 RX ORDER — SODIUM CHLORIDE 0.9 % (FLUSH) 0.9 %
5-40 SYRINGE (ML) INJECTION PRN
Status: DISCONTINUED | OUTPATIENT
Start: 2023-10-09 | End: 2023-10-09 | Stop reason: HOSPADM

## 2023-10-09 RX ORDER — LIDOCAINE HYDROCHLORIDE 20 MG/ML
SOLUTION OROPHARYNGEAL PRN
Status: COMPLETED | OUTPATIENT
Start: 2023-10-09 | End: 2023-10-09

## 2023-10-09 RX ORDER — PROPOFOL 10 MG/ML
INJECTION, EMULSION INTRAVENOUS PRN
Status: DISCONTINUED | OUTPATIENT
Start: 2023-10-09 | End: 2023-10-09 | Stop reason: SDUPTHER

## 2023-10-09 RX ORDER — SODIUM CHLORIDE, SODIUM LACTATE, POTASSIUM CHLORIDE, CALCIUM CHLORIDE 600; 310; 30; 20 MG/100ML; MG/100ML; MG/100ML; MG/100ML
INJECTION, SOLUTION INTRAVENOUS CONTINUOUS PRN
Status: DISCONTINUED | OUTPATIENT
Start: 2023-10-09 | End: 2023-10-09 | Stop reason: SDUPTHER

## 2023-10-09 RX ORDER — FENTANYL CITRATE 50 UG/ML
25 INJECTION, SOLUTION INTRAMUSCULAR; INTRAVENOUS EVERY 5 MIN PRN
Status: DISCONTINUED | OUTPATIENT
Start: 2023-10-09 | End: 2023-10-09 | Stop reason: HOSPADM

## 2023-10-09 RX ORDER — SODIUM CHLORIDE 0.9 % (FLUSH) 0.9 %
5-40 SYRINGE (ML) INJECTION EVERY 12 HOURS SCHEDULED
Status: DISCONTINUED | OUTPATIENT
Start: 2023-10-09 | End: 2023-10-09 | Stop reason: HOSPADM

## 2023-10-09 RX ORDER — LIDOCAINE HYDROCHLORIDE 10 MG/ML
INJECTION, SOLUTION EPIDURAL; INFILTRATION; INTRACAUDAL; PERINEURAL PRN
Status: DISCONTINUED | OUTPATIENT
Start: 2023-10-09 | End: 2023-10-09 | Stop reason: SDUPTHER

## 2023-10-09 RX ORDER — HYDRALAZINE HYDROCHLORIDE 20 MG/ML
10 INJECTION INTRAMUSCULAR; INTRAVENOUS
Status: DISCONTINUED | OUTPATIENT
Start: 2023-10-09 | End: 2023-10-09 | Stop reason: HOSPADM

## 2023-10-09 RX ORDER — ROSUVASTATIN CALCIUM 40 MG/1
40 TABLET, COATED ORAL NIGHTLY
Qty: 30 TABLET | Refills: 3 | Status: CANCELLED | OUTPATIENT
Start: 2023-10-09

## 2023-10-09 RX ORDER — CIPROFLOXACIN 500 MG/1
500 TABLET, FILM COATED ORAL EVERY 12 HOURS SCHEDULED
Qty: 12 TABLET | Refills: 0 | Status: CANCELLED | OUTPATIENT
Start: 2023-10-09 | End: 2023-10-15

## 2023-10-09 RX ORDER — SODIUM CHLORIDE 9 MG/ML
INJECTION, SOLUTION INTRAVENOUS PRN
Status: DISCONTINUED | OUTPATIENT
Start: 2023-10-09 | End: 2023-10-09 | Stop reason: HOSPADM

## 2023-10-09 RX ORDER — MIDAZOLAM HYDROCHLORIDE 1 MG/ML
INJECTION INTRAMUSCULAR; INTRAVENOUS PRN
Status: COMPLETED | OUTPATIENT
Start: 2023-10-09 | End: 2023-10-09

## 2023-10-09 RX ORDER — CLOPIDOGREL BISULFATE 75 MG/1
75 TABLET ORAL DAILY
Qty: 90 TABLET | Refills: 0 | Status: CANCELLED | OUTPATIENT
Start: 2023-10-10 | End: 2024-01-08

## 2023-10-09 RX ORDER — FENTANYL CITRATE 50 UG/ML
50 INJECTION, SOLUTION INTRAMUSCULAR; INTRAVENOUS EVERY 5 MIN PRN
Status: DISCONTINUED | OUTPATIENT
Start: 2023-10-09 | End: 2023-10-09 | Stop reason: HOSPADM

## 2023-10-09 RX ORDER — GABAPENTIN 300 MG/1
300 CAPSULE ORAL NIGHTLY
Qty: 30 CAPSULE | Refills: 0 | Status: CANCELLED | OUTPATIENT
Start: 2023-10-10 | End: 2023-11-09

## 2023-10-09 RX ORDER — ENOXAPARIN SODIUM 100 MG/ML
40 INJECTION SUBCUTANEOUS DAILY
Status: DISCONTINUED | OUTPATIENT
Start: 2023-10-10 | End: 2023-10-10 | Stop reason: HOSPADM

## 2023-10-09 RX ORDER — ONDANSETRON 2 MG/ML
4 INJECTION INTRAMUSCULAR; INTRAVENOUS
Status: DISCONTINUED | OUTPATIENT
Start: 2023-10-09 | End: 2023-10-09 | Stop reason: HOSPADM

## 2023-10-09 RX ORDER — FENTANYL CITRATE 50 UG/ML
INJECTION, SOLUTION INTRAMUSCULAR; INTRAVENOUS PRN
Status: COMPLETED | OUTPATIENT
Start: 2023-10-09 | End: 2023-10-09

## 2023-10-09 RX ADMIN — PROPOFOL 100 MG: 10 INJECTION, EMULSION INTRAVENOUS at 10:17

## 2023-10-09 RX ADMIN — METOPROLOL TARTRATE 25 MG: 25 TABLET, FILM COATED ORAL at 20:10

## 2023-10-09 RX ADMIN — SODIUM CHLORIDE: 9 INJECTION, SOLUTION INTRAVENOUS at 10:52

## 2023-10-09 RX ADMIN — FENTANYL CITRATE 25 MCG: 50 INJECTION, SOLUTION INTRAMUSCULAR; INTRAVENOUS at 16:46

## 2023-10-09 RX ADMIN — LIDOCAINE HYDROCHLORIDE 5 ML: 10 INJECTION, SOLUTION EPIDURAL; INFILTRATION; INTRACAUDAL; PERINEURAL at 16:56

## 2023-10-09 RX ADMIN — FENTANYL CITRATE 25 MCG: 50 INJECTION, SOLUTION INTRAMUSCULAR; INTRAVENOUS at 16:42

## 2023-10-09 RX ADMIN — PANTOPRAZOLE SODIUM 8 MG/HR: 40 INJECTION, POWDER, FOR SOLUTION INTRAVENOUS at 12:26

## 2023-10-09 RX ADMIN — MIDAZOLAM 2 MG: 1 INJECTION INTRAMUSCULAR; INTRAVENOUS at 16:40

## 2023-10-09 RX ADMIN — FENTANYL CITRATE 25 MCG: 50 INJECTION, SOLUTION INTRAMUSCULAR; INTRAVENOUS at 16:40

## 2023-10-09 RX ADMIN — GABAPENTIN 300 MG: 300 CAPSULE ORAL at 20:10

## 2023-10-09 RX ADMIN — SODIUM CHLORIDE, POTASSIUM CHLORIDE, SODIUM LACTATE AND CALCIUM CHLORIDE: 600; 310; 30; 20 INJECTION, SOLUTION INTRAVENOUS at 10:17

## 2023-10-09 RX ADMIN — CIPROFLOXACIN HYDROCHLORIDE 500 MG: 500 TABLET, FILM COATED ORAL at 20:09

## 2023-10-09 RX ADMIN — SODIUM CHLORIDE, PRESERVATIVE FREE 10 ML: 5 INJECTION INTRAVENOUS at 20:10

## 2023-10-09 RX ADMIN — ROSUVASTATIN CALCIUM 40 MG: 20 TABLET, FILM COATED ORAL at 20:10

## 2023-10-09 RX ADMIN — LIDOCAINE HYDROCHLORIDE 15 ML: 20 SOLUTION ORAL; TOPICAL at 16:37

## 2023-10-09 RX ADMIN — INSULIN GLARGINE 10 UNITS: 100 INJECTION, SOLUTION SUBCUTANEOUS at 20:08

## 2023-10-09 RX ADMIN — MIDAZOLAM 1 MG: 1 INJECTION INTRAMUSCULAR; INTRAVENOUS at 16:46

## 2023-10-09 RX ADMIN — LIDOCAINE HYDROCHLORIDE 50 MG: 10 INJECTION, SOLUTION EPIDURAL; INFILTRATION; INTRACAUDAL; PERINEURAL at 10:17

## 2023-10-09 ASSESSMENT — ENCOUNTER SYMPTOMS: SHORTNESS OF BREATH: 1

## 2023-10-09 ASSESSMENT — PAIN - FUNCTIONAL ASSESSMENT: PAIN_FUNCTIONAL_ASSESSMENT: NONE - DENIES PAIN

## 2023-10-09 NOTE — PROCEDURES
[facility]      Procedure Note  LOOP RECORDER IMPLANT      Valarie Camacho (62 y.o., female)  1945  10/9/2023    Procedure: Loop Recorder Implant    Operators:  Primary: FANNIE Bass MD (Attending Physician)  Kayden Bocanegra MD       Model # Serial #   Recorder [id number]     Sedation monitoring  Loop recorder Implant    Indication: CVA    Procedure:  After the usual preparation of the left neck and chest, the patient was draped in the usual sterile manner. Local anesthesia was infused below the left clavicle from the midline laterally. An incision was made below the left breast, lateral to midline. The incision was dilated. The Loop recorder (Reveal) System was advanced using the standard techniques, and positioned successfully with no bleeding or compliaction    Impression / Device:  Successful Implantation of: Reveal / Loop Recporder    Plan:  Telemetry monitoring  Interrogate recorder before discharge  Wound check at [facility]CC in 7 days  Discharge if patient remains stable      Severiano Sands, MD, PGY- 4  Fellow, cardiovascular disease   [place], [place]  10/9/2023, 5:14 PM     I was present during entire procedure and performed all critical elements of the procedure.     Lacy Rodriguez MD

## 2023-10-09 NOTE — PROCEDURES
[facility]  Transesophageal Echocardiogram       Today's Date:  10/9/2023  Ordering Physician:  Dr. Leatha Rizzo  Indication:   CVA    Operators:  Primary:   Dr.Syed Teodoro Barrientos(Attending Physician)  Assistant:   Bethany Quevedo MD (Cardiovascular Fellow)    Pre Procedure Conscious Sedation Data:  ASA Class:    [] I [x] II [] III [] IV    Mallampati Class:  [] I [x] II [] III [] IV    Procedure:  Patient seen and examined. History and Physical reviewed. Labs reviewed. After informed consent was obtained with explanation of the risks and benefits, the patient was brought to cardiac cath lab. All sedation was administered by the cardiologist. The oropharynx was pre-anesthesized with viscous lidocaine and cetacaine spray. The ultrasound probe was passed without any difficulty. IGOR findings:  LA:  Enlarged  GRAHAM:  No thrombus  RA:  Normal  RV:      Enlarged. LV:  Normal  Estimated LVEF:  55%  Aorta:   Mild atheromatous disease arch  Pericardium: No pericardial effusion  Septum:  No intracardiac shunt via color Doppler. No intracardiac shunt via injection of agitated saline. Valves:  Mitral Valve: Structurally normal. Mild regurgitation is identified. Aortic Valve: The aortic valve is trileaflet and opens adequately. Trivial regurgitation is identified. Tricuspid valve: Structurally normal. Mild regurgitation is identified. Pulmonary valve: Normal. No significant regurgitation    No valvular vegetations or thrombus identified. Summary:   1. A IGOR was performed without complications. 2. LVEF 55  3. No thrombus or valvular vegetation identified      Bethany Quevedo, PGY- 4  Fellow, cardiovascular disease   [place], [place]  10/9/2023, 5:12 PM    I was present during entire procedure and performed all critical elements of the procedure.     Lima Serrano MD

## 2023-10-09 NOTE — PROGRESS NOTES
TRANSFER - OUT REPORT:    Verbal report given to [address] on [address] being transferred to  for routine progression of patient care       Report consisted of patient's Situation, Background, Assessment and   Recommendations(SBAR). Information from the following report(s) Nurse Handoff Report was reviewed with the receiving nurse. Opportunity for questions and clarification was provided.       Patient transported with:   Monitor  Registered Nurse

## 2023-10-09 NOTE — PLAN OF CARE
Problem: Chronic Conditions and Co-morbidities  Goal: Patient's chronic conditions and co-morbidity symptoms are monitored and maintained or improved  10/9/2023 1237 by Gisela Molina RN  Outcome: Progressing  Flowsheets (Taken 10/9/2023 0715)  Care Plan - Patient's Chronic Conditions and Co-Morbidity Symptoms are Monitored and Maintained or Improved: Monitor and assess patient's chronic conditions and comorbid symptoms for stability, deterioration, or improvement  10/9/2023 0607 by Amanda Hathaway RN  Outcome: Progressing     Problem: Discharge Planning  Goal: Discharge to home or other facility with appropriate resources  Outcome: Progressing  Flowsheets (Taken 10/9/2023 0715)  Discharge to home or other facility with appropriate resources: Identify barriers to discharge with patient and caregiver     Problem: Safety - Adult  Goal: Free from fall injury  Outcome: Progressing  Flowsheets (Taken 10/9/2023 1236)  Free From Fall Injury: Instruct family/caregiver on patient safety

## 2023-10-09 NOTE — OP NOTE
Operative Note      Patient: Sky Olivier  YOB: 1945  MRN: [id number]    Date of Procedure: 10/9/2023    Pre-Op Diagnosis Codes:     * Anemia, unspecified type [D64.9]    Post-Op Diagnosis: Same  10 mm ulcer without stigmata of bleeding at the GE junction. Likely pill esophagitis. LA grade C moderate to severe esophagitis in the distal esophagus. 3 cm hiatal hernia. Normal stomach and duodenum. Procedure(s):  EGD ESOPHAGOGASTRODUODENOSCOPY    Surgeon(s):  Minh Arellano MD    Assistant:   First Assistant: Shaun Leung RN    Anesthesia: Monitor Anesthesia Care    Estimated Blood Loss (mL): None    Complications: None    Specimens:   * No specimens in log *    Implants:  * No implants in log *      Drains: * No LDAs found *    Findings:   A 10 mm cratered ulcer with a clean base was found in between 2 adjoining folds in the distal esophagus suspicious for pill esophagitis. LA grade C erosive esophagitis was found in the distal esophagus. If 3 cm hiatal hernia was noted without evidence of José erosions. The stomach mucosa otherwise appeared normal on forward and retroflexed views. The examined duodenum appeared normal.    Recommendations  Continue pantoprazole 40 mg p.o. twice daily for 8 to 10 weeks. Follow a strict antireflux lifestyle and GERD prevention diet. Okay to resume soft diet and advance per primary team.  Radha Thompson to resume Plavix tomorrow. Patient will need a repeat EGD in 2 to 3 months to check for healing of esophagitis and ulcer. GI will sign off. Please call for any questions or concerns. Detailed Description of Procedure:   Informed consent was obtained from the patient after explanation of the procedure including indications, description of the procedure,  benefits and possible risks and complications of the procedure, and alternatives. Questions were answered.   The patient's history was reviewed and a directed physical examination was performed prior to

## 2023-10-09 NOTE — PROGRESS NOTES
Physical Therapy        Physical Therapy Cancel Note      DATE: 10/9/2023    NAME: George Worrell  MRN: [id number]   : 1945      Patient not seen this date for Physical Therapy due to: Other: Pt off unit at EGD upon AM attempt, pt refused therapy evaluation on PM attempt. PT will check back 10/10/23.        Electronically signed by Oz Diane PT on 10/9/2023 at 2:47 PM

## 2023-10-09 NOTE — CONSULTS
[facility]    Consult                        Today's Date: 10/9/2023  Patient Name: Danielle Velazquez  Date of admission: 10/6/2023  9:06 AM  Patient's age: 77 y.o., 1945  Admission Dx: Stroke of unknown etiology ([address]) [I63.9]  Stroke-like symptoms [R29.90]    Reason for Consult:  Cardiac evaluation    Requesting Physician: Yari Baig DO    CHIEF COMPLAINT:  aphasia     History Obtained From:  patient, electronic medical record    HISTORY OF PRESENT ILLNESS:      The patient is a 77 y.o.  female with  history diabetes, hypertension and hyperlipidemia and no no prior significant cardiac history was seen for dyspnea in our office recently and recommended heart cath but was  admitted to the hospital for stroke like symptoms . CTH showed subacute L frontal cortical infarct involving the middle and inf frontal gyri and fibrocalcific plaque within the left proximal cervical ICA contributing to 40% stenosis. MRI brain revealed left cortical stroke with petechial hemorrhage. was Noted drop in HGB of  6.1 and GI consult ws consulted and had EGD today ,. Cardiology was consulted for IGOR / loop reorder to rule out cardioembloic event     Patient seen and examined with  at bedside , patient is [facility] . Denies chest pain or shortness of breath. Tele/vitals/labs reviewed . SR on tele with  No afib     Past Medical History:   has a past medical history of Anxiety state, unspecified, Arthritis, Cataracts, bilateral, COVID-19 vaccine administered, Esophageal reflux, Generalized osteoarthrosis, unspecified site, Hearing loss, Hyperlipidemia, Hypertension, Neuropathy, Neuropathy due to medical condition ([address]), Pure hypercholesterolemia, Thoracic or lumbosacral neuritis or radiculitis, unspecified, Type 2 diabetes mellitus without complication ([address]), Urinary retention, and UTI (urinary tract infection). Past Surgical History:   has a past surgical history that includes back surgery;  Colonoscopy Tony Lemon MD   sodium zirconium cyclosilicate (LOKELMA) 10 g PACK oral suspension Take 10 g by mouth daily  Patient not taking: Reported on 8/21/2023 2/9/23   Bebeto Gant MD   [facility] 100 UNIT/ML [facility]  11/18/21   Rd Ku MD   Spacer/Aero-Holding Faisal Bernadette BRIDGET 1 Device by Does not apply route daily 10/27/21 7/26/22  Ysabel Green MD   blood glucose monitor kit and supplies 1 kit by Other route three times daily Dispense One Touch Glucose Meter. 7/30/21   Bebeto Gant MD   albuterol sulfate HFA (VENTOLIN HFA) 108 (90 Base) MCG/ACT inhaler Inhale 2 puffs into the lungs every 4 hours as needed for Wheezing  Patient not taking: Reported on 8/21/2023 6/1/21 7/26/22  Bebeto Gant MD   blood glucose monitor strips Test four  times a day & as needed for symptoms of irregular blood glucose. Dispense sufficient amount for indicated testing frequency plus additional to accommodate PRN testing needs. 7/23/20   Bebeto Gant MD   ONE TOUCH ULTRASOFT LANCETS MISC 1 each by Does not apply route 2 times daily 4/5/17   Roman Hale MD   Calcium-Magnesium-Vitamin D (CALCIUM 1200+D3 PO) Take 2 tablets by mouth daily. Rd Ku MD   acetaminophen (TYLENOL) 500 MG tablet Take 1 tablet by mouth 2 times daily    Rd Ku MD   aspirin 81 MG chewable tablet Take 1 tablet by mouth daily    Rd Ku MD       Allergies:  Hydrocodone    Social History:   reports that she quit smoking about 39 years ago. Her smoking use included cigarettes. She has a 40.00 pack-year smoking history. She has never used smokeless tobacco. She reports current alcohol use. She reports that she does not use drugs. Family History: family history includes Cancer in her mother; Heart Disease in her brother; Hypertension in her brother, father, and mother. No h/o sudden cardiac death. No for premature CAD    REVIEW OF SYSTEMS:    Constitutional: there has been no

## 2023-10-09 NOTE — CONSULTS
Consult noted. Patient had an EGD this morning. Will plan to evaluate her tomorrow.       Angel Luciano MD

## 2023-10-09 NOTE — PROGRESS NOTES
Occupational Therapy  Facility/Department: [address] NEURO ICU  Occupational Therapy Initial Assessment    Name: Edward Falcon  : 1945  MRN: [id number]  Date of Service: 10/9/2023  Obtained from medical chart:   \" The patient is a 77 y.o. Non- / non  female w PMH of T2DM with neruopathy, former smoker, HTN, HLD, who presents with aphasia since 10/5/23. Patient reportedly was not at her baseline for the past week, was confused per . He states that she made a meal on Tuesday which was \"inedible\" and was just valente and flour which is unlike herself. He also noticed she was having mood swings. On 10/5/23 morning, patient had difficulty expressing herself and right sided weakness.  believed she was having a stroke and took her to the ER where she was evaluated by telestroke. NIH SS 5 for minor facial paralysis, severe aphasia, and answering neither question correctly. CTH showed subacute L frontal cortical infarct involving the middle and inf frontal gyri and fibrocalcific plaque within the left proximal cervical ICA contributing to 40% stenosis. \"    Discharge Recommendations:  Patient would benefit from continued therapy after discharge  OT Equipment Recommendations  Equipment Needed: Yes  Mobility Devices: ADL Assistive Devices  ADL Assistive Devices: Shower Chair with back     Patient Diagnosis(es): The primary encounter diagnosis was Acute ischemic left MCA stroke ([address]). Diagnoses of Stroke of unknown etiology ([address]) and Stroke-like symptoms were also pertinent to this visit.   Past Medical History:  has a past medical history of Anxiety state, unspecified, Arthritis, Cataracts, bilateral, COVID-19 vaccine administered, Esophageal reflux, Generalized osteoarthrosis, unspecified site, Hearing loss, Hyperlipidemia, Hypertension, Neuropathy, Neuropathy due to medical condition ([address]), Pure hypercholesterolemia, Thoracic or lumbosacral neuritis or radiculitis, unspecified, Type 2 diabetes for reading  Hearing  Hearing: Exceptions to [facility]  Hearing Exceptions: Hard of hearing/hearing concerns;Bilateral hearing aid  Cognition  Overall Cognitive Status: Exceptions  Arousal/Alertness: Delayed responses to stimuli  Following Commands: Follows one step commands with repetition; Follows one step commands with increased time; Follows one step commands consistently  Attention Span: Attends with cues to redirect  Safety Judgement: Decreased awareness of need for safety;Decreased awareness of need for assistance  Problem Solving: Decreased awareness of errors  Insights: Decreased awareness of deficits  Initiation: Requires cues for some  Sequencing: Does not require cues  Orientation  Overall Orientation Status: Impaired  Orientation Level: Oriented to place;Oriented to situation;Oriented to person;Disoriented to time     A/AROM Exercises: Moderate resistive sponge provided to patient for grasp/release and individual dexterity exercises of each digit to improve grasp with demo provided with fair return, good return from spouse present  Education Given To: Patient  Education Provided: Role of Therapy;Plan of Care;ADL Adaptive Strategies;Transfer Training;Equipment;Home Exercise Program;Family Education;Orientation; Fall Prevention Strategies  Education Method: Verbal;Demonstration  Barriers to Learning: None  Education Outcome: Verbalized understanding    Hand Dominance  Hand Dominance: Right  AM-PAC Score        AM-[facility] Inpatient Daily Activity Raw Score: 19 (10/09/23 1212)  AM-PAC Inpatient ADL T-Scale Score : 40.22 (10/09/23 1212)  ADL Inpatient CMS 0-100% Score: 42.8 (10/09/23 1212)  ADL Inpatient CMS G-Code Modifier : CK (10/09/23 1212)  Goals  Short Term Goals  Time Frame for Short Term Goals: Patient will, by discharge  Short Term Goal 1: demo UB ADLs at Mod I using adaptive techniques PRN  Short Term Goal 2: demo 15+ min of proprioceptive input to the R UE to promote neuromuscular re-education for functional

## 2023-10-09 NOTE — CARE COORDINATION
Transitional Planning  Spoke with spouse, plan to return home with spouse and HHC. List provided, need choices. Has transportation. Post Acute Facility/Agency List     Provided spouse with the following list, the list includes the overall star ratings obtained from CMS per the Medicare Web site (www.Medicare.gov):     [] [address]  [] Acute Inpatient Rehabilitation Facilities  [] Skilled Nursing Facilities  [x] Home Care    Provided verbal instructions on how to utilize the QR Code to obtain additional detailed star ratings from www. Medicare. gov     offered to print and provide the detailed list:    []Accepted   [x]Declined

## 2023-10-10 VITALS
HEART RATE: 71 BPM | BODY MASS INDEX: 20.72 KG/M2 | TEMPERATURE: 98.3 F | WEIGHT: 132 LBS | SYSTOLIC BLOOD PRESSURE: 112 MMHG | HEIGHT: 67 IN | OXYGEN SATURATION: 96 % | RESPIRATION RATE: 16 BRPM | DIASTOLIC BLOOD PRESSURE: 60 MMHG

## 2023-10-10 LAB
ANION GAP SERPL CALCULATED.3IONS-SCNC: 10 MMOL/L (ref 9–16)
BASOPHILS # BLD: 0.07 K/UL (ref 0–0.2)
BASOPHILS NFR BLD: 1 % (ref 0–2)
BUN SERPL-MCNC: 14 MG/DL (ref 8–23)
CALCIUM SERPL-MCNC: 8.3 MG/DL (ref 8.6–10.4)
CHLORIDE SERPL-SCNC: 104 MMOL/L (ref 98–107)
CO2 SERPL-SCNC: 22 MMOL/L (ref 20–31)
CREAT SERPL-MCNC: 0.7 MG/DL (ref 0.5–0.9)
EOSINOPHIL # BLD: 0.26 K/UL (ref 0–0.44)
EOSINOPHILS RELATIVE PERCENT: 4 % (ref 1–4)
ERYTHROCYTE [DISTWIDTH] IN BLOOD BY AUTOMATED COUNT: 22 % (ref 11.8–14.4)
GFR SERPL CREATININE-BSD FRML MDRD: >60 ML/MIN/1.73M2
GLUCOSE BLD-MCNC: 202 MG/DL (ref 65–105)
GLUCOSE BLD-MCNC: 211 MG/DL (ref 65–105)
GLUCOSE BLD-MCNC: 278 MG/DL (ref 65–105)
GLUCOSE SERPL-MCNC: 229 MG/DL (ref 74–99)
HCT VFR BLD AUTO: 29.9 % (ref 36.3–47.1)
HCT VFR BLD AUTO: 30 % (ref 36.3–47.1)
HCT VFR BLD AUTO: 30 % (ref 36.3–47.1)
HGB BLD-MCNC: 8.4 G/DL (ref 11.9–15.1)
HGB BLD-MCNC: 8.4 G/DL (ref 11.9–15.1)
HGB BLD-MCNC: 8.5 G/DL (ref 11.9–15.1)
IMM GRANULOCYTES # BLD AUTO: 0 K/UL (ref 0–0.3)
IMM GRANULOCYTES NFR BLD: 0 %
IRON SATN MFR SERPL: 7 % (ref 20–55)
IRON SERPL-MCNC: 22 UG/DL (ref 37–145)
LYMPHOCYTES NFR BLD: 1.65 K/UL (ref 1.1–3.7)
LYMPHOCYTES RELATIVE PERCENT: 25 % (ref 24–43)
MCH RBC QN AUTO: 22.3 PG (ref 25.2–33.5)
MCHC RBC AUTO-ENTMCNC: 28.1 G/DL (ref 28.4–34.8)
MCV RBC AUTO: 79.5 FL (ref 82.6–102.9)
MONOCYTES NFR BLD: 0.79 K/UL (ref 0.1–1.2)
MONOCYTES NFR BLD: 12 % (ref 3–12)
MORPHOLOGY: ABNORMAL
NEUTROPHILS NFR BLD: 58 % (ref 36–65)
NEUTS SEG NFR BLD: 3.83 K/UL (ref 1.5–8.1)
NRBC BLD-RTO: 0.3 PER 100 WBC
PLATELET # BLD AUTO: 355 K/UL (ref 138–453)
PMV BLD AUTO: 9.6 FL (ref 8.1–13.5)
POTASSIUM SERPL-SCNC: 4 MMOL/L (ref 3.7–5.3)
RBC # BLD AUTO: 3.76 M/UL (ref 3.95–5.11)
SODIUM SERPL-SCNC: 136 MMOL/L (ref 136–145)
TIBC SERPL-MCNC: 315 UG/DL (ref 250–450)
UNSATURATED IRON BINDING CAPACITY: 293 UG/DL (ref 112–347)
WBC OTHER # BLD: 6.6 K/UL (ref 3.5–11.3)

## 2023-10-10 PROCEDURE — 99232 SBSQ HOSP IP/OBS MODERATE 35: CPT | Performed by: PSYCHIATRY & NEUROLOGY

## 2023-10-10 PROCEDURE — 85014 HEMATOCRIT: CPT

## 2023-10-10 PROCEDURE — 85018 HEMOGLOBIN: CPT

## 2023-10-10 PROCEDURE — 36415 COLL VENOUS BLD VENIPUNCTURE: CPT

## 2023-10-10 PROCEDURE — 6370000000 HC RX 637 (ALT 250 FOR IP): Performed by: INTERNAL MEDICINE

## 2023-10-10 PROCEDURE — 83540 ASSAY OF IRON: CPT

## 2023-10-10 PROCEDURE — 2580000003 HC RX 258: Performed by: INTERNAL MEDICINE

## 2023-10-10 PROCEDURE — 99222 1ST HOSP IP/OBS MODERATE 55: CPT | Performed by: STUDENT IN AN ORGANIZED HEALTH CARE EDUCATION/TRAINING PROGRAM

## 2023-10-10 PROCEDURE — 83550 IRON BINDING TEST: CPT

## 2023-10-10 PROCEDURE — 97162 PT EVAL MOD COMPLEX 30 MIN: CPT

## 2023-10-10 PROCEDURE — 85025 COMPLETE CBC W/AUTO DIFF WBC: CPT

## 2023-10-10 PROCEDURE — 82947 ASSAY GLUCOSE BLOOD QUANT: CPT

## 2023-10-10 PROCEDURE — 6360000002 HC RX W HCPCS: Performed by: INTERNAL MEDICINE

## 2023-10-10 PROCEDURE — 80048 BASIC METABOLIC PNL TOTAL CA: CPT

## 2023-10-10 PROCEDURE — 92507 TX SP LANG VOICE COMM INDIV: CPT

## 2023-10-10 PROCEDURE — C9113 INJ PANTOPRAZOLE SODIUM, VIA: HCPCS | Performed by: INTERNAL MEDICINE

## 2023-10-10 PROCEDURE — 97530 THERAPEUTIC ACTIVITIES: CPT

## 2023-10-10 RX ORDER — GABAPENTIN 300 MG/1
300 CAPSULE ORAL NIGHTLY
Qty: 90 CAPSULE | Refills: 3 | Status: CANCELLED | OUTPATIENT
Start: 2023-10-10

## 2023-10-10 RX ORDER — GABAPENTIN 300 MG/1
300 CAPSULE ORAL NIGHTLY
Qty: 30 CAPSULE | Refills: 0 | Status: SHIPPED | OUTPATIENT
Start: 2023-10-11 | End: 2023-11-10

## 2023-10-10 RX ORDER — CLOPIDOGREL BISULFATE 75 MG/1
75 TABLET ORAL DAILY
Qty: 21 TABLET | Refills: 0 | Status: SHIPPED | OUTPATIENT
Start: 2023-10-11 | End: 2023-11-01

## 2023-10-10 RX ORDER — CIPROFLOXACIN 500 MG/1
500 TABLET, FILM COATED ORAL 2 TIMES DAILY
Qty: 5 TABLET | Refills: 0 | Status: SHIPPED | OUTPATIENT
Start: 2023-10-10 | End: 2023-10-12

## 2023-10-10 RX ORDER — LANOLIN ALCOHOL/MO/W.PET/CERES
325 CREAM (GRAM) TOPICAL
Status: DISCONTINUED | OUTPATIENT
Start: 2023-10-11 | End: 2023-10-10 | Stop reason: HOSPADM

## 2023-10-10 RX ORDER — PANTOPRAZOLE SODIUM 40 MG/1
40 TABLET, DELAYED RELEASE ORAL
Qty: 140 TABLET | Refills: 0 | Status: CANCELLED | OUTPATIENT
Start: 2023-10-10 | End: 2023-12-19

## 2023-10-10 RX ORDER — GABAPENTIN 100 MG/1
200 CAPSULE ORAL 2 TIMES DAILY
Qty: 120 CAPSULE | Refills: 0 | Status: SHIPPED | OUTPATIENT
Start: 2023-10-11 | End: 2023-11-10

## 2023-10-10 RX ORDER — LANOLIN ALCOHOL/MO/W.PET/CERES
325 CREAM (GRAM) TOPICAL
Qty: 90 TABLET | Refills: 3 | Status: SHIPPED | OUTPATIENT
Start: 2023-10-11

## 2023-10-10 RX ADMIN — CLOPIDOGREL BISULFATE 75 MG: 75 TABLET ORAL at 08:54

## 2023-10-10 RX ADMIN — METOPROLOL TARTRATE 25 MG: 25 TABLET, FILM COATED ORAL at 08:53

## 2023-10-10 RX ADMIN — ENOXAPARIN SODIUM 40 MG: 100 INJECTION SUBCUTANEOUS at 08:53

## 2023-10-10 RX ADMIN — INSULIN LISPRO 8 UNITS: 100 INJECTION, SOLUTION INTRAVENOUS; SUBCUTANEOUS at 16:27

## 2023-10-10 RX ADMIN — GABAPENTIN 200 MG: 100 CAPSULE ORAL at 08:51

## 2023-10-10 RX ADMIN — INSULIN LISPRO 4 UNITS: 100 INJECTION, SOLUTION INTRAVENOUS; SUBCUTANEOUS at 08:52

## 2023-10-10 RX ADMIN — ASPIRIN 81 MG: 81 TABLET, COATED ORAL at 09:03

## 2023-10-10 RX ADMIN — CIPROFLOXACIN HYDROCHLORIDE 500 MG: 500 TABLET, FILM COATED ORAL at 08:52

## 2023-10-10 RX ADMIN — DULOXETINE HYDROCHLORIDE 60 MG: 30 CAPSULE, DELAYED RELEASE ORAL at 08:52

## 2023-10-10 RX ADMIN — PANTOPRAZOLE SODIUM 8 MG/HR: 40 INJECTION, POWDER, FOR SOLUTION INTRAVENOUS at 01:21

## 2023-10-10 RX ADMIN — INSULIN LISPRO 4 UNITS: 100 INJECTION, SOLUTION INTRAVENOUS; SUBCUTANEOUS at 12:00

## 2023-10-10 RX ADMIN — SODIUM CHLORIDE, PRESERVATIVE FREE 10 ML: 5 INJECTION INTRAVENOUS at 08:54

## 2023-10-10 RX ADMIN — GABAPENTIN 200 MG: 100 CAPSULE ORAL at 16:27

## 2023-10-10 ASSESSMENT — ENCOUNTER SYMPTOMS
SHORTNESS OF BREATH: 0
SORE THROAT: 0
VOMITING: 0
NAUSEA: 0
PHOTOPHOBIA: 0

## 2023-10-10 NOTE — DISCHARGE INSTRUCTIONS
Outpatient follow up 2-4 weeks with Dr. Aditi Al   Outpatient follow-up with GI in 1 week.   Rest and compression of the site of hematoma in addition to apply ice and keep the hand elevated using pillows

## 2023-10-10 NOTE — CARE COORDINATION
Transitional Planning  Spoke with spouse and patient at bedside, choices obtained for [facility], [facility] and [address]. Referral to 1st choice [facility]. 200 am Notified per [employer], spouse has concerns of patient going home. Discuss SNF, list provided. Spoke with OT/PT regarding seeing patient today and trailing steps. 1215 pm PM&R consult. Dr. Mario Mike completed consult. Post Acute Facility/Agency List     Provided spouse with the following list, the list includes the overall star ratings obtained from CMS per the Medicare Web site (www.Medicare.gov):     [] [address]  [] Acute Inpatient Rehabilitation Facilities  [x] Skilled Nursing Facilities  [x] Home Care    Provided verbal instructions on how to utilize the QR Code to obtain additional detailed star ratings from www. Medicare. gov     offered to print and provide the detailed list:    []Accepted   [x]Declined    158 pm Spoke with PT, patient ambulated around nurses station and did 6 steps limited by IV, but did fine and okay to return home with [facility]. Called Pending sale to [facility], no answer. Called intake at [facility], spoke with Norah Duty able to accept for [facility]. Called office, spoke with [place] Duty, able to accept. Notified of dc today. Update given to patient, spouse and RN Slim FAIRCHILD MD for [facility] order.      [facility]  Clinical Case Management Department  Written by: Singh Figueroa RN    Patient Name: Cristy Fitch  Attending Provider: No att. providers found  Admit Date: 10/6/2023  9:06 AM  MRN: [id number]  Account: [de-identified]                     : 1945  Discharge Date: 10/10/2023      Disposition: home with Monse Ramos RN

## 2023-10-10 NOTE — PROGRESS NOTES
(N/A, 05/26/2017); Esophagogastroduodenoscopy (10/09/2023); and Upper gastrointestinal endoscopy (N/A, 10/9/2023). Assessment   Body Structures, Functions, Activity Limitations Requiring Skilled Therapeutic Intervention: Decreased functional mobility ; Decreased cognition;Decreased coordination;Decreased endurance;Decreased posture;Decreased balance;Decreased strength;Decreased safe awareness;Decreased fine motor control  Assessment: Pt ambulated 240 feet with no AD CGA. Pt able to ascend/descend 2 stairs with B handrails and no AD. Pt would benefit from continued skilled PT to address deficits in balance, endurance, and strength. Therapy Prognosis: Good  Decision Making: Medium Complexity  Requires PT Follow-Up: Yes  Activity Tolerance  Activity Tolerance: Patient tolerated evaluation without incident     Plan   Physcial Therapy Plan  General Plan:  (5 times per week)  Current Treatment Recommendations: Strengthening, ROM, Balance training, Functional mobility training, Endurance training, Stair training, Gait training, Therapeutic activities  Safety Devices  Type of Devices: Call light within reach, Gait belt, Nurse notified, Left in bed  Restraints  Restraints Initially in Place: No     Restrictions  Restrictions/Precautions  Restrictions/Precautions: Up as Tolerated  Required Braces or Orthoses?: No     Subjective   General  Patient assessed for rehabilitation services?: Yes  Response To Previous Treatment: Not applicable  Family / Caregiver Present: Yes ()  Follows Commands: Within Functional Limits  General Comment  Comments: Pt reports R arm pain upon writer's arrival. RN notified and pt positioned for comfort with ice at writer's exit. Subjective  Subjective: Pt and RN agreeable to evaluation. Pt supine in bed at writer's arrival. Pt cooperative throughout evaluation.          Social/Functional History  Social/Functional History  Lives With: Spouse  Type of Home: House  Home Layout: Two level,

## 2023-10-10 NOTE — PROGRESS NOTES
CLINICAL PHARMACY NOTE: MEDS TO BEDS    Total # of Prescriptions Filled: 5   The following medications were delivered to the patient:  GABAPENTIN 100MG  GABAPENTIN 300MG   PLAVIXX  IRON 325  CIPRO 500      Additional Documentation:

## 2023-10-10 NOTE — PROGRESS NOTES
Pt declined to getting in the chair, getting washed up. However patient did brush her teeth. Noted at approx [phone] pt had hematoma on her right wrist. Pressure applied and pressure dressing placed. Neuro team notified to come and assess. VS stable will monitor.

## 2023-10-10 NOTE — PLAN OF CARE
Problem: Chronic Conditions and Co-morbidities  Goal: Patient's chronic conditions and co-morbidity symptoms are monitored and maintained or improved  10/9/2023 2126 by Yasmeen Souza RN  Outcome: Progressing  Flowsheets  Taken 10/9/2023 2000 by Yasmeen Souza [facility] - Patient's Chronic Conditions and Co-Morbidity Symptoms are Monitored and Maintained or Improved:   Monitor and assess patient's chronic conditions and comorbid symptoms for stability, deterioration, or improvement   Collaborate with multidisciplinary team to address chronic and comorbid conditions and prevent exacerbation or deterioration   Update acute care plan with appropriate goals if chronic or comorbid symptoms are exacerbated and prevent overall improvement and discharge  Taken 10/9/2023 1530 by Arianna Betancourt [facility] - Patient's Chronic Conditions and Co-Morbidity Symptoms are Monitored and Maintained or Improved: Monitor and assess patient's chronic conditions and comorbid symptoms for stability, deterioration, or improvement  10/9/2023 1237 by Arianna Betancourt RN  Outcome: Progressing  Flowsheets  Taken 10/9/2023 1200  Care Plan - Patient's Chronic Conditions and Co-Morbidity Symptoms are Monitored and Maintained or Improved: Monitor and assess patient's chronic conditions and comorbid symptoms for stability, deterioration, or improvement  Taken 10/9/2023 0715  Care Plan - Patient's Chronic Conditions and Co-Morbidity Symptoms are Monitored and Maintained or Improved: Monitor and assess patient's chronic conditions and comorbid symptoms for stability, deterioration, or improvement     Problem: Discharge Planning  Goal: Discharge to home or other facility with appropriate resources  10/9/2023 2126 by Yasmeen Souza RN  Outcome: Progressing  Flowsheets  Taken 10/9/2023 2000 by Yasmeen Souza RN  Discharge to home or other facility with appropriate resources:   Identify barriers to discharge with patient and caregiver   Arrange for

## 2023-10-10 NOTE — DISCHARGE SUMMARY
[address]     Department of Neurology    INPATIENT DISCHARGE SUMMARY        Patient Identification:  Tricia Chandler is a 77 y.o. female. :  1945  MRN: [id number]     Acct: [de-identified]   Admit Date:  10/6/2023  Discharge date and time: No discharge date for patient encounter. Attending Provider: Loy Reese DO                                     Admission Diagnoses:   Stroke of unknown etiology ([address]) [I63.9]  Stroke-like symptoms [R29.90]    Discharge Diagnoses:   Principal Problem:    Acute ischemic left MCA stroke ([address])  Active Problems:    Acute posthemorrhagic anemia    Stroke-like symptoms    Expressive aphasia    Carotid stenosis, left    Melena    Admission for long-term (current) use of antiplatelets/antithrombotics  Resolved Problems:    * No resolved hospital problems. *       Consults:   cardiology, GI, rehabilitation medicine, and endovascular neurology    Brief Inpatient course:  Patient is a 77 y.o. Non- / non  female with past medical history of prior smoking, T2DM, HLD, HTN presenting with severe expressive aphasia and confusion. Loaded with Plavix at OSH and maintained on DAPT. MRI brain without contrast was done and showed acute left MCA territory infract with petechial hemorrhage ,MRI with contrast showed no abnormal postcontrast enhancement. CTA H&N showed fibrocalcific plaque left proximal cervical ICA 40% stenosis . Endovascular was consulted and recommended outpatient follow up 2-4 weeks to assess intervention vs medical optimization (now patient is not a candidate for surgical intervention due to anemia, will be evaluated outpatient for intervention once hemoglobin is stable)   1 hr EEG completed and shows interhemispheric asymmetry with left hemispheric high amplitude delta slowing intermixed with theta frequencies compatible with underlying structural defect.    Patient was on aspirin 81 mg without cardiomegaly, daily with Crestor 40 mg nightly for 30 days. Qty: 30 capsule, Refills: 0       !! - Potential duplicate medications found. Please discuss with provider. CONTINUE these medications which have NOT CHANGED    Details   Insulin Pen Needle 32G X 4 MM MISC 1 each by Does not apply route daily  Qty: 30 each, Refills: 3    Associated Diagnoses: Type 2 diabetes mellitus with diabetic neuropathy, with long-term current use of insulin (HCC)      atorvastatin (LIPITOR) 40 MG tablet TAKE 1 TABLET BY MOUTH DAILY  Qty: 100 tablet, Refills: 2    Comments: Please send a replace/new response with 100-Day Supply if appropriate to maximize member benefit. Requesting 1 year supply. Associated Diagnoses: Mixed hyperlipidemia      !! Continuous Blood Gluc Sensor (FREESTYLE WARREN 2 SENSOR) MISC 1 Units by Does not apply route 3 times daily  Qty: 6 each, Refills: 3    Associated Diagnoses: Type 2 diabetes mellitus with diabetic neuropathy, with long-term current use of insulin ([address])      ! ! Continuous Blood Gluc Sensor (FREESTYLE WARREN 3 SENSOR) MISC 1 Units by Does not apply route in the morning, at noon, and at bedtime  Qty: 6 each, Refills: 3      Continuous Blood Gluc  (FREESTYLE WARREN 2 READER) BRIDGET 1 Units by Does not apply route in the morning, at noon, and at bedtime  Qty: 1 each, Refills: 1      metoprolol tartrate (LOPRESSOR) 25 MG tablet TAKE 1 TABLET BY MOUTH  TWICE DAILY  Qty: 200 tablet, Refills: 2    Comments: Requesting 1 year supply      DULoxetine (CYMBALTA) 60 MG extended release capsule Take 1 capsule by mouth daily  Qty: 90 capsule, Refills: 3    Comments: Requesting 1 year supply      insulin glargine (LANTUS SOLOSTAR) 100 UNIT/ML injection pen Inject 30 Units into the skin daily  Qty: 9 Adjustable Dose Pre-filled Pen Syringe, Refills: 3    Associated Diagnoses: Type 2 diabetes mellitus with diabetic neuropathy, with long-term current use of insulin (HCC)      ! ! blood glucose test strips (ONETOUCH ULTRA) strip USE 1 STRIP TO

## 2023-10-10 NOTE — PLAN OF CARE
Problem: Chronic Conditions and Co-morbidities  Goal: Patient's chronic conditions and co-morbidity symptoms are monitored and maintained or improved  10/10/2023 1835 by Yanique Burdick RN  Outcome: Completed  10/10/2023 1057 by Yanique Burdick RN  Outcome: Progressing  Flowsheets (Taken 10/10/2023 0800)  Care Plan - Patient's Chronic Conditions and Co-Morbidity Symptoms are Monitored and Maintained or Improved: Monitor and assess patient's chronic conditions and comorbid symptoms for stability, deterioration, or improvement     Problem: Discharge Planning  Goal: Discharge to home or other facility with appropriate resources  10/10/2023 1835 by Yanique Burdick RN  Outcome: Completed  10/10/2023 1057 by Yanique Burdick RN  Outcome: Progressing  Flowsheets (Taken 10/10/2023 0800)  Discharge to home or other facility with appropriate resources: Identify barriers to discharge with patient and caregiver     Problem: Safety - Adult  Goal: Free from fall injury  10/10/2023 1835 by Yanique Burdick RN  Outcome: Completed  10/10/2023 1057 by Yanique Burdick RN  Outcome: Progressing

## 2023-10-10 NOTE — CONSULTS
as low as reasonably achievable. Noncontrast CT of the head with reconstructed 2-D images are also provided for review. COMPARISON: None. HISTORY: ORDERING SYSTEM PROVIDED HISTORY: ams FINDINGS: CT HEAD: BRAIN/VENTRICLES:  Evolving subacute cortical infarct within the left frontal lobe involving the left middle and inferior frontal gyri. No space-occupying parenchymal hemorrhage. There is no evidence of an intracranial mass or extraaxial fluid collection. No significant mass effect or midline shift. There is mild generalized volume loss. Ventricular enlargement concordant with the degree of parenchymal volume loss. ORBITS: The visualized portion of the orbits demonstrate no acute abnormality. SINUSES:  The visualized paranasal sinuses and mastoid air cells demonstrate no acute abnormality. Sequelae of chronic sinusitis within the left sphenoid sinus. SOFT TISSUES/SKULL: No acute abnormality of the visualized skull or soft tissues. CTA NECK: AORTIC ARCH/ARCH VESSELS: No dissection or arterial injury. No significant stenosis of the brachiocephalic or subclavian arteries. CAROTID ARTERIES: Fibrocalcific plaque within the left proximal cervical ICA contributing to approximately 40% stenosis per NASCET criteria. No significant stenosis within the right cervical ICA. VERTEBRAL ARTERIES: No dissection, arterial injury, or significant stenosis. SOFT TISSUES: The lung apices are clear. No cervical or superior mediastinal lymphadenopathy. The larynx and pharynx are unremarkable. No acute abnormality of the salivary and thyroid glands. BONES: No acute osseous abnormality of the cervical spine. Multilevel degenerative changes throughout the cervical spine, most pronounced C4-5. C7 intraosseous hemangioma. CTA HEAD: ANTERIOR CIRCULATION: No significant stenosis of the intracranial internal carotid, anterior cerebral, or middle cerebral arteries. No aneurysm.  POSTERIOR CIRCULATION: No significant stenosis of the

## 2023-10-10 NOTE — PROGRESS NOTES
Pt IV removed and DC instructions reviewed including medications, activity, diet. Pt and spouse voiced understanding. Pt taken to private car via wheelchair.

## 2023-10-10 NOTE — PROGRESS NOTES
person, place, and time. Follow commands   Language: Expressive aphasia, No dysarthria  Cranial Nerves: PERRL, EOMI, VF intact, facial sensation intact, facial expression symmetric, hearing intact to finger rub bilaterally, symmetric palate elevation, shoulder shrug intact and symmetric, tongue protrudes midline  Motor:  Normal muscle bulk  Normal muscle tone  R : 5/5 upper and 5/5 lower extremity strength  L  : 5/5 upper and 5/5 lower extremity strength    Reflexes: 2+ throughout  Sensory: Intact  Cerebellar: FNF intact   Gait and Station: deferred        NIH Stroke Scale Total (if not done complete detailed one below):    1a.  Level of consciousness:  0 - alert; keenly responsive  1b. Level of consciousness questions:  2 - answers neither question correctly  1c. Level of consciousness questions:  1 - performs one task correctly  2. Best Gaze:  0 - normal  3. Visual:  0 - no visual loss  4. Facial Palsy:  0 - normal symmetric movement  5a. Motor left arm:  0 - no drift, limb holds 90 (or 45) degrees for full 10 seconds  5b. Motor right arm:  0 - no drift, limb holds 90 (or 45) degrees for full 10 seconds  6a. Motor left le - no drift; leg holds 30 degree position for full 5 seconds  6b. Motor right le - no drift; leg holds 30 degree position for full 5 seconds  7. Limb Ataxia:  0 - absent  8. Sensory:  0 - normal; no sensory loss  9. Best Language:  1 - mild to moderate aphasia; some obvious loss of fluency or facility of comprehension without significant limitation on ideas expressed or form of expression. Reduction of speech and/or comprehension, however, makes conversation about provided materials difficult or impossible. For example, in conversation about provided materials, examiner can identify picture or naming card content from patient's response. 10.  Dysarthria:  0 - normal  11.   Extinction and Inattention:  0 - no abnormality    NIHSS: 4    LABS:     Recent Labs 10/08/23  0213 10/08/23  1158 10/09/23  1753 10/09/23  2148 10/10/23  0428 10/10/23  1052   WBC 9.7  --  8.3  --  6.6  --    HGB 8.2*   < > 8.7* 8.4* 8.4*  8.4* 8.5*   HCT 28.5*   < > 31.2* 29.2* 30.0*  30.0* 29.9*     --  347  --  355  --      --  139  --  136  --    K 3.3*  --  4.0  --  4.0  --      --  105  --  104  --    CO2 20  --  22  --  22  --    BUN 24*  --  12  --  14  --    CREATININE 0.5  --  0.4*  --  0.7  --    MG 1.8  --   --   --   --   --    CALCIUM 7.9*  --  8.1*  --  8.3*  --     < > = values in this interval not displayed. No results for input(s): \"ALKPHOS\", \"ALT\", \"AST\", \"BILITOT\", \"BILIDIR\", \"LABALBU\", \"AMYLASE\", \"LIPASE\" in the last 72 hours. RADIOLOGY:   CTA imaging: irregular, heterogenous L ICA plaque w/ ? Ulceration posterolaterally:   CTA head neck 10/5/23: left ICA 40% stenosis            Acute left MCA territory infarcts. Petechial hemorrhage within the infarct in the left frontal lobe. IMPRESSIONS:     José Miguel Stephenson is a 77 y.o. female who presents with aphasia since 10/5/23. CTH showed subacute L frontal cortical infarct involving the middle and inf frontal gyri and fibrocalcific plaque within the left proximal cervical ICA contributing to 40% stenosis. Patient takes aspirin 81 mg at home off and on. She was loaded with Plavix 300 mg and transferred to [facility] for further management. This morning the patient was noted to have drop in Hgb 6.1 with no active signs of bleeding. MRI shows L MCA infarct with petechial hemorrhage. Stroke mechanism: artery-artery in the setting of large vessel atherosclerosis     PLANS:   On baby aspirin and Plavix 75mg   Crestor 40 mg  Plan for EGD by GI  carotid artery stenting as an outpatient. Follow up with Dr Lisa Arora in 2-3 weeks after discharge. Discussed with Dr Lisa Arora  Thank you for the interesting evaluation. Further recommendations to follow.     Lala Parker MD, PGY 8 Pager

## 2023-10-10 NOTE — PLAN OF CARE
Problem: Chronic Conditions and Co-morbidities  Goal: Patient's chronic conditions and co-morbidity symptoms are monitored and maintained or improved  10/10/2023 1057 by Kaitlynn Deshpande RN  Outcome: Progressing  Flowsheets (Taken 10/10/2023 0800)  Care Plan - Patient's Chronic Conditions and Co-Morbidity Symptoms are Monitored and Maintained or Improved: Monitor and assess patient's chronic conditions and comorbid symptoms for stability, deterioration, or improvement  10/9/2023 2126 by Wai Montero RN  Outcome: Progressing  Flowsheets  Taken 10/9/2023 2000 by Wai Montero RN  Care Plan - Patient's Chronic Conditions and Co-Morbidity Symptoms are Monitored and Maintained or Improved:   Monitor and assess patient's chronic conditions and comorbid symptoms for stability, deterioration, or improvement   Collaborate with multidisciplinary team to address chronic and comorbid conditions and prevent exacerbation or deterioration   Update acute care plan with appropriate goals if chronic or comorbid symptoms are exacerbated and prevent overall improvement and discharge  Taken 10/9/2023 1530 by [facility] - Patient's Chronic Conditions and Co-Morbidity Symptoms are Monitored and Maintained or Improved: Monitor and assess patient's chronic conditions and comorbid symptoms for stability, deterioration, or improvement     Problem: Discharge Planning  Goal: Discharge to home or other facility with appropriate resources  10/10/2023 1057 by Kaitlynn Deshpande RN  Outcome: Progressing  Flowsheets (Taken 10/10/2023 0800)  Discharge to home or other facility with appropriate resources: Identify barriers to discharge with patient and caregiver  10/9/2023 2126 by Wai Montero RN  Outcome: Progressing  Flowsheets  Taken 10/9/2023 2000 by Wai Montero RN  Discharge to home or other facility with appropriate resources:   Identify barriers to discharge with patient and caregiver   Arrange for needed discharge resources and transportation as appropriate   Identify discharge learning needs (meds, wound care, etc)   Refer to discharge planning if patient needs post-hospital services based on physician order or complex needs related to functional status, cognitive ability or social support system   Arrange for interpreters to assist at discharge as needed  Taken 10/9/2023 1530 by Uche Cobb RN  Discharge to home or other facility with appropriate resources: Identify barriers to discharge with patient and caregiver     Problem: Safety - Adult  Goal: Free from fall injury  10/10/2023 1057 by Yanique Burdick RN  Outcome: Progressing  10/9/2023 2126 by Azeem Vaughan RN  Outcome: Progressing  Flowsheets (Taken 10/9/2023 2000)  Free From Fall Injury:   Instruct family/caregiver on patient safety   Based on caregiver fall risk screen, instruct family/caregiver to ask for assistance with transferring infant if caregiver noted to have fall risk factors

## 2023-10-10 NOTE — PROGRESS NOTES
Glucose Fingerstick    Collection Time: 10/10/23  8:18 AM   Result Value Ref Range    POC Glucose 202 (H) 65 - 105 mg/dL     Recent Labs     10/10/23  0428   WBC 6.6   RBC 3.76*   HGB 8.4*  8.4*   HCT 30.0*  30.0*   MCV 79.5*   MCH 22.3*   MCHC 28.1*   RDW 22.0*      MPV 9.6     Recent Labs     10/10/23  0428      K 4.0      CO2 22   BUN 14   CREATININE 0.7   GLUCOSE 229*   CALCIUM 8.3*     Hemoglobin A1C   Date Value Ref Range Status   10/06/2023 7.1 (H) 4.0 - 6.0 % Final       Assessment :      Primary Problem  Acute ischemic left MCA stroke [facility])    Active Hospital Problems    Diagnosis Date Noted    Melena [K92.1] 10/09/2023    Admission for long-term (current) use of antiplatelets/antithrombotics [Z79.02] 10/09/2023    Acute ischemic left MCA stroke [facility]) [I63.512] 10/06/2023    Stroke-like symptoms [R29.90] 10/06/2023    Expressive aphasia [R47.01] 10/06/2023    Carotid stenosis, left [I65.22] 10/06/2023    Acute posthemorrhagic anemia [D62] 03/03/2020       Patient is a 77 y.o. Non- / non  female w hx of prior smoking, T2DM, HLD, HTN presenting with severe expressive aphasia and confusion. Loaded with Plavix at OSH and maintained on DAPT. MRI ordered for stroke work-up showing acute L MCA territory infarct with petechial hemorrhage. The left frontal lesion visualized on MRI  appears round and solitary concerning for mass. Will obtain MRI brain w contrast. Outpatient follow up with endovascular for 40% symptomatic L ICA stenosis. Ongoing workup for GI bleed with blood loss anemia. Speech therapy for residual aphasia. Discharge planning     Plan:     Left frontal lobe ischemic stroke, from symptomatic carotis stenosis     MRI brain wo contrast: acute left MCA territory infract with petechial hemorrhage   MRI w contrast shows no abnormal postcontrast enhancement. CTA H&N: fibrocalcific plaque left proximal cervical ICA 40% stenosis   Endovascular consulted:  Outpatient follow up 2-4 weeks to assess intervention vs medical optimization (not a candidate for surgical intervention due to anemia, will be evaluated outpatient for intervention once hemoglobin is stable)   1 hr EEG completed: interhemispheric asymmetry with left hemispheric high amplitude delta slowing intermixed with theta frequencies compatible with underlying structural defect. Continue ASA 81 mg and Plavix 75 mg po daily   Crestor 40 mg nightly  Lipid panel: Total cholesterol 140, HDL 74, LDL 51, TG 76  HgbA1C : 7.1%  MDISS, POC glucose, hypoglycemia treatment orders  On Lopressor 25 mg bid for comorbid hypertension  IGOR with loop, cardiology consulted, ejection fraction was 55 with no thrombus . Left atrial dilatation. PT/OT/SLP  PM&R consult, home care. Blood loss anemia 2/2 GI bleed   Hemoglobin 6.1, hematocrit 23.4 10/7/23   Hgb 8.4 today  MCV decreased, iron study was decreased, started on ferrous sulfate. Transfused 2 pRBC 10/7  GI consulted, appreciate their recommendations- rectal exam positive for melena   Endoscopy shows ulcer suspicion for pill esophagitis in addition to the erosive esophagitis was found in the distal esophagus with hiatal hernia   Protonix drip, will be discharged on 40 mg pantoprazole twice daily for 8 to 10 weeks and repeat endoscopic after 2 to 3 months. H&H q 6 hrs   OK to continue ASA  Plavix was resumed today per GI  10- Waiting for PMNR recommendation    UTI  WBC 13.4>9 > 8.3  UA positive nitrite and small LE, 21-50 WBC, many bacteria   Start Ciprofloxacin 400 mg IV bid, 5 days         Follow-up further recommendations after discussing the case with attending  The plan was discussed with the patient, patient's family and the medical staff.    Consultations:   IP CONSULT TO ENDOVASCULAR NEUROSURGERY  IP CONSULT TO PHYSICAL MEDICINE REHAB  IP CONSULT TO GI  IP CONSULT TO CARDIOLOGY    Patient is admitted as inpatient status because of co-morbidities listed above, severity of signs

## 2023-10-11 ENCOUNTER — TELEPHONE (OUTPATIENT)
Dept: INTERNAL MEDICINE CLINIC | Age: 78
End: 2023-10-11

## 2023-10-11 ENCOUNTER — CARE COORDINATION (OUTPATIENT)
Dept: CASE MANAGEMENT | Age: 78
End: 2023-10-11

## 2023-10-11 DIAGNOSIS — I63.512 ACUTE ISCHEMIC LEFT MCA STROKE (HCC): Primary | ICD-10-CM

## 2023-10-11 PROCEDURE — 1111F DSCHRG MED/CURRENT MED MERGE: CPT | Performed by: INTERNAL MEDICINE

## 2023-10-11 ASSESSMENT — ENCOUNTER SYMPTOMS
ABDOMINAL PAIN: 0
SHORTNESS OF BREATH: 0

## 2023-10-11 NOTE — CARE COORDINATION
Care Transitions Initial Follow Up Call    Call within 2 business days of discharge: Yes    Patient Current Location:  Home: [address]  [address] [facility] [place] [facility] [place] [facility] [address]    LPN Care Coordinator contacted the family by telephone to perform post hospital discharge assessment. Verified name and  with family as identifiers. Provided introduction to self, and explanation of the LPN Care Coordinator role. Patient: Sara Peoples Patient : 1945   MRN: [id number]  Reason for Admission: Acute ischemic left MCA stroke   Discharge Date: 10/10/23 RARS: Readmission Risk Score: 15      Last Discharge Facility       Date Complaint Diagnosis Description Type Department Provider    10/6/23  Acute ischemic left MCA stroke ([address] [facility]) . .. Admission (Discharged) [facility] Og Toure, DO            Was this an external facility discharge? No Discharge Facility: [facility]    Challenges to be reviewed by the provider   Additional needs identified to be addressed with provider: No  none               Method of communication with provider: none. Writer spoke with patient's  Monico Lopez for her initial care transitions call. He reports she is doing okay so far. [facility] has already contacted him and will be coming today for [facility]. She continues to have expressive aphasia and some right hand weakness. Medications reviewed, all questions answered, he has picked up all of her new meds except for ASA and will be getting that today. Her neuro follow up is already scheduled and Monico Lopez will be calling to schedule her PCP and GI follow ups today, denies needing assistance with scheduling. LPN Care Coordinator reviewed discharge instructions with family who verbalized understanding. The family was given an opportunity to ask questions and does not have any further questions or concerns at this time. Were discharge instructions available to patient? Yes.  Reviewed appropriate site of care based on symptoms and resources available to patient including:

## 2023-10-16 ENCOUNTER — OFFICE VISIT (OUTPATIENT)
Dept: INTERNAL MEDICINE CLINIC | Age: 78
End: 2023-10-16
Payer: MEDICARE

## 2023-10-16 ENCOUNTER — HOSPITAL ENCOUNTER (INPATIENT)
Age: 78
LOS: 2 days | Discharge: HOME | DRG: 378 | End: 2023-10-18
Attending: EMERGENCY MEDICINE | Admitting: INTERNAL MEDICINE
Payer: MEDICARE

## 2023-10-16 VITALS
SYSTOLIC BLOOD PRESSURE: 82 MMHG | HEIGHT: 67 IN | HEART RATE: 47 BPM | OXYGEN SATURATION: 82 % | DIASTOLIC BLOOD PRESSURE: 48 MMHG | WEIGHT: 137.2 LBS | BODY MASS INDEX: 21.53 KG/M2

## 2023-10-16 DIAGNOSIS — Z91.81 AT HIGH RISK FOR FALLS: ICD-10-CM

## 2023-10-16 DIAGNOSIS — K29.31 CHRONIC SUPERFICIAL GASTRITIS WITH BLEEDING: ICD-10-CM

## 2023-10-16 DIAGNOSIS — E11.40 TYPE 2 DIABETES MELLITUS WITH DIABETIC NEUROPATHY, WITH LONG-TERM CURRENT USE OF INSULIN (HCC): Primary | ICD-10-CM

## 2023-10-16 DIAGNOSIS — I63.512 ACUTE ISCHEMIC LEFT MCA STROKE (HCC): ICD-10-CM

## 2023-10-16 DIAGNOSIS — Z79.4 TYPE 2 DIABETES MELLITUS WITH DIABETIC NEUROPATHY, WITH LONG-TERM CURRENT USE OF INSULIN (HCC): Primary | ICD-10-CM

## 2023-10-16 DIAGNOSIS — R06.09 DYSPNEA ON EXERTION: ICD-10-CM

## 2023-10-16 DIAGNOSIS — I95.9 HYPOTENSION, UNSPECIFIED HYPOTENSION TYPE: Primary | ICD-10-CM

## 2023-10-16 DIAGNOSIS — R19.5 OCCULT BLOOD POSITIVE STOOL: ICD-10-CM

## 2023-10-16 DIAGNOSIS — D64.9 ANEMIA, UNSPECIFIED TYPE: ICD-10-CM

## 2023-10-16 DIAGNOSIS — R00.1 BRADYCARDIA: ICD-10-CM

## 2023-10-16 DIAGNOSIS — D62 ACUTE POSTHEMORRHAGIC ANEMIA: ICD-10-CM

## 2023-10-16 DIAGNOSIS — E78.2 MIXED HYPERLIPIDEMIA: ICD-10-CM

## 2023-10-16 LAB
ALBUMIN SERPL-MCNC: 3.8 G/DL (ref 3.5–5.2)
ALP SERPL-CCNC: 127 U/L (ref 35–104)
ALT SERPL-CCNC: 15 U/L (ref 5–33)
ANION GAP SERPL CALCULATED.3IONS-SCNC: 16 MMOL/L (ref 9–17)
AST SERPL-CCNC: 24 U/L
BACTERIA URNS QL MICRO: ABNORMAL
BASOPHILS # BLD: 0.09 K/UL (ref 0–0.2)
BASOPHILS NFR BLD: 1 % (ref 0–2)
BILIRUB SERPL-MCNC: 0.7 MG/DL (ref 0.3–1.2)
BILIRUB UR QL STRIP: NEGATIVE
BUN SERPL-MCNC: 18 MG/DL (ref 8–23)
CALCIUM SERPL-MCNC: 9.5 MG/DL (ref 8.6–10.4)
CASTS #/AREA URNS LPF: ABNORMAL /LPF
CHLORIDE SERPL-SCNC: 99 MMOL/L (ref 98–107)
CLARITY UR: CLEAR
CO2 SERPL-SCNC: 20 MMOL/L (ref 20–31)
COLOR UR: YELLOW
CREAT SERPL-MCNC: 0.6 MG/DL (ref 0.5–0.9)
DATE, STOOL #1: ABNORMAL
EOSINOPHIL # BLD: 0.18 K/UL (ref 0–0.4)
EOSINOPHILS RELATIVE PERCENT: 2 % (ref 0–4)
EPI CELLS #/AREA URNS HPF: ABNORMAL /HPF
ERYTHROCYTE [DISTWIDTH] IN BLOOD BY AUTOMATED COUNT: 23.7 % (ref 11.5–14.9)
GFR SERPL CREATININE-BSD FRML MDRD: >60 ML/MIN/1.73M2
GLUCOSE SERPL-MCNC: 109 MG/DL (ref 70–99)
GLUCOSE UR STRIP-MCNC: NEGATIVE MG/DL
HCT VFR BLD AUTO: 28.3 % (ref 36–46)
HEMOCCULT SP1 STL QL: POSITIVE
HGB BLD-MCNC: 8.5 G/DL (ref 12–16)
HGB UR QL STRIP.AUTO: NEGATIVE
INR PPP: 0.9
KETONES UR STRIP-MCNC: ABNORMAL MG/DL
LEUKOCYTE ESTERASE UR QL STRIP: ABNORMAL
LYMPHOCYTES NFR BLD: 2.21 K/UL (ref 1–4.8)
LYMPHOCYTES RELATIVE PERCENT: 24 % (ref 24–44)
MAGNESIUM SERPL-MCNC: 1.7 MG/DL (ref 1.6–2.6)
MCH RBC QN AUTO: 22.1 PG (ref 26–34)
MCHC RBC AUTO-ENTMCNC: 30 G/DL (ref 31–37)
MCV RBC AUTO: 73.5 FL (ref 80–100)
MONOCYTES NFR BLD: 0.55 K/UL (ref 0.1–1.3)
MONOCYTES NFR BLD: 6 % (ref 1–7)
MORPHOLOGY: ABNORMAL
NEUTROPHILS NFR BLD: 67 % (ref 36–66)
NEUTS SEG NFR BLD: 6.17 K/UL (ref 1.3–9.1)
NITRITE UR QL STRIP: NEGATIVE
PH UR STRIP: 5.5 [PH] (ref 5–8)
PLATELET # BLD AUTO: 521 K/UL (ref 150–450)
PMV BLD AUTO: 7.7 FL (ref 6–12)
POTASSIUM SERPL-SCNC: 4.4 MMOL/L (ref 3.7–5.3)
PROT SERPL-MCNC: 6.5 G/DL (ref 6.4–8.3)
PROT UR STRIP-MCNC: NEGATIVE MG/DL
PROTHROMBIN TIME: 12.6 SEC (ref 11.8–14.6)
RBC # BLD AUTO: 3.85 M/UL (ref 4–5.2)
RBC #/AREA URNS HPF: ABNORMAL /HPF
SODIUM SERPL-SCNC: 135 MMOL/L (ref 135–144)
SP GR UR STRIP: 1.01 (ref 1–1.03)
TIME, STOOL #1: ABNORMAL
TROPONIN I SERPL HS-MCNC: 10 NG/L (ref 0–14)
TROPONIN I SERPL HS-MCNC: 12 NG/L (ref 0–14)
UROBILINOGEN UR STRIP-ACNC: NORMAL EU/DL (ref 0–1)
WBC #/AREA URNS HPF: ABNORMAL /HPF
WBC OTHER # BLD: 9.2 K/UL (ref 3.5–11)

## 2023-10-16 PROCEDURE — 6360000002 HC RX W HCPCS: Performed by: EMERGENCY MEDICINE

## 2023-10-16 PROCEDURE — 86900 BLOOD TYPING SEROLOGIC ABO: CPT

## 2023-10-16 PROCEDURE — 2580000003 HC RX 258: Performed by: EMERGENCY MEDICINE

## 2023-10-16 PROCEDURE — 2060000000 HC ICU INTERMEDIATE R&B

## 2023-10-16 PROCEDURE — 86901 BLOOD TYPING SEROLOGIC RH(D): CPT

## 2023-10-16 PROCEDURE — 1123F ACP DISCUSS/DSCN MKR DOCD: CPT | Performed by: INTERNAL MEDICINE

## 2023-10-16 PROCEDURE — 3074F SYST BP LT 130 MM HG: CPT | Performed by: INTERNAL MEDICINE

## 2023-10-16 PROCEDURE — 80053 COMPREHEN METABOLIC PANEL: CPT

## 2023-10-16 PROCEDURE — 3051F HG A1C>EQUAL 7.0%<8.0%: CPT | Performed by: INTERNAL MEDICINE

## 2023-10-16 PROCEDURE — 6370000000 HC RX 637 (ALT 250 FOR IP)

## 2023-10-16 PROCEDURE — 86920 COMPATIBILITY TEST SPIN: CPT

## 2023-10-16 PROCEDURE — 82272 OCCULT BLD FECES 1-3 TESTS: CPT

## 2023-10-16 PROCEDURE — 81001 URINALYSIS AUTO W/SCOPE: CPT

## 2023-10-16 PROCEDURE — 83735 ASSAY OF MAGNESIUM: CPT

## 2023-10-16 PROCEDURE — 36415 COLL VENOUS BLD VENIPUNCTURE: CPT

## 2023-10-16 PROCEDURE — 86850 RBC ANTIBODY SCREEN: CPT

## 2023-10-16 PROCEDURE — 6360000002 HC RX W HCPCS

## 2023-10-16 PROCEDURE — 93005 ELECTROCARDIOGRAM TRACING: CPT | Performed by: EMERGENCY MEDICINE

## 2023-10-16 PROCEDURE — 3078F DIAST BP <80 MM HG: CPT | Performed by: INTERNAL MEDICINE

## 2023-10-16 PROCEDURE — C9113 INJ PANTOPRAZOLE SODIUM, VIA: HCPCS

## 2023-10-16 PROCEDURE — C9113 INJ PANTOPRAZOLE SODIUM, VIA: HCPCS | Performed by: EMERGENCY MEDICINE

## 2023-10-16 PROCEDURE — 2580000003 HC RX 258

## 2023-10-16 PROCEDURE — 85610 PROTHROMBIN TIME: CPT

## 2023-10-16 PROCEDURE — 99215 OFFICE O/P EST HI 40 MIN: CPT | Performed by: INTERNAL MEDICINE

## 2023-10-16 PROCEDURE — 84484 ASSAY OF TROPONIN QUANT: CPT

## 2023-10-16 PROCEDURE — 85025 COMPLETE CBC W/AUTO DIFF WBC: CPT

## 2023-10-16 PROCEDURE — 99285 EMERGENCY DEPT VISIT HI MDM: CPT

## 2023-10-16 RX ORDER — PANTOPRAZOLE SODIUM 40 MG/1
40 TABLET, DELAYED RELEASE ORAL
Qty: 90 TABLET | Refills: 1 | Status: SHIPPED | OUTPATIENT
Start: 2023-10-16

## 2023-10-16 RX ORDER — GABAPENTIN 300 MG/1
300 CAPSULE ORAL NIGHTLY
Status: DISCONTINUED | OUTPATIENT
Start: 2023-10-16 | End: 2023-10-18 | Stop reason: HOSPADM

## 2023-10-16 RX ORDER — ASPIRIN 81 MG/1
81 TABLET, CHEWABLE ORAL DAILY
Status: CANCELLED | OUTPATIENT
Start: 2023-10-16

## 2023-10-16 RX ORDER — ACETAMINOPHEN 650 MG/1
650 SUPPOSITORY RECTAL EVERY 6 HOURS PRN
Status: DISCONTINUED | OUTPATIENT
Start: 2023-10-16 | End: 2023-10-18 | Stop reason: HOSPADM

## 2023-10-16 RX ORDER — 0.9 % SODIUM CHLORIDE 0.9 %
500 INTRAVENOUS SOLUTION INTRAVENOUS ONCE
Status: COMPLETED | OUTPATIENT
Start: 2023-10-16 | End: 2023-10-16

## 2023-10-16 RX ORDER — POLYETHYLENE GLYCOL 3350 17 G/17G
17 POWDER, FOR SOLUTION ORAL DAILY PRN
Status: DISCONTINUED | OUTPATIENT
Start: 2023-10-16 | End: 2023-10-18 | Stop reason: HOSPADM

## 2023-10-16 RX ORDER — SODIUM CHLORIDE 9 MG/ML
INJECTION, SOLUTION INTRAVENOUS CONTINUOUS
Status: DISCONTINUED | OUTPATIENT
Start: 2023-10-16 | End: 2023-10-18 | Stop reason: HOSPADM

## 2023-10-16 RX ORDER — GABAPENTIN 100 MG/1
200 CAPSULE ORAL 2 TIMES DAILY
Status: DISCONTINUED | OUTPATIENT
Start: 2023-10-17 | End: 2023-10-18 | Stop reason: HOSPADM

## 2023-10-16 RX ORDER — INSULIN GLARGINE 100 [IU]/ML
30 INJECTION, SOLUTION SUBCUTANEOUS DAILY
Status: DISCONTINUED | OUTPATIENT
Start: 2023-10-17 | End: 2023-10-18 | Stop reason: HOSPADM

## 2023-10-16 RX ORDER — ACETAMINOPHEN 325 MG/1
650 TABLET ORAL EVERY 6 HOURS PRN
Status: DISCONTINUED | OUTPATIENT
Start: 2023-10-16 | End: 2023-10-18 | Stop reason: HOSPADM

## 2023-10-16 RX ORDER — DEXTROSE MONOHYDRATE 100 MG/ML
INJECTION, SOLUTION INTRAVENOUS CONTINUOUS PRN
Status: DISCONTINUED | OUTPATIENT
Start: 2023-10-16 | End: 2023-10-18 | Stop reason: HOSPADM

## 2023-10-16 RX ORDER — INSULIN GLARGINE 100 [IU]/ML
30 INJECTION, SOLUTION SUBCUTANEOUS DAILY
Status: DISCONTINUED | OUTPATIENT
Start: 2023-10-16 | End: 2023-10-16

## 2023-10-16 RX ORDER — ATORVASTATIN CALCIUM 40 MG/1
40 TABLET, FILM COATED ORAL DAILY
Status: DISCONTINUED | OUTPATIENT
Start: 2023-10-17 | End: 2023-10-18 | Stop reason: HOSPADM

## 2023-10-16 RX ORDER — SODIUM CHLORIDE 9 MG/ML
INJECTION, SOLUTION INTRAVENOUS PRN
Status: DISCONTINUED | OUTPATIENT
Start: 2023-10-16 | End: 2023-10-18 | Stop reason: HOSPADM

## 2023-10-16 RX ORDER — ONDANSETRON 4 MG/1
4 TABLET, ORALLY DISINTEGRATING ORAL EVERY 8 HOURS PRN
Status: DISCONTINUED | OUTPATIENT
Start: 2023-10-16 | End: 2023-10-18 | Stop reason: HOSPADM

## 2023-10-16 RX ORDER — ONDANSETRON 2 MG/ML
4 INJECTION INTRAMUSCULAR; INTRAVENOUS EVERY 6 HOURS PRN
Status: DISCONTINUED | OUTPATIENT
Start: 2023-10-16 | End: 2023-10-18 | Stop reason: HOSPADM

## 2023-10-16 RX ORDER — ENOXAPARIN SODIUM 100 MG/ML
40 INJECTION SUBCUTANEOUS DAILY
Status: DISCONTINUED | OUTPATIENT
Start: 2023-10-16 | End: 2023-10-18 | Stop reason: HOSPADM

## 2023-10-16 RX ORDER — CLOPIDOGREL BISULFATE 75 MG/1
75 TABLET ORAL DAILY
Qty: 21 TABLET | Refills: 0 | Status: CANCELLED | OUTPATIENT
Start: 2023-10-16 | End: 2023-11-06

## 2023-10-16 RX ORDER — CLOPIDOGREL BISULFATE 75 MG/1
75 TABLET ORAL DAILY
Status: CANCELLED | OUTPATIENT
Start: 2023-10-16

## 2023-10-16 RX ORDER — SODIUM CHLORIDE 0.9 % (FLUSH) 0.9 %
5-40 SYRINGE (ML) INJECTION EVERY 12 HOURS SCHEDULED
Status: DISCONTINUED | OUTPATIENT
Start: 2023-10-16 | End: 2023-10-18 | Stop reason: HOSPADM

## 2023-10-16 RX ORDER — SODIUM CHLORIDE 0.9 % (FLUSH) 0.9 %
5-40 SYRINGE (ML) INJECTION PRN
Status: DISCONTINUED | OUTPATIENT
Start: 2023-10-16 | End: 2023-10-18 | Stop reason: HOSPADM

## 2023-10-16 RX ADMIN — GABAPENTIN 300 MG: 300 CAPSULE ORAL at 20:36

## 2023-10-16 RX ADMIN — ENOXAPARIN SODIUM 40 MG: 100 INJECTION SUBCUTANEOUS at 18:25

## 2023-10-16 RX ADMIN — SODIUM CHLORIDE, PRESERVATIVE FREE 10 ML: 5 INJECTION INTRAVENOUS at 20:36

## 2023-10-16 RX ADMIN — SODIUM CHLORIDE: 9 INJECTION, SOLUTION INTRAVENOUS at 18:01

## 2023-10-16 RX ADMIN — PANTOPRAZOLE SODIUM 8 MG/HR: 40 INJECTION, POWDER, FOR SOLUTION INTRAVENOUS at 18:26

## 2023-10-16 RX ADMIN — PANTOPRAZOLE SODIUM 80 MG: 40 INJECTION, POWDER, FOR SOLUTION INTRAVENOUS at 16:09

## 2023-10-16 RX ADMIN — SODIUM CHLORIDE 500 ML: 9 INJECTION, SOLUTION INTRAVENOUS at 15:18

## 2023-10-16 RX ADMIN — SODIUM CHLORIDE: 9 INJECTION, SOLUTION INTRAVENOUS at 16:06

## 2023-10-16 ASSESSMENT — ENCOUNTER SYMPTOMS
DIARRHEA: 0
WHEEZING: 0
EYE PAIN: 0
ABDOMINAL DISTENTION: 0
SORE THROAT: 0
DIARRHEA: 0
EYE DISCHARGE: 0
TROUBLE SWALLOWING: 0
ABDOMINAL PAIN: 0
SINUS PAIN: 0
CHEST TIGHTNESS: 0
WHEEZING: 0
COLOR CHANGE: 0
COUGH: 0
SHORTNESS OF BREATH: 0
NAUSEA: 0
VOMITING: 0
BLOOD IN STOOL: 0
BLOOD IN STOOL: 0
SHORTNESS OF BREATH: 0

## 2023-10-16 ASSESSMENT — PAIN - FUNCTIONAL ASSESSMENT: PAIN_FUNCTIONAL_ASSESSMENT: NONE - DENIES PAIN

## 2023-10-16 NOTE — PROCEDURES
[facility]                          ELECTROENCEPHALOGRAM REPORT    PATIENT NAME: Sky Olivier                      :        1945  MED REC NO:   [id number]                             ROOM:       0119  ACCOUNT NO:   [de-identified]                           ADMIT DATE: 10/06/2023  PROVIDER:     Erika Virk    DATE OF EEG:  10/06/2023    HISTORY:  This is a 77-year-old female with history of hypertension,  hyperlipidemia, diabetes, presented with expressive aphasia, found to  have left hemispheric ischemic stroke. She also had episodic confusion,  for which she is being evaluated for possible seizures. MEDICATIONS:  Include aspirin, Plavix, duloxetine, insulin,  rosuvastatin. DESCRIPTION OF THE PROCEDURE:  Electrodes were applied using paste in  positions dictated by the International 10-20 System of placement. Reviewing montages included both referential and bipolar derivations. In addition to EEG data, EKG, eye movements, hyperventilation and photic  stimulation were performed. This is a routine recording. This test was  performed on 10/06/2023. DESCRIPTION OF ACTIVITIES:  At the onset of the study, the patient was  awake and drowsy with background demonstrating high amplitude mixed with  delta and theta frequencies involving left hemisphere, predominantly in  frontotemporal regions. Background activity in right hemisphere  demonstrated 7-8 Hz theta activity. Background activity attenuated with  eye opening. Hyperventilation and photic stimulation were not  performed. Electrocardiogram montage revealed artifacts. ELECTRODIAGNOSTIC INTERPRETATION:  This is abnormal EEG with the  interhemispheric asymmetry with the left hemisphere slowing with the  high amplitude delta and intermixed with theta frequencies compatible  with the underlying structural defect.   No ongoing

## 2023-10-16 NOTE — PROGRESS NOTES
[address] [address]  Dept: [phone]  Dept Fax: [phone]    Sara Domingo is a 77 y.o. female who presents today for her medical conditions/complaintsas noted below. Sara Domingo is c/o of   Chief Complaint   Patient presents with    Follow-Up from [address] [place] [address] on Saturday, they stated that it is clearing up.  Quit the Iron pill to help    Mass     Patient has a lump on her hand from where the IV was          HPI:     Dizzy  Fatigue          Hemoglobin A1C (%)   Date Value   10/06/2023 7.1 (H)   07/05/2023 7.2 (H)   04/03/2023 7.4 (H)             ( goal A1Cis < 7)   No components found for: \"LABMICR\"  LDL Cholesterol (mg/dL)   Date Value   10/06/2023 51   11/17/2022 71   08/30/2021 74       (goal LDL is <100)   AST (U/L)   Date Value   10/05/2023 24     ALT (U/L)   Date Value   10/05/2023 16     BUN (mg/dL)   Date Value   10/10/2023 14     BP Readings from Last 3 Encounters:   10/16/23 (!) 82/48   10/10/23 112/60   10/06/23 (!) 154/70          (goal 120/80)    Past Medical History:   Diagnosis Date    Anxiety state, unspecified     Arthritis     Cataracts, bilateral     COVID-19 vaccine administered 2- & 3-    MODERNA    Esophageal reflux     Generalized osteoarthrosis, unspecified site     Hearing loss     Hyperlipidemia     Hypertension     Neuropathy     Neuropathy due to medical condition ([address])     Pure hypercholesterolemia     Thoracic or lumbosacral neuritis or radiculitis, unspecified     Type 2 diabetes mellitus without complication ([address])     Urinary retention     UTI (urinary tract infection)       Past Surgical History:   Procedure Laterality Date    BACK SURGERY      X3    BREAST BIOPSY  2007    Lt     CARPAL TUNNEL RELEASE Right     CATARACT REMOVAL WITH IMPLANT Bilateral     COLONOSCOPY  2006    COLONOSCOPY  04/24/2015    diverticulosis    CYSTOSCOPY  05/01/2014    CYSTOSCOPY

## 2023-10-16 NOTE — PROGRESS NOTES
Pharmacy Medication History Note      List of current medications patient is taking is complete. Source of information: patient/caregiver, dispense report, [place]ARRS     Changes made to medication list:  Medications flagged for removal (include reason, ex. noncompliance):  Pantoprazole - patient reports not taking     Medications removed (include reason, ex. therapy complete or physician discontinued):  None     Medications added/doses adjusted:  None     Other notes (ex. Recent course of antibiotics, Coumadin dosing): The patient is only on clopidogrel for 21 days. This prescription was written 10/10/23. Per oARRS, the patient filled both the gabapentin 100 mg and gabapentin 300 mg capsules on 10/10/23. The 100 mg capsules were filled with quantity 120 caps for 30 days. And the 300 mg capsules were filled with quantity 30 for 30 days. The 100 mg capsules are taken as 2 capsules in the AM and at noon and the 300 mg capsules are taken as 1 capsule at bedtime. Denies use of other OTC or herbal medications.       Allergies clarified    Medication list provided to the patient: no   Medication education provided to the patient: none      Electronically signed by Amrit Sunshine [facility] on 10/16/2023 at 3:07 PM

## 2023-10-16 NOTE — PROGRESS NOTES
Patient arrived to 2080 via stretcher. Telemetry applied and strip printed. Bed in low locked position with 2 rails up. Patient acclimated to room and call light.

## 2023-10-16 NOTE — PROGRESS NOTES
Visit Information    Have you changed or started any medications since your last visit including any over-the-counter medicines, vitamins, or herbal medicines? no   Are you having any side effects from any of your medications? -  no  Have you stopped taking any of your medications? Is so, why? -  no    Have you seen any other physician or provider since your last visit? Yes - Records Obtained  Have you had any other diagnostic tests since your last visit? Yes - Records Obtained  Have you been seen in the emergency room and/or had an admission to a hospital since we last saw you? Yes - Records Obtained  Have you had your routine dental cleaning in the past 6 months? yes -     Have you activated your [employer] account? If not, what are your barriers?  Yes     Patient Care Team:  Zandra Wood MD as PCP - General (Internal Medicine)  Zandra Wood MD as PCP - Empaneled Provider  Blaine Barrera DO (Obstetrics & Gynecology)  Ren Hoff RN as Care Transitions Nurse    Medical History Review  Past Medical, Family, and Social History reviewed and does contribute to the patient presenting condition    Health Maintenance   Topic Date Due    Hepatitis B vaccine (1 of 3 - Risk 3-dose series) Never done    DTaP/Tdap/Td vaccine (1 - Tdap) 06/08/2010    Shingles vaccine (2 of 2) 09/26/2018    COVID-19 Vaccine (4 - Kayode Suzanne series) 04/10/2022    Flu vaccine (1) 08/01/2023    Annual Wellness Visit (AWV)  10/13/2023    Depression Monitoring  01/03/2024    Lipids  10/06/2024    DEXA (modify frequency per FRAX score)  Completed    Pneumococcal 65+ years Vaccine  Completed    Hepatitis C screen  Completed    Hepatitis A vaccine  Aged Out    Hib vaccine  Aged Out    Meningococcal (ACWY) vaccine  Aged Out    Diabetic foot exam  Discontinued    A1C test (Diabetic or Prediabetic)  Discontinued    Diabetic retinal exam  Discontinued    Depression Screen  Discontinued    Colonoscopy  Discontinued

## 2023-10-16 NOTE — H&P
[address]     HISTORY AND PHYSICAL EXAMINATION            Date:   10/16/2023  Patient name:  Denys Magaña  Date of admission:  10/16/2023  1:23 PM  MRN:   [id number]  Account:  [de-identified]  YOB: 1945  PCP:    Keven Merrill MD  Room:   10/10  Code Status:    Prior    Chief Complaint:     Chief Complaint   Patient presents with    Larisa Pruitt advised to come to ED but do not know why    Hypotension     Pt was at f/u appt s/p hemorrhagic CVA with GI bleed and found to be hypotensive       History Obtained From:     patient    History of Present Illness:       Patient is a 79-year-old female with past medical history of diabetes, hypertension, hyperlipidemia, stroke and upper GI bleed 10 days ago who presents to the ED on request of her PCP for hypotension at her hospital follow-up appointment. Patient was recently admitted to [facility] for right-sided weakness found to have left MCA infarct with petechial hemorrhage, medically managed with aspirin and Plavix. During her hospital stay she had echo which showed good ejection fraction, no clot and no A-fib. During her admission patient hemoglobin dropped to 6, requiring 2 times packed RBCs, EGD showed upper gastritis and esophageal ulcer. She was discharged on 21 days Plavix, aspirin, PPI and statin. At her follow-up appointment with her PCP today patient reported dark stool after starting her iron supplement and endorsed dizziness, BP at office was 80/40. She was advised to come to the ED for further evaluation. In ED patient had a blood pressure 81/70, HR 73, saturating 100 percent on room air. CMP unremarkable, hemoglobin 8.5 stable since last reading 2 days ago. FOBT positive.  Denies any vaginal bleeding, blood in her stools, vomiting, dizziness, reformatted images are provided for review. MIP images are provided for review. Stenosis of the internal carotid arteries measured using NASCET criteria. Automated exposure control, iterative reconstruction, and/or weight based adjustment of the mA/kV was utilized to reduce the radiation dose to as low as reasonably achievable.; CT of the cervical spine was performed without the administration of intravenous contrast. Multiplanar reformatted images are provided for review. Automated exposure control, iterative reconstruction, and/or weight based adjustment of the mA/kV was utilized to reduce the radiation dose to as low as reasonably achievable.; CT of the head was performed without the administration of intravenous contrast. Automated exposure control, iterative reconstruction, and/or weight based adjustment of the mA/kV was utilized to reduce the radiation dose to as low as reasonably achievable. Noncontrast CT of the head with reconstructed 2-D images are also provided for review. COMPARISON: None. HISTORY: ORDERING SYSTEM PROVIDED HISTORY: ams FINDINGS: CT HEAD: BRAIN/VENTRICLES:  Evolving subacute cortical infarct within the left frontal lobe involving the left middle and inferior frontal gyri. No space-occupying parenchymal hemorrhage. There is no evidence of an intracranial mass or extraaxial fluid collection. No significant mass effect or midline shift. There is mild generalized volume loss. Ventricular enlargement concordant with the degree of parenchymal volume loss. ORBITS: The visualized portion of the orbits demonstrate no acute abnormality. SINUSES:  The visualized paranasal sinuses and mastoid air cells demonstrate no acute abnormality. Sequelae of chronic sinusitis within the left sphenoid sinus. SOFT TISSUES/SKULL: No acute abnormality of the visualized skull or soft tissues. CTA NECK: AORTIC ARCH/ARCH VESSELS: No dissection or arterial injury.   No significant stenosis of the brachiocephalic or subclavian arteries. CAROTID ARTERIES: Fibrocalcific plaque within the left proximal cervical ICA contributing to approximately 40% stenosis per NASCET criteria. No significant stenosis within the right cervical ICA. VERTEBRAL ARTERIES: No dissection, arterial injury, or significant stenosis. SOFT TISSUES: The lung apices are clear. No cervical or superior mediastinal lymphadenopathy. The larynx and pharynx are unremarkable. No acute abnormality of the salivary and thyroid glands. BONES: No acute osseous abnormality of the cervical spine. Multilevel degenerative changes throughout the cervical spine, most pronounced C4-5. C7 intraosseous hemangioma. CTA HEAD: ANTERIOR CIRCULATION: No significant stenosis of the intracranial internal carotid, anterior cerebral, or middle cerebral arteries. No aneurysm. POSTERIOR CIRCULATION: No significant stenosis of the vertebral, basilar, or posterior cerebral arteries. No aneurysm. OTHER: No dural venous sinus thrombosis on this non-dedicated study. 1. Evolving subacute cortical infarct within the left frontal lobe involving the left middle and inferior frontal gyri. No space-occupying parenchymal hemorrhage. No significant mass effect. 2.  No flow limiting stenosis or occlusion within the head. 3. Fibrocalcific plaque within the left proximal cervical ICA contributing to approximately 40% stenosis per NASCET criteria. 4. No acute osseous abnormality of the cervical spine. 5. Chronic left sphenoid sinusitis. XR CHEST PORTABLE    Result Date: 10/5/2023  EXAMINATION: ONE XRAY VIEW OF THE CHEST 10/5/2023 10:39 am COMPARISON: None. HISTORY: ORDERING SYSTEM PROVIDED HISTORY: ams, fall TECHNOLOGIST PROVIDED HISTORY: ams, fall Reason for Exam: confusion, fell FINDINGS: There are hypoventilatory changes in the lung bases. The lungs otherwise clear. There is no evidence for any pulmonary infiltrates, CHF or pneumothorax. The heart appears unremarkable.   Pedicle screws

## 2023-10-17 PROBLEM — R19.5 OCCULT BLOOD POSITIVE STOOL: Status: ACTIVE | Noted: 2023-10-17

## 2023-10-17 PROBLEM — R00.1 BRADYCARDIA: Status: ACTIVE | Noted: 2023-10-17

## 2023-10-17 LAB
ANION GAP SERPL CALCULATED.3IONS-SCNC: 7 MMOL/L (ref 9–17)
BASOPHILS # BLD: 0.13 K/UL (ref 0–0.2)
BASOPHILS NFR BLD: 2 % (ref 0–2)
BUN SERPL-MCNC: 12 MG/DL (ref 8–23)
CALCIUM SERPL-MCNC: 8.2 MG/DL (ref 8.6–10.4)
CHLORIDE SERPL-SCNC: 108 MMOL/L (ref 98–107)
CO2 SERPL-SCNC: 24 MMOL/L (ref 20–31)
CREAT SERPL-MCNC: 0.7 MG/DL (ref 0.5–0.9)
EKG ATRIAL RATE: 74 BPM
EKG P AXIS: 50 DEGREES
EKG P-R INTERVAL: 176 MS
EKG Q-T INTERVAL: 398 MS
EKG QRS DURATION: 72 MS
EKG QTC CALCULATION (BAZETT): 441 MS
EKG R AXIS: 58 DEGREES
EKG T AXIS: 65 DEGREES
EKG VENTRICULAR RATE: 74 BPM
EOSINOPHIL # BLD: 0.2 K/UL (ref 0–0.4)
EOSINOPHILS RELATIVE PERCENT: 3 % (ref 0–4)
ERYTHROCYTE [DISTWIDTH] IN BLOOD BY AUTOMATED COUNT: 23.5 % (ref 11.5–14.9)
GFR SERPL CREATININE-BSD FRML MDRD: >60 ML/MIN/1.73M2
GLUCOSE BLD-MCNC: 148 MG/DL (ref 65–105)
GLUCOSE SERPL-MCNC: 160 MG/DL (ref 70–99)
HCT VFR BLD AUTO: 23.3 % (ref 36–46)
HCT VFR BLD AUTO: 28.4 % (ref 36–46)
HGB BLD-MCNC: 6.8 G/DL (ref 12–16)
HGB BLD-MCNC: 8.8 G/DL (ref 12–16)
LYMPHOCYTES NFR BLD: 1.78 K/UL (ref 1–4.8)
LYMPHOCYTES RELATIVE PERCENT: 27 % (ref 24–44)
MCH RBC QN AUTO: 21.3 PG (ref 26–34)
MCHC RBC AUTO-ENTMCNC: 29.2 G/DL (ref 31–37)
MCV RBC AUTO: 73.1 FL (ref 80–100)
MONOCYTES NFR BLD: 0.79 K/UL (ref 0.1–1.3)
MONOCYTES NFR BLD: 12 % (ref 1–7)
MORPHOLOGY: ABNORMAL
NEUTROPHILS NFR BLD: 56 % (ref 36–66)
NEUTS SEG NFR BLD: 3.7 K/UL (ref 1.3–9.1)
PLATELET # BLD AUTO: 455 K/UL (ref 150–450)
PMV BLD AUTO: 7.6 FL (ref 6–12)
POTASSIUM SERPL-SCNC: 3.8 MMOL/L (ref 3.7–5.3)
RBC # BLD AUTO: 3.18 M/UL (ref 4–5.2)
REASON FOR REJECTION: NORMAL
SODIUM SERPL-SCNC: 139 MMOL/L (ref 135–144)
SPECIMEN SOURCE: NORMAL
WBC OTHER # BLD: 6.6 K/UL (ref 3.5–11)
ZZ NTE CLEAN UP: ORDERED TEST: NORMAL

## 2023-10-17 PROCEDURE — 30233N1 TRANSFUSION OF NONAUTOLOGOUS RED BLOOD CELLS INTO PERIPHERAL VEIN, PERCUTANEOUS APPROACH: ICD-10-PCS | Performed by: INTERNAL MEDICINE

## 2023-10-17 PROCEDURE — 36415 COLL VENOUS BLD VENIPUNCTURE: CPT

## 2023-10-17 PROCEDURE — 2580000003 HC RX 258

## 2023-10-17 PROCEDURE — 36430 TRANSFUSION BLD/BLD COMPNT: CPT

## 2023-10-17 PROCEDURE — 2060000000 HC ICU INTERMEDIATE R&B

## 2023-10-17 PROCEDURE — 6360000002 HC RX W HCPCS

## 2023-10-17 PROCEDURE — 99223 1ST HOSP IP/OBS HIGH 75: CPT | Performed by: INTERNAL MEDICINE

## 2023-10-17 PROCEDURE — 6370000000 HC RX 637 (ALT 250 FOR IP)

## 2023-10-17 PROCEDURE — P9016 RBC LEUKOCYTES REDUCED: HCPCS

## 2023-10-17 PROCEDURE — 82947 ASSAY GLUCOSE BLOOD QUANT: CPT

## 2023-10-17 PROCEDURE — C9113 INJ PANTOPRAZOLE SODIUM, VIA: HCPCS

## 2023-10-17 PROCEDURE — 85014 HEMATOCRIT: CPT

## 2023-10-17 PROCEDURE — 85025 COMPLETE CBC W/AUTO DIFF WBC: CPT

## 2023-10-17 PROCEDURE — 85018 HEMOGLOBIN: CPT

## 2023-10-17 PROCEDURE — 93010 ELECTROCARDIOGRAM REPORT: CPT | Performed by: INTERNAL MEDICINE

## 2023-10-17 PROCEDURE — 80048 BASIC METABOLIC PNL TOTAL CA: CPT

## 2023-10-17 RX ORDER — SODIUM CHLORIDE 9 MG/ML
INJECTION, SOLUTION INTRAVENOUS PRN
Status: DISCONTINUED | OUTPATIENT
Start: 2023-10-17 | End: 2023-10-18 | Stop reason: HOSPADM

## 2023-10-17 RX ADMIN — GABAPENTIN 200 MG: 100 CAPSULE ORAL at 09:44

## 2023-10-17 RX ADMIN — PANTOPRAZOLE SODIUM 8 MG/HR: 40 INJECTION, POWDER, FOR SOLUTION INTRAVENOUS at 01:35

## 2023-10-17 RX ADMIN — ATORVASTATIN CALCIUM 40 MG: 40 TABLET, FILM COATED ORAL at 09:44

## 2023-10-17 RX ADMIN — PANTOPRAZOLE SODIUM 8 MG/HR: 40 INJECTION, POWDER, FOR SOLUTION INTRAVENOUS at 13:24

## 2023-10-17 RX ADMIN — GABAPENTIN 300 MG: 300 CAPSULE ORAL at 19:49

## 2023-10-17 RX ADMIN — SODIUM CHLORIDE: 9 INJECTION, SOLUTION INTRAVENOUS at 00:49

## 2023-10-17 ASSESSMENT — ENCOUNTER SYMPTOMS
SHORTNESS OF BREATH: 0
APNEA: 0
COLOR CHANGE: 0
ANAL BLEEDING: 0
ABDOMINAL PAIN: 0
VOMITING: 0
BLOOD IN STOOL: 0
NAUSEA: 0
DIARRHEA: 0

## 2023-10-17 NOTE — PROGRESS NOTES
[address]    PROGRESS NOTE             10/17/2023    9:15 AM    Name:   Mirna Mcnair  MRN:     [id number]     Acct:      [de-identified]   Room:   2080/2080-01   Day:  1  Admit Date:  10/16/2023  1:23 PM    PCP:  Rambo Rodríguez MD  Code Status:  Full Code    Subjective:     C/C:   Chief Complaint   Patient presents with    Daisy Coley Felt advised to come to ED but do not know why    Hypotension     Pt was at f/u appt s/p hemorrhagic CVA with GI bleed and found to be hypotensive     Interval History Status: worsened. Patient seen and examined this morning at bedside. Hemoglobin 6.8 this morning, patient consented to blood transfusion. Patient denies any blood in stool or urine. Denies any nausea or vomiting. Patient was last bowel movement yesterday without blood. Brief History:     Patient is a 78-year-old female with past medical history of diabetes, hypertension, hyperlipidemia, stroke and upper GI bleed 10 days ago who presents to the ED on request of her PCP for hypotension at her hospital follow-up appointment. Patient was recently admitted to [facility] for right-sided weakness found to have left MCA infarct with petechial hemorrhage, medically managed with aspirin and Plavix. During her hospital stay she had echo which showed good ejection fraction, no clot and no A-fib. During her admission patient hemoglobin dropped to 6, requiring 2 times packed RBCs, EGD showed upper gastritis and esophageal ulcer. She was discharged on 21 days Plavix, aspirin, PPI and statin. At her follow-up appointment with her PCP today patient reported dark stool after starting her iron supplement and endorsed dizziness, BP at office was 80/40. She was advised to come to the ED for further evaluation. In ED patient had a blood pressure 81/70, HR 73, saturating 100 percent on room air.   CMP unremarkable, hemoglobin 8.5 infiltrates, CHF or pneumothorax. The heart appears unremarkable. Pedicle screws and connecting rods are noted in the lower thoracic spine. Bony structures are otherwise unremarkable. Mild hypoventilatory changes in the lung bases. Examination otherwise unremarkable. Physical Examination:        Physical Exam  Constitutional:       General: She is not in acute distress. Cardiovascular:      Rate and Rhythm: Normal rate and regular rhythm. Pulses: Normal pulses. Pulmonary:      Effort: Pulmonary effort is normal.   Abdominal:      General: Bowel sounds are normal. There is no distension. Palpations: Abdomen is soft. Tenderness: There is no abdominal tenderness. Skin:     General: Skin is warm. Neurological:      Mental Status: She is alert. Mental status is at baseline.    Psychiatric:         Mood and Affect: Mood normal.           Assessment:        Primary Problem  Hypotension    Active Hospital Problems    Diagnosis Date Noted    Hypotension [I95.9] 10/16/2023       Plan:        # Acute anemia likely 2/2 upper GI bleed  - Hgb 6.8 today 10/17 - 1 unit pRBC transfusion  - FOBT positive  - GI consult, appreciate recommendations  - EGD 10/9 showed 10 mm ulcer at GE junction, likely pill esophagitis no stigmata of bleeding  - iron studies 10/10 showed low iron and iron saturation   - ferrous sulfate 325 mg at home  - protonix infusion     # Recent Left MCA infarct with petechial hemorrhage on 10/6/23  - No new subjective neurologic symptoms, no FND on exam   - asa, statin, plavix  - IGOR and loop recorder placed by Dr. Philip nixon 10/10 to r/o cardioembolic event     # HTN   - lopressor 25 mg - holding d/t low BP     # DM2  - on 30U daily at home, hold while NPO  - poct glucose   - hypoglycemia protocol     DVT Prophylaxis: lovenox  GI Prophylaxis: protonix  Diet: npo  Dispo: home  PT/OT/SW: consulted      Haider Perez MD  10/17/2023  9:15 AM      Attending Physician Statement  I

## 2023-10-17 NOTE — FLOWSHEET NOTE
10/17/23 1331   Treatment Team Notification   Reason for Communication Review case   Name of Team Member Notified Wilber Toure NP   Treatment Team Role Attending Provider   Method of Communication Secure Message   Response Waiting for response   Notification Time [phone]     Patient inquiring about diet. New orders for soft diet, possibly NPO at [facility] for procedure.

## 2023-10-17 NOTE — FLOWSHEET NOTE
10/17/23 0802   Treatment Team Notification   Reason for Communication Critical results   Type of Critical Result Laboratory   Critical Lab Result Type Hemoglobin and hematocrit   Name of Team Member Notified Dr. Lata Villalta Team Role Attending Provider   Method of Communication Secure Message   Response Waiting for response   Notification Time Gabby Day- Dr. Indira Clifford will see patient. No new orders at this time.

## 2023-10-17 NOTE — CARE COORDINATION
Case Management Assessment  Initial Evaluation    Date/Time of Evaluation: 10/17/2023 12:35 PM  Assessment Completed by: Cecelia Quiles RN    If patient is discharged prior to next notation, then this note serves as note for discharge by case management. Patient Name: Denys Magaña                   YOB: 1945  Diagnosis: Bradycardia [R00.1]  Hypotension [I95.9]  Occult blood positive stool [R19.5]  Hypotension, unspecified hypotension type [I95.9]                   Date / Time: 10/16/2023  1:23 PM    Patient Admission Status: Inpatient   Readmission Risk (Low < 19, Mod (19-27), High > 27): Readmission Risk Score: 22.8    Current PCP: Keven Merrill MD  PCP verified by ? Yes    Chart Reviewed: Yes      History Provided by: Patient, Significant Other  Patient Orientation: Alert and Oriented ([place])    Patient Cognition:      Hospitalization in the last 30 days (Readmission):  No    If yes, Readmission Assessment in  Navigator will be completed. Advance Directives:      Code Status: Full Code   Patient's Primary Decision Maker is:      Primary Decision Maker: Nicko Rodas - Spouse - [phone]    Discharge Planning:    Patient lives with: Spouse/Significant Other Type of Home: House  Primary Care Giver: Self  Patient Support Systems include: Spouse/Significant Other   Current Financial resources: Medicare  Current community resources: ECF/Home Care (Current w/ [facility])  Current services prior to admission: Durable Medical Equipment, Home Care (Current w/[facility])            Current DME: Shower Chair, [place], [place], Other (Comment) ([place])            Type of Home Care services:  PT, OT    ADLS  Prior functional level: Independent in ADLs/IADLs  Current functional level: Independent in ADLs/IADLs    PT AM-PAC:   /24  OT AM-PAC:   /24    Family can provide assistance at DC:  Yes  Would you like Case Management to discuss the discharge plan with any other family members/significant others, and if

## 2023-10-17 NOTE — CONSENT
Informed Consent for Blood Component Transfusion Note    I have discussed with the patient the rationale for blood component transfusion; its benefits in treating or preventing fatigue, organ damage, or death; and its risk which includes mild transfusion reactions, rare risk of blood borne infection, or more serious but rare reactions. I have discussed the alternatives to transfusion, including the risk and consequences of not receiving transfusion. The patient had an opportunity to ask questions and had agreed to proceed with transfusion of blood components.     Electronically signed by Rachel Dangelo MD on 10/17/23 at 8:13 AM EDT

## 2023-10-17 NOTE — PLAN OF CARE
Problem: Discharge Planning  Goal: Discharge to home or other facility with appropriate resources  Outcome: Progressing     Problem: ABCDS Injury Assessment  Goal: Absence of physical injury  Outcome: Progressing     Problem: Safety - Adult  Goal: Free from fall injury  10/17/2023 1806 by Ronna Rodas RN  Outcome: Progressing     Problem: Neurosensory - Adult  Goal: Achieves stable or improved neurological status  10/17/2023 1806 by Ronna Rodas RN  Outcome: Progressing

## 2023-10-17 NOTE — DISCHARGE INSTR - COC
Continuity of Care Form    Patient Name: Salvatore Yu   :  1945  MRN:  [id number]    Admit date:  10/16/2023  Discharge date:  10/18/2023    Code Status Order: Full Code   Advance Directives:     Admitting Physician:  Mario Sahu MD  PCP: Yasmany Parnell MD    Discharging Nurse: Debbie Jeffries Unit/Room#: /-  Discharging Unit Phone Number: [phone]    Emergency Contact:   Extended Emergency Contact Information  Primary Emergency Contact: Nicko Rodas  Address: [address] [place] [address] [place] Phone: [phone]  Mobile Phone: [phone]  Relation: Spouse  Hearing or visual needs: None  Other needs: None  Preferred language: English   needed? No  Secondary Emergency Contact: Stella Soler  Address: [address] [place] [address] [place] Phone: [phone]  Mobile Phone: [phone]  Relation: Child  Hearing or visual needs: None  Other needs: None  Preferred language: English   needed?  No    Past Surgical History:  Past Surgical History:   Procedure Laterality Date    BACK SURGERY      X3    BREAST BIOPSY  2007    Lt     CARPAL TUNNEL RELEASE Right     CATARACT REMOVAL WITH IMPLANT Bilateral     COLONOSCOPY  2006    COLONOSCOPY  2015    diverticulosis    CYSTOSCOPY  2014    CYSTOSCOPY N/A 2017    CYSTOSCOPY URODYNAMICS AND DILATION  performed by Carmen Flanagan MD at [address]  10/09/2023    HAND SURGERY Bilateral     thumbs reconstruction    HIP ARTHROPLASTY Bilateral     JOINT REPLACEMENT Bilateral     LUMBAR FUSION  10/27/2015    UPPER GASTROINTESTINAL ENDOSCOPY N/A 10/9/2023    EGD ESOPHAGOGASTRODUODENOSCOPY performed by Hai Srivastava MD at [address]       Immunization History:   Immunization History   Administered Date(s) Administered    COVID-19, MODERNA BLUE border, Primary or Immunocompromised, (age 12y+), IM, 79 patient):  Hearing Aides bilateral    RN SIGNATURE:  Electronically signed by Deya Saleem RN on 10/18/23 at 5:17 PM EDT    CASE MANAGEMENT/SOCIAL WORK SECTION    Inpatient Status Date: ***    Readmission Risk Assessment Score:  Readmission Risk              Risk of Unplanned Readmission:  18           Discharging to Facility/ [address] [facility] [address]  [address] [facility] [address]  Phone [phone]  Fax  [phone]     Dialysis Facility (if applicable)   Name:  Address:  Dialysis Schedule:  Phone:  Fax:    / signature: Electronically signed by Kathleen Rose RN on 10/17/23 at 12:42 PM EDT    PHYSICIAN SECTION    Prognosis: {Prognosis:9117409376}    Condition at Discharge: [address] Patient Condition:978865154}    Rehab Potential (if transferring to Rehab): {Prognosis:5964657956}    Recommended Labs or Other Treatments After Discharge: ***    Physician Certification: I certify the above information and transfer of Lesa Lindquist  is necessary for the continuing treatment of the diagnosis listed and that she requires {Admit to Appropriate Level of Care:91417} for {GREATER/LESS:044711387} 30 days.      Update Admission H&P: {CHP DME Changes in NJTUV:801010849}    PHYSICIAN SIGNATURE:  {Esignature:437720195}

## 2023-10-17 NOTE — ACP (ADVANCE CARE PLANNING)
Advance Care Planning     Advance Care Planning Activator (Inpatient)  Conversation Note      Date of ACP Conversation: 10/17/2023     Conversation Conducted with: Patient with Decision Making Capacity    ACP Activator: Lane Guallpa RN        Health Care Decision Maker:     Current Designated Health Care Decision Maker:     Primary Decision Maker: Chun Garcia - [phone]        Care Preferences    Ventilation: \"If you were in your present state of health and suddenly became very ill and were unable to breathe on your own, what would your preference be about the use of a ventilator (breathing machine) if it were available to you? \"      Would the patient desire the use of ventilator (breathing machine)?: yes    \"If your health worsens and it becomes clear that your chance of recovery is unlikely, what would your preference be about the use of a ventilator (breathing machine) if it were available to you? \"     Would the patient desire the use of ventilator (breathing machine)?: \"I Don't Know\"      Resuscitation  \"CPR works best to restart the heart when there is a sudden event, like a heart attack, in someone who is otherwise healthy. Unfortunately, CPR does not typically restart the heart for people who have serious health conditions or who are very sick. \"    \"In the event your heart stopped as a result of an underlying serious health condition, would you want attempts to be made to restart your heart (answer \"yes\" for attempt to resuscitate) or would you prefer a natural death (answer \"no\" for do not attempt to resuscitate)? \" yes       [] Yes   [] No   Educated Patient / Guillermo Sails regarding differences between Advance Directives and portable DNR orders.     Length of ACP Conversation in minutes:      Conversation Outcomes:  ACP discussion completed    Follow-up plan:    [] Schedule follow-up conversation to continue planning  [] Referred individual to Provider for additional questions/concerns

## 2023-10-17 NOTE — PLAN OF CARE
Problem: Safety - Adult  Goal: Free from fall injury  10/17/2023 0410 by Des Bae RN  Outcome: Progressing  Note: No falls noted this shift. Patient ambulates with x1 staff assistance without difficulty. Bed kept in low position. Safe environment maintained. Bedside table & call light in reach. Uses call light appropriately when needing assistance. Problem: Neurosensory - Adult  Goal: Achieves stable or improved neurological status  Outcome: Progressing     Problem: Skin/Tissue Integrity  Goal: Absence of new skin breakdown  Description: 1. Monitor for areas of redness and/or skin breakdown  2. Assess vascular access sites hourly  3. Every 4-6 hours minimum:  Change oxygen saturation probe site  4. Every 4-6 hours:  If on nasal continuous positive airway pressure, respiratory therapy assess nares and determine need for appliance change or resting period. 10/17/2023 0410 by Des Bae RN  Outcome: Progressing     Problem: Hematologic - Adult  Goal: Maintains hematologic stability  10/17/2023 0410 by Des Bae RN  Outcome: Progressing     Problem: Pain  Goal: Verbalizes/displays adequate comfort level or baseline comfort level  10/17/2023 0410 by Des Bae RN  Outcome: Progressing  Note: No pain at this time. 0/10 pain scale.

## 2023-10-17 NOTE — CONSULTS
GI Consult Note:    Name: Monica Dumont  MRN: [id number]     [address]: [de-identified]  Room: 2080/2080-01    Admit Date: 10/16/2023  PCP: Yuko Ferrara MD    Physician Requesting Consult: Jluis Castro MD     Reason for Consult:    Anemia  Melena?   History for esophageal ulcer  Weakness and fatigue    Chief Complaint:     Chief Complaint   Patient presents with    OTHER     States Dr. Bogdan Manuel advised to come to ED but do not know why    Hypotension     Pt was at f/u appt s/p hemorrhagic CVA with GI bleed and found to be hypotensive       History Obtained From:     Patient her  at bedside and EMR    History of Present Illness:      Monica Dumont is a  77 y.o.  female who presents with OTHER (States Dr. Bogdan Manuel advised to come to ED but do not know why) and Hypotension (Pt was at f/u appt s/p hemorrhagic CVA with GI bleed and found to be hypotensive)    This pleasant 78-year-old  female was evaluated accompanied by her  in the room  She history significant of multiple chronic medical issues  Has recent stroke has been taking blood thinners  She was noticed to have low blood pressure in the doctor's office subsequently was sent to the emergency room her blood pressure in ER was normal however she was noticed to have low hemoglobin subsequently dropped today she gives history for esophageal ulcer had endoscopy done at [facility]  She has been on Plavix and aspirin  The last dose of Plavix was Sunday  Currently denies any significant abdominal pain  Denies any rectal bleeding or melanotic stool  Denies any nausea and vomiting  Previous records were reviewed with her and her   Symptoms:  Onset:  Location:  abdomen  Duration:  day(s)  Severity:  mild  Quality:  intermittent      Past Medical History:     Past Medical History:   Diagnosis Date    Anxiety state, unspecified     Arthritis     Cataracts, bilateral     COVID-19 vaccine administered 2- & 3- Brigida Vaughn MD   Insulin Pen Needle 32G X 4 MM MISC 1 each by Does not apply route daily 9/8/23   Soumya Ambriz MD   atorvastatin (LIPITOR) 40 MG tablet TAKE 1 TABLET BY MOUTH DAILY 8/11/23   Melene Carrel, MD   Continuous Blood Gluc Sensor (FREESTYLE WARREN 2 SENSOR) MISC 1 Units by Does not apply route 3 times daily  Patient not taking: Reported on 10/16/2023 7/3/23   Leslie Jensen MD   Continuous Blood Gluc Sensor (FREESTYLE WARREN 3 SENSOR) MISC 1 Units by Does not apply route in the morning, at noon, and at bedtime  Patient not taking: Reported on 10/16/2023 6/29/23   Soumya Ambriz MD   Continuous Blood Gluc  (FREESTYLE WARREN 2 READER) BRIDGET 1 Units by Does not apply route in the morning, at noon, and at bedtime  Patient not taking: Reported on 10/16/2023 6/29/23   Soumya Ambriz MD   metoprolol tartrate (LOPRESSOR) 25 MG tablet TAKE 1 TABLET BY MOUTH  TWICE DAILY 5/8/23   Mallory Almazan MD   DULoxetine (CYMBALTA) 60 MG extended release capsule Take 1 capsule by mouth daily 4/11/23   JAMES Chin - CNP   insulin glargine (LANTUS SOLOSTAR) 100 UNIT/ML injection pen Inject 30 Units into the skin daily 4/3/23 10/16/23  Soumya Ambriz MD   blood glucose test strips (ONETOUCH ULTRA) strip USE 1 STRIP TO TEST 4 TIMES DAILY 3/23/23   Soumya Ambriz MD   metFORMIN (GLUCOPHAGE) 1000 MG tablet Take 1 tablet by mouth 2 times daily (with meals) 3/23/23 10/16/23  Soumya Ambriz MD   Spacer/Aero-Holding Ирина Stuart BRIDGET 1 Device by Does not apply route daily 10/27/21 7/26/22  Shawn Pascal MD   blood glucose monitor kit and supplies 1 kit by Other route three times daily Dispense One Touch Glucose Meter. 7/30/21   Soumya Ambriz MD   blood glucose monitor strips Test four  times a day & as needed for symptoms of irregular blood glucose. Dispense sufficient amount for indicated testing frequency plus additional to accommodate PRN testing needs.  7/23/20

## 2023-10-18 ENCOUNTER — ANESTHESIA EVENT (OUTPATIENT)
Dept: ENDOSCOPY | Age: 78
DRG: 378 | End: 2023-10-18
Payer: MEDICARE

## 2023-10-18 ENCOUNTER — ANESTHESIA (OUTPATIENT)
Dept: ENDOSCOPY | Age: 78
DRG: 378 | End: 2023-10-18
Payer: MEDICARE

## 2023-10-18 VITALS
TEMPERATURE: 98.9 F | HEIGHT: 67 IN | SYSTOLIC BLOOD PRESSURE: 157 MMHG | HEART RATE: 104 BPM | RESPIRATION RATE: 16 BRPM | OXYGEN SATURATION: 95 % | BODY MASS INDEX: 21.5 KG/M2 | DIASTOLIC BLOOD PRESSURE: 73 MMHG | WEIGHT: 137 LBS

## 2023-10-18 DIAGNOSIS — E11.40 TYPE 2 DIABETES MELLITUS WITH DIABETIC NEUROPATHY, WITH LONG-TERM CURRENT USE OF INSULIN (HCC): ICD-10-CM

## 2023-10-18 DIAGNOSIS — Z79.4 TYPE 2 DIABETES MELLITUS WITH DIABETIC NEUROPATHY, WITH LONG-TERM CURRENT USE OF INSULIN (HCC): ICD-10-CM

## 2023-10-18 PROBLEM — R41.0 EPISODIC CONFUSION: Status: ACTIVE | Noted: 2023-10-18

## 2023-10-18 LAB
ABO/RH: NORMAL
ANION GAP SERPL CALCULATED.3IONS-SCNC: 8 MMOL/L (ref 9–17)
ANTIBODY SCREEN: NEGATIVE
ARM BAND NUMBER: NORMAL
BASOPHILS # BLD: 0 K/UL (ref 0–0.2)
BASOPHILS NFR BLD: 0 % (ref 0–2)
BLOOD BANK BLOOD PRODUCT EXPIRATION DATE: NORMAL
BLOOD BANK DISPENSE STATUS: NORMAL
BLOOD BANK ISBT PRODUCT BLOOD TYPE: 9500
BLOOD BANK PRODUCT CODE: NORMAL
BLOOD BANK SAMPLE EXPIRATION: NORMAL
BLOOD BANK UNIT TYPE AND RH: NORMAL
BPU ID: NORMAL
BUN SERPL-MCNC: 5 MG/DL (ref 8–23)
CALCIUM SERPL-MCNC: 8.4 MG/DL (ref 8.6–10.4)
CHLORIDE SERPL-SCNC: 109 MMOL/L (ref 98–107)
CO2 SERPL-SCNC: 26 MMOL/L (ref 20–31)
COMPONENT: NORMAL
CREAT SERPL-MCNC: 0.4 MG/DL (ref 0.5–0.9)
CROSSMATCH RESULT: NORMAL
EOSINOPHIL # BLD: 0.13 K/UL (ref 0–0.4)
EOSINOPHILS RELATIVE PERCENT: 2 % (ref 0–4)
ERYTHROCYTE [DISTWIDTH] IN BLOOD BY AUTOMATED COUNT: 24 % (ref 11.5–14.9)
GFR SERPL CREATININE-BSD FRML MDRD: >60 ML/MIN/1.73M2
GLUCOSE BLD-MCNC: 220 MG/DL (ref 65–105)
GLUCOSE SERPL-MCNC: 213 MG/DL (ref 70–99)
HCT VFR BLD AUTO: 27.9 % (ref 36–46)
HGB BLD-MCNC: 8.8 G/DL (ref 12–16)
LYMPHOCYTES NFR BLD: 2.67 K/UL (ref 1–4.8)
LYMPHOCYTES RELATIVE PERCENT: 41 % (ref 24–44)
MAGNESIUM SERPL-MCNC: 1.5 MG/DL (ref 1.6–2.6)
MCH RBC QN AUTO: 23.6 PG (ref 26–34)
MCHC RBC AUTO-ENTMCNC: 31.4 G/DL (ref 31–37)
MCV RBC AUTO: 75.1 FL (ref 80–100)
MONOCYTES NFR BLD: 0.26 K/UL (ref 0.1–1.3)
MONOCYTES NFR BLD: 4 % (ref 1–7)
MORPHOLOGY: ABNORMAL
NEUTROPHILS NFR BLD: 53 % (ref 36–66)
NEUTS SEG NFR BLD: 3.44 K/UL (ref 1.3–9.1)
PLATELET # BLD AUTO: 441 K/UL (ref 150–450)
PMV BLD AUTO: 7.1 FL (ref 6–12)
POTASSIUM SERPL-SCNC: 3.5 MMOL/L (ref 3.7–5.3)
RBC # BLD AUTO: 3.71 M/UL (ref 4–5.2)
SODIUM SERPL-SCNC: 143 MMOL/L (ref 135–144)
TRANSFUSION STATUS: NORMAL
UNIT DIVISION: 0
UNIT ISSUE DATE/TIME: NORMAL
WBC OTHER # BLD: 6.5 K/UL (ref 3.5–11)

## 2023-10-18 PROCEDURE — 6360000002 HC RX W HCPCS: Performed by: NURSE ANESTHETIST, CERTIFIED REGISTERED

## 2023-10-18 PROCEDURE — 6360000002 HC RX W HCPCS

## 2023-10-18 PROCEDURE — 6360000002 HC RX W HCPCS: Performed by: INTERNAL MEDICINE

## 2023-10-18 PROCEDURE — 2709999900 HC NON-CHARGEABLE SUPPLY: Performed by: INTERNAL MEDICINE

## 2023-10-18 PROCEDURE — 2500000003 HC RX 250 WO HCPCS: Performed by: NURSE PRACTITIONER

## 2023-10-18 PROCEDURE — 2580000003 HC RX 258

## 2023-10-18 PROCEDURE — 7100000000 HC PACU RECOVERY - FIRST 15 MIN: Performed by: INTERNAL MEDICINE

## 2023-10-18 PROCEDURE — 82947 ASSAY GLUCOSE BLOOD QUANT: CPT

## 2023-10-18 PROCEDURE — 85025 COMPLETE CBC W/AUTO DIFF WBC: CPT

## 2023-10-18 PROCEDURE — 0DB38ZX EXCISION OF LOWER ESOPHAGUS, VIA NATURAL OR ARTIFICIAL OPENING ENDOSCOPIC, DIAGNOSTIC: ICD-10-PCS | Performed by: INTERNAL MEDICINE

## 2023-10-18 PROCEDURE — 2500000003 HC RX 250 WO HCPCS: Performed by: NURSE ANESTHETIST, CERTIFIED REGISTERED

## 2023-10-18 PROCEDURE — C9113 INJ PANTOPRAZOLE SODIUM, VIA: HCPCS

## 2023-10-18 PROCEDURE — 83735 ASSAY OF MAGNESIUM: CPT

## 2023-10-18 PROCEDURE — 3609012400 HC EGD TRANSORAL BIOPSY SINGLE/MULTIPLE: Performed by: INTERNAL MEDICINE

## 2023-10-18 PROCEDURE — 7100000001 HC PACU RECOVERY - ADDTL 15 MIN: Performed by: INTERNAL MEDICINE

## 2023-10-18 PROCEDURE — 36415 COLL VENOUS BLD VENIPUNCTURE: CPT

## 2023-10-18 PROCEDURE — 80048 BASIC METABOLIC PNL TOTAL CA: CPT

## 2023-10-18 PROCEDURE — 2580000003 HC RX 258: Performed by: NURSE ANESTHETIST, CERTIFIED REGISTERED

## 2023-10-18 PROCEDURE — 99232 SBSQ HOSP IP/OBS MODERATE 35: CPT | Performed by: INTERNAL MEDICINE

## 2023-10-18 PROCEDURE — 88305 TISSUE EXAM BY PATHOLOGIST: CPT

## 2023-10-18 PROCEDURE — 3700000000 HC ANESTHESIA ATTENDED CARE: Performed by: INTERNAL MEDICINE

## 2023-10-18 PROCEDURE — 3700000001 HC ADD 15 MINUTES (ANESTHESIA): Performed by: INTERNAL MEDICINE

## 2023-10-18 RX ORDER — LIDOCAINE HYDROCHLORIDE 10 MG/ML
INJECTION, SOLUTION EPIDURAL; INFILTRATION; INTRACAUDAL; PERINEURAL PRN
Status: DISCONTINUED | OUTPATIENT
Start: 2023-10-18 | End: 2023-10-18 | Stop reason: SDUPTHER

## 2023-10-18 RX ORDER — FENTANYL CITRATE 0.05 MG/ML
50 INJECTION, SOLUTION INTRAMUSCULAR; INTRAVENOUS EVERY 5 MIN PRN
Status: DISCONTINUED | OUTPATIENT
Start: 2023-10-18 | End: 2023-10-18 | Stop reason: HOSPADM

## 2023-10-18 RX ORDER — POTASSIUM CHLORIDE 20 MEQ/1
40 TABLET, EXTENDED RELEASE ORAL ONCE
Status: DISCONTINUED | OUTPATIENT
Start: 2023-10-18 | End: 2023-10-18 | Stop reason: HOSPADM

## 2023-10-18 RX ORDER — SODIUM CHLORIDE 9 MG/ML
INJECTION, SOLUTION INTRAVENOUS PRN
Status: DISCONTINUED | OUTPATIENT
Start: 2023-10-18 | End: 2023-10-18 | Stop reason: HOSPADM

## 2023-10-18 RX ORDER — SODIUM CHLORIDE 0.9 % (FLUSH) 0.9 %
5-40 SYRINGE (ML) INJECTION EVERY 12 HOURS SCHEDULED
Status: DISCONTINUED | OUTPATIENT
Start: 2023-10-18 | End: 2023-10-18 | Stop reason: HOSPADM

## 2023-10-18 RX ORDER — DIPHENHYDRAMINE HYDROCHLORIDE 50 MG/ML
12.5 INJECTION INTRAMUSCULAR; INTRAVENOUS
Status: DISCONTINUED | OUTPATIENT
Start: 2023-10-18 | End: 2023-10-18 | Stop reason: HOSPADM

## 2023-10-18 RX ORDER — PROPOFOL 10 MG/ML
INJECTION, EMULSION INTRAVENOUS PRN
Status: DISCONTINUED | OUTPATIENT
Start: 2023-10-18 | End: 2023-10-18 | Stop reason: SDUPTHER

## 2023-10-18 RX ORDER — SODIUM CHLORIDE 0.9 % (FLUSH) 0.9 %
5-40 SYRINGE (ML) INJECTION PRN
Status: DISCONTINUED | OUTPATIENT
Start: 2023-10-18 | End: 2023-10-18 | Stop reason: HOSPADM

## 2023-10-18 RX ORDER — FENTANYL CITRATE 0.05 MG/ML
25 INJECTION, SOLUTION INTRAMUSCULAR; INTRAVENOUS EVERY 5 MIN PRN
Status: DISCONTINUED | OUTPATIENT
Start: 2023-10-18 | End: 2023-10-18 | Stop reason: HOSPADM

## 2023-10-18 RX ORDER — INSULIN GLARGINE 100 [IU]/ML
INJECTION, SOLUTION SUBCUTANEOUS
Qty: 30 ML | Refills: 2 | Status: SHIPPED | OUTPATIENT
Start: 2023-10-18

## 2023-10-18 RX ORDER — SODIUM CHLORIDE 9 MG/ML
INJECTION, SOLUTION INTRAVENOUS CONTINUOUS PRN
Status: DISCONTINUED | OUTPATIENT
Start: 2023-10-18 | End: 2023-10-18 | Stop reason: SDUPTHER

## 2023-10-18 RX ORDER — ONDANSETRON 2 MG/ML
4 INJECTION INTRAMUSCULAR; INTRAVENOUS
Status: DISCONTINUED | OUTPATIENT
Start: 2023-10-18 | End: 2023-10-18 | Stop reason: HOSPADM

## 2023-10-18 RX ORDER — MAGNESIUM SULFATE 1 G/100ML
1000 INJECTION INTRAVENOUS PRN
Status: DISCONTINUED | OUTPATIENT
Start: 2023-10-18 | End: 2023-10-18 | Stop reason: HOSPADM

## 2023-10-18 RX ORDER — METOPROLOL TARTRATE 5 MG/5ML
2.5 INJECTION INTRAVENOUS EVERY 6 HOURS PRN
Status: DISCONTINUED | OUTPATIENT
Start: 2023-10-18 | End: 2023-10-18 | Stop reason: HOSPADM

## 2023-10-18 RX ORDER — ONDANSETRON 2 MG/ML
INJECTION INTRAMUSCULAR; INTRAVENOUS PRN
Status: DISCONTINUED | OUTPATIENT
Start: 2023-10-18 | End: 2023-10-18 | Stop reason: SDUPTHER

## 2023-10-18 RX ADMIN — SODIUM CHLORIDE: 9 INJECTION, SOLUTION INTRAVENOUS at 14:01

## 2023-10-18 RX ADMIN — MAGNESIUM SULFATE HEPTAHYDRATE 1000 MG: 1 INJECTION, SOLUTION INTRAVENOUS at 09:59

## 2023-10-18 RX ADMIN — PANTOPRAZOLE SODIUM 8 MG/HR: 40 INJECTION, POWDER, FOR SOLUTION INTRAVENOUS at 00:40

## 2023-10-18 RX ADMIN — PROPOFOL 50 MG: 10 INJECTION, EMULSION INTRAVENOUS at 14:48

## 2023-10-18 RX ADMIN — ONDANSETRON 4 MG: 2 INJECTION INTRAMUSCULAR; INTRAVENOUS at 14:54

## 2023-10-18 RX ADMIN — LIDOCAINE HYDROCHLORIDE 50 MG: 10 INJECTION, SOLUTION EPIDURAL; INFILTRATION; INTRACAUDAL; PERINEURAL at 14:45

## 2023-10-18 RX ADMIN — MAGNESIUM SULFATE HEPTAHYDRATE 1000 MG: 1 INJECTION, SOLUTION INTRAVENOUS at 11:12

## 2023-10-18 RX ADMIN — PROPOFOL 50 MG: 10 INJECTION, EMULSION INTRAVENOUS at 14:52

## 2023-10-18 RX ADMIN — METOPROLOL TARTRATE 2.5 MG: 5 INJECTION, SOLUTION INTRAVENOUS at 04:51

## 2023-10-18 RX ADMIN — PROPOFOL 50 MG: 10 INJECTION, EMULSION INTRAVENOUS at 14:45

## 2023-10-18 RX ADMIN — SODIUM CHLORIDE: 9 INJECTION, SOLUTION INTRAVENOUS at 14:42

## 2023-10-18 RX ADMIN — SODIUM CHLORIDE: 9 INJECTION, SOLUTION INTRAVENOUS at 00:39

## 2023-10-18 ASSESSMENT — ENCOUNTER SYMPTOMS
SHORTNESS OF BREATH: 1
NAUSEA: 0
SHORTNESS OF BREATH: 0
ANAL BLEEDING: 0
ABDOMINAL PAIN: 0
COLOR CHANGE: 0
DIARRHEA: 0
APNEA: 0
VOMITING: 0
BLOOD IN STOOL: 0

## 2023-10-18 ASSESSMENT — PAIN - FUNCTIONAL ASSESSMENT: PAIN_FUNCTIONAL_ASSESSMENT: 0-10

## 2023-10-18 NOTE — PLAN OF CARE
Problem: Discharge Planning  Goal: Discharge to home or other facility with appropriate resources  10/18/2023 0301 by Felix Mccall RN  Outcome: Progressing       Problem: ABCDS Injury Assessment  Goal: Absence of physical injury  10/18/2023 0301 by Felix Mccall RN  Outcome: Progressing    Problem: Safety - Adult  Goal: Free from fall injury  10/18/2023 0301 by Felix Mccall RN  Outcome: Progressing       Problem: Neurosensory - Adult  Goal: Achieves stable or improved neurological status  10/18/2023 0301 by Felix Mccall RN  Outcome: Progressing       Problem: Hematologic - Adult  Goal: Maintains hematologic stability  Outcome: Progressing     Problem: Skin/Tissue Integrity  Goal: Absence of new skin breakdown  Description: 1. Monitor for areas of redness and/or skin breakdown  2. Assess vascular access sites hourly  3. Every 4-6 hours minimum:  Change oxygen saturation probe site  4. Every 4-6 hours:  If on nasal continuous positive airway pressure, respiratory therapy assess nares and determine need for appliance change or resting period.   Outcome: Progressing     Problem: Pain  Goal: Verbalizes/displays adequate comfort level or baseline comfort level  Outcome: Progressing

## 2023-10-18 NOTE — DISCHARGE SUMMARY
[facility]    Discharge Summary     Patient ID: Eduardo Ryan  :  1945   MRN: [id number]     ACCOUNT:  [de-identified]   Patient's PCP: Colin Acuna MD  Admit Date: 10/16/2023   Discharge Date: 10/18/2023   Length of Stay: 2  Code Status:  Full Code  Admitting Physician: Elayne Al MD  Discharge Physician: Ana Laura Grace MD     Active Discharge Diagnoses:       Primary Problem  Hypotension      [address] Problems    Diagnosis Date Noted    Bradycardia [R00.1] 10/17/2023    Occult blood positive stool [R19.5] 10/17/2023    Hypotension [I95.9] 10/16/2023       Admission Condition:  poor     Discharged Condition: stable    Hospital Stay:       Hospital Course:         Patient is a 69-year-old female with past medical history of diabetes, hypertension, hyperlipidemia, stroke and upper GI bleed 10 days ago who presents to the ED on request of her PCP for hypotension at her hospital follow-up appointment. Patient was recently admitted to [facility] for right-sided weakness found to have left MCA infarct with petechial hemorrhage, medically managed with aspirin and Plavix. During her hospital stay she had echo which showed good ejection fraction, no clot and no A-fib. During her admission patient hemoglobin dropped to 6, requiring 2 times packed RBCs, EGD showed upper gastritis and esophageal ulcer. She was discharged on 21 days Plavix, aspirin, PPI and statin. At her follow-up appointment with her PCP today patient reported dark stool after starting her iron supplement and endorsed dizziness, BP at office was 80/40. She was advised to come to the ED for further evaluation. In ED patient had a blood pressure 81/70, HR 73, saturating 100 percent on room air. CMP unremarkable, hemoglobin 8.5 stable since last reading 2 days ago. FOBT positive.  Denies any vaginal bleeding, blood in her stools, vomiting, left frontal mass vs stroke TECHNOLOGIST PROVIDED HISTORY: aphasia- concern for left frontal mass vs stroke Reason for Exam: aphasia- concern for left frontal mass vs stroke FINDINGS: The examination is motion degraded. INTRACRANIAL STRUCTURES/VENTRICLES: 35 mm acute or subacute left frontal infarct is unchanged in size. There is associated petechial microhemorrhage throughout the infarct. There are additional smaller areas of acute infarction scattered throughout the left middle cerebral artery territory. No herniation or hydrocephalus. ORBITS: The visualized portion of the orbits demonstrate no acute abnormality. SINUSES: The visualized paranasal sinuses and mastoid air cells demonstrate no acute abnormality. BONES/SOFT TISSUES: The bone marrow signal intensity appears normal. The soft tissues demonstrate no acute abnormality. Acute left MCA territory infarcts. Petechial hemorrhage within the infarct in the left frontal lobe. CT Head W/O Contrast    Result Date: 10/5/2023  EXAMINATION: CTA OF THE HEAD AND NECK WITH CONTRAST; CT OF THE CERVICAL SPINE WITHOUT CONTRAST; CT OF THE HEAD WITHOUT CONTRAST 10/5/2023 11:14 am: TECHNIQUE: CTA of the head and neck was performed with the administration of intravenous contrast. Multiplanar reformatted images are provided for review. MIP images are provided for review. Stenosis of the internal carotid arteries measured using NASCET criteria. Automated exposure control, iterative reconstruction, and/or weight based adjustment of the mA/kV was utilized to reduce the radiation dose to as low as reasonably achievable.; CT of the cervical spine was performed without the administration of intravenous contrast. Multiplanar reformatted images are provided for review.  Automated exposure control, iterative reconstruction, and/or weight based adjustment of the mA/kV was utilized to reduce the radiation dose to as low as reasonably achievable.; CT of the head was performed utilized to reduce the radiation dose to as low as reasonably achievable. Noncontrast CT of the head with reconstructed 2-D images are also provided for review. COMPARISON: None. HISTORY: ORDERING SYSTEM PROVIDED HISTORY: ams FINDINGS: CT HEAD: BRAIN/VENTRICLES:  Evolving subacute cortical infarct within the left frontal lobe involving the left middle and inferior frontal gyri. No space-occupying parenchymal hemorrhage. There is no evidence of an intracranial mass or extraaxial fluid collection. No significant mass effect or midline shift. There is mild generalized volume loss. Ventricular enlargement concordant with the degree of parenchymal volume loss. ORBITS: The visualized portion of the orbits demonstrate no acute abnormality. SINUSES:  The visualized paranasal sinuses and mastoid air cells demonstrate no acute abnormality. Sequelae of chronic sinusitis within the left sphenoid sinus. SOFT TISSUES/SKULL: No acute abnormality of the visualized skull or soft tissues. CTA NECK: AORTIC ARCH/ARCH VESSELS: No dissection or arterial injury. No significant stenosis of the brachiocephalic or subclavian arteries. CAROTID ARTERIES: Fibrocalcific plaque within the left proximal cervical ICA contributing to approximately 40% stenosis per NASCET criteria. No significant stenosis within the right cervical ICA. VERTEBRAL ARTERIES: No dissection, arterial injury, or significant stenosis. SOFT TISSUES: The lung apices are clear. No cervical or superior mediastinal lymphadenopathy. The larynx and pharynx are unremarkable. No acute abnormality of the salivary and thyroid glands. BONES: No acute osseous abnormality of the cervical spine. Multilevel degenerative changes throughout the cervical spine, most pronounced C4-5. C7 intraosseous hemangioma. CTA HEAD: ANTERIOR CIRCULATION: No significant stenosis of the intracranial internal carotid, anterior cerebral, or middle cerebral arteries. No aneurysm.  POSTERIOR

## 2023-10-18 NOTE — PLAN OF CARE
Problem: Discharge Planning  Goal: Discharge to home or other facility with appropriate resources  10/18/2023 1632 by Katalina Alejandre RN  Outcome: Progressing     Problem: ABCDS Injury Assessment  Goal: Absence of physical injury  10/18/2023 1632 by Katalina Alejandre RN  Outcome: Progressing     Problem: Safety - Adult  Goal: Free from fall injury  10/18/2023 1632 by Katalina Alejandre RN  Outcome: Progressing     Problem: Neurosensory - Adult  Goal: Achieves stable or improved neurological status  10/18/2023 1632 by Katalina Alejandre RN  Outcome: Progressing     Problem: Hematologic - Adult  Goal: Maintains hematologic stability  10/18/2023 1632 by Katalina Alejandre RN  Outcome: Progressing

## 2023-10-18 NOTE — ANESTHESIA PRE PROCEDURE
Department of Anesthesiology  Preprocedure Note       Name:  Sara Peoples   Age:  77 y.o.  :  1945                                          MRN:  [id number]         Date:  10/18/2023      Surgeon: Jose Low):  Sujit Garduno MD    Procedure: Procedure(s):  EGD BIOPSY    Medications prior to admission:   Prior to Admission medications    Medication Sig Start Date End Date Taking? Authorizing Provider   pantoprazole (PROTONIX) 40 MG tablet Take 1 tablet by mouth every morning (before breakfast)  Patient not taking: Reported on 10/16/2023 10/16/23   Tiera Arshad MD   gabapentin (NEURONTIN) 100 MG capsule Take 2 capsules by mouth 2 times daily for 30 days. 10/11/23 11/10/23  Teri Hinton MD   gabapentin (NEURONTIN) 300 MG capsule Take 1 capsule by mouth nightly for 30 days.  10/11/23 11/10/23  Teri Hinton MD   ferrous sulfate (FE TABS 325) 325 (65 Fe) MG EC tablet Take 1 tablet by mouth daily (with breakfast) 10/11/23   Parmjit Goins MD   clopidogrel (PLAVIX) 75 MG tablet Take 1 tablet by mouth daily for 21 days 10/11/23 11/1/23  Parmjit Goins MD   Insulin Pen Needle 32G X 4 MM MISC 1 each by Does not apply route daily 23   Tiera Arshad MD   atorvastatin (LIPITOR) 40 MG tablet TAKE 1 TABLET BY MOUTH DAILY 23   Gail Steve MD   Continuous Blood Gluc Sensor (FREESTYLE WARREN 2 SENSOR) MISC 1 Units by Does not apply route 3 times daily  Patient not taking: Reported on 10/16/2023 7/3/23   Elizabeth Silverio MD   Continuous Blood Gluc Sensor (FREESTYLE WARREN 3 SENSOR) MISC 1 Units by Does not apply route in the morning, at noon, and at bedtime  Patient not taking: Reported on 10/16/2023 6/29/23   Tiera Arshad MD   Continuous Blood Gluc  (FREESTYLE WARREN 2 READER) BRIDGET 1 Units by Does not apply route in the morning, at noon, and at bedtime  Patient not taking: Reported on 10/16/2023 6/29/23   Tiera Arshad MD   metoprolol tartrate (LOPRESSOR)

## 2023-10-18 NOTE — PROGRESS NOTES
The visualized paranasal sinuses and mastoid air cells demonstrate no acute abnormality. BONES/SOFT TISSUES: The bone marrow signal intensity appears normal. The soft tissues demonstrate no acute abnormality. Acute left MCA territory infarcts. Petechial hemorrhage within the infarct in the left frontal lobe. CT Head W/O Contrast    Result Date: 10/5/2023  EXAMINATION: CTA OF THE HEAD AND NECK WITH CONTRAST; CT OF THE CERVICAL SPINE WITHOUT CONTRAST; CT OF THE HEAD WITHOUT CONTRAST 10/5/2023 11:14 am: TECHNIQUE: CTA of the head and neck was performed with the administration of intravenous contrast. Multiplanar reformatted images are provided for review. MIP images are provided for review. Stenosis of the internal carotid arteries measured using NASCET criteria. Automated exposure control, iterative reconstruction, and/or weight based adjustment of the mA/kV was utilized to reduce the radiation dose to as low as reasonably achievable.; CT of the cervical spine was performed without the administration of intravenous contrast. Multiplanar reformatted images are provided for review. Automated exposure control, iterative reconstruction, and/or weight based adjustment of the mA/kV was utilized to reduce the radiation dose to as low as reasonably achievable.; CT of the head was performed without the administration of intravenous contrast. Automated exposure control, iterative reconstruction, and/or weight based adjustment of the mA/kV was utilized to reduce the radiation dose to as low as reasonably achievable. Noncontrast CT of the head with reconstructed 2-D images are also provided for review. COMPARISON: None. HISTORY: ORDERING SYSTEM PROVIDED HISTORY: ams FINDINGS: CT HEAD: BRAIN/VENTRICLES:  Evolving subacute cortical infarct within the left frontal lobe involving the left middle and inferior frontal gyri. No space-occupying parenchymal hemorrhage.   There is no evidence of an intracranial mass or extraaxial parenchymal volume loss. ORBITS: The visualized portion of the orbits demonstrate no acute abnormality. SINUSES:  The visualized paranasal sinuses and mastoid air cells demonstrate no acute abnormality. Sequelae of chronic sinusitis within the left sphenoid sinus. SOFT TISSUES/SKULL: No acute abnormality of the visualized skull or soft tissues. CTA NECK: AORTIC ARCH/ARCH VESSELS: No dissection or arterial injury. No significant stenosis of the brachiocephalic or subclavian arteries. CAROTID ARTERIES: Fibrocalcific plaque within the left proximal cervical ICA contributing to approximately 40% stenosis per NASCET criteria. No significant stenosis within the right cervical ICA. VERTEBRAL ARTERIES: No dissection, arterial injury, or significant stenosis. SOFT TISSUES: The lung apices are clear. No cervical or superior mediastinal lymphadenopathy. The larynx and pharynx are unremarkable. No acute abnormality of the salivary and thyroid glands. BONES: No acute osseous abnormality of the cervical spine. Multilevel degenerative changes throughout the cervical spine, most pronounced C4-5. C7 intraosseous hemangioma. CTA HEAD: ANTERIOR CIRCULATION: No significant stenosis of the intracranial internal carotid, anterior cerebral, or middle cerebral arteries. No aneurysm. POSTERIOR CIRCULATION: No significant stenosis of the vertebral, basilar, or posterior cerebral arteries. No aneurysm. OTHER: No dural venous sinus thrombosis on this non-dedicated study. 1. Evolving subacute cortical infarct within the left frontal lobe involving the left middle and inferior frontal gyri. No space-occupying parenchymal hemorrhage. No significant mass effect. 2.  No flow limiting stenosis or occlusion within the head. 3. Fibrocalcific plaque within the left proximal cervical ICA contributing to approximately 40% stenosis per NASCET criteria. 4. No acute osseous abnormality of the cervical spine. 5. Chronic left sphenoid sinusitis. PROVIDED HISTORY: ams, fall TECHNOLOGIST PROVIDED HISTORY: ams, fall Reason for Exam: confusion, fell FINDINGS: There are hypoventilatory changes in the lung bases. The lungs otherwise clear. There is no evidence for any pulmonary infiltrates, CHF or pneumothorax. The heart appears unremarkable. Pedicle screws and connecting rods are noted in the lower thoracic spine. Bony structures are otherwise unremarkable. Mild hypoventilatory changes in the lung bases. Examination otherwise unremarkable. Physical Examination:        Physical Exam  Constitutional:       General: She is not in acute distress. Cardiovascular:      Rate and Rhythm: Normal rate and regular rhythm. Pulses: Normal pulses. Pulmonary:      Effort: Pulmonary effort is normal.   Abdominal:      General: Bowel sounds are normal. There is no distension. Palpations: Abdomen is soft. Tenderness: There is no abdominal tenderness. Skin:     General: Skin is warm. Neurological:      Mental Status: She is alert. Mental status is at baseline.    Psychiatric:         Mood and Affect: Mood normal.           Assessment:        Primary Problem  Hypotension    Active Hospital Problems    Diagnosis Date Noted    Bradycardia [R00.1] 10/17/2023    Occult blood positive stool [R19.5] 10/17/2023    Hypotension [I95.9] 10/16/2023       Plan:        # Acute anemia likely 2/2 upper GI bleed  - Hgb 6.8 10/17   - 1 unit pRBC transfusion 10/17  -Post Transfusion hemoglobin is stable 8.8  - FOBT positive  - GI consult, appreciate recommendations  - EGD 10/9 showed 10 mm ulcer at GE junction, likely pill esophagitis no stigmata of bleeding  - iron studies 10/10 showed low iron and iron saturation   - ferrous sulfate 325 mg at home  - protonix infusion  - Repeat UGI endoscopy done today N  -holding blood thinners for now     # Recent Left MCA infarct with petechial hemorrhage on 10/6/23  - No new subjective neurologic symptoms, no FND on exam

## 2023-10-18 NOTE — OP NOTE
PROCEDURE NOTE    DATE OF PROCEDURE: 10/18/2023     SURGEON: Alissa Claros MD    ASSISTANT: None    PREOPERATIVE DIAGNOSIS: ANEMIA  HX OF ESOPHAGEAL ULCER    POSTOPERATIVE DIAGNOSIS: As described below    OPERATION: Upper GI endoscopy with Biopsy    ANESTHESIA: MAC PER ANESTHESIA     ESTIMATED BLOOD LOSS: Less than 50 ml    COMPLICATIONS: None. SPECIMENS:  Was Obtained:     HISTORY: The patient is a 77y.o. year old female with history of above preop diagnosis. I recommended esophagogastroduodenoscopy with possible biopsy and I explained the risk, benefits, expected outcome, and alternatives to the procedure. Risks included but are not limited to bleeding, infection, respiratory distress, hypotension, and perforation of the esophagus, stomach, or duodenum. Patient understands and is in agreement. PROCEDURE: The patient was given IV conscious sedation. The patient's SPO2 remained above 90% throughout the procedure. The gastroscope was inserted orally and advanced under direct vision through the esophagus, through the stomach, through the pylorus, and into the descending duodenum. Findings:    Retropharyngeal area was grossly normal appearing    Esophagus: abnormal: MILD IRREGULAR SJC  NO ULCERATIONS NOTED  BIOPSIES AND PICTURES WERE TAKEN     Stomach:    Fundus: normal    Body: normal    Antrum: normal    Duodenum:     Descending: abnormal: EVIDENCE OF BENIGN APPEARING ELONGATED FOLD   BIOPSIES AND PICTURES WERE TAKEN     Bulb: normal    The scope was removed and the patient tolerated the procedure well.      Recommendations/Plan:   F/U Biopsies  F/U In Office in 3-4 weeks  Discussed with the family  Post sedation patient was stable with stable vital signs and stable O2 saturations    Electronically signed by Alissa Claros MD  on 10/18/2023 at 2:55 PM

## 2023-10-18 NOTE — PROGRESS NOTES
Discharge instructions explained to patient and family at bedside. Patient escorted to main entrance by [facility] into the care of  Parisa Canas.

## 2023-10-18 NOTE — CARE COORDINATION
ONGOING DISCHARGE PLAN:    Patient is alert and oriented x4. Spoke with patient regarding discharge plan and patient confirms that plan is still to return home with spouse. DME: SC, GB, glucometer, walker, cane. VNS: [facility] current. Hgb 8.8 this morning; GI on board. POD #0 EGD. Will continue to follow for additional discharge needs. If patient is discharged prior to next notation, then this note serves as note for discharge by case management.     Electronically signed by Iván Zamora RN on 10/18/2023 at 10:14 AM

## 2023-10-18 NOTE — PROGRESS NOTES
Attending Physician Statement  I have discussed the care of Lonne Fess with the resident team. I have examined the patient myself and taken ros and hpi , including pertinent history and exam findings,  with the resident. I have reviewed the key elements of all parts of the encounter with the resident. I agree with the assessment, plan and orders as documented by the resident. Principal Problem:    Hypotension  Active Problems:    Bradycardia    Occult blood positive stool  Resolved Problems:    * No resolved hospital problems. *    Hemoglobin stable, no bleed  Due for EGD today  Will follow results  Patient reports no new symptoms    Full note to follow.       Electronically signed by Rufino Jones MD

## 2023-10-19 ENCOUNTER — TELEPHONE (OUTPATIENT)
Dept: INTERNAL MEDICINE CLINIC | Age: 78
End: 2023-10-19

## 2023-10-19 ENCOUNTER — CARE COORDINATION (OUTPATIENT)
Dept: CASE MANAGEMENT | Age: 78
End: 2023-10-19

## 2023-10-19 NOTE — PROGRESS NOTES
Physician Progress Note      Ljha Staff  CSN #:                  [id number]  :                       1945  ADMIT DATE:       10/16/2023 1:23 PM  [facility] DATE:        10/18/2023 5:30 PM  RESPONDING  PROVIDER #:        Paul Rosales MD          QUERY TEXT:    Pt admitted with Hypotension and has anemia documented. If possible, please   document in progress notes and discharge summary further specificity regarding   the acuity and type of anemia:    The medical record reflects the following:    Risk Factors: Melena  Clinical Indicators: FOBT positive. \" Labs Hgb/Hct 8.5/28.3 on 10/16 and   6.8/23.3 on 10/17  Treatment: 1unit PRBC, Protonix gtt, continuous IVF, GI consulted    Thank you! Luan Evans CRCR  RN Clinical   Options provided:  -- Anemia due to acute blood loss  -- Anemia due to chronic blood loss  -- Anemia due to acute on chronic blood loss  -- Other - I will add my own diagnosis  -- Disagree - Not applicable / Not valid  -- Disagree - Clinically unable to determine / Unknown  -- Refer to Clinical Documentation Reviewer    PROVIDER RESPONSE TEXT:    This patient has acute blood loss anemia.     Query created by: Quin Brantley on 10/18/2023 1:17 PM      Electronically signed by:  Paul Rosales MD 10/19/2023 5:31 PM

## 2023-10-19 NOTE — CARE COORDINATION
Care Transitions Initial Follow Up Call    Call within 2 business days of discharge: Yes      Patient: Brain Alves Patient : 1945   MRN: [id number]  Reason for Admission: 10/6/23 Acute Stroke 10/16/23 Hypotension  Discharge Date: 10/18/23 RARS: Readmission Risk Score: 24.5      Last Discharge Facility       Date Complaint Diagnosis Description Type Department Provider    10/16/23 OTHER; Hypotension Hypotension, unspecified hypotension type . .. ED to Hosp-Admission (Discharged) (Racheal Ramachandran) Codie Polanco MD; Oj Stephens. .. Was this an external facility discharge? No Discharge Facility: [facility] to be reviewed by the provider   Additional needs identified to be addressed with provider: Yes  7 day hospital follow up appointment               Method of communication with provider: chart routing. 1st attempt to reach patient for Care Transitions. [facility] requesting return call. Contact information provided. [phone]    [facility] called and spoke with intake. They stated that the nurse did reach out to the pt today to schedule FILOMENA. She did not have the exact date of the visit.       Care Transitions 24 Hour Call    Do you have all of your prescriptions and are they filled?: Yes  Do you have support at home?: Partner/Spouse/SO  Are you an active caregiver in your home?: No  Care Transitions Interventions                                     Follow Up  Future Appointments   Date Time Provider [address]   10/25/2023 10:00 AM Nelly Dao MD Neuro Endo Danielle Wooten RN

## 2023-10-20 ENCOUNTER — CARE COORDINATION (OUTPATIENT)
Dept: CASE MANAGEMENT | Age: 78
End: 2023-10-20

## 2023-10-20 DIAGNOSIS — R19.5 OCCULT BLOOD POSITIVE STOOL: Primary | ICD-10-CM

## 2023-10-20 PROCEDURE — 1111F DSCHRG MED/CURRENT MED MERGE: CPT | Performed by: INTERNAL MEDICINE

## 2023-10-23 LAB — SURGICAL PATHOLOGY REPORT: NORMAL

## 2023-10-24 ENCOUNTER — TELEPHONE (OUTPATIENT)
Dept: PHARMACY | Facility: CLINIC | Age: 78
End: 2023-10-24

## 2023-10-24 NOTE — TELEPHONE ENCOUNTER
[facility]: ADHERENCE REVIEW  Identified care gap per [place] Per insurer report, LIS-0 - co-pays are based on tiers and patient is subject to coverage gap.    fills with [facility]: ACE/ARB and Statin adherence    Patient also appears to be prescribed:DIABETES      ASSESSMENT    ACE/ARB ADHERENCE    Insurance Records claims through 10/09/2023 (Prior Year [address] = not reported; YTD [address] = 82%; Potential Fail Date: 10.28.23):   Lisinopril  last filled on 06.06.23 for 80 day supply. Next refill due: 08.25.23    Per Insurer Portal: last filled on (same as above). .     BP Readings from Last 3 Encounters:   10/18/23 (!) 157/73   10/16/23 (!) 82/48   10/10/23 112/60     Estimated Creatinine Clearance: 111 mL/min (A) (based on SCr of 0.4 mg/dL (L)). Lab Results   Component Value Date    CREATININE 0.4 (L) 10/18/2023     Lab Results   Component Value Date    K 3.5 (L) 10/18/2023        DIABETES ADHERENCE    Per Insurer Portal:   Metformin last filled on  08.30.23 for 100 day supply. Lab Results   Component Value Date    LABA1C 7.1 (H) 10/06/2023    LABA1C 7.2 (H) 07/05/2023    LABA1C 7.4 (H) 04/03/2023     NOTE: A1c <9%     [address] [place] [employer] Records claims through 10/09/2023 (Prior Year PDC = not reported; YTD PDC = 100% - PASSED):   Atorvastatin  last filled on 09.09.23 for 100 day supply. Next refill due: 12.18.23    Per Insurer Portal: last filled on (same as above). .    Lab Results   Component Value Date    CHOL 140 10/06/2023    TRIG 76 10/06/2023    HDL 74 10/06/2023    LDLCHOLESTEROL 51 10/06/2023     ALT   Date Value Ref Range Status   10/16/2023 15 5 - 33 U/L Final     AST   Date Value Ref Range Status   10/16/2023 24 <32 U/L Final     The ASCVD Risk score (Celeste SPEAR, et al., 2019) failed to calculate for the following reasons:     The patient has a prior MI or stroke diagnosis        PLAN  The following are interventions that have been identified:   Patient overdue

## 2023-10-24 NOTE — PROGRESS NOTES
Physician Progress Note      Denisa Hernandez  [facility] #:                  [id number]  :                       1945  ADMIT DATE:       10/6/2023 9:06 AM  DISCH DATE:        10/10/2023 5:30 PM  RESPONDING  PROVIDER #:        Hank Bello MD          QUERY TEXT:    Patient admitted with Lt MCA CVA, noted to have Petechial hemorrhage within   the infarct in the left frontal lobe on the MRI. If possible, please document   in progress notes and discharge summary if you are evaluating and/or treating   any of the following: The medical record reflects the following:  Risk Factors: HTN  Clinical Indicators: MRI:35 mm acute or subacute left frontal infarct is   unchanged in size. There is associated petechial Microhemorrhage throughout   the infarct. Treatment: Baby ASA, Plavix, Crestor and Carotid artery stenting as   outpatient, Monitor vital signs and mentation. Thank you for your time, Sarita Brooks MSN, RN CDS. Please let me know if you have   any questions (7a-3p): [phone]. Options provided:  -- Lt MCA with hemorrhagic conversion  -- Lt MCA with hemorrhagic conversion not clinically significant  -- Other - I will add my own diagnosis  -- Disagree - Not applicable / Not valid  -- Disagree - Clinically unable to determine / Unknown  -- Refer to Clinical Documentation Reviewer    PROVIDER RESPONSE TEXT:    This patient has a Lt MCA with hemorrhagic conversion. Query created by: Flakita Jackson on 10/24/2023 8:29 AM      Electronically signed by:   Hank Bello MD 10/24/2023 8:47 AM

## 2023-10-25 ENCOUNTER — CARE COORDINATION (OUTPATIENT)
Dept: CASE MANAGEMENT | Age: 78
End: 2023-10-25

## 2023-10-25 NOTE — CARE COORDINATION
health  When to call 911. The family agrees to contact the PCP office for questions related to their healthcare. Advance Care Planning:   patient declined education. Patients top risk factors for readmission: functional physical ability, lack of knowledge about disease, and medical condition-stroke/hypotension/GI bleed  Interventions to address risk factors: Obtained and reviewed discharge summary and/or continuity of care documents    Offered patient enrollment in the Remote Patient Monitoring (RPM) program for in-home monitoring: NA.     Care Transitions Subsequent and Final Call    Subsequent and Final Calls  Do you have any ongoing symptoms?: Yes  Onset of Patient-reported symptoms: In the past 7 days  Patient-reported symptoms: Weakness  Have your medications changed?: No  Do you have any questions related to your medications?: No  Do you currently have any active services?: Yes  Are you currently active with any services?: Home Health  Do you have any needs or concerns that I can assist you with?: No  Care Transitions Interventions                          Other Interventions:             Care Transition Nurse provided contact information for future needs. Plan for follow-up call in 7-10 days based on severity of symptoms and risk factors. Plan for next call: follow-up appointment-PCP appointment  Follow up on stroke symptoms/ bleeding/ hypotension/weakness.     Amado Stack RN

## 2023-10-31 ENCOUNTER — HOSPITAL ENCOUNTER (OUTPATIENT)
Facility: CLINIC | Age: 78
Discharge: HOME | End: 2023-11-02
Payer: MEDICARE

## 2023-10-31 ENCOUNTER — HOSPITAL ENCOUNTER (OUTPATIENT)
Age: 78
Setting detail: SPECIMEN
Discharge: HOME | End: 2023-10-31

## 2023-10-31 ENCOUNTER — HOSPITAL ENCOUNTER (OUTPATIENT)
Dept: GENERAL RADIOLOGY | Facility: CLINIC | Age: 78
Discharge: HOME | End: 2023-11-02
Payer: MEDICARE

## 2023-10-31 ENCOUNTER — OFFICE VISIT (OUTPATIENT)
Dept: INTERNAL MEDICINE CLINIC | Age: 78
End: 2023-10-31
Payer: MEDICARE

## 2023-10-31 VITALS
HEART RATE: 88 BPM | BODY MASS INDEX: 21.72 KG/M2 | WEIGHT: 138.4 LBS | SYSTOLIC BLOOD PRESSURE: 110 MMHG | HEIGHT: 67 IN | OXYGEN SATURATION: 97 % | DIASTOLIC BLOOD PRESSURE: 44 MMHG

## 2023-10-31 DIAGNOSIS — E61.1 IRON DEFICIENCY: Primary | ICD-10-CM

## 2023-10-31 DIAGNOSIS — K92.2 GASTROINTESTINAL HEMORRHAGE, UNSPECIFIED GASTROINTESTINAL HEMORRHAGE TYPE: ICD-10-CM

## 2023-10-31 DIAGNOSIS — M25.561 ACUTE PAIN OF RIGHT KNEE: ICD-10-CM

## 2023-10-31 DIAGNOSIS — E11.40 TYPE 2 DIABETES MELLITUS WITH DIABETIC NEUROPATHY, WITH LONG-TERM CURRENT USE OF INSULIN (HCC): ICD-10-CM

## 2023-10-31 DIAGNOSIS — E61.1 IRON DEFICIENCY: ICD-10-CM

## 2023-10-31 DIAGNOSIS — K06.8 GINGIVAL BLEEDING: ICD-10-CM

## 2023-10-31 DIAGNOSIS — Z79.4 TYPE 2 DIABETES MELLITUS WITH DIABETIC NEUROPATHY, WITH LONG-TERM CURRENT USE OF INSULIN (HCC): ICD-10-CM

## 2023-10-31 LAB
BASOPHILS # BLD: 0.11 K/UL (ref 0–0.2)
BASOPHILS NFR BLD: 1 % (ref 0–2)
EOSINOPHIL # BLD: 0 K/UL (ref 0–0.4)
EOSINOPHILS RELATIVE PERCENT: 0 % (ref 1–4)
ERYTHROCYTE [DISTWIDTH] IN BLOOD BY AUTOMATED COUNT: 22.2 % (ref 11.8–14.4)
HCT VFR BLD AUTO: 28 % (ref 36.3–47.1)
HGB BLD-MCNC: 7.4 G/DL (ref 11.9–15.1)
IMM GRANULOCYTES # BLD AUTO: 0 K/UL (ref 0–0.3)
IMM GRANULOCYTES NFR BLD: 0 %
LYMPHOCYTES NFR BLD: 2.15 K/UL (ref 1–4.8)
LYMPHOCYTES RELATIVE PERCENT: 19 % (ref 24–44)
MCH RBC QN AUTO: 21.8 PG (ref 25.2–33.5)
MCHC RBC AUTO-ENTMCNC: 26.4 G/DL (ref 28.4–34.8)
MCV RBC AUTO: 82.6 FL (ref 82.6–102.9)
MONOCYTES NFR BLD: 1.13 K/UL (ref 0.1–0.8)
MONOCYTES NFR BLD: 10 % (ref 1–7)
MORPHOLOGY: ABNORMAL
NEUTROPHILS NFR BLD: 70 % (ref 36–66)
NEUTS SEG NFR BLD: 7.91 K/UL (ref 1.8–7.7)
NRBC BLD-RTO: 0.4 PER 100 WBC
PLATELET # BLD AUTO: 840 K/UL (ref 138–453)
PMV BLD AUTO: 9.7 FL (ref 8.1–13.5)
RBC # BLD AUTO: 3.39 M/UL (ref 3.95–5.11)
WBC OTHER # BLD: 11.3 K/UL (ref 3.5–11.3)

## 2023-10-31 PROCEDURE — 1123F ACP DISCUSS/DSCN MKR DOCD: CPT | Performed by: INTERNAL MEDICINE

## 2023-10-31 PROCEDURE — 73562 X-RAY EXAM OF KNEE 3: CPT

## 2023-10-31 PROCEDURE — 3051F HG A1C>EQUAL 7.0%<8.0%: CPT | Performed by: INTERNAL MEDICINE

## 2023-10-31 PROCEDURE — 3074F SYST BP LT 130 MM HG: CPT | Performed by: INTERNAL MEDICINE

## 2023-10-31 PROCEDURE — 99214 OFFICE O/P EST MOD 30 MIN: CPT | Performed by: INTERNAL MEDICINE

## 2023-10-31 PROCEDURE — 3078F DIAST BP <80 MM HG: CPT | Performed by: INTERNAL MEDICINE

## 2023-10-31 ASSESSMENT — ENCOUNTER SYMPTOMS
COLOR CHANGE: 0
ABDOMINAL DISTENTION: 0
COUGH: 0
SHORTNESS OF BREATH: 0
EYE PAIN: 0
EYE DISCHARGE: 0
TROUBLE SWALLOWING: 0
BLOOD IN STOOL: 0
WHEEZING: 0
DIARRHEA: 0

## 2023-10-31 NOTE — PROGRESS NOTES
Visit Information    Have you changed or started any medications since your last visit including any over-the-counter medicines, vitamins, or herbal medicines? no   Are you having any side effects from any of your medications? -  no  Have you stopped taking any of your medications? Is so, why? -  no    Have you seen any other physician or provider since your last visit? Yes - Records Obtained  Have you had any other diagnostic tests since your last visit? Yes - Records Obtained  Have you been seen in the emergency room and/or had an admission to a hospital since we last saw you? Yes - Records Obtained  Have you had your routine dental cleaning in the past 6 months? no    Have you activated your [employer] account? If not, what are your barriers?  Yes     Patient Care Team:  Daniel Vickers MD as PCP - General (Internal Medicine)  Daniel Vickers MD as PCP - Empaneled Provider  David Camarena DO (Obstetrics & Gynecology)  Tavon Cortez RN as Care Transitions Nurse    Medical History Review  Past Medical, Family, and Social History reviewed and does contribute to the patient presenting condition    Health Maintenance   Topic Date Due    Hepatitis B vaccine (1 of 3 - Risk 3-dose series) Never done    DTaP/Tdap/Td vaccine (1 - Tdap) 06/08/2010    Shingles vaccine (2 of 2) 09/26/2018    COVID-19 Vaccine (4 - [facility] - BASTROP series) 04/10/2022    Flu vaccine (1) 08/01/2023    Annual Wellness Visit (AWV)  10/13/2023    Depression Monitoring  01/03/2024    Lipids  10/06/2024    DEXA (modify frequency per FRAX score)  Completed    Pneumococcal 65+ years Vaccine  Completed    Hepatitis C screen  Completed    Hepatitis A vaccine  Aged Out    Hib vaccine  Aged Out    Meningococcal (ACWY) vaccine  Aged Out    Diabetic foot exam  Discontinued    A1C test (Diabetic or Prediabetic)  Discontinued    Diabetic retinal exam  Discontinued    Depression Screen  Discontinued    Colonoscopy  Discontinued
UTI (urinary tract infection)       Past Surgical History:   Procedure Laterality Date    BACK SURGERY      X3    BREAST BIOPSY  2007    Lt     CARPAL TUNNEL RELEASE Right     CATARACT REMOVAL WITH IMPLANT Bilateral     COLONOSCOPY  2006    COLONOSCOPY  2015    diverticulosis    CYSTOSCOPY  2014    CYSTOSCOPY N/A 2017    CYSTOSCOPY URODYNAMICS AND DILATION  performed by Luiz Nunez MD at [address]  10/09/2023    HAND SURGERY Bilateral     thumbs reconstruction    HIP ARTHROPLASTY Bilateral     JOINT REPLACEMENT Bilateral     LUMBAR FUSION  10/27/2015    UPPER GASTROINTESTINAL ENDOSCOPY N/A 10/9/2023    EGD ESOPHAGOGASTRODUODENOSCOPY performed by Carol Ann Mehta MD at [facility] N/A 10/18/2023    EGD BIOPSY OF DUODENUM OF ESOPHAGUS performed by Sujit Garduno MD at [facility]       Family History   Problem Relation Age of Onset    Cancer Mother         lymphoma    Hypertension Mother     Hypertension Father     Hypertension Brother     Heart Disease Brother         pt states that brother had 5 bypass surgeries. Social History     Tobacco Use    Smoking status: Former     Packs/day: 2.00     Years: 20.00     Additional pack years: 0.00     Total pack years: 40.00     Types: Cigarettes     Quit date: 1984     Years since quittin.8    Smokeless tobacco: Never   Substance Use Topics    Alcohol use: Yes     Alcohol/week: 0.0 standard drinks of alcohol     Comment: social      Current Outpatient Medications   Medication Sig Dispense Refill    LANTUS SOLOSTAR 100 UNIT/ML injection pen INJECT SUBCUTANEOUSLY 30 UNITS  DAILY 30 mL 2    pantoprazole (PROTONIX) 40 MG tablet Take 1 tablet by mouth every morning (before breakfast) 90 tablet 1    gabapentin (NEURONTIN) 100 MG capsule Take 2 capsules by mouth 2 times daily for 30 days.  120 capsule 0    gabapentin (NEURONTIN) 300 MG capsule Take 1 capsule by mouth nightly for 30

## 2023-11-01 ENCOUNTER — CARE COORDINATION (OUTPATIENT)
Dept: CASE MANAGEMENT | Age: 78
End: 2023-11-01

## 2023-11-01 NOTE — CARE COORDINATION
Care Transitions Follow Up Call    Patient Current Location:  Home: [address] [place] [address]  [address] [address]    Care Transition Nurse contacted the patient by telephone to follow up after admission on 10/16/23. Verified name and  with patient as identifiers. Patient: Toby Marte  Patient : 1945   MRN: [id number]  Reason for Admission: 10/6/23 Acute  Lt MCA ischemic stroke 10/16 readmit Hypotension, occult blood in stool  Discharge Date: 10/18/23 RARS: Readmission Risk Score: 24.5      Needs to be reviewed by the provider   Additional needs identified to be addressed with provider: No  none             Method of communication with provider: none. Was able to contact Shubham Tolbert . He said that she was currently resting. He said that she was doing somewhat better. He said that she was getting slowly getting stronger. He said that her breathing was at her baseline, is eating/drinking, denied her having chest pain, or further s/s of bleeding. He said that she gets an occasional headache, that is relieved by Tylenol. No chocking when eating or drinking. He said that her BP was 127/57. He said that she did have a Xray on her Rt knee, but he said that her pain is from her hip to her knee. They would not do the Xray above the knee. Plavix was stopped due to her gums bleeding. She will following up with PCP again tomorrow. He did say that Blank Yi wants to go the the movies over the weekend. He had no further questions/concerns. Addressed changes since last contact:  none  Discussed follow-up appointments. If no appointment was previously scheduled, appointment scheduling offered: Yes.        Follow Up  Future Appointments   Date Time Provider [address]   2023 11:00 AM Anand Johnson MD [address] [place] [address]   2023 10:30 AM SCHEDULE, [facility]   2023 11:15 AM SCHEDULE, [facility] SCHEDULE [facility]

## 2023-11-02 ENCOUNTER — OFFICE VISIT (OUTPATIENT)
Dept: INTERNAL MEDICINE CLINIC | Age: 78
End: 2023-11-02

## 2023-11-02 VITALS
OXYGEN SATURATION: 100 % | HEART RATE: 71 BPM | SYSTOLIC BLOOD PRESSURE: 112 MMHG | BODY MASS INDEX: 21.82 KG/M2 | WEIGHT: 139 LBS | HEIGHT: 67 IN | DIASTOLIC BLOOD PRESSURE: 66 MMHG

## 2023-11-02 DIAGNOSIS — E61.1 IRON DEFICIENCY: Primary | ICD-10-CM

## 2023-11-02 DIAGNOSIS — I10 PRIMARY HYPERTENSION: ICD-10-CM

## 2023-11-02 DIAGNOSIS — E78.2 MIXED HYPERLIPIDEMIA: ICD-10-CM

## 2023-11-02 DIAGNOSIS — E11.40 TYPE 2 DIABETES MELLITUS WITH DIABETIC NEUROPATHY, WITH LONG-TERM CURRENT USE OF INSULIN (HCC): ICD-10-CM

## 2023-11-02 DIAGNOSIS — Z79.4 TYPE 2 DIABETES MELLITUS WITH DIABETIC NEUROPATHY, WITH LONG-TERM CURRENT USE OF INSULIN (HCC): ICD-10-CM

## 2023-11-02 RX ORDER — FOLIC ACID 1 MG/1
1 TABLET ORAL DAILY
Qty: 30 TABLET | Refills: 1 | Status: SHIPPED | OUTPATIENT
Start: 2023-11-02

## 2023-11-02 ASSESSMENT — ENCOUNTER SYMPTOMS
COLOR CHANGE: 0
DIARRHEA: 0
EYE DISCHARGE: 0
WHEEZING: 0
TROUBLE SWALLOWING: 0
BLOOD IN STOOL: 0
SHORTNESS OF BREATH: 0
COUGH: 0
EYE PAIN: 0
ABDOMINAL DISTENTION: 0

## 2023-11-02 NOTE — PROGRESS NOTES
Visit Information    Have you changed or started any medications since your last visit including any over-the-counter medicines, vitamins, or herbal medicines? no   Are you having any side effects from any of your medications? -  no  Have you stopped taking any of your medications? Is so, why? -  no    Have you seen any other physician or provider since your last visit? No  Have you had any other diagnostic tests since your last visit? Yes - Records Obtained  Have you been seen in the emergency room and/or had an admission to a hospital since we last saw you? No  Have you had your routine dental cleaning in the past 6 months? no    Have you activated your [employer] account? If not, what are your barriers?  Yes     Patient Care Team:  Claire Head MD as PCP - General (Internal Medicine)  Claire Head MD as PCP - Empaneled Provider  Deena Singletary DO (Obstetrics & Gynecology)  Leonora Carlton RN as Care Transitions Nurse    Medical History Review  Past Medical, Family, and Social History reviewed and does contribute to the patient presenting condition    Health Maintenance   Topic Date Due    Hepatitis B vaccine (1 of 3 - Risk 3-dose series) Never done    DTaP/Tdap/Td vaccine (1 - Tdap) 06/08/2010    Shingles vaccine (2 of 2) 09/26/2018    COVID-19 Vaccine (4 - [facility] series) 04/10/2022    Flu vaccine (1) 08/01/2023    Annual Wellness Visit (AWV)  10/13/2023    Depression Monitoring  01/03/2024    Lipids  10/06/2024    DEXA (modify frequency per FRAX score)  Completed    Pneumococcal 65+ years Vaccine  Completed    Hepatitis C screen  Completed    Hepatitis A vaccine  Aged Out    Hib vaccine  Aged Out    Meningococcal (ACWY) vaccine  Aged Out    Diabetic foot exam  Discontinued    A1C test (Diabetic or Prediabetic)  Discontinued    Diabetic retinal exam  Discontinued    Depression Screen  Discontinued    Colonoscopy  Discontinued
facility-administered medications for this visit. Allergies   Allergen Reactions    Hydrocodone        Health Maintenance   Topic Date Due    Hepatitis B vaccine (1 of 3 - Risk 3-dose series) Never done    DTaP/Tdap/Td vaccine (1 - Tdap) 06/08/2010    Shingles vaccine (2 of 2) 09/26/2018    COVID-19 Vaccine (4 - Moderna series) 04/10/2022    Flu vaccine (1) 08/01/2023    Annual Wellness Visit (AWV)  10/13/2023    Depression Monitoring  01/03/2024    Lipids  10/06/2024    DEXA (modify frequency per FRAX score)  Completed    Pneumococcal 65+ years Vaccine  Completed    Hepatitis C screen  Completed    Hepatitis A vaccine  Aged Out    Hib vaccine  Aged Out    Meningococcal (ACWY) vaccine  Aged Out    Diabetic foot exam  Discontinued    A1C test (Diabetic or Prediabetic)  Discontinued    Diabetic retinal exam  Discontinued    Depression Screen  Discontinued    Colonoscopy  Discontinued       Subjective:     Review of Systems   Constitutional:  Positive for fatigue. Negative for appetite change and diaphoresis. HENT:  Negative for ear discharge and trouble swallowing. Eyes:  Negative for pain and discharge. Respiratory:  Negative for cough, shortness of breath and wheezing. Cardiovascular:  Negative for chest pain and palpitations. Gastrointestinal:  Negative for abdominal distention, blood in stool and diarrhea. Endocrine: Negative for polydipsia and polyphagia. Genitourinary:  Negative for difficulty urinating and frequency. Musculoskeletal:  Negative for gait problem, myalgias and neck pain. Skin:  Negative for color change and rash. Allergic/Immunologic: Negative for environmental allergies and food allergies. Neurological:  Negative for dizziness and headaches. Hematological:  Negative for adenopathy. Does not bruise/bleed easily. Psychiatric/Behavioral:  Negative for behavioral problems and sleep disturbance.         Objective:     Physical Exam  Constitutional:       Appearance: She

## 2023-11-08 ENCOUNTER — CARE COORDINATION (OUTPATIENT)
Dept: CASE MANAGEMENT | Age: 78
End: 2023-11-08

## 2023-11-08 NOTE — CARE COORDINATION
Care Transitions Follow Up Call    Patient Current Location:  Home: [address] [place] [address] Drive  [address]    Care Transition Nurse contacted the family by telephone to follow up after admission on 10/16/23. Verified name and  with family as identifiers. Patient: Mirna Mcnair  Patient : 1945   MRN: [id number]  Reason for Admission: 10/6/23 Acute  Lt MCA ischemic stroke 10/16 readmit Hypotension, occult blood in stool  Discharge Date: 10/18/23 RARS: Readmission Risk Score: 24.5      Needs to be reviewed by the provider   Additional needs identified to be addressed with provider: No  none             Method of communication with provider: none. Attempted to contact Monet Fajardo , but her , Monet Fajardo the phone. He stated that his wife was doing \"pretty good\". He said that she was getting stronger and did not have any concerns. He said that her headaches were less and she did have a decrease in safety awareness since the stroke. He said that he is keeping a close eye on her. He said that therapy continues. He had no further questions/concerns. Addressed changes since last contact:  none  Discussed follow-up appointments. If no appointment was previously scheduled, appointment scheduling offered: Yes. Follow Up  Future Appointments   Date Time Provider [address]   2023 10:30 AM SCHEDULE, [facility]   2023 11:15 AM SCHEDULE, [facility]EDU[facility]   2023 11:30 AM Aura Sauceda, APRN - CNP [facility]       Care Transition Nurse reviewed medical action plan with family and discussed any barriers to care and/or understanding of plan of care after discharge. Discussed appropriate site of care based on symptoms and resources available to patient including: PCP  Specialist  When to call 911.  The family agrees to contact the PCP office for questions related to their

## 2023-11-14 ENCOUNTER — CARE COORDINATION (OUTPATIENT)
Dept: CASE MANAGEMENT | Age: 78
End: 2023-11-14

## 2023-11-14 NOTE — CARE COORDINATION
Care Transitions Follow Up Call    Patient Current Location:  Home: [address] [place] [address]  [address]    Care Transition Nurse contacted the spouse/partner by telephone to follow up after admission on 10/16/23. Verified name and  with spouse/partner as identifiers. Patient: Rudi Negro  Patient : 1945   MRN: [id number]  Reason for Admission: hypotension  Discharge Date: 10/18/23 RARS: Readmission Risk Score: 24.5      Needs to be reviewed by the provider   Additional needs identified to be addressed with provider: No  none             Method of communication with provider: none. Spoke to pt's  for final transitions call. Stated pt had therapy this morning, currently napping. No further bleeding since d/c of Plavix. Strength improving, usus walker. No recent falls. Pt is taking daily iron, no constipation or stomach upset. Denies any needs or concerns. Informed of final transitions call. Pt's spouse thanked CTN for calls. Addressed changes since last contact:   continues with [address] therapy, progressing. No GI or gum bleeds since d/c Plavix  Discussed follow-up appointments. Follow Up  Future Appointments   Date Time Provider [address]   2023 10:30 AM SCHEDULE, [facility]   2023 11:15 AM SCHEDULE, [facility]   2023 11:30 AM Steph Sauceda, APRN - CNP [facility]       Care Transition Nurse reviewed discharge instructions with spouse/partner and discussed any barriers to care and/or understanding of plan of care after discharge. Discussed appropriate site of care based on symptoms and resources available to patient including: PCP  Specialist  When to call Pawan Brewer. The spouse/partner agrees to contact the PCP office for questions related to their healthcare.           Care Transitions Subsequent and Final Call    Subsequent and Final

## 2023-12-14 ENCOUNTER — HOSPITAL ENCOUNTER (OUTPATIENT)
Age: 78
Setting detail: SPECIMEN
Discharge: HOME | End: 2023-12-14

## 2023-12-14 DIAGNOSIS — K92.1 MELENA: ICD-10-CM

## 2023-12-14 LAB
EST. AVERAGE GLUCOSE BLD GHB EST-MCNC: 148 MG/DL
HBA1C MFR BLD: 6.8 % (ref 4–6)
HCT VFR BLD AUTO: 31.7 % (ref 36.3–47.1)
HGB BLD-MCNC: 8.1 G/DL (ref 11.9–15.1)

## 2023-12-26 RX ORDER — PANTOPRAZOLE SODIUM 40 MG/1
40 TABLET, DELAYED RELEASE ORAL
Qty: 100 TABLET | Refills: 2 | Status: SHIPPED | OUTPATIENT
Start: 2023-12-26

## 2024-01-11 ENCOUNTER — OFFICE VISIT (OUTPATIENT)
Dept: INTERNAL MEDICINE CLINIC | Age: 79
End: 2024-01-11

## 2024-01-11 VITALS
SYSTOLIC BLOOD PRESSURE: 110 MMHG | OXYGEN SATURATION: 96 % | DIASTOLIC BLOOD PRESSURE: 64 MMHG | BODY MASS INDEX: 21.77 KG/M2 | HEART RATE: 84 BPM | WEIGHT: 139 LBS

## 2024-01-11 DIAGNOSIS — E11.40 TYPE 2 DIABETES MELLITUS WITH DIABETIC NEUROPATHY, WITH LONG-TERM CURRENT USE OF INSULIN (HCC): ICD-10-CM

## 2024-01-11 DIAGNOSIS — F33.0 MAJOR DEPRESSIVE DISORDER, RECURRENT, MILD (HCC): ICD-10-CM

## 2024-01-11 DIAGNOSIS — D50.0 IRON DEFICIENCY ANEMIA DUE TO CHRONIC BLOOD LOSS: ICD-10-CM

## 2024-01-11 DIAGNOSIS — J43.1 PANLOBULAR EMPHYSEMA (HCC): ICD-10-CM

## 2024-01-11 DIAGNOSIS — Z79.4 TYPE 2 DIABETES MELLITUS WITH DIABETIC NEUROPATHY, WITH LONG-TERM CURRENT USE OF INSULIN (HCC): ICD-10-CM

## 2024-01-11 DIAGNOSIS — R56.9 CONVULSIONS, UNSPECIFIED CONVULSION TYPE (HCC): ICD-10-CM

## 2024-01-11 DIAGNOSIS — F33.1 MAJOR DEPRESSIVE DISORDER, RECURRENT, MODERATE (HCC): ICD-10-CM

## 2024-01-11 DIAGNOSIS — H61.21 IMPACTED CERUMEN OF RIGHT EAR: ICD-10-CM

## 2024-01-11 DIAGNOSIS — E78.2 MIXED HYPERLIPIDEMIA: ICD-10-CM

## 2024-01-11 DIAGNOSIS — I10 PRIMARY HYPERTENSION: Primary | ICD-10-CM

## 2024-01-11 RX ORDER — FERROUS SULFATE 325(65) MG
325 TABLET ORAL 2 TIMES DAILY
Qty: 180 TABLET | Refills: 1 | Status: SHIPPED | OUTPATIENT
Start: 2024-01-11

## 2024-01-11 ASSESSMENT — PATIENT HEALTH QUESTIONNAIRE - PHQ9
SUM OF ALL RESPONSES TO PHQ QUESTIONS 1-9: 0
7. TROUBLE CONCENTRATING ON THINGS, SUCH AS READING THE NEWSPAPER OR WATCHING TELEVISION: 0
3. TROUBLE FALLING OR STAYING ASLEEP: 0
SUM OF ALL RESPONSES TO PHQ QUESTIONS 1-9: 0
10. IF YOU CHECKED OFF ANY PROBLEMS, HOW DIFFICULT HAVE THESE PROBLEMS MADE IT FOR YOU TO DO YOUR WORK, TAKE CARE OF THINGS AT HOME, OR GET ALONG WITH OTHER PEOPLE: 0
SUM OF ALL RESPONSES TO PHQ9 QUESTIONS 1 & 2: 0
SUM OF ALL RESPONSES TO PHQ QUESTIONS 1-9: 0
9. THOUGHTS THAT YOU WOULD BE BETTER OFF DEAD, OR OF HURTING YOURSELF: 0
2. FEELING DOWN, DEPRESSED OR HOPELESS: 0
4. FEELING TIRED OR HAVING LITTLE ENERGY: 0
SUM OF ALL RESPONSES TO PHQ QUESTIONS 1-9: 0
1. LITTLE INTEREST OR PLEASURE IN DOING THINGS: 0
8. MOVING OR SPEAKING SO SLOWLY THAT OTHER PEOPLE COULD HAVE NOTICED. OR THE OPPOSITE, BEING SO FIGETY OR RESTLESS THAT YOU HAVE BEEN MOVING AROUND A LOT MORE THAN USUAL: 0
6. FEELING BAD ABOUT YOURSELF - OR THAT YOU ARE A FAILURE OR HAVE LET YOURSELF OR YOUR FAMILY DOWN: 0
5. POOR APPETITE OR OVEREATING: 0

## 2024-01-11 NOTE — PROGRESS NOTES
[facility]  [address] [address]  Dept: [phone]  Dept Fax: [phone]    Yeni Rodas is a 78 y.o. female who presents today for her medical conditions/complaintsas noted below.  Yeni Rodas is c/o of   Chief Complaint   Patient presents with    Follow-up         HPI:     HTN  Onset more than 2 years ago  ernesto mild to mod  Controlled with current po meds  Not associated with headaches or blurry vision  No chest pain    Diabetes   Duration more than 7 years  Modifying factors on Glucophage and other med  Severity uncontrolled sever  Associated signs and symtoms neuropathy/ckd/ CAD.   aggravated with sugar diet and better with low sugar diet               Hemoglobin A1C (%)   Date Value   12/14/2023 6.8 (H)   10/06/2023 7.1 (H)   07/05/2023 7.2 (H)             ( goal A1Cis < 7)   No components found for: \"LABMICR\"  LDL Cholesterol (mg/dL)   Date Value   10/06/2023 51   11/17/2022 71   08/30/2021 74       (goal LDL is <100)   AST (U/L)   Date Value   10/16/2023 24     ALT (U/L)   Date Value   10/16/2023 15     BUN (mg/dL)   Date Value   10/18/2023 5 (L)     BP Readings from Last 3 Encounters:   01/11/24 110/64   12/14/23 120/68   11/02/23 112/66          (goal 120/80)    Past Medical History:   Diagnosis Date    Anxiety state, unspecified     Arthritis     Cataracts, bilateral     COVID-19 vaccine administered 2- & 3-    MODERNA    Esophageal reflux     Generalized osteoarthrosis, unspecified site     Hearing loss     Hyperlipidemia     Hypertension     Neuropathy     Neuropathy due to medical condition (HCC)     Pure hypercholesterolemia     Thoracic or lumbosacral neuritis or radiculitis, unspecified     Type 2 diabetes mellitus without complication (HCC)     Urinary retention     UTI (urinary tract infection)       Past Surgical History:   Procedure Laterality Date    BACK SURGERY      X3    BREAST BIOPSY  2007    Lt     CARPAL TUNNEL RELEASE

## 2024-01-11 NOTE — PROGRESS NOTES
Visit Information    Have you changed or started any medications since your last visit including any over-the-counter medicines, vitamins, or herbal medicines? no   Are you having any side effects from any of your medications? -  no  Have you stopped taking any of your medications? Is so, why? -  no    Have you seen any other physician or provider since your last visit? Yes - Records Obtained  Have you had any other diagnostic tests since your last visit? No  Have you been seen in the emergency room and/or had an admission to a hospital since we last saw you? No  Have you had your routine dental cleaning in the past 6 months? no    Have you activated your [employer] account? If not, what are your barriers? Yes     Patient Care Team:  Johnie Lawrence MD as PCP - General (Internal Medicine)  Johnie Lawrence MD as PCP - Empaneled Provider  Stephanie Escobedo DO (Obstetrics & Gynecology)    Medical History Review  Past Medical, Family, and Social History reviewed and does contribute to the patient presenting condition    Health Maintenance   Topic Date Due    Respiratory Syncytial Virus (RSV) Pregnant or age 60 yrs+ (1 - 1-dose 60+ series) Never done    DTaP/Tdap/Td vaccine (1 - Tdap) 06/08/2010    Shingles vaccine (2 of 2) 09/26/2018    Flu vaccine (1) 08/01/2023    COVID-19 Vaccine (4 - 2023-24 season) 09/01/2023    Annual Wellness Visit (Medicare Advantage)  01/01/2024    Depression Monitoring  01/03/2024    Lipids  10/06/2024    DEXA (modify frequency per FRAX score)  Completed    Pneumococcal 65+ years Vaccine  Completed    Hepatitis C screen  Completed    Hepatitis A vaccine  Aged Out    Hepatitis B vaccine  Aged Out    Hib vaccine  Aged Out    Polio vaccine  Aged Out    Meningococcal (ACWY) vaccine  Aged Out    Diabetic foot exam  Discontinued    A1C test (Diabetic or Prediabetic)  Discontinued    Diabetic retinal exam  Discontinued    Depression Screen  Discontinued    Colonoscopy  Discontinued

## 2024-01-31 DIAGNOSIS — Z79.4 TYPE 2 DIABETES MELLITUS WITH DIABETIC NEUROPATHY, WITH LONG-TERM CURRENT USE OF INSULIN (HCC): ICD-10-CM

## 2024-01-31 DIAGNOSIS — E11.40 TYPE 2 DIABETES MELLITUS WITH DIABETIC NEUROPATHY, WITH LONG-TERM CURRENT USE OF INSULIN (HCC): ICD-10-CM

## 2024-01-31 RX ORDER — INSULIN GLARGINE 100 [IU]/ML
INJECTION, SOLUTION SUBCUTANEOUS
Qty: 30 ML | Refills: 2 | Status: SHIPPED | OUTPATIENT
Start: 2024-01-31

## 2024-02-16 RX ORDER — DULOXETIN HYDROCHLORIDE 60 MG/1
60 CAPSULE, DELAYED RELEASE ORAL DAILY
Qty: 90 CAPSULE | Refills: 1 | Status: SHIPPED | OUTPATIENT
Start: 2024-02-16

## 2024-02-16 NOTE — TELEPHONE ENCOUNTER
Pharmacy requesting refill of Duloxetine 60 mg.      Medication active on med list yes      Date of last Rx: 4/11/2023 with 3 refills          verified by MARK ALDRICH      Date of last appointment 4/11/2023    Next Visit Date:  3/14/2024

## 2024-03-07 ENCOUNTER — TELEPHONE (OUTPATIENT)
Dept: INTERNAL MEDICINE CLINIC | Age: 79
End: 2024-03-07

## 2024-03-07 NOTE — TELEPHONE ENCOUNTER
Patient is calling to report that she has been experiencing severe diarrhea (no bleeding, no mucus, etc.), for the past few months, has been taking Pepto Bismol for treatment, has not been to an Urgent Care or any other medical facility for care. Patient admits this would be her first time calling in regards to this concern. Patient also states that she would like to be seen in the clinic or open to try any medication to help.

## 2024-03-12 NOTE — TELEPHONE ENCOUNTER
Patients spouse answered the phone, stated patient is unavailable at this time. Spouse agreed to have patient return office call to discuss concerns. Can offer appointment or refer to UC if needed.

## 2024-03-13 NOTE — TELEPHONE ENCOUNTER
2nd attempt to reach patient, man answered the phone again. Stated patient is unavailable, agreed to have patient return office call.

## 2024-03-14 ENCOUNTER — TELEPHONE (OUTPATIENT)
Dept: NEUROLOGY | Age: 79
End: 2024-03-14

## 2024-03-14 NOTE — TELEPHONE ENCOUNTER
Called pt today to notify her that she missed her appointment today pt stated \"she never had a appointment scheduled today and to cross her off the list\" tried multiple times to explain to pt that she was scheduled with Dr. Villeda and she also seen jackson in the past pt stated \"You are crazy cross me off you're list\" and hung up.

## 2024-03-26 ENCOUNTER — HOSPITAL ENCOUNTER (OUTPATIENT)
Age: 79
Setting detail: SPECIMEN
Discharge: HOME | End: 2024-03-26

## 2024-03-26 DIAGNOSIS — Z79.4 TYPE 2 DIABETES MELLITUS WITH DIABETIC NEUROPATHY, WITH LONG-TERM CURRENT USE OF INSULIN (HCC): ICD-10-CM

## 2024-03-26 DIAGNOSIS — E11.40 TYPE 2 DIABETES MELLITUS WITH DIABETIC NEUROPATHY, WITH LONG-TERM CURRENT USE OF INSULIN (HCC): ICD-10-CM

## 2024-03-26 LAB
EST. AVERAGE GLUCOSE BLD GHB EST-MCNC: 203 MG/DL
HBA1C MFR BLD: 8.7 % (ref 4–6)

## 2024-04-04 ENCOUNTER — OFFICE VISIT (OUTPATIENT)
Dept: INTERNAL MEDICINE CLINIC | Age: 79
End: 2024-04-04

## 2024-04-04 VITALS
HEART RATE: 100 BPM | WEIGHT: 140 LBS | DIASTOLIC BLOOD PRESSURE: 76 MMHG | SYSTOLIC BLOOD PRESSURE: 137 MMHG | OXYGEN SATURATION: 94 % | BODY MASS INDEX: 21.93 KG/M2

## 2024-04-04 DIAGNOSIS — Z86.73 HISTORY OF STROKE: ICD-10-CM

## 2024-04-04 DIAGNOSIS — E11.40 TYPE 2 DIABETES MELLITUS WITH DIABETIC NEUROPATHY, WITH LONG-TERM CURRENT USE OF INSULIN (HCC): ICD-10-CM

## 2024-04-04 DIAGNOSIS — Z79.4 TYPE 2 DIABETES MELLITUS WITH DIABETIC NEUROPATHY, WITH LONG-TERM CURRENT USE OF INSULIN (HCC): ICD-10-CM

## 2024-04-04 DIAGNOSIS — Z00.00 MEDICARE ANNUAL WELLNESS VISIT, SUBSEQUENT: ICD-10-CM

## 2024-04-04 DIAGNOSIS — E78.2 MIXED HYPERLIPIDEMIA: ICD-10-CM

## 2024-04-04 DIAGNOSIS — K59.04 CHRONIC IDIOPATHIC CONSTIPATION: Primary | ICD-10-CM

## 2024-04-04 RX ORDER — DAPAGLIFLOZIN 10 MG/1
10 TABLET, FILM COATED ORAL EVERY MORNING
Qty: 30 TABLET | Refills: 3 | Status: SHIPPED | OUTPATIENT
Start: 2024-04-04

## 2024-04-04 SDOH — ECONOMIC STABILITY: FOOD INSECURITY: WITHIN THE PAST 12 MONTHS, YOU WORRIED THAT YOUR FOOD WOULD RUN OUT BEFORE YOU GOT MONEY TO BUY MORE.: NEVER TRUE

## 2024-04-04 SDOH — ECONOMIC STABILITY: FOOD INSECURITY: WITHIN THE PAST 12 MONTHS, THE FOOD YOU BOUGHT JUST DIDN'T LAST AND YOU DIDN'T HAVE MONEY TO GET MORE.: NEVER TRUE

## 2024-04-04 SDOH — ECONOMIC STABILITY: INCOME INSECURITY: HOW HARD IS IT FOR YOU TO PAY FOR THE VERY BASICS LIKE FOOD, HOUSING, MEDICAL CARE, AND HEATING?: NOT HARD AT ALL

## 2024-04-04 ASSESSMENT — PATIENT HEALTH QUESTIONNAIRE - PHQ9
6. FEELING BAD ABOUT YOURSELF - OR THAT YOU ARE A FAILURE OR HAVE LET YOURSELF OR YOUR FAMILY DOWN: NOT AT ALL
SUM OF ALL RESPONSES TO PHQ9 QUESTIONS 1 & 2: 0
SUM OF ALL RESPONSES TO PHQ QUESTIONS 1-9: 0
4. FEELING TIRED OR HAVING LITTLE ENERGY: NOT AT ALL
9. THOUGHTS THAT YOU WOULD BE BETTER OFF DEAD, OR OF HURTING YOURSELF: NOT AT ALL
SUM OF ALL RESPONSES TO PHQ QUESTIONS 1-9: 0
2. FEELING DOWN, DEPRESSED OR HOPELESS: NOT AT ALL
8. MOVING OR SPEAKING SO SLOWLY THAT OTHER PEOPLE COULD HAVE NOTICED. OR THE OPPOSITE, BEING SO FIGETY OR RESTLESS THAT YOU HAVE BEEN MOVING AROUND A LOT MORE THAN USUAL: NOT AT ALL
SUM OF ALL RESPONSES TO PHQ QUESTIONS 1-9: 0
10. IF YOU CHECKED OFF ANY PROBLEMS, HOW DIFFICULT HAVE THESE PROBLEMS MADE IT FOR YOU TO DO YOUR WORK, TAKE CARE OF THINGS AT HOME, OR GET ALONG WITH OTHER PEOPLE: NOT DIFFICULT AT ALL
5. POOR APPETITE OR OVEREATING: NOT AT ALL
1. LITTLE INTEREST OR PLEASURE IN DOING THINGS: NOT AT ALL
SUM OF ALL RESPONSES TO PHQ QUESTIONS 1-9: 0
3. TROUBLE FALLING OR STAYING ASLEEP: NOT AT ALL
7. TROUBLE CONCENTRATING ON THINGS, SUCH AS READING THE NEWSPAPER OR WATCHING TELEVISION: NOT AT ALL

## 2024-04-04 NOTE — PROGRESS NOTES
Visit Information    Have you changed or started any medications since your last visit including any over-the-counter medicines, vitamins, or herbal medicines? no   Are you having any side effects from any of your medications? -  no  Have you stopped taking any of your medications? Is so, why? -  no    Have you seen any other physician or provider since your last visit? No  Have you had any other diagnostic tests since your last visit? Yes - Records Obtained  Have you been seen in the emergency room and/or had an admission to a hospital since we last saw you? No  Have you had your routine dental cleaning in the past 6 months? no    Have you activated your [employer] account? If not, what are your barriers? Yes     Patient Care Team:  Johnie Lawrence MD as PCP - General (Internal Medicine)  Johnie Lawrence MD as PCP - Empaneled Provider  Stephanie Escobedo DO (Obstetrics & Gynecology)    Medical History Review  Past Medical, Family, and Social History reviewed and does contribute to the patient presenting condition    Health Maintenance   Topic Date Due    Respiratory Syncytial Virus (RSV) Pregnant or age 60 yrs+ (1 - 1-dose 60+ series) Never done    DTaP/Tdap/Td vaccine (1 - Tdap) 06/08/2010    Shingles vaccine (2 of 2) 09/26/2018    COVID-19 Vaccine (4 - 2023-24 season) 09/01/2023    Annual Wellness Visit (Medicare)  03/07/2024    Flu vaccine (Season Ended) 08/01/2024    Lipids  10/06/2024    Depression Monitoring  01/11/2025    DEXA (modify frequency per FRAX score)  Completed    Pneumococcal 65+ years Vaccine  Completed    Hepatitis C screen  Completed    Hepatitis A vaccine  Aged Out    Hepatitis B vaccine  Aged Out    Hib vaccine  Aged Out    Polio vaccine  Aged Out    Meningococcal (ACWY) vaccine  Aged Out    Diabetic foot exam  Discontinued    A1C test (Diabetic or Prediabetic)  Discontinued    Diabetic retinal exam  Discontinued    Depression Screen  Discontinued    Colonoscopy  Discontinued

## 2024-04-04 NOTE — PROGRESS NOTES
Medicare Annual Wellness Visit    Yeni Rodas is here for Medicare AWV    Assessment & Plan   Chronic idiopathic constipation  -     psyllium (KONSYL) 28.3 % POWD powder; Take 3.4 g by mouth daily, Disp-575 g, R-2Normal  Medicare annual wellness visit, subsequent  Mixed hyperlipidemia  History of stroke  Type 2 diabetes mellitus with diabetic neuropathy, with long-term current use of insulin (HCC)  Uncontrolled dm  Add farxiga  If not coverd will do lantus 35 daily  Constipatin will add above likely form iron tabs  Cva  On lipitor and asa  Advised to see audiology for hearing loss    Recommendations for Preventive Services Due: see orders and patient instructions/AVS.  Recommended screening schedule for the next 5-10 years is provided to the patient in written form: see Patient Instructions/AVS.     Return in about 2 months (around 6/4/2024).     Subjective       Patient's complete Health Risk Assessment and screening values have been reviewed and are found in Flowsheets. The following problems were reviewed today and where indicated follow up appointments were made and/or referrals ordered.    Positive Risk Factor Screenings with Interventions:                  Hearing Screen:  Do you or your family notice any trouble with your hearing that hasn't been managed with hearing aids?: (!) Yes    Interventions:  Patient comments: none    Vision Screen:  Do you have difficulty driving, watching TV, or doing any of your daily activities because of your eyesight?: No  Have you had an eye exam within the past year?: (!) No  No results found.    Interventions:   Patient comments: no issue    Safety:  Do you have any tripping hazards - loose or unsecured carpets or rugs?: (!) Yes  Interventions:  Patient comments: no concern       LDCT Screening: Discussed with patient the benefits and harms of screening, follow-up diagnostic testing, over-diagnosis, false positive rate, and total radiation exposure. Counseled on the

## 2024-04-12 DIAGNOSIS — Z79.4 TYPE 2 DIABETES MELLITUS WITH DIABETIC NEUROPATHY, WITH LONG-TERM CURRENT USE OF INSULIN (HCC): ICD-10-CM

## 2024-04-12 DIAGNOSIS — E11.40 TYPE 2 DIABETES MELLITUS WITH DIABETIC NEUROPATHY, WITH LONG-TERM CURRENT USE OF INSULIN (HCC): ICD-10-CM

## 2024-05-20 DIAGNOSIS — E78.2 MIXED HYPERLIPIDEMIA: ICD-10-CM

## 2024-05-21 RX ORDER — ATORVASTATIN CALCIUM 40 MG/1
40 TABLET, FILM COATED ORAL DAILY
Qty: 100 TABLET | Refills: 2 | Status: SHIPPED | OUTPATIENT
Start: 2024-05-21

## 2024-05-22 ENCOUNTER — OFFICE VISIT (OUTPATIENT)
Dept: ORTHOPEDIC SURGERY | Age: 79
End: 2024-05-22
Payer: MEDICARE

## 2024-05-22 DIAGNOSIS — G89.29 CHRONIC PAIN OF LEFT KNEE: ICD-10-CM

## 2024-05-22 DIAGNOSIS — M25.562 CHRONIC PAIN OF LEFT KNEE: ICD-10-CM

## 2024-05-22 DIAGNOSIS — M25.561 RIGHT KNEE PAIN, UNSPECIFIED CHRONICITY: Primary | ICD-10-CM

## 2024-05-22 PROCEDURE — 99213 OFFICE O/P EST LOW 20 MIN: CPT | Performed by: ORTHOPAEDIC SURGERY

## 2024-05-22 PROCEDURE — 20610 DRAIN/INJ JOINT/BURSA W/O US: CPT | Performed by: ORTHOPAEDIC SURGERY

## 2024-05-22 PROCEDURE — 1123F ACP DISCUSS/DSCN MKR DOCD: CPT | Performed by: ORTHOPAEDIC SURGERY

## 2024-05-22 RX ORDER — LIDOCAINE HYDROCHLORIDE 10 MG/ML
2 INJECTION, SOLUTION INFILTRATION; PERINEURAL ONCE
Status: COMPLETED | OUTPATIENT
Start: 2024-05-22 | End: 2024-05-22

## 2024-05-22 RX ORDER — BUPIVACAINE HYDROCHLORIDE 2.5 MG/ML
2 INJECTION, SOLUTION INFILTRATION; PERINEURAL ONCE
Status: COMPLETED | OUTPATIENT
Start: 2024-05-22 | End: 2024-05-22

## 2024-05-22 RX ORDER — BETAMETHASONE SODIUM PHOSPHATE AND BETAMETHASONE ACETATE 3; 3 MG/ML; MG/ML
12 INJECTION, SUSPENSION INTRA-ARTICULAR; INTRALESIONAL; INTRAMUSCULAR; SOFT TISSUE ONCE
Status: COMPLETED | OUTPATIENT
Start: 2024-05-22 | End: 2024-05-22

## 2024-05-22 RX ADMIN — LIDOCAINE HYDROCHLORIDE 2 ML: 10 INJECTION, SOLUTION INFILTRATION; PERINEURAL at 15:03

## 2024-05-22 RX ADMIN — BETAMETHASONE SODIUM PHOSPHATE AND BETAMETHASONE ACETATE 12 MG: 3; 3 INJECTION, SUSPENSION INTRA-ARTICULAR; INTRALESIONAL; INTRAMUSCULAR; SOFT TISSUE at 15:02

## 2024-05-22 RX ADMIN — BUPIVACAINE HYDROCHLORIDE 5 MG: 2.5 INJECTION, SOLUTION INFILTRATION; PERINEURAL at 15:02

## 2024-06-03 ENCOUNTER — TELEPHONE (OUTPATIENT)
Dept: INTERNAL MEDICINE CLINIC | Age: 79
End: 2024-06-03

## 2024-06-03 NOTE — TELEPHONE ENCOUNTER
Pt stated she is requesting the Zolpidem to be filed again, as the trazodone has not helped at all.    Writer spoke w/ pt by phone regarding her no show appt wero'd w/ Dr. Afua Cody on 7/19/21. Pt stated she thought her appt was wero'd for the next day. Appt was markie'd & ltr mailed.

## 2024-07-11 ENCOUNTER — OFFICE VISIT (OUTPATIENT)
Dept: INTERNAL MEDICINE CLINIC | Age: 79
End: 2024-07-11

## 2024-07-11 VITALS
WEIGHT: 140 LBS | BODY MASS INDEX: 21.97 KG/M2 | HEART RATE: 85 BPM | HEIGHT: 67 IN | OXYGEN SATURATION: 96 % | DIASTOLIC BLOOD PRESSURE: 78 MMHG | SYSTOLIC BLOOD PRESSURE: 126 MMHG

## 2024-07-11 DIAGNOSIS — Z79.4 TYPE 2 DIABETES MELLITUS WITH DIABETIC NEUROPATHY, WITH LONG-TERM CURRENT USE OF INSULIN (HCC): Primary | ICD-10-CM

## 2024-07-11 DIAGNOSIS — M48.061 SPINAL STENOSIS OF LUMBAR REGION WITHOUT NEUROGENIC CLAUDICATION: ICD-10-CM

## 2024-07-11 DIAGNOSIS — E78.2 MIXED HYPERLIPIDEMIA: ICD-10-CM

## 2024-07-11 DIAGNOSIS — I10 PRIMARY HYPERTENSION: ICD-10-CM

## 2024-07-11 DIAGNOSIS — E11.40 TYPE 2 DIABETES MELLITUS WITH DIABETIC NEUROPATHY, WITH LONG-TERM CURRENT USE OF INSULIN (HCC): Primary | ICD-10-CM

## 2024-07-11 LAB — HBA1C MFR BLD: 9 %

## 2024-07-11 RX ORDER — INSULIN LISPRO 100 [IU]/ML
6 INJECTION, SOLUTION INTRAVENOUS; SUBCUTANEOUS
Qty: 4 ADJUSTABLE DOSE PRE-FILLED PEN SYRINGE | Refills: 1 | Status: SHIPPED | OUTPATIENT
Start: 2024-07-11

## 2024-07-11 ASSESSMENT — ENCOUNTER SYMPTOMS
SHORTNESS OF BREATH: 0
COLOR CHANGE: 0
WHEEZING: 0
EYE PAIN: 0
ABDOMINAL DISTENTION: 0
COUGH: 0
TROUBLE SWALLOWING: 0
BLOOD IN STOOL: 0
DIARRHEA: 0
EYE DISCHARGE: 0

## 2024-07-11 NOTE — PROGRESS NOTES
Visit Information    Have you changed or started any medications since your last visit including any over-the-counter medicines, vitamins, or herbal medicines? no   Are you having any side effects from any of your medications? -  no  Have you stopped taking any of your medications? Is so, why? -  no    Have you seen any other physician or provider since your last visit? Yes - Records Obtained  Have you had any other diagnostic tests since your last visit? Yes - Records Obtained  Have you been seen in the emergency room and/or had an admission to a hospital since we last saw you? Yes - Records Obtained  Have you had your routine dental cleaning in the past 6 months? yes -     Have you activated your [employer] account? If not, what are your barriers? Yes     Patient Care Team:  Johnie Lawrence MD as PCP - General (Internal Medicine)  Johnie Lawrence MD as PCP - Empaneled Provider  Stephanie Escobedo DO (Obstetrics & Gynecology)    Medical History Review  Past Medical, Family, and Social History reviewed and does contribute to the patient presenting condition    Health Maintenance   Topic Date Due    Respiratory Syncytial Virus (RSV) Pregnant or age 60 yrs+ (1 - 1-dose 60+ series) Never done    DTaP/Tdap/Td vaccine (1 - Tdap) 06/08/2010    Shingles vaccine (2 of 2) 09/26/2018    COVID-19 Vaccine (4 - 2023-24 season) 09/01/2023    Flu vaccine (1) 08/01/2024    Lipids  10/06/2024    Depression Monitoring  04/04/2025    Annual Wellness Visit (Medicare)  04/05/2025    DEXA (modify frequency per FRAX score)  Completed    Pneumococcal 65+ years Vaccine  Completed    Hepatitis C screen  Completed    Hepatitis A vaccine  Aged Out    Hepatitis B vaccine  Aged Out    Hib vaccine  Aged Out    Polio vaccine  Aged Out    Meningococcal (ACWY) vaccine  Aged Out    Diabetic foot exam  Discontinued    A1C test (Diabetic or Prediabetic)  Discontinued    Diabetic retinal exam  Discontinued    Depression Screen  Discontinued

## 2024-07-11 NOTE — PROGRESS NOTES
[facility]  [address] [address]  Dept: [phone]  Dept Fax: [phone]    Yeni Rodas is a 78 y.o. female who presents today for her medical conditions/complaintsas noted below.  Yeni Rodas is c/o of   Chief Complaint   Patient presents with    Diabetes     Type 2 diabetes mellitus with diabetic neuropathy, with long-term current use of insulin (HCC)         HPI:     HTN  Onset more than 2 years ago  ernesto mild to mod  Controlled with current po meds  Not associated with headaches or blurry vision  No chest pain    Diabetes   Duration more than 7 years  Modifying factors on Glucophage and other med  Severity uncontrolled sever  Associated signs and symtoms neuropathy/ckd/ CAD.   aggravated with sugar diet and better with low sugar diet               Hemoglobin A1C (%)   Date Value   07/11/2024 9.0 (A)   03/26/2024 8.7 (H)   12/14/2023 6.8 (H)             ( goal A1Cis < 7)   No components found for: \"LABMICR\"  No components found for: \"LDLCHOLESTEROL\", \"LDLCALC\"    (goal LDL is <100)   AST (U/L)   Date Value   10/16/2023 24     ALT (U/L)   Date Value   10/16/2023 15     BUN (mg/dL)   Date Value   10/18/2023 5 (L)     BP Readings from Last 3 Encounters:   07/11/24 126/78   04/04/24 137/76   01/11/24 110/64          (goal 120/80)    Past Medical History:   Diagnosis Date    Anxiety state, unspecified     Arthritis     Cataracts, bilateral     COVID-19 vaccine administered 2- & 3-    MODERNA    Esophageal reflux     Generalized osteoarthrosis, unspecified site     Hearing loss     Hyperlipidemia     Hypertension     Neuropathy     Neuropathy due to medical condition (HCC)     Pure hypercholesterolemia     Thoracic or lumbosacral neuritis or radiculitis, unspecified     Type 2 diabetes mellitus without complication (HCC)     Urinary retention     UTI (urinary tract infection)       Past Surgical History:   Procedure Laterality Date    BACK SURGERY

## 2024-07-12 ENCOUNTER — CARE COORDINATION (OUTPATIENT)
Dept: CARE COORDINATION | Age: 79
End: 2024-07-12

## 2024-07-12 DIAGNOSIS — Z79.4 TYPE 2 DIABETES MELLITUS WITH DIABETIC NEUROPATHY, WITH LONG-TERM CURRENT USE OF INSULIN (HCC): ICD-10-CM

## 2024-07-12 DIAGNOSIS — E11.40 TYPE 2 DIABETES MELLITUS WITH DIABETIC NEUROPATHY, WITH LONG-TERM CURRENT USE OF INSULIN (HCC): ICD-10-CM

## 2024-07-12 NOTE — TELEPHONE ENCOUNTER
Patient called in request to order RX for  short pen needles for new insulin  Lispro     Please send to [facility]

## 2024-07-12 NOTE — CARE COORDINATION
Ambulatory Care Coordination Note     7/12/2024 3:05 PM     Patient outreach attempt by this ACM today to offer care management services. ACM was unable to reach the patient by telephone today; left voice message requesting a return phone call to this ACM.     ACM: Wong So RN     Care Summary Note:     Writer is covering for Juanita Juarez RN, ACM.  ACM referral received from Patient's PCP.  Writer phoned Patient to introduce self and explain ACM.  Writer left message on identifiable voice mail requesting return call.  Writer's contact information provided.    PCP/Specialist follow up:   Future Appointments         Provider Specialty Dept Phone    8/21/2024 12:30 PM Johnie Lawrence MD Internal Medicine [phone]    10/23/2024 1:00 PM Debra Rush PA Neurology [phone]            Follow Up:   Plan for next ACM outreach in approximately 1 week to complete:  - outreach attempt to offer care management services.

## 2024-07-24 ENCOUNTER — CARE COORDINATION (OUTPATIENT)
Dept: CARE COORDINATION | Age: 79
End: 2024-07-24

## 2024-07-24 NOTE — CARE COORDINATION
Ambulatory Care Coordination Note     2024 11:10 AM     Patient Current Location:  [place]     This patient was received as a referral from Provider.    ACM contacted the patient by telephone. Verified name and  with spouse/partner as identifiers. Provided introduction to self, and explanation of the ACM role.   Spouse/Partner states Patient is unable to come to the phone at this time.  He wrote down Writer's contact information for Patient.  He states he will have her return call to Writer.     ACM: Wong So RN     Challenges to be reviewed by the provider   Additional needs identified to be addressed with provider No  none               Method of communication with provider: staff message.    Care Summary Note: Writer phoned Patient to introduce self to Patient and explain ACM.  Patient's  states he will inform Patient of Writer's call.    Offered patient enrollment in the Remote Patient Monitoring (RPM) program for in-home monitoring: Deferred at this time because unable to contact Patient; will discuss at next outreach.     Assessments Completed:   No return call received from Patient today    Medications Reviewed:   Not completed today.  No return call     Advance Care Planning:   Not reviewed during this call     Care Planning:   Not completed during this call    PCP/Specialist follow up:   Future Appointments         Provider Specialty Dept Phone    2024 1:45 [facility] [phone]    9/3/2024 12:30 PM Johnie Lawrence MD Internal Medicine [phone]    10/23/2024 1:00 PM Debra Rush PA Neurology [phone]            Follow Up:   No further Ambulatory Care Management follow-up scheduled at this time.  Patient  has Ambulatory Care Manager's contact information for any further questions, concerns or needs.

## 2024-08-05 NOTE — TELEPHONE ENCOUNTER
Pharmacy requesting refill of Duloxetine Cymalta 60 Mg.      Medication active on med list yes      Date of last Rx: 02/16/2024 with 1 refills          verified by JEN ROSALES      Date of last appointment 04/11/2023    Next Visit Date:  10/23/2024    Please fill for Debra.

## 2024-08-07 RX ORDER — DULOXETIN HYDROCHLORIDE 60 MG/1
60 CAPSULE, DELAYED RELEASE ORAL DAILY
Qty: 90 CAPSULE | Refills: 1 | Status: SHIPPED | OUTPATIENT
Start: 2024-08-07

## 2024-08-12 ENCOUNTER — CARE COORDINATION (OUTPATIENT)
Dept: CARE COORDINATION | Age: 79
End: 2024-08-12

## 2024-08-12 NOTE — CARE COORDINATION
Ambulatory Care Coordination Note     2024 11:09 AM     Patient Current Location:  Home: [address] [facility] [address]     This patient was received as a referral from Provider.    ACM contacted the patient by telephone. Verified name and  with patient as identifiers. Provided introduction to self, and explanation of the ACM role.   Patient did not decline or accept care management services at this time.      ACM: Wong So RN     Challenges to be reviewed by the provider   Additional needs identified to be addressed with provider No  none               Method of communication with provider: staff message.    Care Summary Note:     Writer introduced  She states this isn't a good time for her and requested a return call.  Writer will update Bebe Juarez RN, ACM re:  this is Writer's third phone call attempting to enroll Patient in ACM without success.     Bebe will take over ACM enrollment attempts.       Offered patient enrollment in the Remote Patient Monitoring (RPM) program for in-home monitoring: Deferred at this time because Patient did not agree to ACM enrollment today; will discuss at next outreach.     Assessments Completed:   Not done today    Medications Reviewed:   Not completed during this call:      Advance Care Planning:   Not reviewed during this call     Care Planning:   Not completed during this call    PCP/Specialist follow up:   Future Appointments         Provider Specialty Dept Phone    2024 1:45 PM SCHEDULE, [facility] Cardiology [phone]    9/3/2024 12:30 PM Johnie Lawrence MD Internal Medicine [phone]    10/23/2024 1:00 PM Debra Rush PA Neurology [phone]            Follow Up:   Plan for next ACM outreach in approximately 1 week to complete:  - outreach attempt to offer care management services.   Patient  is agreeable to this plan.

## 2024-08-12 NOTE — CARE COORDINATION
PCP referral, introduction letter mailed. Multiple attempts made by [facility] CM in the past.   Will wait for patient to respond to letter.   HUSSEIN LawN, RN ambulatory care manager [phone].

## 2024-08-15 NOTE — TELEPHONE ENCOUNTER
Pharmacy change    Pharmacy requesting refill of Duloxetine 60 mg.      Medication active on med list yes      Date of last Rx: 8/7/2024 with 1 refills          verified by MARK ALDRICH      Date of last appointment 4/11/2023    Next Visit Date:  10/23/2024

## 2024-08-16 RX ORDER — DULOXETIN HYDROCHLORIDE 60 MG/1
60 CAPSULE, DELAYED RELEASE ORAL DAILY
Qty: 90 CAPSULE | Refills: 1 | Status: SHIPPED | OUTPATIENT
Start: 2024-08-16

## 2024-08-23 ENCOUNTER — TELEPHONE (OUTPATIENT)
Age: 79
End: 2024-08-23

## 2024-08-27 DIAGNOSIS — Z79.4 TYPE 2 DIABETES MELLITUS WITH DIABETIC NEUROPATHY, WITH LONG-TERM CURRENT USE OF INSULIN (HCC): ICD-10-CM

## 2024-08-27 DIAGNOSIS — E11.40 TYPE 2 DIABETES MELLITUS WITH DIABETIC NEUROPATHY, WITH LONG-TERM CURRENT USE OF INSULIN (HCC): ICD-10-CM

## 2024-08-28 RX ORDER — INSULIN GLARGINE 100 [IU]/ML
INJECTION, SOLUTION SUBCUTANEOUS
Qty: 30 ML | Refills: 2 | Status: SHIPPED | OUTPATIENT
Start: 2024-08-28

## 2024-08-28 RX ORDER — DULOXETIN HYDROCHLORIDE 60 MG/1
60 CAPSULE, DELAYED RELEASE ORAL DAILY
Qty: 90 CAPSULE | Refills: 1 | Status: SHIPPED | OUTPATIENT
Start: 2024-08-28

## 2024-09-03 ENCOUNTER — OFFICE VISIT (OUTPATIENT)
Dept: INTERNAL MEDICINE CLINIC | Age: 79
End: 2024-09-03

## 2024-09-03 VITALS
SYSTOLIC BLOOD PRESSURE: 110 MMHG | HEIGHT: 67 IN | DIASTOLIC BLOOD PRESSURE: 60 MMHG | HEART RATE: 88 BPM | WEIGHT: 136 LBS | OXYGEN SATURATION: 97 % | BODY MASS INDEX: 21.35 KG/M2

## 2024-09-03 DIAGNOSIS — J43.1 PANLOBULAR EMPHYSEMA (HCC): ICD-10-CM

## 2024-09-03 DIAGNOSIS — Z79.4 TYPE 2 DIABETES MELLITUS WITH DIABETIC NEUROPATHY, WITH LONG-TERM CURRENT USE OF INSULIN (HCC): ICD-10-CM

## 2024-09-03 DIAGNOSIS — I63.512 ACUTE ISCHEMIC LEFT MCA STROKE (HCC): Primary | ICD-10-CM

## 2024-09-03 DIAGNOSIS — E11.40 TYPE 2 DIABETES MELLITUS WITH DIABETIC NEUROPATHY, WITH LONG-TERM CURRENT USE OF INSULIN (HCC): ICD-10-CM

## 2024-09-03 SDOH — ECONOMIC STABILITY: FOOD INSECURITY: WITHIN THE PAST 12 MONTHS, YOU WORRIED THAT YOUR FOOD WOULD RUN OUT BEFORE YOU GOT MONEY TO BUY MORE.: PATIENT DECLINED

## 2024-09-03 SDOH — ECONOMIC STABILITY: INCOME INSECURITY: HOW HARD IS IT FOR YOU TO PAY FOR THE VERY BASICS LIKE FOOD, HOUSING, MEDICAL CARE, AND HEATING?: PATIENT DECLINED

## 2024-09-03 SDOH — ECONOMIC STABILITY: FOOD INSECURITY: WITHIN THE PAST 12 MONTHS, THE FOOD YOU BOUGHT JUST DIDN'T LAST AND YOU DIDN'T HAVE MONEY TO GET MORE.: PATIENT DECLINED

## 2024-09-03 ASSESSMENT — PATIENT HEALTH QUESTIONNAIRE - PHQ9
6. FEELING BAD ABOUT YOURSELF - OR THAT YOU ARE A FAILURE OR HAVE LET YOURSELF OR YOUR FAMILY DOWN: NOT AT ALL
5. POOR APPETITE OR OVEREATING: NOT AT ALL
4. FEELING TIRED OR HAVING LITTLE ENERGY: NOT AT ALL
SUM OF ALL RESPONSES TO PHQ QUESTIONS 1-9: 0
10. IF YOU CHECKED OFF ANY PROBLEMS, HOW DIFFICULT HAVE THESE PROBLEMS MADE IT FOR YOU TO DO YOUR WORK, TAKE CARE OF THINGS AT HOME, OR GET ALONG WITH OTHER PEOPLE: NOT DIFFICULT AT ALL
3. TROUBLE FALLING OR STAYING ASLEEP: NOT AT ALL
SUM OF ALL RESPONSES TO PHQ QUESTIONS 1-9: 0
8. MOVING OR SPEAKING SO SLOWLY THAT OTHER PEOPLE COULD HAVE NOTICED. OR THE OPPOSITE, BEING SO FIGETY OR RESTLESS THAT YOU HAVE BEEN MOVING AROUND A LOT MORE THAN USUAL: NOT AT ALL
7. TROUBLE CONCENTRATING ON THINGS, SUCH AS READING THE NEWSPAPER OR WATCHING TELEVISION: NOT AT ALL
2. FEELING DOWN, DEPRESSED OR HOPELESS: NOT AT ALL
9. THOUGHTS THAT YOU WOULD BE BETTER OFF DEAD, OR OF HURTING YOURSELF: NOT AT ALL
SUM OF ALL RESPONSES TO PHQ9 QUESTIONS 1 & 2: 0
SUM OF ALL RESPONSES TO PHQ QUESTIONS 1-9: 0
1. LITTLE INTEREST OR PLEASURE IN DOING THINGS: NOT AT ALL
SUM OF ALL RESPONSES TO PHQ QUESTIONS 1-9: 0

## 2024-09-03 ASSESSMENT — ENCOUNTER SYMPTOMS
SHORTNESS OF BREATH: 0
COUGH: 0
COLOR CHANGE: 0
EYE PAIN: 0
BLOOD IN STOOL: 0
TROUBLE SWALLOWING: 0
WHEEZING: 0
ABDOMINAL DISTENTION: 0
EYE DISCHARGE: 0
DIARRHEA: 0

## 2024-09-03 NOTE — PROGRESS NOTES
[facility]  [address] [address]  Dept: [phone]  Dept Fax: [phone]    Yeni Rodas is a 78 y.o. female who presents today for her medical conditions/complaintsas noted below.  Yeni Rodas is c/o of   Chief Complaint   Patient presents with    Diabetes     1 month follow up          HPI:     HTN  Onset more than 2 years ago  ernesto mild to mod  Controlled with current po meds  Not associated with headaches or blurry vision  No chest pain    Diabetes   Duration more than 7 years  Modifying factors on Glucophage and other med  Severity uncontrolled sever  Associated signs and symtoms neuropathy/ckd/ CAD.   aggravated with sugar diet and better with low sugar diet               Hemoglobin A1C (%)   Date Value   07/11/2024 9.0 (A)   03/26/2024 8.7 (H)   12/14/2023 6.8 (H)             ( goal A1Cis < 7)   No components found for: \"LABMICR\"  No components found for: \"LDLCHOLESTEROL\", \"LDLCALC\"    (goal LDL is <100)   AST (U/L)   Date Value   10/16/2023 24     ALT (U/L)   Date Value   10/16/2023 15     BUN (mg/dL)   Date Value   10/18/2023 5 (L)     BP Readings from Last 3 Encounters:   09/03/24 110/60   07/11/24 126/78   04/04/24 137/76          (goal 120/80)    Past Medical History:   Diagnosis Date    Anxiety state, unspecified     Arthritis     Cataracts, bilateral     COVID-19 vaccine administered 2- & 3-    MODERNA    Esophageal reflux     Generalized osteoarthrosis, unspecified site     Hearing loss     Hyperlipidemia     Hypertension     Neuropathy     Neuropathy due to medical condition (HCC)     Pure hypercholesterolemia     Thoracic or lumbosacral neuritis or radiculitis, unspecified     Type 2 diabetes mellitus without complication (HCC)     Urinary retention     UTI (urinary tract infection)       Past Surgical History:   Procedure Laterality Date    BACK SURGERY      X3    BREAST BIOPSY  2007    Lt     CARPAL TUNNEL RELEASE Right     
(Diabetic or Prediabetic)  Discontinued    Diabetic retinal exam  Discontinued    Depression Screen  Discontinued    Colonoscopy  Discontinued

## 2024-09-04 ENCOUNTER — TELEPHONE (OUTPATIENT)
Age: 79
End: 2024-09-04

## 2024-09-04 NOTE — TELEPHONE ENCOUNTER
New referral for diabetes medication management.  Attempted to contact patient to explain services and schedule initial appt.  No answer so left voicemail for patient to return our call.  Kareen Thornton RPH,Pharm.D,, BCPS, CACP  9/4/2024  6:12 PM

## 2024-09-09 RX ORDER — PANTOPRAZOLE SODIUM 40 MG/1
40 TABLET, DELAYED RELEASE ORAL
Qty: 100 TABLET | Refills: 2 | Status: SHIPPED | OUTPATIENT
Start: 2024-09-09

## 2024-09-19 ENCOUNTER — CARE COORDINATION (OUTPATIENT)
Dept: CARE COORDINATION | Age: 79
End: 2024-09-19

## 2024-10-01 ENCOUNTER — CARE COORDINATION (OUTPATIENT)
Dept: CARE COORDINATION | Age: 79
End: 2024-10-01

## 2024-10-01 DIAGNOSIS — E11.65 TYPE 2 DIABETES MELLITUS WITH HYPERGLYCEMIA (HCC): ICD-10-CM

## 2024-10-01 RX ORDER — ACYCLOVIR 400 MG/1
3 TABLET ORAL
Qty: 9 EACH | Refills: 1 | Status: SHIPPED | OUTPATIENT
Start: 2024-10-01

## 2024-10-01 RX ORDER — ACYCLOVIR 400 MG/1
1 TABLET ORAL CONTINUOUS
Qty: 1 EACH | Refills: 0 | Status: SHIPPED | OUTPATIENT
Start: 2024-10-01

## 2024-10-01 NOTE — TELEPHONE ENCOUNTER
Patient is requesting diabetes education at [facility] and a CGM.   Both pending PCP approval.

## 2024-10-01 NOTE — CARE COORDINATION
Received a VM from patient requesting a return call.   Returned call and had to leave a VM in return.   Bebe Juarez, BSN, RN ambulatory care manager [phone].

## 2024-10-01 NOTE — CARE COORDINATION
Ambulatory Care Coordination Note     10/1/2024 1:24 PM     Patient Current Location:  Home: [address] [facility] [address]     This patient was received as a referral from Ambulatory Care Manager .    ACM contacted the patient by telephone. Verified name and  with patient as identifiers. Provided introduction to self, and explanation of the ACM role.   Patient accepted care management services at this time.          ACM: HEIKE ARTHUR RN     Challenges to be reviewed by the provider   Additional needs identified to be addressed with provider Yes  DME-Patient would like a CGM- will pend to PCP.                Method of communication with provider: chart routing.    Has the patient been seen in the ED since your last call? no    Care Summary Note: Patient called asking for assistance with DM EDU. Spoke about diet, glucose testing, A1C 9.0 and a referral to DM EDU. Patient was happy to accept a referral to go to [facility] for DM EDU. She stated back when she was 1st diagnosed she did attend some classes but feels she was not ready to learn at that time. She is ready and willing to accept education now.   Recommended food journaling-blood sugar log to have available for her appointment.   She would like to see if her insurance will cover a CGM, will pend to PCP.     Offered patient enrollment in the Remote Patient Monitoring (RPM) program for in-home monitoring: Yes, but did not enroll at this time: will continue to address .     Assessments Completed:   Diabetes Assessment      Meal Planning: Avoidance of concentrated sweets, Carb counting   How often do you test your blood sugar?: Other (Comment: weekly or if symptoms occur)   Do you have barriers with adherence to non-pharmacologic self-management interventions? (Nutrition/Exercise/Self-Monitoring): No   Have you ever had to go to the ED for symptoms of low blood sugar?: No       No patient-reported symptoms         ,   General Assessment    Do you

## 2024-10-08 ENCOUNTER — CARE COORDINATION (OUTPATIENT)
Dept: CARE COORDINATION | Age: 79
End: 2024-10-08

## 2024-10-08 NOTE — CARE COORDINATION
Ambulatory Care Coordination Note     10/8/2024 10:51 AM     ACM outreach attempt by this ACM today to perform care management follow up . ACM was unable to reach the patient by telephone today;  unable to leave .     ACM: HEIKE ARTHUR RN     Care Summary Note: noted patient has been scheduled with Diabetes education.     PCP/Specialist follow up:   Future Appointments         Provider Specialty Dept Phone    10/15/2024 11:30 AM Joanna Parker RN Diabetes Services [phone]    10/23/2024 1:00 PM Debra Rush PA Neurology [phone]    10/29/2024 3:45 PM Johnie Lawrence MD Internal Medicine [phone]    12/23/2024 2:45 PM Johnie Lawrence MD Internal Medicine [phone]            Follow Up:   Plan for next ACM outreach in approximately 1 week to complete:  - disease specific assessments  - SDOH assessments  - medication review  - advance care planning  - goal progression  - education   - RPM.

## 2024-10-15 ENCOUNTER — TELEPHONE (OUTPATIENT)
Dept: INTERNAL MEDICINE CLINIC | Age: 79
End: 2024-10-15

## 2024-10-15 ENCOUNTER — HOSPITAL ENCOUNTER (OUTPATIENT)
Dept: DIABETES SERVICES | Age: 79
Setting detail: THERAPIES SERIES
Discharge: HOME | End: 2024-10-15
Payer: MEDICARE

## 2024-10-15 DIAGNOSIS — Z79.4 TYPE 2 DIABETES MELLITUS WITH DIABETIC NEUROPATHY, WITH LONG-TERM CURRENT USE OF INSULIN (HCC): Primary | ICD-10-CM

## 2024-10-15 DIAGNOSIS — E11.40 TYPE 2 DIABETES MELLITUS WITH DIABETIC NEUROPATHY, WITH LONG-TERM CURRENT USE OF INSULIN (HCC): Primary | ICD-10-CM

## 2024-10-15 DIAGNOSIS — Z79.4 TYPE 2 DIABETES MELLITUS WITH HYPERGLYCEMIA, WITH LONG-TERM CURRENT USE OF INSULIN (HCC): Primary | ICD-10-CM

## 2024-10-15 DIAGNOSIS — E11.65 TYPE 2 DIABETES MELLITUS WITH HYPERGLYCEMIA, WITH LONG-TERM CURRENT USE OF INSULIN (HCC): Primary | ICD-10-CM

## 2024-10-15 PROCEDURE — G0108 DIAB MANAGE TRN  PER INDIV: HCPCS

## 2024-10-15 SDOH — ECONOMIC STABILITY: FOOD INSECURITY: ADDITIONAL INFORMATION: NO

## 2024-10-15 ASSESSMENT — PROBLEM AREAS IN DIABETES QUESTIONNAIRE (PAID)
COPING WITH COMPLICATIONS OF DIABETES: SOMEWHAT SERIOUS PROBLEM
FEELING THAT DIABETES IS TAKING UP TOO MUCH OF YOUR MENTAL AND PHYSICAL ENERGY EVERY DAY: SOMEWHAT SERIOUS PROBLEM
PAID-5 TOTAL SCORE: 15
WORRYING ABOUT THE FUTURE AND THE POSSIBILITY OF SERIOUS COMPLICATIONS: SERIOUS PROBLEM
FEELING DEPRESSED WHEN YOU THINK ABOUT LIVING WITH DIABETES: MODERATE PROBLEM
FEELING SCARED WHEN YOU THINK ABOUT LIVING WITH DIABETES: SOMEWHAT SERIOUS PROBLEM

## 2024-10-15 NOTE — PROGRESS NOTES
Diabetes Self- Management Education Program Assessment -   Also see Diabetic Screening  Patient, Yeni Rodas,  here for diabetes self-management education  visit/ assessment.   Today's visit was in an individual setting.    MEDICAL HISTORY:  Past Medical History:   Diagnosis Date    Anxiety state, unspecified     Arthritis     Cataracts, bilateral     COVID-19 vaccine administered 2- & 3-    MODERNA    Esophageal reflux     Generalized osteoarthrosis, unspecified site     Hearing loss     Hyperlipidemia     Hypertension     Neuropathy     Neuropathy due to medical condition (HCC)     Pure hypercholesterolemia     Thoracic or lumbosacral neuritis or radiculitis, unspecified     Type 2 diabetes mellitus without complication (HCC)     Urinary retention     UTI (urinary tract infection)      Family History   Problem Relation Age of Onset    Cancer Mother         lymphoma    Hypertension Mother     Hypertension Father     Hypertension Brother     Heart Disease Brother         pt states that brother had 5 bypass surgeries.      Hydrocodone   Immunization History   Administered Date(s) Administered    COVID-19, MODERNA BLUE border, Primary or Immunocompromised, (age 12y+), IM, 100 mcg/0.5mL 02/05/2021, 03/05/2021    COVID-19, PFIZER PURPLE top, DILUTE for use, (age 12 y+), 30mcg/0.3mL 02/13/2022    Influenza, FLUAD, (age 65 y+), IM, Quadv, 0.5mL 11/03/2020, 02/10/2022, 10/12/2022    Influenza, FLUZONE High Dose, (age 65 y+), IM, Trivalent PF, 0.5mL 12/02/2019    Pneumococcal Conjugate 7-valent (Prevnar7) 12/18/2009    Pneumococcal, PCV-13, PREVNAR 13, (age 6w+), IM, 0.5mL 03/16/2018    Pneumococcal, PPSV23, PNEUMOVAX 23, (age 2y+), SC/IM, 0.5mL 01/04/2017    TD 5LF, TENIVAC, (age 7y+), IM, 0.5mL 06/07/2010    Zoster Recombinant (Shingrix) 08/01/2018     Current Medications  Current Outpatient Medications   Medication Sig Dispense Refill    Continuous Glucose Sensor (DEXCOM G7 SENSOR) MISC 3 Devices by Does

## 2024-10-15 NOTE — TELEPHONE ENCOUNTER
Patient called the office and is wanting to know if she can get a order for an A1C to be done before she comes into the office on 10/22/2024. Please advise.

## 2024-10-17 ENCOUNTER — HOSPITAL ENCOUNTER (OUTPATIENT)
Age: 79
Setting detail: SPECIMEN
Discharge: HOME | End: 2024-10-17

## 2024-10-17 ENCOUNTER — CARE COORDINATION (OUTPATIENT)
Dept: CARE COORDINATION | Age: 79
End: 2024-10-17

## 2024-10-17 DIAGNOSIS — E11.65 TYPE 2 DIABETES MELLITUS WITH HYPERGLYCEMIA, WITH LONG-TERM CURRENT USE OF INSULIN (HCC): ICD-10-CM

## 2024-10-17 DIAGNOSIS — Z79.4 TYPE 2 DIABETES MELLITUS WITH DIABETIC NEUROPATHY, WITH LONG-TERM CURRENT USE OF INSULIN (HCC): ICD-10-CM

## 2024-10-17 DIAGNOSIS — Z79.4 TYPE 2 DIABETES MELLITUS WITH HYPERGLYCEMIA, WITH LONG-TERM CURRENT USE OF INSULIN (HCC): ICD-10-CM

## 2024-10-17 DIAGNOSIS — E11.40 TYPE 2 DIABETES MELLITUS WITH DIABETIC NEUROPATHY, WITH LONG-TERM CURRENT USE OF INSULIN (HCC): ICD-10-CM

## 2024-10-17 LAB
CHOLEST SERPL-MCNC: 221 MG/DL (ref 0–199)
CHOLESTEROL/HDL RATIO: 2
EST. AVERAGE GLUCOSE BLD GHB EST-MCNC: 203 MG/DL
HBA1C MFR BLD: 8.7 % (ref 4–6)
HDLC SERPL-MCNC: 98 MG/DL
LDLC SERPL CALC-MCNC: 106 MG/DL (ref 0–100)
TRIGL SERPL-MCNC: 88 MG/DL
VLDLC SERPL CALC-MCNC: 18 MG/DL

## 2024-10-17 NOTE — CARE COORDINATION
Ambulatory Care Coordination Note     10/17/2024 10:43 AM     ACM outreach attempt by this ACM today to perform care management follow up . ACM was unable to reach the patient by telephone today; left voice message requesting a return phone call to this ACM.     ACM: HEIKE ARTHUR RN     PCP/Specialist follow up:   Future Appointments         Provider Specialty Dept Phone    10/22/2024 10:00 AM Joanna Parker RN Diabetes Services [phone]    10/22/2024 3:45 PM Johnie Lawrence MD Internal Medicine [phone]    10/23/2024 1:00 PM Debra Rush PA Neurology [phone]    12/23/2024 2:45 PM Johnie Lawrence MD Internal Medicine [phone]            Follow Up:   Plan for next ACM outreach in approximately 1 week to complete:  - disease specific assessments  - SDOH assessments  - medication review  - advance care planning  - goal progression  - education   - RPM.

## 2024-10-22 ENCOUNTER — HOSPITAL ENCOUNTER (OUTPATIENT)
Dept: DIABETES SERVICES | Age: 79
Setting detail: THERAPIES SERIES
Discharge: HOME | End: 2024-10-22
Payer: MEDICARE

## 2024-10-22 ENCOUNTER — OFFICE VISIT (OUTPATIENT)
Dept: INTERNAL MEDICINE CLINIC | Age: 79
End: 2024-10-22

## 2024-10-22 VITALS
BODY MASS INDEX: 21.5 KG/M2 | WEIGHT: 137 LBS | DIASTOLIC BLOOD PRESSURE: 64 MMHG | SYSTOLIC BLOOD PRESSURE: 110 MMHG | HEIGHT: 67 IN

## 2024-10-22 DIAGNOSIS — M17.12 PRIMARY OSTEOARTHRITIS OF LEFT KNEE: ICD-10-CM

## 2024-10-22 DIAGNOSIS — I10 PRIMARY HYPERTENSION: Primary | ICD-10-CM

## 2024-10-22 DIAGNOSIS — J43.1 PANLOBULAR EMPHYSEMA (HCC): ICD-10-CM

## 2024-10-22 DIAGNOSIS — E11.40 TYPE 2 DIABETES MELLITUS WITH DIABETIC NEUROPATHY, WITH LONG-TERM CURRENT USE OF INSULIN (HCC): ICD-10-CM

## 2024-10-22 DIAGNOSIS — Z79.4 TYPE 2 DIABETES MELLITUS WITH DIABETIC NEUROPATHY, WITH LONG-TERM CURRENT USE OF INSULIN (HCC): ICD-10-CM

## 2024-10-22 DIAGNOSIS — E78.2 MIXED HYPERLIPIDEMIA: ICD-10-CM

## 2024-10-22 DIAGNOSIS — K21.9 GASTROESOPHAGEAL REFLUX DISEASE WITHOUT ESOPHAGITIS: ICD-10-CM

## 2024-10-22 DIAGNOSIS — H61.22 IMPACTED CERUMEN OF LEFT EAR: ICD-10-CM

## 2024-10-22 PROCEDURE — G0108 DIAB MANAGE TRN  PER INDIV: HCPCS

## 2024-10-22 RX ORDER — DAPAGLIFLOZIN 10 MG/1
10 TABLET, FILM COATED ORAL EVERY MORNING
Qty: 90 TABLET | Refills: 0 | Status: SHIPPED | OUTPATIENT
Start: 2024-10-22

## 2024-10-22 RX ORDER — PROCHLORPERAZINE 25 MG/1
2 SUPPOSITORY RECTAL
Qty: 2 EACH | Refills: 3 | Status: SHIPPED | OUTPATIENT
Start: 2024-10-22

## 2024-10-22 RX ORDER — PROCHLORPERAZINE 25 MG/1
1 SUPPOSITORY RECTAL
Qty: 1 EACH | Refills: 3 | Status: SHIPPED | OUTPATIENT
Start: 2024-10-22

## 2024-10-22 ASSESSMENT — ENCOUNTER SYMPTOMS
ABDOMINAL DISTENTION: 0
COUGH: 0
EYE DISCHARGE: 0
WHEEZING: 0
DIARRHEA: 0
COLOR CHANGE: 0
SHORTNESS OF BREATH: 0
EYE PAIN: 0
BLOOD IN STOOL: 0
TROUBLE SWALLOWING: 0

## 2024-10-22 NOTE — PROGRESS NOTES
[facility]  [address] [address]  Dept: [phone]  Dept Fax: [phone]    Yeni Rodas is a 79 y.o. female who presents today for her medical conditions/complaintsas noted below.  Yeni Rodas is c/o of   Chief Complaint   Patient presents with    Diabetes         HPI:     HTN  Onset more than 2 years ago  ernesto mild to mod  Controlled with current po meds  Not associated with headaches or blurry vision  No chest pain    Diabetes   Duration more than 7 years  Modifying factors on Glucophage and other med  Severity uncontrolled sever  Associated signs and symtoms neuropathy/ckd/ CAD.   aggravated with sugar diet and better with low sugar diet               Hemoglobin A1C (%)   Date Value   10/17/2024 8.7 (H)   07/11/2024 9.0 (A)   03/26/2024 8.7 (H)             ( goal A1Cis < 7)   No components found for: \"LABMICR\"  No components found for: \"LDLCHOLESTEROL\", \"LDLCALC\"    (goal LDL is <100)   AST (U/L)   Date Value   10/16/2023 24     ALT (U/L)   Date Value   10/16/2023 15     BUN (mg/dL)   Date Value   10/18/2023 5 (L)     BP Readings from Last 3 Encounters:   10/22/24 110/64   09/03/24 110/60   07/11/24 126/78          (goal 120/80)    Past Medical History:   Diagnosis Date    Anxiety state, unspecified     Arthritis     Cataracts, bilateral     COVID-19 vaccine administered 2- & 3-    MODERNA    Esophageal reflux     Generalized osteoarthrosis, unspecified site     Hearing loss     Hyperlipidemia     Hypertension     Neuropathy     Neuropathy due to medical condition (HCC)     Pure hypercholesterolemia     Thoracic or lumbosacral neuritis or radiculitis, unspecified     Type 2 diabetes mellitus without complication (HCC)     Urinary retention     UTI (urinary tract infection)       Past Surgical History:   Procedure Laterality Date    BACK SURGERY      X3    BREAST BIOPSY  2007    Lt     CARPAL TUNNEL RELEASE Right     CATARACT REMOVAL WITH

## 2024-10-23 ENCOUNTER — OFFICE VISIT (OUTPATIENT)
Dept: NEUROLOGY | Age: 79
End: 2024-10-23

## 2024-10-23 VITALS
HEIGHT: 67 IN | SYSTOLIC BLOOD PRESSURE: 113 MMHG | DIASTOLIC BLOOD PRESSURE: 75 MMHG | WEIGHT: 136.6 LBS | HEART RATE: 118 BPM | BODY MASS INDEX: 21.44 KG/M2

## 2024-10-23 DIAGNOSIS — Z98.890 HISTORY OF LUMBOSACRAL SPINE SURGERY: ICD-10-CM

## 2024-10-23 DIAGNOSIS — E11.40 TYPE 2 DIABETES MELLITUS WITH DIABETIC NEUROPATHY, WITH LONG-TERM CURRENT USE OF INSULIN (HCC): ICD-10-CM

## 2024-10-23 DIAGNOSIS — Z79.4 TYPE 2 DIABETES MELLITUS WITH DIABETIC NEUROPATHY, WITH LONG-TERM CURRENT USE OF INSULIN (HCC): ICD-10-CM

## 2024-10-23 DIAGNOSIS — M48.061 SPINAL STENOSIS OF LUMBAR REGION WITHOUT NEUROGENIC CLAUDICATION: Primary | ICD-10-CM

## 2024-10-23 DIAGNOSIS — Z86.73 HISTORY OF STROKE: ICD-10-CM

## 2024-10-23 RX ORDER — GABAPENTIN 400 MG/1
CAPSULE ORAL
Qty: 180 CAPSULE | Refills: 1 | Status: SHIPPED | OUTPATIENT
Start: 2024-10-23 | End: 2025-04-21

## 2024-10-23 RX ORDER — GABAPENTIN 100 MG/1
CAPSULE ORAL
Qty: 360 CAPSULE | Refills: 1 | Status: SHIPPED | OUTPATIENT
Start: 2024-10-23 | End: 2025-04-21

## 2024-10-23 NOTE — PATIENT INSTRUCTIONS
Take first dose of gabapentin after waking up (around 9) and the next dose in the afternoon (2-3 PM) and the last dose before bed (around 9 PM)

## 2024-10-23 NOTE — PROGRESS NOTES
Calcium-Magnesium-Vitamin D (CALCIUM 1200+D3 PO) Take 2 tablets by mouth daily. (Patient not taking: Reported on 10/23/2024)       No current facility-administered medications for this visit.        Allergies   Allergen Reactions    Hydrocodone         REVIEW OF SYSTEMS:       All items selected indicate a positive finding.    Those items not selected are negative.  Constitutional [] Weight loss/gain   [] Fatigue  [] Fever/Chills   HEENT [] Hearing Loss  [] Visual Disturbance  [] Tinnitus  [] Eye pain  [] Vertigo   Respiratory [] Shortness of Breath  [] Cough  [] Snoring   Cardiovascular [] Chest Pain  [] Palpitations  [] Lightheaded   GI [] Constipation  [] Diarrhea  [] Swallowing change  [] Nausea/vomiting    [] Urinary Frequency  [] Urinary Urgency   Musculoskeletal [] Neck pain  [] Back pain  [] Muscle pain  [] Restless legs  [] Joint pain   Dermatologic [] Skin changes   Neurologic [] Memory loss/confusion  [] Seizures  [x] Trouble walking or imbalance  [x] Dizziness  [] Sleep disturbance  [] Weakness  [x] Numbness  [] Tremors  [] Speech Difficulty  [] Headaches  [] Light Sensitivity  [] Sound Sensitivity   Endocrinology []Excessive thirst  []Excessive hunger   Psychiatric [] Anxiety/Depression  [] Hallucination   Allergy/immunology []Hives/environmental allergies   Hematologic/lymph [] Abnormal bleeding  [] Abnormal bruising         NEUROLOGICAL EXAMINATION:   VITALS  /75 (Site: Left Upper Arm, Position: Sitting, Cuff Size: Medium Adult)   Pulse (!) 118   Ht 1.702 m (5' 7\")   Wt 62 kg (136 lb 9.6 oz)   BMI 21.39 kg/m²      General Appearance:  Alert, cooperative, no signs of distress, appears stated age   Head:  Normocephalic, no signs of trauma   Eyes:  Conjunctiva/corneas clear;  eyelids intact   Ears:  Normal external ear    Nose: Nares normal no drainage    Throat: Lips and tongue normal; teeth normal;  normal gingiva   Neck: Supple, intact ROM  trachea midline;     Back:   Symmetric, no

## 2024-10-28 ENCOUNTER — LAB (OUTPATIENT)
Dept: INTERNAL MEDICINE CLINIC | Age: 79
End: 2024-10-28

## 2024-10-28 ENCOUNTER — CARE COORDINATION (OUTPATIENT)
Dept: CARE COORDINATION | Age: 79
End: 2024-10-28

## 2024-10-28 VITALS — HEART RATE: 67 BPM | TEMPERATURE: 97.7 F | OXYGEN SATURATION: 95 %

## 2024-10-28 DIAGNOSIS — H61.22 IMPACTED CERUMEN OF LEFT EAR: Primary | ICD-10-CM

## 2024-10-28 NOTE — CARE COORDINATION
Ambulatory Care Coordination Note     10/28/2024 2:30 PM     ACM outreach attempt by this ACM today to perform care management follow up . ACM was unable to reach the patient by telephone today; left voice message requesting a return phone call to this ACM.     ACM: HEIKE ARTHUR RN       PCP/Specialist follow up:   Future Appointments         Provider Specialty Dept Phone    10/31/2024 10:45 AM Kayode Joe PA Orthopedic Surgery [phone]    1/6/2025 11:45 AM Johnie Lawrence MD Internal Medicine [phone]            Follow Up:   Plan for next ACM outreach in approximately 1 week to complete:  - disease specific assessments  - SDOH assessments  - medication review  - advance care planning  - goal progression  - education   - RPM.

## 2024-11-04 ENCOUNTER — CARE COORDINATION (OUTPATIENT)
Dept: CARE COORDINATION | Age: 79
End: 2024-11-04

## 2024-11-04 NOTE — CARE COORDINATION
3 attempts to reach patient for a follow up CC call. Will sign off at this time but will be happy to assist if patient reaches out with needs.   Patient did attend diabetic EDU.

## 2024-11-13 ENCOUNTER — TELEPHONE (OUTPATIENT)
Dept: ORTHOPEDIC SURGERY | Age: 79
End: 2024-11-13

## 2024-11-13 ENCOUNTER — OFFICE VISIT (OUTPATIENT)
Dept: ORTHOPEDIC SURGERY | Age: 79
End: 2024-11-13

## 2024-11-13 VITALS — BODY MASS INDEX: 21.82 KG/M2 | RESPIRATION RATE: 14 BRPM | HEIGHT: 67 IN | WEIGHT: 139 LBS

## 2024-11-13 DIAGNOSIS — M17.12 PRIMARY OSTEOARTHRITIS OF LEFT KNEE: ICD-10-CM

## 2024-11-13 DIAGNOSIS — M25.562 CHRONIC PAIN OF LEFT KNEE: Primary | ICD-10-CM

## 2024-11-13 DIAGNOSIS — G89.29 CHRONIC PAIN OF LEFT KNEE: Primary | ICD-10-CM

## 2024-11-14 NOTE — PROGRESS NOTES
Imaging:   No new x-rays taken today however 2 views of the bilateral knees including AP bilateral knees standing and lateral view demonstrates mild to moderate tricompartmental degenerative changes of both knees greatest in the medial compartment of the right knee in the lateral compartment of the left knee.  No evidence of acute fracture seen at that time     No orders of the defined types were placed in this encounter.      Assessment and Plan:  1. Chronic pain of left knee    2. Primary osteoarthritis of left knee          PLAN:  Yeni Rodas is a 79 y.o. old female with left knee osteoarthritis. I had a discussion with the patient with regards to the nature and extent of her problem. We also discussed treatment options available to her including non-operative and operative intervention. To this end we discussed use of NSAIDs, cortisone and viscosupplementation injections, weight loss, activity modification, physical therapy, bracing, and use of assistive walking devices. We also had discussions about total knee arthroplasties. As outlined above she has attempted treatment to include use of corticosteroid injection and tylenol. At this time she would like to proceed with prior auth for visco injection.    Will submit for Durolane injection of the left knee under Katelyn Bird PA-C.  Patient will follow-up with her for reevaluation and likely proceeding with Durolane injection once approved.    In the interim Voltaren gel electronically sent to patient pharmacy          Electronically signed by ALFREDO Cat on 11/14/24 at 7:54 AM EST        Please note that this chart was generated using voice recognition Dragon dictation software.  Although every effort was made to ensure the accuracy of this automated transcription, some errors in transcription may have occurred.

## 2024-11-21 NOTE — TELEPHONE ENCOUNTER
Benefits Investigation Summary received and scanned into media.  No pre cert required.  Patient called and was scheduled with Katelyn Bird PA-C on 12/2/24.

## 2024-11-27 RX ORDER — METOPROLOL TARTRATE 25 MG/1
25 TABLET, FILM COATED ORAL 2 TIMES DAILY
Qty: 200 TABLET | Refills: 2 | Status: SHIPPED | OUTPATIENT
Start: 2024-11-27

## 2024-12-02 ENCOUNTER — PROCEDURE VISIT (OUTPATIENT)
Dept: ORTHOPEDIC SURGERY | Age: 79
End: 2024-12-02
Payer: MEDICARE

## 2024-12-02 VITALS — RESPIRATION RATE: 14 BRPM | HEIGHT: 67 IN | WEIGHT: 134.2 LBS | BODY MASS INDEX: 21.06 KG/M2

## 2024-12-02 DIAGNOSIS — M25.562 CHRONIC PAIN OF LEFT KNEE: ICD-10-CM

## 2024-12-02 DIAGNOSIS — M17.12 PRIMARY OSTEOARTHRITIS OF LEFT KNEE: Primary | ICD-10-CM

## 2024-12-02 DIAGNOSIS — G89.29 CHRONIC PAIN OF LEFT KNEE: ICD-10-CM

## 2024-12-02 PROCEDURE — 1123F ACP DISCUSS/DSCN MKR DOCD: CPT | Performed by: PHYSICIAN ASSISTANT

## 2024-12-02 PROCEDURE — 99213 OFFICE O/P EST LOW 20 MIN: CPT | Performed by: PHYSICIAN ASSISTANT

## 2024-12-02 PROCEDURE — 20610 DRAIN/INJ JOINT/BURSA W/O US: CPT | Performed by: PHYSICIAN ASSISTANT

## 2024-12-02 PROCEDURE — 1125F AMNT PAIN NOTED PAIN PRSNT: CPT | Performed by: PHYSICIAN ASSISTANT

## 2024-12-02 RX ORDER — BUPIVACAINE HYDROCHLORIDE 2.5 MG/ML
2 INJECTION, SOLUTION INFILTRATION; PERINEURAL ONCE
Status: COMPLETED | OUTPATIENT
Start: 2024-12-02 | End: 2024-12-02

## 2024-12-02 RX ADMIN — BUPIVACAINE HYDROCHLORIDE 5 MG: 2.5 INJECTION, SOLUTION INFILTRATION; PERINEURAL at 11:53

## 2024-12-02 ASSESSMENT — ENCOUNTER SYMPTOMS
COLOR CHANGE: 0
VOMITING: 0
COUGH: 0
SHORTNESS OF BREATH: 0

## 2024-12-02 NOTE — PROGRESS NOTES
AROM Left knee 3-105.  Motor, sensory, vascular examination to the Left lower extremity is grossly intact without focal deficits.    Neuro: alert and oriented to person and place.   Eyes: Extra-ocular muscles intact  Mouth: Oral mucosa moist. No perioral lesions  Pulm: Respirations unlabored and regular. Symmetric chest excursion without outward deformity is noted.   Skin: warm, well perfused  Psych:   Patient has good fund of knowledge and displays understanging of exam, diagnosis, and plan.    Radiology: No new imaging obtained today in office.    XR KNEE BILATERAL - 5/22/2024  Narrative & Impression  X-rays taken today reviewed by me show standing AP of both knees.  Patient has moderate to degenerative joint disease of both knees.  She has medial and lateral compartment joint narrowing on both the left and right knees.  Certainly certainly nowhere near bone-on-bone some mild subchondral sclerosis.  No other acute or chronic findings.       Procedure:   After informed consent was obtained verbally, the Left knee was sterilely prepped with Betadine and locally anesthetized with ethyl chloride spray at the lateral joint line just lateral to the patellar tendon.  The knee was then injected through this injection site with 2mL of 0.25% Marcaine and then a  Durolane 3mL prefilled syringe injection.  The patient tolerated the procedure well without postinjection complications.  A sterile dressing was applied.      Assessment:      1. Primary osteoarthritis of left knee    2. Chronic pain of left knee       Plan:   Assessment & Plan   Yeni Rodas is a 79 y.o. female here for an insurance approved left knee Durolane visco-supplemental injection. The patient was given the injection today in office. She tolerated the injection without complication. We discussed that she may have 1-2 days if increased discomfort before she notices improvement - we also discussed that gel injections can take anywhere from 6-12 weeks to

## 2024-12-30 RX ORDER — GABAPENTIN 100 MG/1
CAPSULE ORAL
Qty: 400 CAPSULE | Refills: 2 | OUTPATIENT
Start: 2024-12-30

## 2024-12-30 RX ORDER — GABAPENTIN 400 MG/1
CAPSULE ORAL
Qty: 200 CAPSULE | Refills: 2 | OUTPATIENT
Start: 2024-12-30

## 2025-01-06 ENCOUNTER — OFFICE VISIT (OUTPATIENT)
Dept: INTERNAL MEDICINE CLINIC | Age: 80
End: 2025-01-06
Payer: COMMERCIAL

## 2025-01-06 VITALS
WEIGHT: 139 LBS | DIASTOLIC BLOOD PRESSURE: 76 MMHG | HEART RATE: 76 BPM | SYSTOLIC BLOOD PRESSURE: 124 MMHG | BODY MASS INDEX: 21.77 KG/M2 | OXYGEN SATURATION: 97 %

## 2025-01-06 DIAGNOSIS — Z79.4 TYPE 2 DIABETES MELLITUS WITH DIABETIC NEUROPATHY, WITH LONG-TERM CURRENT USE OF INSULIN (HCC): Primary | ICD-10-CM

## 2025-01-06 DIAGNOSIS — J43.1 PANLOBULAR EMPHYSEMA (HCC): ICD-10-CM

## 2025-01-06 DIAGNOSIS — E11.40 TYPE 2 DIABETES MELLITUS WITH DIABETIC NEUROPATHY, WITH LONG-TERM CURRENT USE OF INSULIN (HCC): Primary | ICD-10-CM

## 2025-01-06 DIAGNOSIS — R56.9 CONVULSIONS, UNSPECIFIED CONVULSION TYPE (HCC): ICD-10-CM

## 2025-01-06 DIAGNOSIS — F33.1 MAJOR DEPRESSIVE DISORDER, RECURRENT, MODERATE (HCC): ICD-10-CM

## 2025-01-06 PROCEDURE — 3078F DIAST BP <80 MM HG: CPT | Performed by: INTERNAL MEDICINE

## 2025-01-06 PROCEDURE — 99214 OFFICE O/P EST MOD 30 MIN: CPT | Performed by: INTERNAL MEDICINE

## 2025-01-06 PROCEDURE — 3074F SYST BP LT 130 MM HG: CPT | Performed by: INTERNAL MEDICINE

## 2025-01-06 PROCEDURE — 1123F ACP DISCUSS/DSCN MKR DOCD: CPT | Performed by: INTERNAL MEDICINE

## 2025-01-06 RX ORDER — INSULIN LISPRO 100 [IU]/ML
6 INJECTION, SOLUTION INTRAVENOUS; SUBCUTANEOUS
Qty: 4 ADJUSTABLE DOSE PRE-FILLED PEN SYRINGE | Refills: 1 | Status: SHIPPED | OUTPATIENT
Start: 2025-01-06

## 2025-01-06 ASSESSMENT — PATIENT HEALTH QUESTIONNAIRE - PHQ9
1. LITTLE INTEREST OR PLEASURE IN DOING THINGS: NOT AT ALL
SUM OF ALL RESPONSES TO PHQ9 QUESTIONS 1 & 2: 0
3. TROUBLE FALLING OR STAYING ASLEEP: NOT AT ALL
10. IF YOU CHECKED OFF ANY PROBLEMS, HOW DIFFICULT HAVE THESE PROBLEMS MADE IT FOR YOU TO DO YOUR WORK, TAKE CARE OF THINGS AT HOME, OR GET ALONG WITH OTHER PEOPLE: NOT DIFFICULT AT ALL
SUM OF ALL RESPONSES TO PHQ QUESTIONS 1-9: 0
SUM OF ALL RESPONSES TO PHQ QUESTIONS 1-9: 0
8. MOVING OR SPEAKING SO SLOWLY THAT OTHER PEOPLE COULD HAVE NOTICED. OR THE OPPOSITE, BEING SO FIGETY OR RESTLESS THAT YOU HAVE BEEN MOVING AROUND A LOT MORE THAN USUAL: NOT AT ALL
4. FEELING TIRED OR HAVING LITTLE ENERGY: NOT AT ALL
2. FEELING DOWN, DEPRESSED OR HOPELESS: NOT AT ALL
SUM OF ALL RESPONSES TO PHQ QUESTIONS 1-9: 0
5. POOR APPETITE OR OVEREATING: NOT AT ALL
SUM OF ALL RESPONSES TO PHQ QUESTIONS 1-9: 0
6. FEELING BAD ABOUT YOURSELF - OR THAT YOU ARE A FAILURE OR HAVE LET YOURSELF OR YOUR FAMILY DOWN: NOT AT ALL
9. THOUGHTS THAT YOU WOULD BE BETTER OFF DEAD, OR OF HURTING YOURSELF: NOT AT ALL

## 2025-01-06 ASSESSMENT — ENCOUNTER SYMPTOMS
EYE DISCHARGE: 0
EYE PAIN: 0
ABDOMINAL DISTENTION: 0
COLOR CHANGE: 0
DIARRHEA: 0
BLOOD IN STOOL: 0
COUGH: 0
WHEEZING: 0
TROUBLE SWALLOWING: 0
SHORTNESS OF BREATH: 0

## 2025-01-06 NOTE — PROGRESS NOTES
\"Have you been to the ER, urgent care clinic since your last visit?  Hospitalized since your last visit?\"    NO    “Have you seen or consulted any other health care providers outside our system since your last visit?”    NO           
Fe) MG EC tablet Take 1 tablet by mouth daily (with breakfast) (Patient not taking: Reported on 10/31/2023) 90 tablet 3   • Continuous Blood Gluc Sensor (FREESTYLE WARREN 2 SENSOR) MISC 1 Units by Does not apply route 3 times daily (Patient not taking: Reported on 10/16/2023) 6 each 3   • Continuous Blood Gluc Sensor (FREESTYLE WARREN 3 SENSOR) MISC 1 Units by Does not apply route in the morning, at noon, and at bedtime (Patient not taking: Reported on 10/16/2023) 6 each 3   • Continuous Blood Gluc  (FREESTYLE WARREN 2 READER) BRIDGET 1 Units by Does not apply route in the morning, at noon, and at bedtime (Patient not taking: Reported on 10/16/2023) 1 each 1   • Spacer/Aero-Holding Chambers BRIDGET 1 Device by Does not apply route daily 1 each 1   • blood glucose monitor kit and supplies 1 kit by Other route three times daily Dispense One Touch Glucose Meter. 1 kit 0   • ONE TOUCH ULTRASOFT LANCETS MISC 1 each by Does not apply route 2 times daily (Patient not taking: Reported on 10/23/2024) 100 each 3   • Calcium-Magnesium-Vitamin D (CALCIUM 1200+D3 PO) Take 2 tablets by mouth daily. (Patient not taking: Reported on 10/23/2024)       No current facility-administered medications for this visit.     No Known Allergies    Health Maintenance   Topic Date Due   • DTaP/Tdap/Td vaccine (1 - Tdap) 06/08/2010   • Shingles vaccine (2 of 2) 09/26/2018   • Respiratory Syncytial Virus (RSV) Pregnant or age 60 yrs+ (1 - 1-dose 75+ series) Never done   • Diabetic Alb to Cr ratio (uACR) test  11/08/2022   • GFR test (Diabetes, CKD 3-4, OR last GFR 15-59)  10/18/2024   • COVID-19 Vaccine (5 - 2023-24 season) 03/21/2025   • Lipids  10/17/2025   • Depression Monitoring  01/06/2026   • DEXA (modify frequency per FRAX score)  Completed   • Flu vaccine  Completed   • Pneumococcal 65+ years Vaccine  Completed   • Hepatitis A vaccine  Aged Out   • Hepatitis B vaccine  Aged Out   • Hib vaccine  Aged Out   • Polio vaccine  Aged Out   •

## 2025-01-08 ENCOUNTER — TELEPHONE (OUTPATIENT)
Age: 80
End: 2025-01-08

## 2025-01-08 ENCOUNTER — TELEPHONE (OUTPATIENT)
Dept: INTERNAL MEDICINE CLINIC | Age: 80
End: 2025-01-08

## 2025-01-08 NOTE — TELEPHONE ENCOUNTER
Patient called and stated that Dr. Lawrence told her to call in today to see if we have samples of insulin she did not know what insulin. Please advise.

## 2025-01-08 NOTE — TELEPHONE ENCOUNTER
Called patient to inform her we do not have samples of insulin in the office. She informed me her insulin is over 100 dollars. Informed patient to call her insurance to see if there is a cheaper alternatives we can send in. Patient states she will call and let us know.

## 2025-01-08 NOTE — TELEPHONE ENCOUNTER
New Referral for Diabetes Medication management  Patient to bring blood sugar log and list of home medications

## 2025-01-17 ENCOUNTER — TELEPHONE (OUTPATIENT)
Dept: INTERNAL MEDICINE CLINIC | Age: 80
End: 2025-01-17

## 2025-01-17 NOTE — TELEPHONE ENCOUNTER
Pt called stating that her insurance company [employer] needs a list of pt medication, pt will call back with fax number so list can be sent.

## 2025-01-20 ENCOUNTER — OFFICE VISIT (OUTPATIENT)
Dept: ORTHOPEDIC SURGERY | Age: 80
End: 2025-01-20
Payer: MEDICARE

## 2025-01-20 ENCOUNTER — HOSPITAL ENCOUNTER (OUTPATIENT)
Age: 80
Setting detail: SPECIMEN
Discharge: HOME | End: 2025-01-20

## 2025-01-20 VITALS — WEIGHT: 130 LBS | HEIGHT: 67 IN | BODY MASS INDEX: 20.4 KG/M2

## 2025-01-20 DIAGNOSIS — G89.29 CHRONIC PAIN OF LEFT KNEE: ICD-10-CM

## 2025-01-20 DIAGNOSIS — M17.12 PRIMARY OSTEOARTHRITIS OF LEFT KNEE: Primary | ICD-10-CM

## 2025-01-20 DIAGNOSIS — M25.562 CHRONIC PAIN OF LEFT KNEE: ICD-10-CM

## 2025-01-20 DIAGNOSIS — Z79.4 TYPE 2 DIABETES MELLITUS WITH DIABETIC NEUROPATHY, WITH LONG-TERM CURRENT USE OF INSULIN (HCC): ICD-10-CM

## 2025-01-20 DIAGNOSIS — E11.40 TYPE 2 DIABETES MELLITUS WITH DIABETIC NEUROPATHY, WITH LONG-TERM CURRENT USE OF INSULIN (HCC): ICD-10-CM

## 2025-01-20 LAB
BASOPHILS # BLD: 0.04 K/UL (ref 0–0.2)
BASOPHILS NFR BLD: 1 % (ref 0–2)
EOSINOPHIL # BLD: 0.15 K/UL (ref 0–0.44)
EOSINOPHILS RELATIVE PERCENT: 2 % (ref 1–4)
ERYTHROCYTE [DISTWIDTH] IN BLOOD BY AUTOMATED COUNT: 15.1 % (ref 11.8–14.4)
EST. AVERAGE GLUCOSE BLD GHB EST-MCNC: 214 MG/DL
HBA1C MFR BLD: 9.1 % (ref 4–6)
HCT VFR BLD AUTO: 33.6 % (ref 36.3–47.1)
HGB BLD-MCNC: 9.8 G/DL (ref 11.9–15.1)
IMM GRANULOCYTES # BLD AUTO: 0.05 K/UL (ref 0–0.3)
IMM GRANULOCYTES NFR BLD: 1 %
LYMPHOCYTES NFR BLD: 1.9 K/UL (ref 1.1–3.7)
LYMPHOCYTES RELATIVE PERCENT: 24 % (ref 24–43)
MCH RBC QN AUTO: 27.3 PG (ref 25.2–33.5)
MCHC RBC AUTO-ENTMCNC: 29.2 G/DL (ref 28.4–34.8)
MCV RBC AUTO: 93.6 FL (ref 82.6–102.9)
MONOCYTES NFR BLD: 0.64 K/UL (ref 0.1–1.2)
MONOCYTES NFR BLD: 8 % (ref 3–12)
NEUTROPHILS NFR BLD: 64 % (ref 36–65)
NEUTS SEG NFR BLD: 5.09 K/UL (ref 1.5–8.1)
NRBC BLD-RTO: 0.3 PER 100 WBC
PLATELET # BLD AUTO: 388 K/UL (ref 138–453)
PMV BLD AUTO: 10.6 FL (ref 8.1–13.5)
RBC # BLD AUTO: 3.59 M/UL (ref 3.95–5.11)
RBC # BLD: ABNORMAL 10*6/UL
WBC OTHER # BLD: 7.9 K/UL (ref 3.5–11.3)

## 2025-01-20 PROCEDURE — 1159F MED LIST DOCD IN RCRD: CPT | Performed by: PHYSICIAN ASSISTANT

## 2025-01-20 PROCEDURE — 1125F AMNT PAIN NOTED PAIN PRSNT: CPT | Performed by: PHYSICIAN ASSISTANT

## 2025-01-20 PROCEDURE — 1123F ACP DISCUSS/DSCN MKR DOCD: CPT | Performed by: PHYSICIAN ASSISTANT

## 2025-01-20 PROCEDURE — 99213 OFFICE O/P EST LOW 20 MIN: CPT | Performed by: PHYSICIAN ASSISTANT

## 2025-01-20 ASSESSMENT — ENCOUNTER SYMPTOMS
COLOR CHANGE: 0
COUGH: 0
VOMITING: 0
SHORTNESS OF BREATH: 0

## 2025-01-20 NOTE — PROGRESS NOTES
[facility]  [address] [place] [address]  Dept: [phone]  Dept Fax: [phone]        Ambulatory Follow Up      Subjective:   Yeni Rodas is a 79 y.o. year old female who presents to our office today for routine followup regarding her   1. Primary osteoarthritis of left knee    2. Chronic pain of left knee    .    Chief Complaint   Patient presents with    Knee Pain     F/U: Lt knee     History of Present Illness  The patient is an 79-year-old female who presents today for chronic left knee pain.    She was last seen on 12/02/2024 and underwent a left knee corticosteroid injection and was referred to physical therapy, but she did not go to physical therapy due to high cost. She notes that overall she is doing better. She reports a significant improvement in her condition following the corticosteroid injection administered during her previous visit. Initially, she experienced persistent soreness for 2 to 3 days post-injection, particularly when descending stairs. However, after a week, her symptoms notably improved and have remained stable since then. She has not experienced any recent trauma or falls. She does not use a cane for mobility and acknowledges a lack of regular walking exercise. She is not currently on any medication for her knee condition. She also reports an enhanced ability to straighten her knee.      Review of Systems   Constitutional:  Negative for activity change and fever.   HENT:  Negative for sneezing.    Respiratory:  Negative for cough and shortness of breath.    Cardiovascular:  Negative for chest pain.   Gastrointestinal:  Negative for vomiting.   Musculoskeletal:  Positive for arthralgias (mild left knee). Negative for joint swelling and myalgias.   Skin:  Negative for color change.   Neurological:  Negative for weakness and numbness.   Psychiatric/Behavioral:  Negative for sleep disturbance.

## 2025-01-21 ENCOUNTER — TELEPHONE (OUTPATIENT)
Dept: INTERNAL MEDICINE CLINIC | Age: 80
End: 2025-01-21

## 2025-01-21 DIAGNOSIS — D64.9 ANEMIA, UNSPECIFIED TYPE: Primary | ICD-10-CM

## 2025-01-21 NOTE — TELEPHONE ENCOUNTER
Hemoglobin A1c 9.1  Needs better dm 2 control  Anemia chronic,should contact dr tyrel ibarra  Offer apt in a month  Or as planned if sooner

## 2025-01-21 NOTE — TELEPHONE ENCOUNTER
Patient called for results of recent testing.   Please review all labs and/or testing ordered.     Very concerned about the lab results they seen in my chart. Patient was not feeling well yesterday. Patient seems to be a little better today.      Please advise

## 2025-01-24 NOTE — TELEPHONE ENCOUNTER
Spoke with GI there is no Dx code on the referral. Please add Dx code. They would also like this faxed to them.    Please advise

## 2025-01-29 ENCOUNTER — HOSPITAL ENCOUNTER (OUTPATIENT)
Age: 80
Setting detail: SPECIMEN
Discharge: HOME | End: 2025-01-29

## 2025-01-29 ENCOUNTER — TELEPHONE (OUTPATIENT)
Age: 80
End: 2025-01-29

## 2025-01-29 NOTE — TELEPHONE ENCOUNTER
Patient was a no show for Diabetes appointment in [facility] today. Called patient. States she thought appointment was next week. Rescheduled for 2/12/25  Judith Morales, Pharm D, BCPS, CACP  [facility]  1/29/2025 10:08 AM

## 2025-02-03 ENCOUNTER — HOSPITAL ENCOUNTER (OUTPATIENT)
Age: 80
Setting detail: SPECIMEN
Discharge: HOME | End: 2025-02-03

## 2025-02-03 LAB
ALBUMIN SERPL-MCNC: 3.8 G/DL (ref 3.5–5.2)
ALBUMIN/GLOB SERPL: 1.5 {RATIO} (ref 1–2.5)
ALP SERPL-CCNC: 94 U/L (ref 35–104)
ALT SERPL-CCNC: 19 U/L (ref 10–35)
ANION GAP SERPL CALCULATED.3IONS-SCNC: 12 MMOL/L (ref 9–16)
AST SERPL-CCNC: 19 U/L (ref 10–35)
BILIRUB SERPL-MCNC: 0.4 MG/DL (ref 0–1.2)
BUN SERPL-MCNC: 20 MG/DL (ref 8–23)
CALCIUM SERPL-MCNC: 9.1 MG/DL (ref 8.6–10.4)
CHLORIDE SERPL-SCNC: 103 MMOL/L (ref 98–107)
CO2 SERPL-SCNC: 25 MMOL/L (ref 20–31)
CREAT SERPL-MCNC: 0.6 MG/DL (ref 0.6–0.9)
GFR, ESTIMATED: >90 ML/MIN/1.73M2
GLUCOSE SERPL-MCNC: 68 MG/DL (ref 74–99)
POTASSIUM SERPL-SCNC: 4 MMOL/L (ref 3.7–5.3)
PROT SERPL-MCNC: 6.3 G/DL (ref 6.6–8.7)
SODIUM SERPL-SCNC: 140 MMOL/L (ref 136–145)

## 2025-02-06 ENCOUNTER — OFFICE VISIT (OUTPATIENT)
Dept: INTERNAL MEDICINE CLINIC | Age: 80
End: 2025-02-06
Payer: MEDICARE

## 2025-02-06 VITALS
SYSTOLIC BLOOD PRESSURE: 110 MMHG | OXYGEN SATURATION: 94 % | HEART RATE: 73 BPM | WEIGHT: 136 LBS | DIASTOLIC BLOOD PRESSURE: 62 MMHG | BODY MASS INDEX: 21.3 KG/M2

## 2025-02-06 DIAGNOSIS — E11.40 TYPE 2 DIABETES MELLITUS WITH DIABETIC NEUROPATHY, WITH LONG-TERM CURRENT USE OF INSULIN (HCC): ICD-10-CM

## 2025-02-06 DIAGNOSIS — F33.1 MAJOR DEPRESSIVE DISORDER, RECURRENT, MODERATE (HCC): ICD-10-CM

## 2025-02-06 DIAGNOSIS — Z79.4 TYPE 2 DIABETES MELLITUS WITH DIABETIC NEUROPATHY, WITH LONG-TERM CURRENT USE OF INSULIN (HCC): ICD-10-CM

## 2025-02-06 DIAGNOSIS — E11.00 UNCONTROLLED TYPE 2 DM WITH HYPEROSMOLAR NONKETOTIC HYPERGLYCEMIA (HCC): Primary | ICD-10-CM

## 2025-02-06 PROCEDURE — 3046F HEMOGLOBIN A1C LEVEL >9.0%: CPT | Performed by: INTERNAL MEDICINE

## 2025-02-06 PROCEDURE — 3078F DIAST BP <80 MM HG: CPT | Performed by: INTERNAL MEDICINE

## 2025-02-06 PROCEDURE — 99214 OFFICE O/P EST MOD 30 MIN: CPT | Performed by: INTERNAL MEDICINE

## 2025-02-06 PROCEDURE — 3074F SYST BP LT 130 MM HG: CPT | Performed by: INTERNAL MEDICINE

## 2025-02-06 PROCEDURE — 1123F ACP DISCUSS/DSCN MKR DOCD: CPT | Performed by: INTERNAL MEDICINE

## 2025-02-06 PROCEDURE — 1159F MED LIST DOCD IN RCRD: CPT | Performed by: INTERNAL MEDICINE

## 2025-02-06 RX ORDER — BLOOD SUGAR DIAGNOSTIC
STRIP MISCELLANEOUS
Qty: 400 STRIP | Refills: 3 | Status: SHIPPED | OUTPATIENT
Start: 2025-02-06

## 2025-02-06 RX ORDER — DULOXETIN HYDROCHLORIDE 60 MG/1
60 CAPSULE, DELAYED RELEASE ORAL DAILY
Qty: 90 CAPSULE | Refills: 1 | Status: SHIPPED | OUTPATIENT
Start: 2025-02-06

## 2025-02-06 SDOH — ECONOMIC STABILITY: FOOD INSECURITY: WITHIN THE PAST 12 MONTHS, YOU WORRIED THAT YOUR FOOD WOULD RUN OUT BEFORE YOU GOT MONEY TO BUY MORE.: NEVER TRUE

## 2025-02-06 SDOH — ECONOMIC STABILITY: FOOD INSECURITY: WITHIN THE PAST 12 MONTHS, THE FOOD YOU BOUGHT JUST DIDN'T LAST AND YOU DIDN'T HAVE MONEY TO GET MORE.: NEVER TRUE

## 2025-02-06 ASSESSMENT — ENCOUNTER SYMPTOMS
COLOR CHANGE: 0
TROUBLE SWALLOWING: 0
EYE PAIN: 0
COUGH: 0
SHORTNESS OF BREATH: 0
DIARRHEA: 0
ABDOMINAL DISTENTION: 0
BLOOD IN STOOL: 0
EYE DISCHARGE: 0
WHEEZING: 0

## 2025-02-06 NOTE — PROGRESS NOTES
[facility]  [address] [address]  Dept: [phone]  Dept Fax: [phone]    Yeni Rodas is a 79 y.o. female who presents today for her medical conditions/complaintsas noted below.  Yeni Rodas is c/o of   Chief Complaint   Patient presents with    Diabetes     Wants to go over labs    [phone]         HPI:     Diabetes   Duration more than 7 years  Modifying factors on Glucophage and other med  Severity uncontrolled sever  Associated signs and symtoms neuropathy/ckd/ CAD.   aggravated with sugar diet and better with low sugar diet               Hemoglobin A1C (%)   Date Value   01/20/2025 9.1 (H)   10/17/2024 8.7 (H)   07/11/2024 9.0 (A)             ( goal A1Cis < 7)   No components found for: \"LABMICR\"  No components found for: \"LDLCHOLESTEROL\", \"LDLCALC\"    (goal LDL is <100)   AST (U/L)   Date Value   02/03/2025 19     ALT (U/L)   Date Value   02/03/2025 19     BUN (mg/dL)   Date Value   02/03/2025 20     BP Readings from Last 3 Encounters:   02/06/25 110/62   01/06/25 124/76   10/23/24 113/75          (goal 120/80)    Past Medical History:   Diagnosis Date    Anxiety state, unspecified     Arthritis     Cataracts, bilateral     COVID-19 vaccine administered 2- & 3-    MODERNA    Esophageal reflux     Generalized osteoarthrosis, unspecified site     Hearing loss     Hyperlipidemia     Hypertension     Neuropathy     Neuropathy due to medical condition (HCC)     Pure hypercholesterolemia     Thoracic or lumbosacral neuritis or radiculitis, unspecified     Type 2 diabetes mellitus without complication (HCC)     Urinary retention     UTI (urinary tract infection)       Past Surgical History:   Procedure Laterality Date    BACK SURGERY      X3    BREAST BIOPSY  2007    Lt     CARPAL TUNNEL RELEASE Right     CATARACT REMOVAL WITH IMPLANT Bilateral     COLONOSCOPY  2006    COLONOSCOPY  04/24/2015    diverticulosis    CYSTOSCOPY  05/01/2014

## 2025-02-07 ENCOUNTER — OFFICE VISIT (OUTPATIENT)
Dept: GASTROENTEROLOGY | Age: 80
End: 2025-02-07
Payer: MEDICARE

## 2025-02-07 VITALS
BODY MASS INDEX: 21.46 KG/M2 | WEIGHT: 137 LBS | SYSTOLIC BLOOD PRESSURE: 99 MMHG | TEMPERATURE: 97.4 F | DIASTOLIC BLOOD PRESSURE: 55 MMHG

## 2025-02-07 DIAGNOSIS — D50.9 IRON DEFICIENCY ANEMIA, UNSPECIFIED IRON DEFICIENCY ANEMIA TYPE: Primary | ICD-10-CM

## 2025-02-07 DIAGNOSIS — R19.5 DARK STOOLS: ICD-10-CM

## 2025-02-07 DIAGNOSIS — D64.9 MILD ANEMIA: ICD-10-CM

## 2025-02-07 DIAGNOSIS — Z87.19 HISTORY OF ESOPHAGEAL ULCER: ICD-10-CM

## 2025-02-07 PROCEDURE — 1123F ACP DISCUSS/DSCN MKR DOCD: CPT | Performed by: INTERNAL MEDICINE

## 2025-02-07 PROCEDURE — 99214 OFFICE O/P EST MOD 30 MIN: CPT | Performed by: INTERNAL MEDICINE

## 2025-02-07 PROCEDURE — 1159F MED LIST DOCD IN RCRD: CPT | Performed by: INTERNAL MEDICINE

## 2025-02-07 PROCEDURE — 3074F SYST BP LT 130 MM HG: CPT | Performed by: INTERNAL MEDICINE

## 2025-02-07 PROCEDURE — 3078F DIAST BP <80 MM HG: CPT | Performed by: INTERNAL MEDICINE

## 2025-02-07 RX ORDER — FERROUS SULFATE 325(65) MG
325 TABLET ORAL
Qty: 180 TABLET | Refills: 1 | Status: SHIPPED | OUTPATIENT
Start: 2025-02-07

## 2025-02-07 ASSESSMENT — ENCOUNTER SYMPTOMS
WHEEZING: 0
COUGH: 0
SHORTNESS OF BREATH: 0
SORE THROAT: 0
ABDOMINAL PAIN: 0
VOICE CHANGE: 0
VOMITING: 0
CHOKING: 0
ANAL BLEEDING: 0
NAUSEA: 0
TROUBLE SWALLOWING: 0
BLOOD IN STOOL: 0
RECTAL PAIN: 0
CONSTIPATION: 0
ABDOMINAL DISTENTION: 0
DIARRHEA: 0

## 2025-02-07 NOTE — PROGRESS NOTES
GI CLINIC FOLLOW UP    NTERVAL HISTORY:   Johnie Lawrence MD  [address] [place] [address]    Chief Complaint   Patient presents with    Anemia     Patient is here today due to anemia, last seen on 6/8/21 for IBS, abdominal bloating, GERD, & weight loss.       1. Iron deficiency anemia, unspecified iron deficiency anemia type    2. Dark stools    3. Mild anemia    4. History of esophageal ulcer      This elderly female patient seen my office accompanied by her     She has history significant for multiple medical issues    She was evaluated in my office in 2021 was supposed to get EGD and colonoscopy apparently was not performed    Subsequently she got hospitalized and October 2023 with anemia and some dark stools has history for esophageal ulcer upper endoscopy was performed at that time besides some irregular squamocolumnar junction no evidence of esophageal ulceration was noted    She has not been seen in the office since then    She is recently found to be mildly anemic    She denies any overt bleeding but complains of intermittent dark-colored stools    Not taking iron pills she says     No smoking alcohol abuse illicit drug usage    Available previous records were reviewed with her    HISTORY OF PRESENT ILLNESS: Ms.Sally NAVID Rodas is a 79 y.o. female with a past history remarkable for , referred for evaluation of   Chief Complaint   Patient presents with    Anemia     Patient is here today due to anemia, last seen on 6/8/21 for IBS, abdominal bloating, GERD, & weight loss.   .    Past Medical,Family, and Social History reviewed and does contribute to the patient presenting condition.    Patient's PMH/PSH,SH,PSYCH Hx, MEDs, ALLERGIES, and ROS were all reviewed and updated in the appropriate sections.    PAST MEDICAL HISTORY:  Past Medical History:   Diagnosis Date    Anxiety state, unspecified     Arthritis     Cataracts, bilateral     COVID-19 vaccine administered 2- &

## 2025-02-12 ENCOUNTER — TELEPHONE (OUTPATIENT)
Age: 80
End: 2025-02-12

## 2025-02-12 NOTE — TELEPHONE ENCOUNTER
Called patient due to them not showing up for their appt today in the [facility] for Diabetes medication management.  Left message for them to call back to reschedule their appt.    Kareen Thornton [facility],Pharm.D,, BCPS, CACP  2/12/2025  3:28 PM

## 2025-02-18 RX ORDER — INSULIN LISPRO 100 [IU]/ML
6 INJECTION, SOLUTION INTRAVENOUS; SUBCUTANEOUS
Qty: 4 ADJUSTABLE DOSE PRE-FILLED PEN SYRINGE | Refills: 1 | Status: CANCELLED | OUTPATIENT
Start: 2025-02-18

## 2025-02-18 NOTE — TELEPHONE ENCOUNTER
Contacted pharmacist in regards to refill request, she informed that patient received Rx on 1/6/25 for a 60 day supply so she is not due for a refill until 3/7/25    Called patient and left voice mail to return call.

## 2025-02-18 NOTE — TELEPHONE ENCOUNTER
Patient contacted office informing that [facility] informed that they did not received order Rx    Informed that Rx had been sent on 1/6/25 and that I will contact pharmacy to verify issue

## 2025-02-20 DIAGNOSIS — E11.40 TYPE 2 DIABETES MELLITUS WITH DIABETIC NEUROPATHY, WITH LONG-TERM CURRENT USE OF INSULIN (HCC): ICD-10-CM

## 2025-02-20 DIAGNOSIS — Z79.4 TYPE 2 DIABETES MELLITUS WITH DIABETIC NEUROPATHY, WITH LONG-TERM CURRENT USE OF INSULIN (HCC): Primary | ICD-10-CM

## 2025-02-20 DIAGNOSIS — E11.40 TYPE 2 DIABETES MELLITUS WITH DIABETIC NEUROPATHY, WITH LONG-TERM CURRENT USE OF INSULIN (HCC): Primary | ICD-10-CM

## 2025-02-20 DIAGNOSIS — Z79.4 TYPE 2 DIABETES MELLITUS WITH DIABETIC NEUROPATHY, WITH LONG-TERM CURRENT USE OF INSULIN (HCC): ICD-10-CM

## 2025-02-20 NOTE — TELEPHONE ENCOUNTER
[facility] faxed refill request for the Dexacom G6 sensor  Last office visit 2/6/2025

## 2025-02-21 RX ORDER — PROCHLORPERAZINE 25 MG/1
SUPPOSITORY RECTAL
Qty: 3 EACH | Refills: 3 | Status: SHIPPED | OUTPATIENT
Start: 2025-02-21

## 2025-03-21 DIAGNOSIS — E11.40 TYPE 2 DIABETES MELLITUS WITH DIABETIC NEUROPATHY, WITH LONG-TERM CURRENT USE OF INSULIN (HCC): ICD-10-CM

## 2025-03-21 DIAGNOSIS — Z79.4 TYPE 2 DIABETES MELLITUS WITH DIABETIC NEUROPATHY, WITH LONG-TERM CURRENT USE OF INSULIN (HCC): ICD-10-CM

## 2025-03-21 NOTE — TELEPHONE ENCOUNTER
Requesting refill for Insulin pen needles to be resent to [facility], original sent to local pharmacy

## 2025-04-17 ENCOUNTER — OFFICE VISIT (OUTPATIENT)
Dept: ORTHOPEDIC SURGERY | Age: 80
End: 2025-04-17

## 2025-04-17 VITALS — BODY MASS INDEX: 21.12 KG/M2 | RESPIRATION RATE: 14 BRPM | HEIGHT: 67 IN | WEIGHT: 134.6 LBS

## 2025-04-17 DIAGNOSIS — M17.12 PRIMARY OSTEOARTHRITIS OF LEFT KNEE: Primary | ICD-10-CM

## 2025-04-17 DIAGNOSIS — G89.29 CHRONIC PAIN OF LEFT KNEE: ICD-10-CM

## 2025-04-17 DIAGNOSIS — M25.562 CHRONIC PAIN OF LEFT KNEE: ICD-10-CM

## 2025-04-17 RX ORDER — BUPIVACAINE HYDROCHLORIDE 2.5 MG/ML
2 INJECTION, SOLUTION INFILTRATION; PERINEURAL ONCE
Status: COMPLETED | OUTPATIENT
Start: 2025-04-17 | End: 2025-04-17

## 2025-04-17 RX ORDER — BETAMETHASONE SODIUM PHOSPHATE AND BETAMETHASONE ACETATE 3; 3 MG/ML; MG/ML
12 INJECTION, SUSPENSION INTRA-ARTICULAR; INTRALESIONAL; INTRAMUSCULAR; SOFT TISSUE ONCE
Status: COMPLETED | OUTPATIENT
Start: 2025-04-17 | End: 2025-04-17

## 2025-04-17 RX ADMIN — BETAMETHASONE SODIUM PHOSPHATE AND BETAMETHASONE ACETATE 12 MG: 3; 3 INJECTION, SUSPENSION INTRA-ARTICULAR; INTRALESIONAL; INTRAMUSCULAR; SOFT TISSUE at 10:54

## 2025-04-17 RX ADMIN — BUPIVACAINE HYDROCHLORIDE 5 MG: 2.5 INJECTION, SOLUTION INFILTRATION; PERINEURAL at 10:55

## 2025-04-17 ASSESSMENT — ENCOUNTER SYMPTOMS
COUGH: 0
VOMITING: 0
COLOR CHANGE: 0
SHORTNESS OF BREATH: 0

## 2025-04-17 NOTE — PROGRESS NOTES
[facility]  [address] [place] [address]  Dept: [phone]  Dept Fax: [phone]        Ambulatory Follow Up      Subjective:   Yeni Rodas is a 79 y.o. year old female who presents to our office today for routine followup regarding her   1. Primary osteoarthritis of left knee    2. Chronic pain of left knee        Chief Complaint   Patient presents with    Knee Pain     Left knee pain, injection on 12/4, 50% relief for 1-2 months       History of Present Illness  The patient is an 79-year-old female here today for a follow-up regarding chronic left knee pain due to osteoarthritis. She was last evaluated on 01/20/2025 for a 6-week follow-up after receiving a left knee corticosteroid injection on 12/02/2024. She continues to experience pain in her left knee over the past few weeks.    A significant exacerbation of left knee pain is reported, severely limiting mobility, particularly when ascending or descending stairs. Reliance exclusively on the left leg for these movements is noted. Additionally, an episode of near-fall while walking on a flat surface due to instability in the left knee occurred. No sensations of clicking or buckling behind the patella are reported.         Review of Systems   Constitutional:  Negative for activity change and fever.   HENT:  Negative for sneezing.    Respiratory:  Negative for cough and shortness of breath.    Cardiovascular:  Negative for chest pain.   Gastrointestinal:  Negative for vomiting.   Musculoskeletal:  Positive for arthralgias (left wrist). Negative for joint swelling and myalgias.   Skin:  Negative for color change.   Neurological:  Negative for weakness and numbness.   Psychiatric/Behavioral:  Negative for sleep disturbance.        Objective :   Resp 14   Ht 1.702 m (5' 7\")   Wt 61.1 kg (134 lb 9.6 oz)   BMI 21.08 kg/m²  Body mass index is 21.08 kg/m².  General: Yeni SHOEMAKER

## 2025-04-25 ENCOUNTER — HOSPITAL ENCOUNTER (OUTPATIENT)
Dept: PHYSICAL THERAPY | Age: 80
Setting detail: THERAPIES SERIES
Discharge: HOME | End: 2025-04-25
Payer: MEDICARE

## 2025-04-25 PROCEDURE — 97161 PT EVAL LOW COMPLEX 20 MIN: CPT

## 2025-04-25 PROCEDURE — 97110 THERAPEUTIC EXERCISES: CPT

## 2025-04-25 NOTE — CONSULTS
[x] [facility]   Outpatient Rehabilitation & Therapy  [address] [facility] [address]  P: [phone]   F: [phone]     Physical therapy LE evaluation     Date:  2025  Patient: Yeni Rodas  : 1945  MRN: [id number]  Physician: Katelyn Bird PA-C     Insurance: [employer] Advantage w/ visits BMN-- $30 cpay  CPT codes verified:[phone](based on medical necessity)[phone]([phone]([phone]([phone](2)   Medical Diagnosis:   M17.12 (ICD-10-CM) - Primary osteoarthritis of left knee   M25.562, G89.29 (ICD-10-CM) - Chronic pain of left knee      Rehab Codes: M62.81 , M25.562 , Z91.81   Onset Date: 2025 referral   Next 's appt: 2025      PRECAUTIONS: [place], fall risk    Subjective:   CC: Chronic L Knee    HPI: Patient reports that this pain has been ongoing for a while. She had her second injection on 2025 and reports that her pain does feel better. Pain at worst so far since the injection is 2/10 and is intermittent now. Cannot remember how bad it was prior to the recent injection. Worst with using stairs. Denies any recent falls, however reports that L knee does buckle occasionally. Pain does not radiate when it comes on and denies any paraesthesia.     PMHx: [] Unremarkable [x] Diabetes [x] HTN  [] Pacemaker   [] MI/Heart Problems [] Cancer [x] Arthritis [x] Other: anxiety , hyperlipidemia                 Past Medical History:   Diagnosis Date    Anxiety state, unspecified     Arthritis     Cataracts, bilateral     COVID-19 vaccine administered 2021 & 3-    MODERNA    Esophageal reflux     Generalized osteoarthrosis, unspecified site     Hearing loss     Hyperlipidemia     Hypertension     Neuropathy     Neuropathy due to medical condition     Pure hypercholesterolemia     Thoracic or lumbosacral neuritis or radiculitis, unspecified     Type 2 diabetes mellitus without complication (HCC)     Urinary retention     UTI (urinary tract

## 2025-04-29 ENCOUNTER — TELEPHONE (OUTPATIENT)
Dept: INTERNAL MEDICINE CLINIC | Age: 80
End: 2025-04-29

## 2025-04-30 ENCOUNTER — HOSPITAL ENCOUNTER (OUTPATIENT)
Age: 80
Setting detail: SPECIMEN
Discharge: HOME | End: 2025-04-30

## 2025-04-30 ENCOUNTER — TELEPHONE (OUTPATIENT)
Dept: GASTROENTEROLOGY | Age: 80
End: 2025-04-30

## 2025-04-30 DIAGNOSIS — Z79.4 TYPE 2 DIABETES MELLITUS WITH DIABETIC NEUROPATHY, WITH LONG-TERM CURRENT USE OF INSULIN (HCC): ICD-10-CM

## 2025-04-30 DIAGNOSIS — D50.9 IRON DEFICIENCY ANEMIA, UNSPECIFIED IRON DEFICIENCY ANEMIA TYPE: ICD-10-CM

## 2025-04-30 DIAGNOSIS — R19.5 DARK STOOLS: ICD-10-CM

## 2025-04-30 DIAGNOSIS — E11.40 TYPE 2 DIABETES MELLITUS WITH DIABETIC NEUROPATHY, WITH LONG-TERM CURRENT USE OF INSULIN (HCC): ICD-10-CM

## 2025-04-30 LAB
ATYPICAL LYMPHOCYTE ABSOLUTE COUNT: 0.05 K/UL
ATYPICAL LYMPHOCYTES: 1 %
BASOPHILS # BLD: 0 K/UL (ref 0–0.2)
BASOPHILS NFR BLD: 0 % (ref 0–2)
CREAT UR-MCNC: 79.1 MG/DL (ref 28–217)
EOSINOPHIL # BLD: 0.1 K/UL (ref 0–0.4)
EOSINOPHILS RELATIVE PERCENT: 2 % (ref 1–4)
ERYTHROCYTE [DISTWIDTH] IN BLOOD BY AUTOMATED COUNT: 21 % (ref 11.8–14.4)
HCT VFR BLD AUTO: 26.4 % (ref 36.3–47.1)
HGB BLD-MCNC: 6.4 G/DL (ref 11.9–15.1)
IMM GRANULOCYTES # BLD AUTO: 0 K/UL (ref 0–0.3)
IMM GRANULOCYTES NFR BLD: 0 %
LYMPHOCYTES NFR BLD: 1.52 K/UL (ref 1–4.8)
LYMPHOCYTES RELATIVE PERCENT: 31 % (ref 24–44)
MCH RBC QN AUTO: 16.5 PG (ref 25.2–33.5)
MCHC RBC AUTO-ENTMCNC: 24.2 G/DL (ref 28.4–34.8)
MCV RBC AUTO: 68 FL (ref 82.6–102.9)
MICROALBUMIN UR-MCNC: 14 MG/L (ref 0–20)
MICROALBUMIN/CREAT UR-RTO: 18 MCG/MG CREAT (ref 0–25)
MONOCYTES NFR BLD: 0.39 K/UL (ref 0.1–0.8)
MONOCYTES NFR BLD: 8 % (ref 1–7)
MORPHOLOGY: ABNORMAL
NEUTROPHILS NFR BLD: 58 % (ref 36–66)
NEUTS SEG NFR BLD: 2.84 K/UL (ref 1.8–7.7)
NRBC BLD-RTO: 0.4 PER 100 WBC
PLATELET # BLD AUTO: 481 K/UL (ref 138–453)
PMV BLD AUTO: 9.2 FL (ref 8.1–13.5)
RBC # BLD AUTO: 3.88 M/UL (ref 3.95–5.11)
WBC OTHER # BLD: 4.9 K/UL (ref 3.5–11.3)

## 2025-04-30 NOTE — TELEPHONE ENCOUNTER
Writer reviewed pt lab, called pt no answer, writer cotym advising pt of critical hbg 6.4, advised to go to ED to further assess/treat critical lab, left call back number if needed & asked pt to return call.

## 2025-05-01 ENCOUNTER — PATIENT MESSAGE (OUTPATIENT)
Dept: INTERNAL MEDICINE CLINIC | Age: 80
End: 2025-05-01

## 2025-05-06 ENCOUNTER — HOSPITAL ENCOUNTER (OUTPATIENT)
Dept: PHYSICAL THERAPY | Age: 80
Setting detail: THERAPIES SERIES
Discharge: HOME | End: 2025-05-06
Payer: MEDICARE

## 2025-05-06 PROCEDURE — 97110 THERAPEUTIC EXERCISES: CPT

## 2025-05-06 NOTE — FLOWSHEET NOTE
[facility]   Outpatient Rehabilitation & Therapy  [address] [facility]  P: [phone]   F: [phone]    Physical Therapy Daily Treatment Note      Date:  2025  Patient Name:  Yeni Rodas    :  1945  MRN: [id number]  Physician: Katelyn Bird PA-C                      Insurance: [employer] Advantage w/ visits BMN-- $30 cpay  CPT codes verified:[phone](based on medical necessity)[phone]([phone]([phone](2)   Medical Diagnosis:   M17.12 (ICD-10-CM) - Primary osteoarthritis of left knee   M25.562, G89.29 (ICD-10-CM) - Chronic pain of left knee                 Rehab Codes: M62.81 , M25.562 , Z91.81   Onset Date: 2025 referral                      Next 's appt: 2025     Visit# / total visits: 2/8  Cancels/No Shows: 0/0    Subjective:  5/6 Patient arrived and denies pain in L knee upon arrival. States she doesn't always have pain. States she was not able to get to [facility] due to being busy.   Pain:  [x] Yes  [] No Location: L knee Pain Rating: (0-10 scale) denies/10  Pain altered Tx:  [] No  [] Yes  Action:  Comments:    Objective:  Modalities:   PRECAUTIONS: [place], fall risk  Exercises:  INTERVENTIONS  Reps/ Time Weight/ Level Completed  Today Comments   EXERCISES            Mat Level:           Bridges  10 x 2   x     SLR 10 x   x Cues to for quad set prior   Side lying L hip abd  10x2   x Added 5/6   Side lying clamshells  10x2  x Added 5/6           seated            LAQ 10x2  3\" hold   x Added 5/6   Resisted HS curls  10x2  3\" hold   x Added 5/6           Standing:           Hip 3 Way 10 x yellow x     Marches 10 x   x Finger tip  Alternating    HS curls  10 x   x Added 5/6 finger tip touch support    (B) tandem standing finger tip touch support  1' ea x2   x Added 5/6    SB calf stretch  3x30\"   X Added 5/6                 Specific Instructions for next treatment:   - mini squats, eccentric heel raise for balance challenge,

## 2025-05-07 ENCOUNTER — PATIENT MESSAGE (OUTPATIENT)
Dept: INTERNAL MEDICINE CLINIC | Age: 80
End: 2025-05-07

## 2025-05-12 ENCOUNTER — HOSPITAL ENCOUNTER (INPATIENT)
Age: 80
LOS: 3 days | Discharge: HOME | DRG: 379 | End: 2025-05-15
Attending: EMERGENCY MEDICINE
Payer: MEDICARE

## 2025-05-12 ENCOUNTER — OFFICE VISIT (OUTPATIENT)
Dept: INTERNAL MEDICINE CLINIC | Age: 80
End: 2025-05-12
Payer: MEDICARE

## 2025-05-12 ENCOUNTER — APPOINTMENT (OUTPATIENT)
Dept: GENERAL RADIOLOGY | Age: 80
DRG: 379 | End: 2025-05-12
Payer: MEDICARE

## 2025-05-12 VITALS
HEART RATE: 79 BPM | DIASTOLIC BLOOD PRESSURE: 62 MMHG | OXYGEN SATURATION: 100 % | WEIGHT: 130 LBS | BODY MASS INDEX: 20.36 KG/M2 | SYSTOLIC BLOOD PRESSURE: 118 MMHG

## 2025-05-12 DIAGNOSIS — Z79.4 TYPE 2 DIABETES MELLITUS WITH DIABETIC NEUROPATHY, WITH LONG-TERM CURRENT USE OF INSULIN (HCC): ICD-10-CM

## 2025-05-12 DIAGNOSIS — I10 PRIMARY HYPERTENSION: ICD-10-CM

## 2025-05-12 DIAGNOSIS — J43.1 PANLOBULAR EMPHYSEMA (HCC): ICD-10-CM

## 2025-05-12 DIAGNOSIS — E11.00 UNCONTROLLED TYPE 2 DM WITH HYPEROSMOLAR NONKETOTIC HYPERGLYCEMIA (HCC): ICD-10-CM

## 2025-05-12 DIAGNOSIS — K64.8 INTERNAL HEMORRHOID: ICD-10-CM

## 2025-05-12 DIAGNOSIS — D64.9 ANEMIA, UNSPECIFIED TYPE: Primary | ICD-10-CM

## 2025-05-12 DIAGNOSIS — R73.9 HYPERGLYCEMIA: ICD-10-CM

## 2025-05-12 DIAGNOSIS — E11.40 TYPE 2 DIABETES MELLITUS WITH DIABETIC NEUROPATHY, WITH LONG-TERM CURRENT USE OF INSULIN (HCC): ICD-10-CM

## 2025-05-12 DIAGNOSIS — R53.83 OTHER FATIGUE: ICD-10-CM

## 2025-05-12 DIAGNOSIS — D50.9 IRON DEFICIENCY ANEMIA, UNSPECIFIED IRON DEFICIENCY ANEMIA TYPE: ICD-10-CM

## 2025-05-12 DIAGNOSIS — D64.9 SEVERE ANEMIA: Primary | ICD-10-CM

## 2025-05-12 DIAGNOSIS — E78.2 MIXED HYPERLIPIDEMIA: ICD-10-CM

## 2025-05-12 PROBLEM — I63.512 ACUTE ISCHEMIC LEFT MCA STROKE (HCC): Status: RESOLVED | Noted: 2023-10-06 | Resolved: 2025-05-12

## 2025-05-12 LAB
ALBUMIN SERPL-MCNC: 4.2 G/DL (ref 3.5–5.2)
ALP SERPL-CCNC: 129 U/L (ref 35–104)
ALT SERPL-CCNC: 13 U/L (ref 10–35)
ANION GAP SERPL CALCULATED.3IONS-SCNC: 11 MMOL/L (ref 9–16)
AST SERPL-CCNC: 14 U/L (ref 10–35)
BASOPHILS # BLD: 0.05 K/UL (ref 0–0.2)
BASOPHILS NFR BLD: 1 % (ref 0–2)
BILIRUB SERPL-MCNC: 0.7 MG/DL (ref 0–1.2)
BUN SERPL-MCNC: 19 MG/DL (ref 8–23)
CALCIUM SERPL-MCNC: 9 MG/DL (ref 8.6–10.4)
CHLORIDE SERPL-SCNC: 98 MMOL/L (ref 98–107)
CO2 SERPL-SCNC: 24 MMOL/L (ref 20–31)
CREAT SERPL-MCNC: 0.8 MG/DL (ref 0.7–1.2)
DATE, STOOL #1: NORMAL
EOSINOPHIL # BLD: 0.05 K/UL (ref 0–0.4)
EOSINOPHILS RELATIVE PERCENT: 1 % (ref 0–4)
ERYTHROCYTE [DISTWIDTH] IN BLOOD BY AUTOMATED COUNT: 21.8 % (ref 11.5–14.9)
FERRITIN SERPL-MCNC: 7 NG/ML
FOLATE SERPL-MCNC: 19.2 NG/ML (ref 4.8–24.2)
GFR, ESTIMATED: 74 ML/MIN/1.73M2
GGT SERPL-CCNC: 13 U/L (ref 5–36)
GLUCOSE BLD-MCNC: 343 MG/DL (ref 65–105)
GLUCOSE BLD-MCNC: 97 MG/DL (ref 65–105)
GLUCOSE SERPL-MCNC: 427 MG/DL (ref 74–99)
HAPTOGLOB SERPL-MCNC: 168 MG/DL (ref 30–200)
HBA1C MFR BLD: 7.6 %
HCT VFR BLD AUTO: 25.2 % (ref 36–46)
HCT VFR BLD AUTO: 26.7 % (ref 36–46)
HEMOCCULT SP1 STL QL: NEGATIVE
HGB BLD-MCNC: 7.1 G/DL (ref 12–16)
HGB BLD-MCNC: 7.5 G/DL (ref 12–16)
INR PPP: 1
IRON SATN MFR SERPL: 4 % (ref 20–55)
IRON SERPL-MCNC: 16 UG/DL (ref 37–145)
LDH SERPL-CCNC: 146 U/L (ref 135–214)
LYMPHOCYTES NFR BLD: 1.58 K/UL (ref 1–4.8)
LYMPHOCYTES RELATIVE PERCENT: 33 % (ref 24–44)
MAGNESIUM SERPL-MCNC: 1.8 MG/DL (ref 1.6–2.4)
MCH RBC QN AUTO: 17.1 PG (ref 26–34)
MCHC RBC AUTO-ENTMCNC: 28 G/DL (ref 31–37)
MCV RBC AUTO: 61 FL (ref 80–100)
MONOCYTES NFR BLD: 0.34 K/UL (ref 0.1–1.3)
MONOCYTES NFR BLD: 7 % (ref 1–7)
MORPHOLOGY: ABNORMAL
NEUTROPHILS NFR BLD: 58 % (ref 36–66)
NEUTS SEG NFR BLD: 2.78 K/UL (ref 1.3–9.1)
PLATELET # BLD AUTO: 412 K/UL (ref 150–450)
PMV BLD AUTO: 8.3 FL (ref 6–12)
POTASSIUM SERPL-SCNC: 4.4 MMOL/L (ref 3.7–5.3)
PROT SERPL-MCNC: 7.1 G/DL (ref 6.6–8.7)
PROTHROMBIN TIME: 14.6 SEC (ref 11.8–14.6)
RBC # BLD AUTO: 4.37 M/UL (ref 4–5.2)
RETICS # AUTO: 0.13 M/UL (ref 0.02–0.1)
RETICS/RBC NFR AUTO: 3 % (ref 0.5–2)
SODIUM SERPL-SCNC: 133 MMOL/L (ref 136–145)
TIBC SERPL-MCNC: 404 UG/DL (ref 250–450)
TIME, STOOL #1: 1332
TROPONIN I SERPL HS-MCNC: 7 NG/L (ref 0–14)
TROPONIN I SERPL HS-MCNC: 9 NG/L (ref 0–14)
UNSATURATED IRON BINDING CAPACITY: 388 UG/DL (ref 112–347)
VIT B12 SERPL-MCNC: 520 PG/ML (ref 232–1245)
WBC OTHER # BLD: 4.8 K/UL (ref 3.5–11)

## 2025-05-12 PROCEDURE — 82607 VITAMIN B-12: CPT

## 2025-05-12 PROCEDURE — 85045 AUTOMATED RETICULOCYTE COUNT: CPT

## 2025-05-12 PROCEDURE — 82728 ASSAY OF FERRITIN: CPT

## 2025-05-12 PROCEDURE — 93005 ELECTROCARDIOGRAM TRACING: CPT | Performed by: EMERGENCY MEDICINE

## 2025-05-12 PROCEDURE — 83735 ASSAY OF MAGNESIUM: CPT

## 2025-05-12 PROCEDURE — 85610 PROTHROMBIN TIME: CPT

## 2025-05-12 PROCEDURE — 1159F MED LIST DOCD IN RCRD: CPT | Performed by: INTERNAL MEDICINE

## 2025-05-12 PROCEDURE — 1123F ACP DISCUSS/DSCN MKR DOCD: CPT | Performed by: INTERNAL MEDICINE

## 2025-05-12 PROCEDURE — 3078F DIAST BP <80 MM HG: CPT | Performed by: INTERNAL MEDICINE

## 2025-05-12 PROCEDURE — 36415 COLL VENOUS BLD VENIPUNCTURE: CPT

## 2025-05-12 PROCEDURE — 2500000003 HC RX 250 WO HCPCS

## 2025-05-12 PROCEDURE — 99215 OFFICE O/P EST HI 40 MIN: CPT | Performed by: INTERNAL MEDICINE

## 2025-05-12 PROCEDURE — 85025 COMPLETE CBC W/AUTO DIFF WBC: CPT

## 2025-05-12 PROCEDURE — 82784 ASSAY IGA/IGD/IGG/IGM EACH: CPT

## 2025-05-12 PROCEDURE — 86900 BLOOD TYPING SEROLOGIC ABO: CPT

## 2025-05-12 PROCEDURE — 83615 LACTATE (LD) (LDH) ENZYME: CPT

## 2025-05-12 PROCEDURE — 84484 ASSAY OF TROPONIN QUANT: CPT

## 2025-05-12 PROCEDURE — 96372 THER/PROPH/DIAG INJ SC/IM: CPT

## 2025-05-12 PROCEDURE — 85014 HEMATOCRIT: CPT

## 2025-05-12 PROCEDURE — 6370000000 HC RX 637 (ALT 250 FOR IP)

## 2025-05-12 PROCEDURE — 83010 ASSAY OF HAPTOGLOBIN QUANT: CPT

## 2025-05-12 PROCEDURE — 80053 COMPREHEN METABOLIC PANEL: CPT

## 2025-05-12 PROCEDURE — 97530 THERAPEUTIC ACTIVITIES: CPT

## 2025-05-12 PROCEDURE — 83540 ASSAY OF IRON: CPT

## 2025-05-12 PROCEDURE — 86850 RBC ANTIBODY SCREEN: CPT

## 2025-05-12 PROCEDURE — 3074F SYST BP LT 130 MM HG: CPT | Performed by: INTERNAL MEDICINE

## 2025-05-12 PROCEDURE — 99223 1ST HOSP IP/OBS HIGH 75: CPT

## 2025-05-12 PROCEDURE — 86901 BLOOD TYPING SEROLOGIC RH(D): CPT

## 2025-05-12 PROCEDURE — 71046 X-RAY EXAM CHEST 2 VIEWS: CPT

## 2025-05-12 PROCEDURE — 86920 COMPATIBILITY TEST SPIN: CPT

## 2025-05-12 PROCEDURE — 83550 IRON BINDING TEST: CPT

## 2025-05-12 PROCEDURE — 84080 ASSAY ALKALINE PHOSPHATASES: CPT

## 2025-05-12 PROCEDURE — 6370000000 HC RX 637 (ALT 250 FOR IP): Performed by: EMERGENCY MEDICINE

## 2025-05-12 PROCEDURE — 82977 ASSAY OF GGT: CPT

## 2025-05-12 PROCEDURE — 97162 PT EVAL MOD COMPLEX 30 MIN: CPT

## 2025-05-12 PROCEDURE — 83036 HEMOGLOBIN GLYCOSYLATED A1C: CPT | Performed by: INTERNAL MEDICINE

## 2025-05-12 PROCEDURE — 85018 HEMOGLOBIN: CPT

## 2025-05-12 PROCEDURE — 82272 OCCULT BLD FECES 1-3 TESTS: CPT

## 2025-05-12 PROCEDURE — 82746 ASSAY OF FOLIC ACID SERUM: CPT

## 2025-05-12 PROCEDURE — 84075 ASSAY ALKALINE PHOSPHATASE: CPT

## 2025-05-12 PROCEDURE — 1200000000 HC SEMI PRIVATE

## 2025-05-12 PROCEDURE — 83516 IMMUNOASSAY NONANTIBODY: CPT

## 2025-05-12 PROCEDURE — 3046F HEMOGLOBIN A1C LEVEL >9.0%: CPT | Performed by: INTERNAL MEDICINE

## 2025-05-12 PROCEDURE — 99285 EMERGENCY DEPT VISIT HI MDM: CPT

## 2025-05-12 PROCEDURE — 82947 ASSAY GLUCOSE BLOOD QUANT: CPT

## 2025-05-12 RX ORDER — GABAPENTIN 400 MG/1
800 CAPSULE ORAL EVERY EVENING
Status: DISCONTINUED | OUTPATIENT
Start: 2025-05-13 | End: 2025-05-15 | Stop reason: HOSPADM

## 2025-05-12 RX ORDER — SODIUM CHLORIDE 0.9 % (FLUSH) 0.9 %
5-40 SYRINGE (ML) INJECTION EVERY 12 HOURS SCHEDULED
Status: DISCONTINUED | OUTPATIENT
Start: 2025-05-12 | End: 2025-05-15 | Stop reason: HOSPADM

## 2025-05-12 RX ORDER — ATORVASTATIN CALCIUM 40 MG/1
40 TABLET, FILM COATED ORAL DAILY
Status: DISCONTINUED | OUTPATIENT
Start: 2025-05-13 | End: 2025-05-15 | Stop reason: HOSPADM

## 2025-05-12 RX ORDER — INSULIN LISPRO-AABC 100 [IU]/ML
6 INJECTION, SOLUTION SUBCUTANEOUS
COMMUNITY
Start: 2025-02-12

## 2025-05-12 RX ORDER — ACETAMINOPHEN 325 MG/1
650 TABLET ORAL EVERY 6 HOURS PRN
Status: DISCONTINUED | OUTPATIENT
Start: 2025-05-12 | End: 2025-05-15 | Stop reason: HOSPADM

## 2025-05-12 RX ORDER — GABAPENTIN 400 MG/1
800 CAPSULE ORAL EVERY EVENING
COMMUNITY

## 2025-05-12 RX ORDER — ONDANSETRON 4 MG/1
4 TABLET, ORALLY DISINTEGRATING ORAL EVERY 8 HOURS PRN
Status: DISCONTINUED | OUTPATIENT
Start: 2025-05-12 | End: 2025-05-15 | Stop reason: HOSPADM

## 2025-05-12 RX ORDER — DEXTROSE MONOHYDRATE 100 MG/ML
INJECTION, SOLUTION INTRAVENOUS CONTINUOUS PRN
Status: DISCONTINUED | OUTPATIENT
Start: 2025-05-12 | End: 2025-05-15 | Stop reason: HOSPADM

## 2025-05-12 RX ORDER — METOPROLOL TARTRATE 25 MG/1
25 TABLET, FILM COATED ORAL 2 TIMES DAILY
Status: DISCONTINUED | OUTPATIENT
Start: 2025-05-12 | End: 2025-05-15 | Stop reason: HOSPADM

## 2025-05-12 RX ORDER — INSULIN LISPRO 100 [IU]/ML
5 INJECTION, SOLUTION INTRAVENOUS; SUBCUTANEOUS ONCE
Status: COMPLETED | OUTPATIENT
Start: 2025-05-12 | End: 2025-05-12

## 2025-05-12 RX ORDER — GABAPENTIN 100 MG/1
200 CAPSULE ORAL 2 TIMES DAILY
COMMUNITY

## 2025-05-12 RX ORDER — POTASSIUM CHLORIDE 1500 MG/1
40 TABLET, EXTENDED RELEASE ORAL PRN
Status: DISCONTINUED | OUTPATIENT
Start: 2025-05-12 | End: 2025-05-15 | Stop reason: HOSPADM

## 2025-05-12 RX ORDER — POLYETHYLENE GLYCOL 3350 17 G/17G
17 POWDER, FOR SOLUTION ORAL DAILY PRN
Status: DISCONTINUED | OUTPATIENT
Start: 2025-05-12 | End: 2025-05-15 | Stop reason: HOSPADM

## 2025-05-12 RX ORDER — POTASSIUM CHLORIDE 7.45 MG/ML
10 INJECTION INTRAVENOUS PRN
Status: DISCONTINUED | OUTPATIENT
Start: 2025-05-12 | End: 2025-05-15 | Stop reason: HOSPADM

## 2025-05-12 RX ORDER — DULOXETIN HYDROCHLORIDE 60 MG/1
60 CAPSULE, DELAYED RELEASE ORAL DAILY
Status: DISCONTINUED | OUTPATIENT
Start: 2025-05-13 | End: 2025-05-15 | Stop reason: HOSPADM

## 2025-05-12 RX ORDER — GABAPENTIN 100 MG/1
200 CAPSULE ORAL 2 TIMES DAILY
Status: DISCONTINUED | OUTPATIENT
Start: 2025-05-12 | End: 2025-05-15 | Stop reason: HOSPADM

## 2025-05-12 RX ORDER — INSULIN GLARGINE 100 [IU]/ML
10 INJECTION, SOLUTION SUBCUTANEOUS 2 TIMES DAILY
Status: DISCONTINUED | OUTPATIENT
Start: 2025-05-12 | End: 2025-05-14

## 2025-05-12 RX ORDER — ONDANSETRON 2 MG/ML
4 INJECTION INTRAMUSCULAR; INTRAVENOUS EVERY 6 HOURS PRN
Status: DISCONTINUED | OUTPATIENT
Start: 2025-05-12 | End: 2025-05-15 | Stop reason: HOSPADM

## 2025-05-12 RX ORDER — ASPIRIN 81 MG/1
81 TABLET, CHEWABLE ORAL DAILY
Status: DISCONTINUED | OUTPATIENT
Start: 2025-05-13 | End: 2025-05-14

## 2025-05-12 RX ORDER — SODIUM CHLORIDE 0.9 % (FLUSH) 0.9 %
5-40 SYRINGE (ML) INJECTION PRN
Status: DISCONTINUED | OUTPATIENT
Start: 2025-05-12 | End: 2025-05-15 | Stop reason: HOSPADM

## 2025-05-12 RX ORDER — INSULIN LISPRO 100 [IU]/ML
0-4 INJECTION, SOLUTION INTRAVENOUS; SUBCUTANEOUS
Status: DISCONTINUED | OUTPATIENT
Start: 2025-05-12 | End: 2025-05-15 | Stop reason: HOSPADM

## 2025-05-12 RX ORDER — MAGNESIUM SULFATE HEPTAHYDRATE 40 MG/ML
2000 INJECTION, SOLUTION INTRAVENOUS PRN
Status: DISCONTINUED | OUTPATIENT
Start: 2025-05-12 | End: 2025-05-15 | Stop reason: HOSPADM

## 2025-05-12 RX ORDER — SODIUM CHLORIDE 9 MG/ML
INJECTION, SOLUTION INTRAVENOUS PRN
Status: DISCONTINUED | OUTPATIENT
Start: 2025-05-12 | End: 2025-05-15 | Stop reason: HOSPADM

## 2025-05-12 RX ORDER — ACETAMINOPHEN 650 MG/1
650 SUPPOSITORY RECTAL EVERY 6 HOURS PRN
Status: DISCONTINUED | OUTPATIENT
Start: 2025-05-12 | End: 2025-05-15 | Stop reason: HOSPADM

## 2025-05-12 RX ADMIN — INSULIN GLARGINE 10 UNITS: 100 INJECTION, SOLUTION SUBCUTANEOUS at 20:08

## 2025-05-12 RX ADMIN — INSULIN LISPRO 3 UNITS: 100 INJECTION, SOLUTION INTRAVENOUS; SUBCUTANEOUS at 20:08

## 2025-05-12 RX ADMIN — INSULIN LISPRO 5 UNITS: 100 INJECTION, SOLUTION INTRAVENOUS; SUBCUTANEOUS at 13:58

## 2025-05-12 RX ADMIN — SODIUM CHLORIDE, PRESERVATIVE FREE 10 ML: 5 INJECTION INTRAVENOUS at 20:09

## 2025-05-12 NOTE — ED PROVIDER NOTES
EMERGENCY DEPARTMENT ENCOUNTER    Pt Name: Yeni Rodas  MRN: [id number]  Birthdate 1945  Date of evaluation: 5/12/25  CHIEF COMPLAINT       Chief Complaint   Patient presents with    Shortness of Breath    Fatigue     HISTORY OF PRESENT ILLNESS   HPI  Generalized fatigue malaise.  Has acute blood loss anemia sent here for transfusion admission.  History of iron deficiency anemia GI bleed in the past.  Patient denies any dark stools or hematemesis.  Limited history she has some dementia.  She states \"I am 79 years old and I do not always know what is going on with myself \"  Family present.  History of low anemia requiring blood transfusion before.  She has had fatigue malaise shortness of breath ongoing for years getting worse lately.  PCP sent her here for admission and blood transfusion.        REVIEW OF SYSTEMS     Review of Systems   All other systems reviewed and are negative.  Limited due to dementia I suspect  PASTMEDICAL HISTORY     Past Medical History:   Diagnosis Date    Anxiety state, unspecified     Arthritis     Cataracts, bilateral     COVID-19 vaccine administered 2- & 3-    MODERNA    Esophageal reflux     Generalized osteoarthrosis, unspecified site     Hearing loss     Hyperlipidemia     Hypertension     Neuropathy     Neuropathy due to medical condition     Pure hypercholesterolemia     Thoracic or lumbosacral neuritis or radiculitis, unspecified     Type 2 diabetes mellitus without complication (HCC)     Urinary retention     UTI (urinary tract infection)      Past Problem List  Patient Active Problem List   Diagnosis Code    Hypertension I10    Hyperlipidemia E78.5    Overactive bladder N32.81    Lumbar stenosis M48.061    History of lumbosacral spine surgery Z98.890    S/P spinal fusion Z98.1    Osteoporosis M81.0    Intercostal pain R07.82    Microhematuria R31.29    History of recurrent UTIs Z87.440    Type 2 diabetes mellitus with diabetic neuropathy, with long-term current  daily    FERROUS SULFATE (IRON 325) 325 (65 FE) MG TABLET    Take 1 tablet by mouth daily (with breakfast)    FOLIC ACID (FOLVITE) 1 MG TABLET    Take 1 tablet by mouth daily    GABAPENTIN (NEURONTIN) 100 MG CAPSULE    Take 2 caps in AM and 2 in afternoon    INSULIN GLARGINE (LANTUS SOLOSTAR) 100 UNIT/ML INJECTION PEN    INJECT SUBCUTANEOUSLY 30 UNITS  DAILY    INSULIN LISPRO, 1 UNIT DIAL, (HUMALOG KWIKPEN) 100 UNIT/ML SOPN    Inject 6 Units into the skin 3 times daily (before meals)    INSULIN PEN NEEDLE 32G X 4 MM MISC    1 each by Does not apply route in the morning, at noon, in the evening, and at bedtime    LYUMJEV KWIKPEN 100 UNIT/ML SOPN        METOPROLOL TARTRATE (LOPRESSOR) 25 MG TABLET    TAKE 1 TABLET BY MOUTH TWICE  DAILY    ONE TOUCH ULTRASOFT LANCETS MISC    1 each by Does not apply route 2 times daily    SPACER/AERO-HOLDING CHAMBERS BRIDGET    1 Device by Does not apply route daily     ALLERGIES     has no known allergies.  FAMILY HISTORY     She indicated that her mother is . She indicated that her father is . She indicated that the status of her brother is unknown.     SOCIAL HISTORY       Social History     Tobacco Use    Smoking status: Former     Current packs/day: 0.00     Average packs/day: 2.0 packs/day for 20.0 years (40.0 ttl pk-yrs)     Types: Cigarettes     Start date: 1964     Quit date: 1984     Years since quittin.3     Passive exposure: Never    Smokeless tobacco: Never   Vaping Use    Vaping status: Never Used   Substance Use Topics    Alcohol use: Yes     Alcohol/week: 0.0 standard drinks of alcohol     Comment: social    Drug use: No     PHYSICAL EXAM     INITIAL VITALS: BP (!) 125/54   Pulse 76   Temp 97.6 °F (36.4 °C) (Oral)   Resp 17   Ht 1.702 m (5' 7\")   Wt 59 kg (130 lb)   SpO2 98%   BMI 20.36 kg/m²    Physical Exam  Constitutional:       General: She is not in acute distress.     Appearance: Normal appearance. She is well-developed. She is not

## 2025-05-12 NOTE — PROGRESS NOTES
Pharmacy Medication History Note      List of current medications patient is taking is complete.    Source of information: patient/family, Sure Scripts, Care Everywhere, OARRS     Changes made to medication list:  Medications removed (include reason, ex. therapy complete or physician discontinued, noncompliance):  None     Medications flagged for provider review:  Amoxicillin - course complete   Ferrous sulfate - patient reports not taking. Multiple duplicates.   Folic acid - patient reports not taking   Gabapentin 300 mg - most recent prescriptions are for 100 mg and 400 mg capsules. See below.   Humalog - most recent prescription is for Lyumjev    Medications added/doses adjusted:  Gabapentin 100 mg take 2 capsules twice daily (morning and afternoon)   Gabapentin 400 mg take 2 capsules nightly   Lyumjev 6 units three times daily before meals    Other notes (ex. Recent course of antibiotics, Coumadin dosing):  Patient's family reports that her Lantus dose was changed from 30 units daily to 15 units twice daily earlier today, but she has not yet started this dose. Her dose of Lyumjev was also increased to 9 units with breakfast and lunch and 6 units with dinner, but again, this dose has not yet started.   Per OARRS, last fills of both doses of gabapentin were on 10/24/24. The 400 mg capsules were filled with quantity 200 for 100 days. The 100 mg capsules were filled with quantity 400 for 100 days.       Current Home Medication List at Time of Admission:  Prior to Admission medications    Medication Sig   gabapentin (NEURONTIN) 400 MG capsule Take 2 capsules by mouth every evening.   gabapentin (NEURONTIN) 100 MG capsule Take 2 capsules by mouth in the morning and afternoon   LYUMJEV KWIKPEN 100 UNIT/ML SOPN Inject 6 Units into the skin 3 times daily (before meals)   insulin glargine (LANTUS SOLOSTAR) 100 UNIT/ML injection pen INJECT SUBCUTANEOUSLY 30 UNITS  DAILY   ferrous sulfate (IRON 325) 325 (65 Fe) MG tablet  Take 1 tablet by mouth daily (with breakfast)  Patient not taking: Reported on 5/12/2025   DULoxetine (CYMBALTA) 60 MG extended release capsule Take 1 capsule by mouth daily   insulin lispro, 1 Unit Dial, (HUMALOG KWIKPEN) 100 UNIT/ML SOPN Inject 6 Units into the skin 3 times daily (before meals)  Patient not taking: Reported on 5/12/2025   metoprolol tartrate (LOPRESSOR) 25 MG tablet TAKE 1 TABLET BY MOUTH TWICE  DAILY   diclofenac sodium (VOLTAREN) 1 % GEL Apply 4 g topically 4 times daily   gabapentin (NEURONTIN) 100 MG capsule Take 2 caps in AM and 2 in afternoon  Patient not taking: Reported on 5/12/2025   atorvastatin (LIPITOR) 40 MG tablet TAKE 1 TABLET BY MOUTH DAILY   amoxicillin (AMOXIL) 500 MG capsule Patient not taking: Reported on 5/12/25   ferrous sulfate (IRON 325) 325 (65 Fe) MG tablet Take 1 tablet by mouth 2 times daily  Patient not taking: Reported on 5/12/2025   folic acid (FOLVITE) 1 MG tablet Take 1 tablet by mouth daily  Patient not taking: Reported on 5/12/2025   ferrous sulfate (FE TABS 325) 325 (65 Fe) MG EC tablet Take 1 tablet by mouth daily (with breakfast)  Patient not taking: Reported on 5/12/2025   Calcium-Magnesium-Vitamin D (CALCIUM 1200+D3 PO) Take 2 tablets by mouth daily   acetaminophen (TYLENOL) 500 MG tablet Take 1 tablet by mouth 2 times daily   aspirin 81 MG chewable tablet Take 1 tablet by mouth daily         Please let me know if you have any questions about this encounter. Thank you!    Electronically signed by Emi Ferguson RPH on 5/12/2025 at 3:22 PM

## 2025-05-12 NOTE — PROGRESS NOTES
Patient a high fall risk related to low hemoglobin and dizziness upon standing.Patient educated about assistance  with getting out of bed and bed alarm turned on.  Patient adamantly refusing bed alarm, stating she will just turn it off and we are punishing her.  Patient given the option to sign the refusal of treatment paper, explained the paper to her. Patient ripped paper up and states she is refusing to sign it. Attempted to gain insight as to why the alarm makes her upset, patient folds arms and refuses to talk to writer. Informed patient we will be turning on the alarm and requests she calls when she needs up. If she does not want this she needs to sign the paper.  States she will sign nothing, bed alarm enacted

## 2025-05-12 NOTE — PLAN OF CARE
Problem: Chronic Conditions and Co-morbidities  Goal: Patient's chronic conditions and co-morbidity symptoms are monitored and maintained or improved  Outcome: Progressing  Flowsheets (Taken 5/12/2025 7902)  Care Plan - Patient's Chronic Conditions and Co-Morbidity Symptoms are Monitored and Maintained or Improved: Monitor and assess patient's chronic conditions and comorbid symptoms for stability, deterioration, or improvement     Problem: Discharge Planning  Goal: Discharge to home or other facility with appropriate resources  Outcome: Progressing  Flowsheets (Taken 5/12/2025 6878)  Discharge to home or other facility with appropriate resources:   Identify barriers to discharge with patient and caregiver   Arrange for needed discharge resources and transportation as appropriate     Problem: ABCDS Injury Assessment  Goal: Absence of physical injury  Outcome: Progressing  Flowsheets (Taken 5/12/2025 9733)  Absence of Physical Injury: Implement safety measures based on patient assessment     Problem: Skin/Tissue Integrity  Goal: Skin integrity remains intact  Outcome: Progressing  Flowsheets (Taken 5/12/2025 6505)  Skin Integrity Remains Intact: Monitor for areas of redness and/or skin breakdown

## 2025-05-12 NOTE — ED TRIAGE NOTES
Mode of arrival (squad #, walk in, police, etc) : walk in        Chief complaint(s): SOB, Fatigue,         Arrival Note (brief scenario, treatment PTA, etc).: Pt states she was at Dr. Lawrence's office and he told her to come over to the ER for a blood transfusion.         C= \"Have you ever felt that you should Cut down on your drinking?\"  No  A= \"Have people Annoyed you by criticizing your drinking?\"  No  G= \"Have you ever felt bad or Guilty about your drinking?\"  No  E= \"Have you ever had a drink as an Eye-opener first thing in the morning to steady your nerves or to help a hangover?\"  No      Deferred []      Reason for deferring: N/A    *If yes to two or more: probable alcohol abuse.*

## 2025-05-12 NOTE — H&P
[facility] Internal Medicine   Johnie Lawrence MD; MD Fe Lauren MD, MD , Sheila Lopez MD    [facility] Internal Medicine   IN-PATIENT SERVICE   [facility]    HISTORY AND PHYSICAL EXAMINATION            Date:   5/12/2025  Patient name:  Yeni Rodas  Date of admission:  5/12/2025 11:57 AM  MRN:   [id number]  Account:  [id number]  YOB: 1945  PCP:    Johnie Lawrence MD  Room:   2073/2073-01  Code Status:    Full Code    Chief Complaint:     Chief Complaint   Patient presents with    Shortness of Breath    Fatigue       History Obtained From:     patient, electronic medical record    History of Present Illness:     Yeni Rodas is a 79 y.o. Non- of /a female who presents with Shortness of Breath and Fatigue   and is admitted to the hospital for the management of Symptomatic anemia.    79-year-old female with past medical history of DM type II, hearing loss using hearing aids, hyperlipidemia, hypertension presented today to her PCPs office complaining of shortness of breath especially with exertion which has been going on for the past few months.  Hemoglobin was checked by her PCP, who tried to reach to her on Friday, but patient did not answer.  They came to the office today.  PCP recommended to go to ER.  She denies any dark-colored stools or obvious bleeding.  On arrival to the emergency room, vital signs were stable, repeat hemoglobin checked here was 7.5.  Patient is getting admitted for symptomatic anemia.    Past Medical History:     Past Medical History:   Diagnosis Date    Anxiety state, unspecified     Arthritis     Cataracts, bilateral     COVID-19 vaccine administered 2- & 3-    MODERNA    Esophageal reflux     Generalized osteoarthrosis, unspecified site     Hearing loss     Hyperlipidemia     Hypertension     Neuropathy     Neuropathy due to medical condition     Pure

## 2025-05-12 NOTE — PROGRESS NOTES
Have you been to the ER, urgent care clinic since your last visit?  Hospitalized since your last visit?   NO    Have you seen or consulted any other health care providers outside our system since your last visit?   YES           
0.4 02/03/2025 09:35 AM    ALKPHOS 94 02/03/2025 09:35 AM    AST 19 02/03/2025 09:35 AM    ALT 19 02/03/2025 09:35 AM     Lab Results   Component Value Date/Time    WBC 4.9 04/30/2025 10:46 AM    RBC 3.88 04/30/2025 10:46 AM    HGB 6.4 04/30/2025 10:46 AM    HCT 26.4 04/30/2025 10:46 AM    MCV 68.0 04/30/2025 10:46 AM    MCH 16.5 04/30/2025 10:46 AM    MCHC 24.2 04/30/2025 10:46 AM    RDW 21.0 04/30/2025 10:46 AM     04/30/2025 10:46 AM    MPV 9.2 04/30/2025 10:46 AM     Lab Results   Component Value Date/Time    TSH 0.55 01/06/2020 10:58 AM     Lab Results   Component Value Date/Time    CHOL 221 10/17/2024 09:24 AM     10/17/2024 09:24 AM    HDL 98 10/17/2024 09:24 AM    LABA1C 9.1 01/20/2025 11:06 AM          Assessment & Plan  1. Diabetes Mellitus.  - A1c levels have improved from 9.1 to 7.4.  - Blood glucose levels fluctuate between 400 and 70.  - Adjust Lantus dosage from 30 units once daily to 15 units twice daily, morning and night.  - Continue premeal insulin regimen.    2. Anemia.  - Hemoglobin level decreased to 6.4.  - Symptoms include heart pounding and difficulty breathing during exertion.  - Immediate medical attention at the emergency room for potential blood transfusion recommended.  - Further evaluation by a gastroenterologist to identify sources of bleeding.    3. Health Maintenance.  - Last colonoscopy was in 2015.  - Blood pressure is well controlled.  - Cholesterol levels will be checked.        Diagnosis Orders   1. Severe anemia        2. Uncontrolled type 2 DM with hyperosmolar nonketotic hyperglycemia (HCC)  POCT glycosylated hemoglobin (Hb A1C)      3. Primary hypertension        4. Mixed hyperlipidemia        5. Type 2 diabetes mellitus with diabetic neuropathy, with long-term current use of insulin (HCC)        6. Panlobular emphysema (HCC)  Blair Melendez MD, Pulmonology, [place]          1. Severe anemia  2. Uncontrolled type 2 DM with hyperosmolar nonketotic

## 2025-05-12 NOTE — PROGRESS NOTES
Physical Therapy    [facility]   Physical Therapy Evaluation  Date: 25  Patient Name: Yeni Rodas       Room: -  MRN: [id number]  Account: [id number]   : 1945  (79 y.o.) Gender: female     Discharge Recommendations:  Discharge Recommendations: Patient would benefit from continued therapy after discharge, Therapy recommended at discharge           Past Medical History:  has a past medical history of Anxiety state, unspecified, Arthritis, Cataracts, bilateral, COVID-19 vaccine administered, Esophageal reflux, Generalized osteoarthrosis, unspecified site, Hearing loss, Hyperlipidemia, Hypertension, Neuropathy, Neuropathy due to medical condition, Pure hypercholesterolemia, Thoracic or lumbosacral neuritis or radiculitis, unspecified, Type 2 diabetes mellitus without complication (HCC), Urinary retention, and UTI (urinary tract infection).  Past Surgical History:   has a past surgical history that includes back surgery; Colonoscopy (); Hip Arthroplasty (Bilateral); Hand surgery (Bilateral); Carpal tunnel release (Right); Cataract removal with implant (Bilateral); Cystocopy (2014); Colonoscopy (2015); lumbar fusion (10/27/2015); Breast biopsy (); joint replacement (Bilateral); Cystoscopy (N/A, 2017); Esophagogastroduodenoscopy (10/09/2023); Upper gastrointestinal endoscopy (N/A, 10/9/2023); and Upper gastrointestinal endoscopy (N/A, 10/18/2023).    Subjective  Subjective  Subjective: OK per RN Lynda to see the pt. Pt surprised that she has PT orders. Agreeabel for assessment today.     General  Patient assessed for rehabilitation services?: Yes  Additional Pertinent Hx: HISTORY OF PRESENT ILLNESS  HPI  Generalized fatigue malaise.  Has acute blood loss anemia sent here for transfusion admission.  History of iron deficiency anemia GI bleed in the past.  Patient denies any dark stools or hematemesis.  Limited history she has some dementia.  She states

## 2025-05-13 ENCOUNTER — HOSPITAL ENCOUNTER (OUTPATIENT)
Dept: PHYSICAL THERAPY | Age: 80
Setting detail: THERAPIES SERIES
End: 2025-05-13
Payer: MEDICARE

## 2025-05-13 PROBLEM — D64.9 ANEMIA: Status: ACTIVE | Noted: 2025-05-13

## 2025-05-13 PROBLEM — R53.83 OTHER FATIGUE: Status: ACTIVE | Noted: 2025-05-13

## 2025-05-13 LAB
ALBUMIN SERPL-MCNC: 3.9 G/DL (ref 3.5–5.2)
ALP SERPL-CCNC: 114 U/L (ref 35–104)
ALT SERPL-CCNC: 10 U/L (ref 10–35)
ANION GAP SERPL CALCULATED.3IONS-SCNC: 8 MMOL/L (ref 9–16)
AST SERPL-CCNC: 14 U/L (ref 10–35)
BASOPHILS # BLD: 0.06 K/UL (ref 0–0.2)
BASOPHILS NFR BLD: 1 % (ref 0–2)
BILIRUB SERPL-MCNC: 0.4 MG/DL (ref 0–1.2)
BUN SERPL-MCNC: 18 MG/DL (ref 8–23)
CALCIUM SERPL-MCNC: 8.9 MG/DL (ref 8.6–10.4)
CHLORIDE SERPL-SCNC: 105 MMOL/L (ref 98–107)
CO2 SERPL-SCNC: 27 MMOL/L (ref 20–31)
CREAT SERPL-MCNC: 0.7 MG/DL (ref 0.7–1.2)
EKG ATRIAL RATE: 69 BPM
EKG P AXIS: 41 DEGREES
EKG P-R INTERVAL: 180 MS
EKG Q-T INTERVAL: 406 MS
EKG QRS DURATION: 78 MS
EKG QTC CALCULATION (BAZETT): 435 MS
EKG R AXIS: 7 DEGREES
EKG T AXIS: 44 DEGREES
EKG VENTRICULAR RATE: 69 BPM
EOSINOPHIL # BLD: 0.17 K/UL (ref 0–0.4)
EOSINOPHILS RELATIVE PERCENT: 3 % (ref 0–4)
ERYTHROCYTE [DISTWIDTH] IN BLOOD BY AUTOMATED COUNT: 21.5 % (ref 11.5–14.9)
GFR, ESTIMATED: 87 ML/MIN/1.73M2
GLIADIN IGA SER IA-ACNC: 1.2 U/ML
GLIADIN IGG SER IA-ACNC: <0.4 U/ML
GLUCOSE BLD-MCNC: 103 MG/DL (ref 65–105)
GLUCOSE BLD-MCNC: 208 MG/DL (ref 65–105)
GLUCOSE BLD-MCNC: 229 MG/DL (ref 65–105)
GLUCOSE SERPL-MCNC: 97 MG/DL (ref 74–99)
HCT VFR BLD AUTO: 24.6 % (ref 36–46)
HCT VFR BLD AUTO: 26.2 % (ref 36–46)
HCT VFR BLD AUTO: 30.6 % (ref 36–46)
HCT VFR BLD AUTO: 35.2 % (ref 36–46)
HGB BLD-MCNC: 10.3 G/DL (ref 12–16)
HGB BLD-MCNC: 6.9 G/DL (ref 12–16)
HGB BLD-MCNC: 7.4 G/DL (ref 12–16)
HGB BLD-MCNC: 9 G/DL (ref 12–16)
IGA SERPL-MCNC: 249 MG/DL (ref 70–400)
LYMPHOCYTES NFR BLD: 2.22 K/UL (ref 1–4.8)
LYMPHOCYTES RELATIVE PERCENT: 39 % (ref 24–44)
MCH RBC QN AUTO: 17 PG (ref 26–34)
MCHC RBC AUTO-ENTMCNC: 28.2 G/DL (ref 31–37)
MCV RBC AUTO: 60.2 FL (ref 80–100)
MONOCYTES NFR BLD: 0.68 K/UL (ref 0.1–1.3)
MONOCYTES NFR BLD: 12 % (ref 1–7)
MORPHOLOGY: ABNORMAL
NEUTROPHILS NFR BLD: 45 % (ref 36–66)
NEUTS SEG NFR BLD: 2.57 K/UL (ref 1.3–9.1)
PATH REV BLD -IMP: NORMAL
PLATELET # BLD AUTO: 416 K/UL (ref 150–450)
PMV BLD AUTO: 8.5 FL (ref 6–12)
POTASSIUM SERPL-SCNC: 4.1 MMOL/L (ref 3.7–5.3)
PROT SERPL-MCNC: 6.4 G/DL (ref 6.6–8.7)
RBC # BLD AUTO: 4.36 M/UL (ref 4–5.2)
SODIUM SERPL-SCNC: 140 MMOL/L (ref 136–145)
SURGICAL PATHOLOGY REPORT: NORMAL
TTG IGA SER IA-ACNC: 0.6 U/ML
WBC OTHER # BLD: 5.7 K/UL (ref 3.5–11)

## 2025-05-13 PROCEDURE — P9016 RBC LEUKOCYTES REDUCED: HCPCS

## 2025-05-13 PROCEDURE — 36415 COLL VENOUS BLD VENIPUNCTURE: CPT

## 2025-05-13 PROCEDURE — 1200000000 HC SEMI PRIVATE

## 2025-05-13 PROCEDURE — 30233N1 TRANSFUSION OF NONAUTOLOGOUS RED BLOOD CELLS INTO PERIPHERAL VEIN, PERCUTANEOUS APPROACH: ICD-10-PCS

## 2025-05-13 PROCEDURE — 6370000000 HC RX 637 (ALT 250 FOR IP): Performed by: NURSE PRACTITIONER

## 2025-05-13 PROCEDURE — 99223 1ST HOSP IP/OBS HIGH 75: CPT | Performed by: STUDENT IN AN ORGANIZED HEALTH CARE EDUCATION/TRAINING PROGRAM

## 2025-05-13 PROCEDURE — 99232 SBSQ HOSP IP/OBS MODERATE 35: CPT | Performed by: INTERNAL MEDICINE

## 2025-05-13 PROCEDURE — 36430 TRANSFUSION BLD/BLD COMPNT: CPT

## 2025-05-13 PROCEDURE — 85025 COMPLETE CBC W/AUTO DIFF WBC: CPT

## 2025-05-13 PROCEDURE — 99222 1ST HOSP IP/OBS MODERATE 55: CPT | Performed by: INTERNAL MEDICINE

## 2025-05-13 PROCEDURE — 82947 ASSAY GLUCOSE BLOOD QUANT: CPT

## 2025-05-13 PROCEDURE — 93010 ELECTROCARDIOGRAM REPORT: CPT | Performed by: INTERNAL MEDICINE

## 2025-05-13 PROCEDURE — 80053 COMPREHEN METABOLIC PANEL: CPT

## 2025-05-13 PROCEDURE — 85018 HEMOGLOBIN: CPT

## 2025-05-13 PROCEDURE — 2500000003 HC RX 250 WO HCPCS

## 2025-05-13 PROCEDURE — 6370000000 HC RX 637 (ALT 250 FOR IP)

## 2025-05-13 PROCEDURE — 85014 HEMATOCRIT: CPT

## 2025-05-13 RX ORDER — PANTOPRAZOLE SODIUM 40 MG/1
40 TABLET, DELAYED RELEASE ORAL
Status: DISCONTINUED | OUTPATIENT
Start: 2025-05-14 | End: 2025-05-15 | Stop reason: HOSPADM

## 2025-05-13 RX ORDER — BISACODYL 5 MG/1
20 TABLET, DELAYED RELEASE ORAL ONCE
Status: COMPLETED | OUTPATIENT
Start: 2025-05-13 | End: 2025-05-13

## 2025-05-13 RX ORDER — SODIUM CHLORIDE 9 MG/ML
INJECTION, SOLUTION INTRAVENOUS PRN
Status: COMPLETED | OUTPATIENT
Start: 2025-05-13 | End: 2025-05-14

## 2025-05-13 RX ADMIN — SODIUM CHLORIDE, PRESERVATIVE FREE 10 ML: 5 INJECTION INTRAVENOUS at 08:54

## 2025-05-13 RX ADMIN — DULOXETINE 60 MG: 60 CAPSULE, DELAYED RELEASE ORAL at 21:35

## 2025-05-13 RX ADMIN — GABAPENTIN 200 MG: 100 CAPSULE ORAL at 00:22

## 2025-05-13 RX ADMIN — INSULIN GLARGINE 10 UNITS: 100 INJECTION, SOLUTION SUBCUTANEOUS at 08:53

## 2025-05-13 RX ADMIN — INSULIN LISPRO 1 UNITS: 100 INJECTION, SOLUTION INTRAVENOUS; SUBCUTANEOUS at 21:28

## 2025-05-13 RX ADMIN — SODIUM CHLORIDE, PRESERVATIVE FREE 10 ML: 5 INJECTION INTRAVENOUS at 21:27

## 2025-05-13 RX ADMIN — GABAPENTIN 200 MG: 100 CAPSULE ORAL at 21:27

## 2025-05-13 RX ADMIN — METOPROLOL TARTRATE 25 MG: 25 TABLET, FILM COATED ORAL at 21:27

## 2025-05-13 RX ADMIN — BISACODYL 20 MG: 5 TABLET, COATED ORAL at 17:18

## 2025-05-13 RX ADMIN — INSULIN LISPRO 1 UNITS: 100 INJECTION, SOLUTION INTRAVENOUS; SUBCUTANEOUS at 11:42

## 2025-05-13 RX ADMIN — METOPROLOL TARTRATE 25 MG: 25 TABLET, FILM COATED ORAL at 08:53

## 2025-05-13 RX ADMIN — GABAPENTIN 800 MG: 400 CAPSULE ORAL at 17:19

## 2025-05-13 RX ADMIN — INSULIN GLARGINE 10 UNITS: 100 INJECTION, SOLUTION SUBCUTANEOUS at 21:28

## 2025-05-13 RX ADMIN — ATORVASTATIN CALCIUM 40 MG: 40 TABLET, FILM COATED ORAL at 08:53

## 2025-05-13 RX ADMIN — INSULIN LISPRO 3 UNITS: 100 INJECTION, SOLUTION INTRAVENOUS; SUBCUTANEOUS at 17:18

## 2025-05-13 RX ADMIN — POLYETHYLENE GLYCOL-3350 AND ELECTROLYTES 4000 ML: 236; 6.74; 5.86; 2.97; 22.74 POWDER, FOR SOLUTION ORAL at 17:19

## 2025-05-13 NOTE — PROGRESS NOTES
Bed alarm going off, writer entered room, patient was already in the bathroom, writer attempted to enter bathroom to check on patient, however patient grabbed handle and closed door when writer peaked in.  Writer waited outside of the bathroom door.  Patient came out, and stated \"I was already in the bathroom.\"  Writer explained to patient that she was aware, and was there for the patient's safety so she would not fall, writer also explained to patient that she needed to be wearing non-skid socks.  Patient handed the socks back to writer and said \"I'm not going to fall.\"  Writer engaged bed alarm prior to leaving room.

## 2025-05-13 NOTE — CONSULTS
Gastroenterology Consult Note    Patient:   Yeni Rodas   Admit date:  5/12/2025  Facility:   [facility]  Referring/PCP: Johnie Lawrence MD  Date:     5/13/2025  Consultant:   JAMES Jay NP, Malcolm Wilcox MD    Subjective:     This 79 y.o. female was admitted 5/12/2025 with a diagnosis of \"Hyperglycemia [R73.9]  Symptomatic anemia [D64.9]  Other fatigue [R53.83]  Anemia, unspecified type [D64.9]\" and is seen in consultation regarding   Chief Complaint   Patient presents with    Shortness of Breath    Fatigue     79-year-old female with past medical history of anxiety, arthritis, GERD, hyperlipidemia, hypertension, neuropathy, T2DM, urinary retention presented to ED yesterday under advisement from her PCP for shortness of breath, fatigue, symptomatic anemia.  Hgb 7.5 on admission.  MCV 61, MCH 17.1.  FOBT was negative.  Ferritin 7, iron 16, iron saturation 4%.  Reticulocyte 3.0. B12, folate within normal limits.  No evidence of hemolysis.    Patient denies any overt GI bleeding.  No melena, hematochezia, hematuria, vaginal bleeding.  No current AC/AP.  Denies NSAID.    Patient reports remote history of colonoscopy.  No reports on file.  Patient had EGD in October 2023 for anemia.  Found to have 10 mm ulcer at GE junction, likely pill esophagitis.  LA grade C distal esophagitis, 3 cm hiatal hernia.  Patient had repeat EGD 9 days later.  No ulcerations noted.  Biopsies taken of duodenal fold, benign.    Past Medical History:  Past Medical History:   Diagnosis Date    Anxiety state, unspecified     Arthritis     Cataracts, bilateral     COVID-19 vaccine administered 2- & 3-    MODERNA    Esophageal reflux     Generalized osteoarthrosis, unspecified site     Hearing loss     Hyperlipidemia     Hypertension     Neuropathy     Neuropathy due to medical condition     Pure hypercholesterolemia     Thoracic or lumbosacral neuritis or radiculitis, unspecified     Type  7  Daily PPI  Supportive care    This plan was formulated in collaboration with Dr. Wilcox    Electronically signed by JAMES Jay NP on 5/13/2025 at 11:49 AM     Note is dictated utilizing voice recognition software. Unfortunately this leads to occasional typographical errors. Please contact our office if you have any questions.    Attending Physician Attestation:    I have discussed the care of Yeni Rodas and I have examined the patient myselft and taken ros and hpi , including pertinent history and exam findings,  with the author of this note . I have reviewed the key elements of all parts of the encounter with the nurse practitioner/resident.  I agree with the assessment, plan and orders as documented by the above health care provider with the following addendum.     More than 50% of the time was spent taking care of this patient in addition to the nurse practitioner time.  That also included history taking follow-up physical examination and review of system.    Electronically signed by Malcolm Wilcox MD

## 2025-05-13 NOTE — PLAN OF CARE
Problem: Chronic Conditions and Co-morbidities  Goal: Patient's chronic conditions and co-morbidity symptoms are monitored and maintained or improved  5/13/2025 0945 by Anastasia De La O RN  Outcome: Progressing  5/13/2025 0641 by Schober, Robyn L, RN  Outcome: Progressing  Flowsheets (Taken 5/12/2025 1948)  Care Plan - Patient's Chronic Conditions and Co-Morbidity Symptoms are Monitored and Maintained or Improved: Monitor and assess patient's chronic conditions and comorbid symptoms for stability, deterioration, or improvement     Problem: Discharge Planning  Goal: Discharge to home or other facility with appropriate resources  5/13/2025 0945 by Anastasia De La O RN  Outcome: Progressing  5/13/2025 0641 by Schober, Robyn L, RN  Outcome: Progressing  Flowsheets (Taken 5/12/2025 1948)  Discharge to home or other facility with appropriate resources: Identify barriers to discharge with patient and caregiver     Problem: ABCDS Injury Assessment  Goal: Absence of physical injury  5/13/2025 0945 by Anastasia De La O RN  Outcome: Progressing  5/13/2025 0641 by Schober, Robyn L, RN  Outcome: Progressing  Flowsheets (Taken 5/13/2025 0441)  Absence of Physical Injury: Implement safety measures based on patient assessment     Problem: Skin/Tissue Integrity  Goal: Skin integrity remains intact  Description: 1.  Monitor for areas of redness and/or skin breakdown2.  Assess vascular access sites hourly3.  Every 4-6 hours minimum:  Change oxygen saturation probe site4.  Every 4-6 hours:  If on nasal continuous positive airway pressure, respiratory therapy assess nares and determine need for appliance change or resting period  5/13/2025 0945 by Anastasia De La O RN  Outcome: Progressing  5/13/2025 0641 by Schober, Robyn L, RN  Outcome: Progressing  Flowsheets  Taken 5/13/2025 0441  Skin Integrity Remains Intact:   Monitor for areas of redness and/or skin breakdown   Assess vascular access sites

## 2025-05-13 NOTE — PROGRESS NOTES
[facility] Internal Medicine   Johnie Lawrence MD; MD Fe Lauren MD, MD , Sheila Lopez MD    [facility] Internal Medicine   IN-PATIENT SERVICE   [facility]    HISTORY AND PHYSICAL EXAMINATION            Date:   5/13/2025  Patient name:  Yeni Rodas  Date of admission:  5/12/2025 11:57 AM  MRN:   [id number]  Account:  [id number]  YOB: 1945  PCP:    Johnie Lawrence MD  Room:   2073/2073-01  Code Status:    Full Code    Chief Complaint:     Chief Complaint   Patient presents with    Shortness of Breath    Fatigue       History Obtained From:     patient, electronic medical record    History of Present Illness:     Yeni Rodas is a 79 y.o. Non- of /a female who presents with Shortness of Breath and Fatigue   and is admitted to the hospital for the management of Symptomatic anemia.    79-year-old female with past medical history of DM type II, hearing loss using hearing aids, hyperlipidemia, hypertension presented today to her PCPs office complaining of shortness of breath especially with exertion which has been going on for the past few months.  Hemoglobin was checked by her PCP, who tried to reach to her on Friday, but patient did not answer.  They came to the office today.  PCP recommended to go to ER.  She denies any dark-colored stools or obvious bleeding.  On arrival to the emergency room, vital signs were stable, repeat hemoglobin checked here was 7.5.  Patient is getting admitted for symptomatic anemia.  5/13  Patient, his past medical hypertension, hyperlipidemia, osteoporosis, type 2 diabetes, COPD, GERD, admitted with symptomatic anemia  Patient had EGD back in 2023 suggestive of esophagitis, esophageal ulcer    Past Medical History:     Past Medical History:   Diagnosis Date    Anxiety state, unspecified     Arthritis     Cataracts, bilateral     COVID-19 vaccine administered 2- &  - 16.0 g/dL    Hematocrit 25.2 (L) 36 - 46 %   POC Glucose Fingerstick    Collection Time: 05/13/25  6:09 AM   Result Value Ref Range    POC Glucose 103 65 - 105 mg/dL   Comprehensive Metabolic Panel w/ Reflex to MG    Collection Time: 05/13/25  6:18 AM   Result Value Ref Range    Sodium 140 136 - 145 mmol/L    Potassium 4.1 3.7 - 5.3 mmol/L    Chloride 105 98 - 107 mmol/L    CO2 27 20 - 31 mmol/L    Anion Gap 8 (L) 9 - 16 mmol/L    Glucose 97 74 - 99 mg/dL    BUN 18 8 - 23 mg/dL    Creatinine 0.7 0.7 - 1.2 mg/dL    Est, Glom Filt Rate 87 >60 mL/min/1.73m2    Calcium 8.9 8.6 - 10.4 mg/dL    Total Protein 6.4 (L) 6.6 - 8.7 g/dL    Albumin 3.9 3.5 - 5.2 g/dL    Total Bilirubin 0.4 0.0 - 1.2 mg/dL    Alkaline Phosphatase 114 (H) 35 - 104 U/L    ALT 10 10 - 35 U/L    AST 14 10 - 35 U/L   CBC with Auto Differential    Collection Time: 05/13/25  6:18 AM   Result Value Ref Range    WBC 5.7 3.5 - 11.0 k/uL    RBC 4.36 4.0 - 5.2 m/uL    Hemoglobin 7.4 (L) 12.0 - 16.0 g/dL    Hematocrit 26.2 (L) 36 - 46 %    MCV 60.2 (L) 80 - 100 fL    MCH 17.0 (L) 26 - 34 pg    MCHC 28.2 (L) 31 - 37 g/dL    RDW 21.5 (H) 11.5 - 14.9 %    Platelets 416 150 - 450 k/uL    MPV 8.5 6.0 - 12.0 fL    Neutrophils % 45 36 - 66 %    Lymphocytes % 39 24 - 44 %    Monocytes % 12 (H) 1 - 7 %    Eosinophils % 3 0 - 4 %    Basophils % 1 0 - 2 %    Neutrophils Absolute 2.57 1.3 - 9.1 k/uL    Lymphocytes Absolute 2.22 1.0 - 4.8 k/uL    Monocytes Absolute 0.68 0.1 - 1.3 k/uL    Eosinophils Absolute 0.17 0.0 - 0.4 k/uL    Basophils Absolute 0.06 0.0 - 0.2 k/uL    Morphology ANISOCYTOSIS PRESENT     Morphology HYPOCHROMIA PRESENT     Morphology MICROCYTOSIS PRESENT     Morphology SLT ROULEAUX     Morphology FEW POLYCHROMASIA        Imaging/Diagnostics:  No results found.    Assessment :      Hospital Problems           Last Modified POA    * (Principal) Symptomatic anemia 5/12/2025 Yes       Plan:     Patient status inpatient in the Progressive Unit/Step

## 2025-05-13 NOTE — PROGRESS NOTES
Dr Salgado s/w the patient's spouse per telephone to explain his plan to order PRBS transfusion for Hgb = 6.9.  Patient & spouse discussed & agree with this plan.  Informed consent was obtained for blood transfusion.

## 2025-05-13 NOTE — CONSULTS
Today's Date: 5/13/2025  Patient Name: Yeni Rodas  Date of admission: 5/12/2025 11:57 AM  Patient's age: 79 y.o., 1945  Admission Dx: Hyperglycemia [R73.9]  Symptomatic anemia [D64.9]  Other fatigue [R53.83]  Anemia, unspecified type [D64.9]    Reason for Consult: Anemia  Requesting Physician: Luna Lopez MD    CHIEF COMPLAINT:    Chief Complaint   Patient presents with    Shortness of Breath    Fatigue       History Obtained From:  patient and chart    HISTORY OF PRESENT ILLNESS:      Yeni Rodas is a 79 y.o. female who is admitted to the hospital on 5/12/2025  for fatigue and shortness of breath.    Patient has past medical history of diabetes mellitus, hypertension hyperlipidemia.  Patient recent had lab work with primary care physician and noted to have low hemoglobin therefore was sent to ER for further evaluation and management.    Patient was noted to have hemoglobin of 7.1, now around 7.4 with MCV 60.2, WBC 5.7 and platelets 416.  Iron studies indicate severe iron deficiency with ferritin of 7, iron of 16 and iron saturation 4%.    Hematology consulted for evaluation of anemia  Past Medical History:   has a past medical history of Anxiety state, unspecified, Arthritis, Cataracts, bilateral, COVID-19 vaccine administered, Esophageal reflux, Generalized osteoarthrosis, unspecified site, Hearing loss, Hyperlipidemia, Hypertension, Neuropathy, Neuropathy due to medical condition, Pure hypercholesterolemia, Thoracic or lumbosacral neuritis or radiculitis, unspecified, Type 2 diabetes mellitus without complication (HCC), Urinary retention, and UTI (urinary tract infection).    Past Surgical History:   has a past surgical history that includes back surgery; Colonoscopy (2006); Hip Arthroplasty (Bilateral); Hand surgery (Bilateral); Carpal tunnel release (Right); Cataract removal with implant (Bilateral); Cystocopy (05/01/2014); Colonoscopy (04/24/2015); lumbar fusion (10/27/2015); Breast  biopsy (2007); joint replacement (Bilateral); Cystoscopy (N/A, 05/26/2017); Esophagogastroduodenoscopy (10/09/2023); Upper gastrointestinal endoscopy (N/A, 10/9/2023); and Upper gastrointestinal endoscopy (N/A, 10/18/2023).     Medications:    Prior to Admission medications    Medication Sig Start Date End Date Taking? Authorizing Provider   gabapentin (NEURONTIN) 400 MG capsule Take 2 capsules by mouth every evening.   Yes Rd Ku MD   gabapentin (NEURONTIN) 100 MG capsule Take 2 capsules by mouth in the morning and at bedtime. Morning and afternoon   Yes Rd Ku MD   insulin glargine (LANTUS SOLOSTAR) 100 UNIT/ML injection pen INJECT SUBCUTANEOUSLY 30 UNITS  DAILY 4/5/25  Yes Johnie Lawrence MD   DULoxetine (CYMBALTA) 60 MG extended release capsule Take 1 capsule by mouth daily 2/6/25  Yes Johnie Lawrence MD   metoprolol tartrate (LOPRESSOR) 25 MG tablet TAKE 1 TABLET BY MOUTH TWICE  DAILY 11/27/24  Yes Fe King MD   atorvastatin (LIPITOR) 40 MG tablet TAKE 1 TABLET BY MOUTH DAILY 5/21/24  Yes Johnie Lawrence MD   Calcium-Magnesium-Vitamin D (CALCIUM 1200+D3 PO) Take 2 tablets by mouth daily   Yes Rd Ku MD   aspirin 81 MG chewable tablet Take 1 tablet by mouth daily   Yes Rd Ku MD LYUMJEV KWIKPEN 100 UNIT/ML SOPN Inject 6 Units into the skin 3 times daily (before meals) 2/12/25   Rd Ku MD   Insulin Pen Needle 32G X 4 MM MISC 1 each by Does not apply route in the morning, at noon, in the evening, and at bedtime 3/21/25   Luna Lopez MD   Continuous Glucose Sensor (DEXCOM G6 SENSOR) MISC Apply sensor every 10 days.  Patient not taking: Reported on 5/12/2025 2/21/25   Cris Billings MD   ferrous sulfate (IRON 325) 325 (65 Fe) MG tablet Take 1 tablet by mouth daily (with breakfast)  Patient not taking: Reported on 5/12/2025 2/7/25   Zita Strickland MD   blood glucose test strips (ONETOUCH ULTRA) strip USE 1 STRIP

## 2025-05-13 NOTE — PLAN OF CARE
Problem: Chronic Conditions and Co-morbidities  Goal: Patient's chronic conditions and co-morbidity symptoms are monitored and maintained or improved  Outcome: Progressing  Flowsheets (Taken 5/12/2025 1948)  Care Plan - Patient's Chronic Conditions and Co-Morbidity Symptoms are Monitored and Maintained or Improved: Monitor and assess patient's chronic conditions and comorbid symptoms for stability, deterioration, or improvement     Problem: Discharge Planning  Goal: Discharge to home or other facility with appropriate resources  Outcome: Progressing  Flowsheets (Taken 5/12/2025 1948)  Discharge to home or other facility with appropriate resources: Identify barriers to discharge with patient and caregiver     Problem: ABCDS Injury Assessment  Goal: Absence of physical injury  Outcome: Progressing  Flowsheets (Taken 5/13/2025 0441)  Absence of Physical Injury: Implement safety measures based on patient assessment     Problem: Skin/Tissue Integrity  Goal: Skin integrity remains intact  Description: 1.  Monitor for areas of redness and/or skin breakdown2.  Assess vascular access sites hourly3.  Every 4-6 hours minimum:  Change oxygen saturation probe site4.  Every 4-6 hours:  If on nasal continuous positive airway pressure, respiratory therapy assess nares and determine need for appliance change or resting period  Outcome: Progressing  Flowsheets  Taken 5/13/2025 0441  Skin Integrity Remains Intact:   Monitor for areas of redness and/or skin breakdown   Assess vascular access sites hourly  Taken 5/12/2025 1948  Skin Integrity Remains Intact:   Monitor for areas of redness and/or skin breakdown   Assess vascular access sites hourly     Problem: Safety - Adult  Goal: Free from fall injury  Outcome: Progressing

## 2025-05-13 NOTE — PROGRESS NOTES
[facility]   OCCUPATIONAL THERAPY MISSED TREATMENT NOTE   INPATIENT   Date: 25  Patient Name: Yeni Rodas       Room:   MRN: [id number]   Account #: [id number]    : 1945  (79 y.o.)  Gender: female                 REASON FOR MISSED TREATMENT:  25    -    Refusal by Patient - pt resting in bed upon arrival. This writer introduced self, attempted to explain role/purpose of OT evaluation however pt cut this writer off stating \"I don't want it\" and turns away from writer. This writer attempted to educate pt on importance of participation with occupational therapy with pt stating \"I said I don't want it.\" OT will continue to follow and check back tomorrow.    [id number]         Electronically signed by HEMANT Morelos on 25 at 1:42 PM EDT

## 2025-05-13 NOTE — PROGRESS NOTES
Writer in patient's room due to patient refusal of the bed alarm. Patient educated on reason for bed alarm. Patient refusing to sign bed alarm and refusing to sign sheet. Patient rips sheet in front of writer and states I am not signing that thing. Writer then explains that the sheet needs signed or we have to have the bed alarm on. Patient tells writer to get out and writer places bed alarm on bed prior to leaving.

## 2025-05-13 NOTE — CARE COORDINATION
Case Management Assessment  Initial Evaluation    Date/Time of Evaluation: 5/13/2025 1:40 PM  Assessment Completed by: Indira Baxter RN    If patient is discharged prior to next notation, then this note serves as note for discharge by case management.    Patient Name: Yeni Rodas                   YOB: 1945  Diagnosis: Hyperglycemia [R73.9]  Symptomatic anemia [D64.9]  Other fatigue [R53.83]  Anemia, unspecified type [D64.9]                   Date / Time: 5/12/2025 11:57 AM    Patient Admission Status: Inpatient   Readmission Risk (Low < 19, Mod (19-27), High > 27): Readmission Risk Score: 15.6    Current PCP: Johnie Lawrence MD  PCP verified by CM? Yes    Chart Reviewed: Yes      History Provided by: Patient  Patient Orientation: Alert and Oriented    Patient Cognition: Alert    Hospitalization in the last 30 days (Readmission):  No    If yes, Readmission Assessment in CM Navigator will be completed.    Advance Directives:      Code Status: Full Code   Patient's Primary Decision Maker is: Legal Next of Kin    Primary Decision Maker: Nicko Rodas - Spouse - [phone]    Discharge Planning:    Patient lives with: Spouse/Significant Other Type of Home: House  Primary Care Giver: Self  Patient Support Systems include: Spouse/Significant Other   Current Financial resources: Medicare  Current community resources: None  Current services prior to admission: Durable Medical Equipment            Current DME: Other (Comment) (BP cuff and pulse ox)            Type of Home Care services:  None    ADLS  Prior functional level: Independent in ADLs/IADLs  Current functional level: Independent in ADLs/IADLs    PT AM-PAC: 16 /24  OT AM-PAC:   /24    Family can provide assistance at DC: Yes  Would you like Case Management to discuss the discharge plan with any other family members/significant others, and if so, who? Yes (Nicko, spouse at bedside)  Plans to Return to Present Housing: Yes  Other

## 2025-05-14 ENCOUNTER — ANESTHESIA EVENT (OUTPATIENT)
Dept: ENDOSCOPY | Age: 80
DRG: 379 | End: 2025-05-14
Payer: MEDICARE

## 2025-05-14 ENCOUNTER — ANESTHESIA (OUTPATIENT)
Dept: ENDOSCOPY | Age: 80
DRG: 379 | End: 2025-05-14
Payer: MEDICARE

## 2025-05-14 PROBLEM — K64.8 INTERNAL HEMORRHOID: Status: ACTIVE | Noted: 2025-05-14

## 2025-05-14 PROBLEM — K22.70 BARRETT'S ESOPHAGUS WITHOUT DYSPLASIA: Status: ACTIVE | Noted: 2025-05-14

## 2025-05-14 PROBLEM — K55.20 AVM (ARTERIOVENOUS MALFORMATION) OF COLON: Status: ACTIVE | Noted: 2025-05-14

## 2025-05-14 PROBLEM — K44.9 HIATAL HERNIA: Status: ACTIVE | Noted: 2025-05-14

## 2025-05-14 LAB
ABO/RH: NORMAL
ALK PHOS BONE SPECIFIC: 58 U/L (ref 0–55)
ALK PHOS OTHER CALC: 0 U/L
ALK PHOSPHATASE: 124 U/L (ref 40–120)
ALKALINE PHOSPHATASE LIVER FRACTION: 66 U/L (ref 0–94)
ANTIBODY SCREEN: NEGATIVE
ARM BAND NUMBER: NORMAL
BASOPHILS # BLD: 0.06 K/UL (ref 0–0.2)
BASOPHILS NFR BLD: 1 % (ref 0–2)
BLOOD BANK BLOOD PRODUCT EXPIRATION DATE: NORMAL
BLOOD BANK DISPENSE STATUS: NORMAL
BLOOD BANK ISBT PRODUCT BLOOD TYPE: 6200
BLOOD BANK PRODUCT CODE: NORMAL
BLOOD BANK SAMPLE EXPIRATION: NORMAL
BLOOD BANK UNIT TYPE AND RH: NORMAL
BPU ID: NORMAL
COMPONENT: NORMAL
CROSSMATCH RESULT: NORMAL
EOSINOPHIL # BLD: 0.19 K/UL (ref 0–0.4)
EOSINOPHILS RELATIVE PERCENT: 3 % (ref 0–4)
ERYTHROCYTE [DISTWIDTH] IN BLOOD BY AUTOMATED COUNT: 26.8 % (ref 11.5–14.9)
GLUCOSE BLD-MCNC: 116 MG/DL (ref 65–105)
GLUCOSE BLD-MCNC: 163 MG/DL (ref 65–105)
GLUCOSE BLD-MCNC: 270 MG/DL (ref 65–105)
GLUCOSE BLD-MCNC: 62 MG/DL (ref 65–105)
GLUCOSE BLD-MCNC: 65 MG/DL (ref 65–105)
GLUCOSE BLD-MCNC: 77 MG/DL (ref 65–105)
HCT VFR BLD AUTO: 28.8 % (ref 36–46)
HCT VFR BLD AUTO: 31.1 % (ref 36–46)
HCT VFR BLD AUTO: 32.2 % (ref 36–46)
HGB BLD-MCNC: 8.4 G/DL (ref 12–16)
HGB BLD-MCNC: 9.1 G/DL (ref 12–16)
HGB BLD-MCNC: 9.2 G/DL (ref 12–16)
LYMPHOCYTES NFR BLD: 2.46 K/UL (ref 1–4.8)
LYMPHOCYTES RELATIVE PERCENT: 39 % (ref 24–44)
MCH RBC QN AUTO: 19.2 PG (ref 26–34)
MCHC RBC AUTO-ENTMCNC: 29.4 G/DL (ref 31–37)
MCV RBC AUTO: 65.3 FL (ref 80–100)
MONOCYTES NFR BLD: 0.69 K/UL (ref 0.1–1.3)
MONOCYTES NFR BLD: 11 % (ref 1–7)
MORPHOLOGY: ABNORMAL
NEUTROPHILS NFR BLD: 46 % (ref 36–66)
NEUTS SEG NFR BLD: 2.9 K/UL (ref 1.3–9.1)
PLATELET # BLD AUTO: 366 K/UL (ref 150–450)
PMV BLD AUTO: 8.5 FL (ref 6–12)
RBC # BLD AUTO: 4.76 M/UL (ref 4–5.2)
TRANSFUSION STATUS: NORMAL
UNIT DIVISION: 0
UNIT ISSUE DATE/TIME: NORMAL
WBC OTHER # BLD: 6.3 K/UL (ref 3.5–11)

## 2025-05-14 PROCEDURE — 45382 COLONOSCOPY W/CONTROL BLEED: CPT | Performed by: STUDENT IN AN ORGANIZED HEALTH CARE EDUCATION/TRAINING PROGRAM

## 2025-05-14 PROCEDURE — 6370000000 HC RX 637 (ALT 250 FOR IP): Performed by: STUDENT IN AN ORGANIZED HEALTH CARE EDUCATION/TRAINING PROGRAM

## 2025-05-14 PROCEDURE — 87077 CULTURE AEROBIC IDENTIFY: CPT

## 2025-05-14 PROCEDURE — 6360000002 HC RX W HCPCS: Performed by: NURSE ANESTHETIST, CERTIFIED REGISTERED

## 2025-05-14 PROCEDURE — 2580000003 HC RX 258: Performed by: INTERNAL MEDICINE

## 2025-05-14 PROCEDURE — 87086 URINE CULTURE/COLONY COUNT: CPT

## 2025-05-14 PROCEDURE — 3609012400 HC EGD TRANSORAL BIOPSY SINGLE/MULTIPLE: Performed by: STUDENT IN AN ORGANIZED HEALTH CARE EDUCATION/TRAINING PROGRAM

## 2025-05-14 PROCEDURE — 1200000000 HC SEMI PRIVATE

## 2025-05-14 PROCEDURE — 7100000000 HC PACU RECOVERY - FIRST 15 MIN: Performed by: STUDENT IN AN ORGANIZED HEALTH CARE EDUCATION/TRAINING PROGRAM

## 2025-05-14 PROCEDURE — 2500000003 HC RX 250 WO HCPCS

## 2025-05-14 PROCEDURE — 2720000010 HC SURG SUPPLY STERILE: Performed by: STUDENT IN AN ORGANIZED HEALTH CARE EDUCATION/TRAINING PROGRAM

## 2025-05-14 PROCEDURE — 0DB98ZX EXCISION OF DUODENUM, VIA NATURAL OR ARTIFICIAL OPENING ENDOSCOPIC, DIAGNOSTIC: ICD-10-PCS | Performed by: STUDENT IN AN ORGANIZED HEALTH CARE EDUCATION/TRAINING PROGRAM

## 2025-05-14 PROCEDURE — 0DB68ZX EXCISION OF STOMACH, VIA NATURAL OR ARTIFICIAL OPENING ENDOSCOPIC, DIAGNOSTIC: ICD-10-PCS | Performed by: STUDENT IN AN ORGANIZED HEALTH CARE EDUCATION/TRAINING PROGRAM

## 2025-05-14 PROCEDURE — 0W3P8ZZ CONTROL BLEEDING IN GASTROINTESTINAL TRACT, VIA NATURAL OR ARTIFICIAL OPENING ENDOSCOPIC: ICD-10-PCS | Performed by: STUDENT IN AN ORGANIZED HEALTH CARE EDUCATION/TRAINING PROGRAM

## 2025-05-14 PROCEDURE — 36415 COLL VENOUS BLD VENIPUNCTURE: CPT

## 2025-05-14 PROCEDURE — 2500000003 HC RX 250 WO HCPCS: Performed by: STUDENT IN AN ORGANIZED HEALTH CARE EDUCATION/TRAINING PROGRAM

## 2025-05-14 PROCEDURE — 6370000000 HC RX 637 (ALT 250 FOR IP): Performed by: NURSE PRACTITIONER

## 2025-05-14 PROCEDURE — 6370000000 HC RX 637 (ALT 250 FOR IP)

## 2025-05-14 PROCEDURE — 3700000000 HC ANESTHESIA ATTENDED CARE: Performed by: STUDENT IN AN ORGANIZED HEALTH CARE EDUCATION/TRAINING PROGRAM

## 2025-05-14 PROCEDURE — 99232 SBSQ HOSP IP/OBS MODERATE 35: CPT | Performed by: INTERNAL MEDICINE

## 2025-05-14 PROCEDURE — 87186 SC STD MICRODIL/AGAR DIL: CPT

## 2025-05-14 PROCEDURE — 88305 TISSUE EXAM BY PATHOLOGIST: CPT

## 2025-05-14 PROCEDURE — 82947 ASSAY GLUCOSE BLOOD QUANT: CPT

## 2025-05-14 PROCEDURE — 2709999900 HC NON-CHARGEABLE SUPPLY: Performed by: STUDENT IN AN ORGANIZED HEALTH CARE EDUCATION/TRAINING PROGRAM

## 2025-05-14 PROCEDURE — 7100000001 HC PACU RECOVERY - ADDTL 15 MIN: Performed by: STUDENT IN AN ORGANIZED HEALTH CARE EDUCATION/TRAINING PROGRAM

## 2025-05-14 PROCEDURE — 85018 HEMOGLOBIN: CPT

## 2025-05-14 PROCEDURE — 3609027000 HC COLONOSCOPY: Performed by: STUDENT IN AN ORGANIZED HEALTH CARE EDUCATION/TRAINING PROGRAM

## 2025-05-14 PROCEDURE — 85014 HEMATOCRIT: CPT

## 2025-05-14 PROCEDURE — 43239 EGD BIOPSY SINGLE/MULTIPLE: CPT | Performed by: STUDENT IN AN ORGANIZED HEALTH CARE EDUCATION/TRAINING PROGRAM

## 2025-05-14 PROCEDURE — 2580000003 HC RX 258: Performed by: ANESTHESIOLOGY

## 2025-05-14 PROCEDURE — 3700000001 HC ADD 15 MINUTES (ANESTHESIA): Performed by: STUDENT IN AN ORGANIZED HEALTH CARE EDUCATION/TRAINING PROGRAM

## 2025-05-14 PROCEDURE — 81001 URINALYSIS AUTO W/SCOPE: CPT

## 2025-05-14 PROCEDURE — 85025 COMPLETE CBC W/AUTO DIFF WBC: CPT

## 2025-05-14 PROCEDURE — 0DB58ZX EXCISION OF ESOPHAGUS, VIA NATURAL OR ARTIFICIAL OPENING ENDOSCOPIC, DIAGNOSTIC: ICD-10-PCS | Performed by: STUDENT IN AN ORGANIZED HEALTH CARE EDUCATION/TRAINING PROGRAM

## 2025-05-14 RX ORDER — LIDOCAINE HYDROCHLORIDE 20 MG/ML
INJECTION, SOLUTION EPIDURAL; INFILTRATION; INTRACAUDAL; PERINEURAL
Status: DISCONTINUED | OUTPATIENT
Start: 2025-05-14 | End: 2025-05-14 | Stop reason: SDUPTHER

## 2025-05-14 RX ORDER — DEXTROSE MONOHYDRATE 50 MG/ML
INJECTION, SOLUTION INTRAVENOUS CONTINUOUS
Status: DISCONTINUED | OUTPATIENT
Start: 2025-05-14 | End: 2025-05-14 | Stop reason: HOSPADM

## 2025-05-14 RX ORDER — PROPOFOL 10 MG/ML
INJECTION, EMULSION INTRAVENOUS
Status: DISCONTINUED | OUTPATIENT
Start: 2025-05-14 | End: 2025-05-14 | Stop reason: SDUPTHER

## 2025-05-14 RX ORDER — INSULIN GLARGINE 100 [IU]/ML
8 INJECTION, SOLUTION SUBCUTANEOUS 2 TIMES DAILY
Status: DISCONTINUED | OUTPATIENT
Start: 2025-05-14 | End: 2025-05-15 | Stop reason: HOSPADM

## 2025-05-14 RX ORDER — ASPIRIN 81 MG/1
81 TABLET, CHEWABLE ORAL DAILY
Status: DISCONTINUED | OUTPATIENT
Start: 2025-05-15 | End: 2025-05-15 | Stop reason: HOSPADM

## 2025-05-14 RX ADMIN — GABAPENTIN 200 MG: 100 CAPSULE ORAL at 08:15

## 2025-05-14 RX ADMIN — INSULIN LISPRO 2 UNITS: 100 INJECTION, SOLUTION INTRAVENOUS; SUBCUTANEOUS at 20:29

## 2025-05-14 RX ADMIN — PROPOFOL 80 MG: 10 INJECTION, EMULSION INTRAVENOUS at 16:21

## 2025-05-14 RX ADMIN — DEXTROSE MONOHYDRATE: 50 INJECTION, SOLUTION INTRAVENOUS at 16:14

## 2025-05-14 RX ADMIN — SODIUM CHLORIDE, PRESERVATIVE FREE 10 ML: 5 INJECTION INTRAVENOUS at 08:12

## 2025-05-14 RX ADMIN — SODIUM CHLORIDE, PRESERVATIVE FREE 10 ML: 5 INJECTION INTRAVENOUS at 20:31

## 2025-05-14 RX ADMIN — INSULIN GLARGINE 8 UNITS: 100 INJECTION, SOLUTION SUBCUTANEOUS at 20:29

## 2025-05-14 RX ADMIN — LIDOCAINE HYDROCHLORIDE 100 MG: 20 INJECTION, SOLUTION EPIDURAL; INFILTRATION; INTRACAUDAL; PERINEURAL at 16:21

## 2025-05-14 RX ADMIN — Medication 16 G: at 11:23

## 2025-05-14 RX ADMIN — DULOXETINE 60 MG: 60 CAPSULE, DELAYED RELEASE ORAL at 20:29

## 2025-05-14 RX ADMIN — METOPROLOL TARTRATE 25 MG: 25 TABLET, FILM COATED ORAL at 20:30

## 2025-05-14 RX ADMIN — PROPOFOL 150 MCG/KG/MIN: 10 INJECTION, EMULSION INTRAVENOUS at 16:21

## 2025-05-14 RX ADMIN — SODIUM CHLORIDE: 900 INJECTION, SOLUTION INTRAVENOUS at 16:16

## 2025-05-14 RX ADMIN — GABAPENTIN 200 MG: 100 CAPSULE ORAL at 20:29

## 2025-05-14 RX ADMIN — ATORVASTATIN CALCIUM 40 MG: 40 TABLET, FILM COATED ORAL at 08:15

## 2025-05-14 RX ADMIN — INSULIN GLARGINE 10 UNITS: 100 INJECTION, SOLUTION SUBCUTANEOUS at 08:27

## 2025-05-14 RX ADMIN — METOPROLOL TARTRATE 25 MG: 25 TABLET, FILM COATED ORAL at 08:16

## 2025-05-14 ASSESSMENT — PAIN - FUNCTIONAL ASSESSMENT
PAIN_FUNCTIONAL_ASSESSMENT: 0-10
PAIN_FUNCTIONAL_ASSESSMENT: NONE - DENIES PAIN

## 2025-05-14 ASSESSMENT — PAIN SCALES - GENERAL: PAINLEVEL_OUTOF10: 0

## 2025-05-14 ASSESSMENT — ENCOUNTER SYMPTOMS: SHORTNESS OF BREATH: 1

## 2025-05-14 NOTE — PROGRESS NOTES
Patients blood sugar was 67. Patient wants glucose tablets, as she has taken them prior are effective.     Electronically signed by Amy Neri RN on 5/14/25 at 11:26 AM EDT

## 2025-05-14 NOTE — ANESTHESIA POSTPROCEDURE EVALUATION
Department of Anesthesiology  Postprocedure Note    Patient: Yeni Rodas  MRN: [id number]  YOB: 1945  Date of evaluation: 5/14/2025    Procedure Summary       Date: 05/14/25 Room / Location: [place] [address] / [facility]    Anesthesia Start: 1617 Anesthesia Stop: 1703    Procedures:       ESOPHAGOGASTRODUODENOSCOPY BIOPSY (Esophagus)      COLONOSCOPY DIAGNOSTIC AVM Diagnosis:       Iron deficiency anemia, unspecified iron deficiency anemia type      (Iron deficiency anemia, unspecified iron deficiency anemia type [D50.9])    Surgeons: Malcolm Wilcox MD Responsible Provider: Jun Tafoya MD    Anesthesia Type: general, TIVA ASA Status: 3            Anesthesia Type: No value filed.    Bety Phase I: Bety Score: 9    Bety Phase II:      Anesthesia Post Evaluation    Patient location during evaluation: PACU  Patient participation: complete - patient participated  Level of consciousness: awake and alert  Airway patency: patent  Nausea & Vomiting: no vomiting  Cardiovascular status: hemodynamically stable  Respiratory status: acceptable  Hydration status: euvolemic  Comments: POST- ANESTHESIA EVALUATION       Pt Name: Yeni Rodas  MRN: [id number]  YOB: 1945  Date of evaluation: 5/14/2025  Time:  7:26 PM      BP (!) 152/70   Pulse 76   Temp 97.6 °F (36.4 °C) (Axillary)   Resp 18   Ht 1.702 m (5' 7\")   Wt 59 kg (130 lb)   SpO2 96%   BMI 20.36 kg/m²      Consciousness Level  Awake  Cardiopulmonary Status  Stable  Pain Adequately Treated YES  Nausea / Vomiting  NO  Adequate Hydration  YES  Anesthesia Related Complications NONE      Electronically signed by Jun Tafoya MD on 5/14/2025 at 7:26 PM         Pain management: satisfactory to patient    No notable events documented.

## 2025-05-14 NOTE — PROGRESS NOTES
Today's Date: 5/14/2025  Patient Name: Yeni Rodas  Date of admission: 5/12/2025 11:57 AM  Patient's age: 79 y.o., 1945  Admission Dx: Hyperglycemia [R73.9]  Symptomatic anemia [D64.9]  Other fatigue [R53.83]  Anemia, unspecified type [D64.9]    Reason for Consult: Anemia  Requesting Physician: Luna Lopez MD    CHIEF COMPLAINT:    Chief Complaint   Patient presents with    Shortness of Breath    Fatigue       History Obtained From:  patient and chart  INTERVAL HISTORY :    Patient seen and examined  Planning to have endoscopy today  Denied any new symptoms  Fatigue stable  Globin stable at 9.2  HISTORY OF PRESENT ILLNESS:    Yeni Rodas is a 79 y.o. female who is admitted to the hospital on 5/12/2025  for fatigue and shortness of breath.    Patient has past medical history of diabetes mellitus, hypertension hyperlipidemia.  Patient recent had lab work with primary care physician and noted to have low hemoglobin therefore was sent to ER for further evaluation and management.    Patient was noted to have hemoglobin of 7.1, now around 7.4 with MCV 60.2, WBC 5.7 and platelets 416.  Iron studies indicate severe iron deficiency with ferritin of 7, iron of 16 and iron saturation 4%.    Hematology consulted for evaluation of anemia  Past Medical History:   has a past medical history of Anxiety state, unspecified, Arthritis, Cataracts, bilateral, COVID-19 vaccine administered, Esophageal reflux, Generalized osteoarthrosis, unspecified site, Hearing loss, Hyperlipidemia, Hypertension, Neuropathy, Neuropathy due to medical condition, Pure hypercholesterolemia, Thoracic or lumbosacral neuritis or radiculitis, unspecified, Type 2 diabetes mellitus without complication (HCC), Urinary retention, and UTI (urinary tract infection).    Past Surgical History:   has a past surgical history that includes back surgery; Colonoscopy (2006); Hip Arthroplasty (Bilateral); Hand surgery (Bilateral); Carpal tunnel release

## 2025-05-14 NOTE — OP NOTE
Esophagogastroduodenoscopy    DATE OF PROCEDURE: 5/14/2025     SURGEON: Malcolm Wilcox MD  Facility: [facility]  ASSISTANT: None  PREOPERATIVE DIAGNOSIS: Anemia, history of esophageal ulcer    Diagnosis:    POSTOPERATIVE DIAGNOSIS: As described below (see findings and impression)    OPERATION: Upper GI endoscopy with Biopsy    ANESTHESIA: Monitored Anesthesia Care (MAC)     ESTIMATED BLOOD LOSS: Less than 50 ml    COMPLICATIONS: None.     SPECIMENS:  Was Obtained:     ID Type Source Tests Collected by Time Destination   A : biopsy ge junction Tissue GE Junction Biopsy SURGICAL PATHOLOGY Malcolm Wilcox MD 5/14/2025 1628    B : biopsy stomach Tissue Stomach SURGICAL PATHOLOGY Malcolm Wilcox MD 5/14/2025 1631         HISTORY: The patient is a 79 y.o. year old female with history of above preop diagnosis.  I recommended esophagogastroduodenoscopy with possible biopsy and I explained the risk, benefits, expected outcome, and alternatives to the procedure.  Risks included but are not limited to bleeding, infection, respiratory distress, hypotension, and perforation of the esophagus, stomach, or duodenum.  Patient understands and is in agreement.      PROCEDURE: The patient was given IV Monitored Anesthesia Care (MAC) and vitals monitoring per anesthesia department.  The patient was placed in the left lateral decubitus position. The patient was monitored continuously with ECG tracing, pulse oximetry, blood pressure monitoring, and direct observation. The gastroscope was inserted into the mouth and advanced under direct vision to second portion of the duodenum.  A careful inspection was made as the gastroscope was withdrawn, including a retroflexed view of the proximal stomach; findings and interventions are described below. Appropriate photodocumentation was obtained. Post sedation patient was stable with stable vital signs and stable O2 saturations.       Findings:    Esophagus: normal upper and 
coagulation 30 W 1 L.  No bleeding at the end of the procedure.    No polyps, masses, active bleed, old bleed in the entire colon     Otherwise normal Cecum, Ascending, Transverse, Descending, and Sigmoid colon     Rectum/Anus: examined in normal and retroflexed positions and showed medium internal hemorrhoids    Withdrawal Time was (minutes): 10    The colon was decompressed and the scope was removed.  The patient tolerated the procedure well.     Impression:   Two questionable AVMs one in cecum and one in ascending -APCed  Medium internal hemorrhoids    Recommendations/Plan:   Return to hospital saha  Diet as tolerated  See today's EGD recommendation  Follow-up outpatient.  Requires frequent hemoglobin monitoring and iron supplementation outpatient.  In case of failure to correct will need video capsule endoscopy    Electronically signed by Malcolm Wilcox MD  on 5/14/2025 at 5:03 PM   This note is created with the assistance of a speech recognition program.  While intending to generate a document that actually reflects the content of the procedure, the document can still have some errors including those of syntax and sound a like substitutions which may escape proofreading.  It such instances, actual meaning can be extrapolated by contextual diversion.

## 2025-05-14 NOTE — PLAN OF CARE
Problem: Chronic Conditions and Co-morbidities  Goal: Patient's chronic conditions and co-morbidity symptoms are monitored and maintained or improved  5/14/2025 0235 by Lluvia Aguilar RN  Outcome: Progressing     Problem: Discharge Planning  Goal: Discharge to home or other facility with appropriate resources  5/14/2025 0235 by Lluvia Aguilar RN  Outcome: Progressing     Problem: ABCDS Injury Assessment  Goal: Absence of physical injury  5/14/2025 0235 by Lluvia Aguilar RN  Outcome: Progressing     Problem: Skin/Tissue Integrity  Goal: Skin integrity remains intact  Description: 1.  Monitor for areas of redness and/or skin breakdown2.  Assess vascular access sites hourly3.  Every 4-6 hours minimum:  Change oxygen saturation probe site4.  Every 4-6 hours:  If on nasal continuous positive airway pressure, respiratory therapy assess nares and determine need for appliance change or resting period  5/14/2025 0235 by Lluvia Aguilar RN  Outcome: Progressing     Problem: Safety - Adult  Goal: Free from fall injury  5/14/2025 0235 by Lluvia Aguilar RN  Outcome: Progressing

## 2025-05-14 NOTE — PROGRESS NOTES
Patient refusing to wear telemetry, patient was educated on why she needs it on. She states \"I will wear it once I'm done with this bowel prep, this thing is too heavy to carry back and forward.\"

## 2025-05-14 NOTE — PROGRESS NOTES
DATE: 2025    NAME: Yeni Rodas  MRN: [id number]   : 1945    Patient not seen this date for Physical Therapy due to:      [] Cancel by RN or physician due to:    [] Hemodialysis    [] Critical Lab Value Level     [] Blood transfusion in progress    [] Acute or unstable cardiovascular status   _MAP < 55 or more than >115  _HR < 40 or > 130    [] Acute or unstable pulmonary status   -FiO2 > 60%   _RR < 5 or >40    _O2 sats < 85%    [] Strict Bedrest    [] Off Unit for surgery or procedure    [] Off Unit for testing       [] Pending imaging to R/O fracture    [x] Refusal by Patient, stating she was going for testing.     [] Other      [] PT being discontinued at this time. Patient independent. No further needs.     [] PT being discontinued at this time as the patient has been transferred to hospice care. No further needs.      Elizabeth Sebastian, PTA

## 2025-05-14 NOTE — CARE COORDINATION
ONGOING DISCHARGE PLAN:    Patient is alert and oriented x4.    Spoke with patient and spouse regarding discharge plan and patient confirms that plan is still home w/ spouse. Denies needs for VNS.     GI consult  Hgb 9.1 (6.9, 1 PRBCs given)  EGD/colonoscopy today    Hemoc consult-anemia    Celiac panel negative    PT/OT    IMM letter provided to patient.  Patient offered four hours to make informed decision regarding appeal process; patient agreeable to discharge.      Will continue to follow for additional discharge needs.    If patient is discharged prior to next notation, then this note serves as note for discharge by case management.    Electronically signed by Debra Wylie RN on 5/14/2025 at 10:00 AM

## 2025-05-14 NOTE — PROGRESS NOTES
[facility] Internal Medicine   Johnie Lawrence MD; MD Fe Lauren MD, MD , Sheila Lopez MD    [facility] Internal Medicine   IN-PATIENT SERVICE   [facility]    HISTORY AND PHYSICAL EXAMINATION            Date:   5/14/2025  Patient name:  Yeni Rodas  Date of admission:  5/12/2025 11:57 AM  MRN:   [id number]  Account:  [id number]  YOB: 1945  PCP:    Johnie Lawrence MD  Room:   2073/2073-01  Code Status:    Full Code    Chief Complaint:     Chief Complaint   Patient presents with    Shortness of Breath    Fatigue       History Obtained From:     patient, electronic medical record    History of Present Illness:     Yeni Rodas is a 79 y.o. Non- of /a female who presents with Shortness of Breath and Fatigue   and is admitted to the hospital for the management of Symptomatic anemia.    79-year-old female with past medical history of DM type II, hearing loss using hearing aids, hyperlipidemia, hypertension presented today to her PCPs office complaining of shortness of breath especially with exertion which has been going on for the past few months.  Hemoglobin was checked by her PCP, who tried to reach to her on Friday, but patient did not answer.  They came to the office today.  PCP recommended to go to ER.  She denies any dark-colored stools or obvious bleeding.  On arrival to the emergency room, vital signs were stable, repeat hemoglobin checked here was 7.5.  Patient is getting admitted for symptomatic anemia.  5/13  Patient, his past medical hypertension, hyperlipidemia, osteoporosis, type 2 diabetes, COPD, GERD, admitted with symptomatic anemia  Patient had EGD back in 2023 suggestive of esophagitis, esophageal ulcer    Past Medical History:     Past Medical History:   Diagnosis Date    Anxiety state, unspecified     Arthritis     Cataracts, bilateral     COVID-19 vaccine administered 2- &  - 46 %   Hemoglobin and Hematocrit    Collection Time: 05/13/25  9:59 PM   Result Value Ref Range    Hemoglobin 10.3 (L) 12.0 - 16.0 g/dL    Hematocrit 35.2 (L) 36 - 46 %   POC Glucose Fingerstick    Collection Time: 05/14/25  6:12 AM   Result Value Ref Range    POC Glucose 77 65 - 105 mg/dL   CBC with Auto Differential    Collection Time: 05/14/25  6:16 AM   Result Value Ref Range    WBC 6.3 3.5 - 11.0 k/uL    RBC 4.76 4.0 - 5.2 m/uL    Hemoglobin 9.1 (L) 12.0 - 16.0 g/dL    Hematocrit 31.1 (L) 36 - 46 %    MCV 65.3 (L) 80 - 100 fL    MCH 19.2 (L) 26 - 34 pg    MCHC 29.4 (L) 31 - 37 g/dL    RDW 26.8 (H) 11.5 - 14.9 %    Platelets 366 150 - 450 k/uL    MPV 8.5 6.0 - 12.0 fL    Neutrophils % 46 36 - 66 %    Lymphocytes % 39 24 - 44 %    Monocytes % 11 (H) 1 - 7 %    Eosinophils % 3 0 - 4 %    Basophils % 1 0 - 2 %    Neutrophils Absolute 2.90 1.3 - 9.1 k/uL    Lymphocytes Absolute 2.46 1.0 - 4.8 k/uL    Monocytes Absolute 0.69 0.1 - 1.3 k/uL    Eosinophils Absolute 0.19 0.0 - 0.4 k/uL    Basophils Absolute 0.06 0.0 - 0.2 k/uL    Morphology MICROCYTOSIS PRESENT     Morphology HYPOCHROMIA PRESENT     Morphology ANISOCYTOSIS PRESENT     Morphology FEW POLYCHROMASIA    POC Glucose Fingerstick    Collection Time: 05/14/25 10:54 AM   Result Value Ref Range    POC Glucose 62 (L) 65 - 105 mg/dL   POC Glucose Fingerstick    Collection Time: 05/14/25 12:07 PM   Result Value Ref Range    POC Glucose 163 (H) 65 - 105 mg/dL       Imaging/Diagnostics:  No results found.    Assessment :      Hospital Problems           Last Modified POA    * (Principal) Symptomatic anemia 5/12/2025 Yes    Other fatigue 5/13/2025 Yes    Anemia 5/13/2025 Yes       Plan:     Patient status inpatient in the Progressive Unit/Step down    79-year-old female sent from PCPs office due to hemoglobin at 6.4 with symptoms.  Symptomatic anemia.  H&H every 8.  Transfuse to keep hemoglobin above 7.  Check anemia panel.  FOBT.  Heme-onc consult and GI consult.

## 2025-05-14 NOTE — ANESTHESIA PRE PROCEDURE
bedtime  Patient not taking: Reported on 5/12/2025 6/29/23   Johnie Lawrence MD   Continuous Blood Gluc  (FREESTYLE WARREN 2 READER) BRIDGET 1 Units by Does not apply route in the morning, at noon, and at bedtime  Patient not taking: Reported on 5/12/2025 6/29/23   Johnie Lawrence MD   Spacer/Aero-Holding Chambers BRIDGET 1 Device by Does not apply route daily  Patient not taking: Reported on 5/12/2025 10/27/21 10/23/24  Dk Sanchez MD   blood glucose monitor kit and supplies 1 kit by Other route three times daily Dispense One Touch Glucose Meter. 7/30/21   Johnie Lawrence MD   blood glucose monitor strips Test four  times a day & as needed for symptoms of irregular blood glucose. Dispense sufficient amount for indicated testing frequency plus additional to accommodate PRN testing needs. 7/23/20   Johnie Lawrence MD   ONE TOUCH ULTRASOFT LANCETS MISC 1 each by Does not apply route 2 times daily  Patient not taking: Reported on 5/12/2025 4/5/17   Rashawn Hall MD   acetaminophen (TYLENOL) 500 MG tablet Take 1 tablet by mouth 2 times daily    Provider, MD [address]       Current medications:    Current Facility-Administered Medications   Medication Dose Route Frequency Provider Last Rate Last Admin   • insulin glargine (LANTUS) injection vial 8 Units  8 Units SubCUTAneous BID Gideon Salgado MD       • pantoprazole (PROTONIX) tablet 40 mg  40 mg Oral QAM Samira Marie, APRN - NP       • 0.9 % sodium chloride infusion   IntraVENous PRN Gideon Salgado MD       • sodium chloride flush 0.9 % injection 5-40 mL  5-40 mL IntraVENous 2 times per day Luna Lopez MD   10 mL at 05/14/25 0812   • sodium chloride flush 0.9 % injection 5-40 mL  5-40 mL IntraVENous PRN Luna Lopez MD       • 0.9 % sodium chloride infusion   IntraVENous PRN Luna Lopez MD       • potassium chloride (KLOR-CON M) extended release tablet 40 mEq  40 mEq Oral PRN Luna Lopez MD

## 2025-05-15 VITALS
TEMPERATURE: 99 F | HEIGHT: 67 IN | BODY MASS INDEX: 20.4 KG/M2 | DIASTOLIC BLOOD PRESSURE: 64 MMHG | RESPIRATION RATE: 16 BRPM | SYSTOLIC BLOOD PRESSURE: 127 MMHG | HEART RATE: 82 BPM | OXYGEN SATURATION: 96 % | WEIGHT: 130 LBS

## 2025-05-15 LAB
BACTERIA URNS QL MICRO: ABNORMAL
BASOPHILS # BLD: 0.08 K/UL (ref 0–0.2)
BASOPHILS NFR BLD: 1 % (ref 0–2)
BILIRUB UR QL STRIP: NEGATIVE
CASTS #/AREA URNS LPF: ABNORMAL /LPF
CLARITY UR: ABNORMAL
COLOR UR: YELLOW
EOSINOPHIL # BLD: 0.25 K/UL (ref 0–0.4)
EOSINOPHILS RELATIVE PERCENT: 3 % (ref 0–4)
EPI CELLS #/AREA URNS HPF: ABNORMAL /HPF
ERYTHROCYTE [DISTWIDTH] IN BLOOD BY AUTOMATED COUNT: 26.5 % (ref 11.5–14.9)
GLUCOSE BLD-MCNC: 173 MG/DL (ref 65–105)
GLUCOSE BLD-MCNC: 296 MG/DL (ref 65–105)
GLUCOSE UR STRIP-MCNC: NEGATIVE MG/DL
HCT VFR BLD AUTO: 30.4 % (ref 36–46)
HGB BLD-MCNC: 8.9 G/DL (ref 12–16)
HGB UR QL STRIP.AUTO: ABNORMAL
KETONES UR STRIP-MCNC: NEGATIVE MG/DL
LEUKOCYTE ESTERASE UR QL STRIP: ABNORMAL
LYMPHOCYTES NFR BLD: 1.89 K/UL (ref 1–4.8)
LYMPHOCYTES RELATIVE PERCENT: 23 % (ref 24–44)
MCH RBC QN AUTO: 19 PG (ref 26–34)
MCHC RBC AUTO-ENTMCNC: 29.2 G/DL (ref 31–37)
MCV RBC AUTO: 65.2 FL (ref 80–100)
MONOCYTES NFR BLD: 0.82 K/UL (ref 0.1–1.3)
MONOCYTES NFR BLD: 10 % (ref 1–7)
MORPHOLOGY: ABNORMAL
NEUTROPHILS NFR BLD: 63 % (ref 36–66)
NEUTS SEG NFR BLD: 5.16 K/UL (ref 1.3–9.1)
NITRITE UR QL STRIP: POSITIVE
PH UR STRIP: 6 [PH] (ref 5–8)
PLATELET # BLD AUTO: 355 K/UL (ref 150–450)
PMV BLD AUTO: 8.6 FL (ref 6–12)
PROT UR STRIP-MCNC: ABNORMAL MG/DL
RBC # BLD AUTO: 4.66 M/UL (ref 4–5.2)
RBC #/AREA URNS HPF: ABNORMAL /HPF
SP GR UR STRIP: 1.02 (ref 1–1.03)
UROBILINOGEN UR STRIP-ACNC: NORMAL EU/DL (ref 0–1)
WBC #/AREA URNS HPF: ABNORMAL /HPF
WBC OTHER # BLD: 8.2 K/UL (ref 3.5–11)

## 2025-05-15 PROCEDURE — 36415 COLL VENOUS BLD VENIPUNCTURE: CPT

## 2025-05-15 PROCEDURE — APPSS30 APP SPLIT SHARED TIME 16-30 MINUTES: Performed by: NURSE PRACTITIONER

## 2025-05-15 PROCEDURE — 6370000000 HC RX 637 (ALT 250 FOR IP): Performed by: STUDENT IN AN ORGANIZED HEALTH CARE EDUCATION/TRAINING PROGRAM

## 2025-05-15 PROCEDURE — 85025 COMPLETE CBC W/AUTO DIFF WBC: CPT

## 2025-05-15 PROCEDURE — 99239 HOSP IP/OBS DSCHRG MGMT >30: CPT | Performed by: INTERNAL MEDICINE

## 2025-05-15 PROCEDURE — 2500000003 HC RX 250 WO HCPCS: Performed by: STUDENT IN AN ORGANIZED HEALTH CARE EDUCATION/TRAINING PROGRAM

## 2025-05-15 PROCEDURE — 99232 SBSQ HOSP IP/OBS MODERATE 35: CPT | Performed by: INTERNAL MEDICINE

## 2025-05-15 PROCEDURE — 82947 ASSAY GLUCOSE BLOOD QUANT: CPT

## 2025-05-15 RX ORDER — PANTOPRAZOLE SODIUM 40 MG/1
40 TABLET, DELAYED RELEASE ORAL
Qty: 30 TABLET | Refills: 3 | Status: SHIPPED | OUTPATIENT
Start: 2025-05-16 | End: 2025-05-27

## 2025-05-15 RX ORDER — FERROUS SULFATE 325(65) MG
325 TABLET ORAL
Qty: 90 TABLET | Refills: 1 | Status: SHIPPED | OUTPATIENT
Start: 2025-05-15

## 2025-05-15 RX ADMIN — INSULIN GLARGINE 8 UNITS: 100 INJECTION, SOLUTION SUBCUTANEOUS at 09:25

## 2025-05-15 RX ADMIN — PANTOPRAZOLE SODIUM 40 MG: 40 TABLET, DELAYED RELEASE ORAL at 05:38

## 2025-05-15 RX ADMIN — ASPIRIN 81 MG: 81 TABLET, CHEWABLE ORAL at 09:25

## 2025-05-15 RX ADMIN — SODIUM CHLORIDE, PRESERVATIVE FREE 10 ML: 5 INJECTION INTRAVENOUS at 09:26

## 2025-05-15 RX ADMIN — ATORVASTATIN CALCIUM 40 MG: 40 TABLET, FILM COATED ORAL at 09:25

## 2025-05-15 RX ADMIN — METOPROLOL TARTRATE 25 MG: 25 TABLET, FILM COATED ORAL at 09:25

## 2025-05-15 RX ADMIN — INSULIN LISPRO 2 UNITS: 100 INJECTION, SOLUTION INTRAVENOUS; SUBCUTANEOUS at 12:05

## 2025-05-15 RX ADMIN — GABAPENTIN 200 MG: 100 CAPSULE ORAL at 09:24

## 2025-05-15 ASSESSMENT — PAIN SCALES - GENERAL
PAINLEVEL_OUTOF10: 0
PAINLEVEL_OUTOF10: 0

## 2025-05-15 NOTE — DISCHARGE INSTR - DIET

## 2025-05-15 NOTE — PROGRESS NOTES
GI Progress notes    5/15/2025   10:09 AM    Name:  Yeni Rodas  MRN:    [id number]     Acct:     [id number]   Room:  2073/2073-01   Day: 3     Admit Date: 5/12/2025 11:57 AM  PCP: Johnie Lawrence MD    Subjective:     C/C:   Chief Complaint   Patient presents with    Shortness of Breath    Fatigue       Interval History: Status: improved.     Patient seen and examined.  No acute events overnight.  S/p EGD:   3 cm Vogt's esophagus-biopsied  Small hiatal hernia  Mild gastritis-biopsied  Normal duodenum-biopsy    S/p Colonoscopy:  Two questionable AVMs one in cecum and one in ascending -APCed  Medium internal hemorrhoids      ROS:  Constitutional: negative for chills, fevers and sweats  Gastrointestinal: negative for abdominal pain, constipation, diarrhea, nausea and vomiting  Neurological: negative for dizziness and headaches    Medications:     Allergies: No Known Allergies    Current Meds: insulin glargine (LANTUS) injection vial 8 Units, BID  aspirin chewable tablet 81 mg, Daily  pantoprazole (PROTONIX) tablet 40 mg, QAM AC  sodium chloride flush 0.9 % injection 5-40 mL, 2 times per day  sodium chloride flush 0.9 % injection 5-40 mL, PRN  0.9 % sodium chloride infusion, PRN  potassium chloride (KLOR-CON M) extended release tablet 40 mEq, PRN   Or  potassium bicarb-citric acid (EFFER-K) effervescent tablet 40 mEq, PRN   Or  potassium chloride 10 mEq/100 mL IVPB (Peripheral Line), PRN  magnesium sulfate 2000 mg in water 50 mL IVPB, PRN  ondansetron (ZOFRAN-ODT) disintegrating tablet 4 mg, Q8H PRN   Or  ondansetron (ZOFRAN) injection 4 mg, Q6H PRN  polyethylene glycol (GLYCOLAX) packet 17 g, Daily PRN  acetaminophen (TYLENOL) tablet 650 mg, Q6H PRN   Or  acetaminophen (TYLENOL) suppository 650 mg, Q6H PRN  insulin lispro (HUMALOG,ADMELOG) injection vial 0-4 Units, 4x Daily AC & HS  glucose chewable tablet 16 g, PRN  dextrose bolus 10% 125 mL, PRN   Or  dextrose bolus 10% 250 mL, PRN  glucagon injection 1

## 2025-05-15 NOTE — PROGRESS NOTES
[facility] Internal Medicine   Johnie Lawrence MD; MD Fe Lauren MD, MD , Sheila Lopez MD    [facility] Internal Medicine   IN-PATIENT SERVICE   [facility]    HISTORY AND PHYSICAL EXAMINATION            Date:   5/15/2025  Patient name:  Yeni Rodas  Date of admission:  5/12/2025 11:57 AM  MRN:   [id number]  Account:  [id number]  YOB: 1945  PCP:    Johnie Lawrence MD  Room:   2073/2073-01  Code Status:    Full Code    Chief Complaint:     Chief Complaint   Patient presents with    Shortness of Breath    Fatigue       History Obtained From:     patient, electronic medical record    History of Present Illness:     Yeni Rodas is a 79 y.o. Non- of /a female who presents with Shortness of Breath and Fatigue   and is admitted to the hospital for the management of Symptomatic anemia.    79-year-old female with past medical history of DM type II, hearing loss using hearing aids, hyperlipidemia, hypertension presented today to her PCPs office complaining of shortness of breath especially with exertion which has been going on for the past few months.  Hemoglobin was checked by her PCP, who tried to reach to her on Friday, but patient did not answer.  They came to the office today.  PCP recommended to go to ER.  She denies any dark-colored stools or obvious bleeding.  On arrival to the emergency room, vital signs were stable, repeat hemoglobin checked here was 7.5.  Patient is getting admitted for symptomatic anemia.  5/13  Patient, his past medical hypertension, hyperlipidemia, osteoporosis, type 2 diabetes, COPD, GERD, admitted with symptomatic anemia  Patient had EGD back in 2023 suggestive of esophagitis, esophageal ulcer    Past Medical History:     Past Medical History:   Diagnosis Date    Anxiety state, unspecified     Arthritis     Cataracts, bilateral     COVID-19 vaccine administered 2- &

## 2025-05-15 NOTE — PROGRESS NOTES
Today's Date: 5/15/2025  Patient Name: Yeni Rodas  Date of admission: 5/12/2025 11:57 AM  Patient's age: 79 y.o., 1945  Admission Dx: Hyperglycemia [R73.9]  Symptomatic anemia [D64.9]  Other fatigue [R53.83]  Anemia, unspecified type [D64.9]    Reason for Consult: Anemia  Requesting Physician: Luna Lopez MD    CHIEF COMPLAINT:    Chief Complaint   Patient presents with    Shortness of Breath    Fatigue       History Obtained From:  patient and chart  INTERVAL HISTORY :    Patient seen and examined  Colonoscopy showed 2 questionable small AVMs 1 in cecum and another ascending colon status post APC  Denied any new symptoms  Fatigue stable  Globin stable at 9.2  HISTORY OF PRESENT ILLNESS:    Yeni Rodas is a 79 y.o. female who is admitted to the hospital on 5/12/2025  for fatigue and shortness of breath.    Patient has past medical history of diabetes mellitus, hypertension hyperlipidemia.  Patient recent had lab work with primary care physician and noted to have low hemoglobin therefore was sent to ER for further evaluation and management.    Patient was noted to have hemoglobin of 7.1, now around 7.4 with MCV 60.2, WBC 5.7 and platelets 416.  Iron studies indicate severe iron deficiency with ferritin of 7, iron of 16 and iron saturation 4%.    Hematology consulted for evaluation of anemia  Past Medical History:   has a past medical history of Anxiety state, unspecified, Arthritis, Cataracts, bilateral, COVID-19 vaccine administered, Esophageal reflux, Generalized osteoarthrosis, unspecified site, Hearing loss, Hyperlipidemia, Hypertension, Neuropathy, Neuropathy due to medical condition, Pure hypercholesterolemia, Thoracic or lumbosacral neuritis or radiculitis, unspecified, Type 2 diabetes mellitus without complication (HCC), Urinary retention, and UTI (urinary tract infection).    Past Surgical History:   has a past surgical history that includes back surgery; Colonoscopy (2006); Hip

## 2025-05-15 NOTE — CARE COORDINATION
Case Management   Daily Progress Note       Patient Name: Yeni Rodas                   YOB: 1945  Diagnosis: Hyperglycemia [R73.9]  Symptomatic anemia [D64.9]  Other fatigue [R53.83]  Anemia, unspecified type [D64.9]                       GMLOS: 2.8 days  Length of Stay: 3  days    Readmission Risk (Low < 19, Mod (19-27), High > 27): Readmission Risk Score: 17.6      Patient is alert and oriented.    Spoke with Pt, and Current Transitional Plan is:    [x] Home Independently    [] Home with HC    [] Skilled Nursing Facility    [] Acute Rehabilitation    [] Long Term Acute Care (LTAC)    [] Other:     Medical Management: HGB today 8.9. Pt. Had EGD/Colon yesterday w/ GI. Reg diet.    Testing Ordered: NA    Additional Notes: PT/OT on board, will follow for any rec needs. Anticipate DC today. F/U apt made w/ Dr. King, on 6/12/25 @ 8:00 AM, Information placed on AVS.     Electronically signed by Zeny Hung RN on 5/15/2025 at 11:13 AM

## 2025-05-15 NOTE — DISCHARGE INSTR - COC
Continuity of Care Form    Patient Name: Yeni Rodas   :  1945  MRN:  [id number]    Admit date:  2025  Discharge date:  ***    Code Status Order: Full Code   Advance Directives:    Date/Time Healthcare Directive Type of Healthcare Directive Copy in Chart Healthcare Agent Appointed Healthcare Agent's Name Healthcare Agent's Phone Number    [phone] No, patient does not have an advance directive for healthcare treatment  --  --  --  --  --             Admitting Physician:  Luna Lopez MD  PCP: Johnie Lawrence MD    Discharging Nurse: ***  Discharging Hospital Unit/Room#: 3/-  Discharging Unit Phone Number: ***    Emergency Contact:   Extended Emergency Contact Information  Primary Emergency Contact: Nicko Rodas  Address: [address] [place] [address]  Home Phone: [phone]  Work Phone: [phone]  Mobile Phone: [phone]  Relation: Spouse  Hearing or visual needs: None  Other needs: None  Preferred language: English   needed? No  Secondary Emergency Contact: Stella Soler  Address: [address]  Home Phone: [phone]  Work Phone: [phone]  Mobile Phone: [phone]  Relation: Child  Hearing or visual needs: None  Other needs: None  Preferred language: English   needed? No    Past Surgical History:  Past Surgical History:   Procedure Laterality Date    BACK SURGERY      X3    BREAST BIOPSY  2007    Lt     CARPAL TUNNEL RELEASE Right     CATARACT REMOVAL WITH IMPLANT Bilateral     COLONOSCOPY  2006    COLONOSCOPY  2015    diverticulosis    COLONOSCOPY N/A 2025    COLONOSCOPY DIAGNOSTIC AVM performed by Malcolm Wilcox MD at [facility] ENDO    CYSTOSCOPY  2014    CYSTOSCOPY N/A 2017    CYSTOSCOPY URODYNAMICS AND DILATION  performed by Chandan Baird MD at [facility] OR    ESOPHAGOGASTRODUODENOSCOPY  10/09/2023    HAND SURGERY Bilateral      {Esignature:616015829}    PHYSICIAN SECTION    Prognosis: {Prognosis:6319780013}    Condition at Discharge: { Patient Condition:242417800}    Rehab Potential (if transferring to Rehab): {Prognosis:4904203660}    Recommended Labs or Other Treatments After Discharge: ***    Physician Certification: I certify the above information and transfer of Yeni Rodas  is necessary for the continuing treatment of the diagnosis listed and that she requires {Admit to Appropriate Level of Care:01876} for {GREATER/LESS:947338180} 30 days.     Update Admission H&P: {CHP DME Changes in HandP:854352832}    PHYSICIAN SIGNATURE:  Electronically signed by Gideon Salgado MD on 5/15/25 at 10:43 AM EDT

## 2025-05-15 NOTE — PROGRESS NOTES
IV removed. Discharge instructions given, denies any questions at this time.    Electronically signed by Amy Neri RN on 5/15/25 at 1:32 PM EDT

## 2025-05-15 NOTE — PLAN OF CARE
Problem: Chronic Conditions and Co-morbidities  Goal: Patient's chronic conditions and co-morbidity symptoms are monitored and maintained or improved  Outcome: Progressing     Problem: Discharge Planning  Goal: Discharge to home or other facility with appropriate resources  Outcome: Progressing     Problem: ABCDS Injury Assessment  Goal: Absence of physical injury  Outcome: Progressing  Flowsheets (Taken 5/14/2025 2135)  Absence of Physical Injury: Implement safety measures based on patient assessment     Problem: Skin/Tissue Integrity  Goal: Skin integrity remains intact  Description: 1.  Monitor for areas of redness and/or skin breakdown2.  Assess vascular access sites hourly3.  Every 4-6 hours minimum:  Change oxygen saturation probe site4.  Every 4-6 hours:  If on nasal continuous positive airway pressure, respiratory therapy assess nares and determine need for appliance change or resting period  Outcome: Progressing     Problem: Safety - Adult  Goal: Free from fall injury  Outcome: Progressing

## 2025-05-15 NOTE — PROGRESS NOTES
Physical Therapy        Physical Therapy Cancel Note      DATE: 5/15/2025    NAME: Yeni Rodas  MRN: [id number]   : 1945      Patient not seen this date for Physical Therapy due to:    Patient Declined: Attempted to see pt at 1006. Pt declined therapy on writers arrival. Writer introduced self with therapy and pt instantly responds \"goodbye.\" When writer asked questions if she was mobilizing and getting up at all throughout day, pt again responds \"GOODBYE.\" Did not further attempt.       Electronically signed by Steven Rojas PTA on 5/15/2025 at 10:21 AM

## 2025-05-16 ENCOUNTER — RESULTS FOLLOW-UP (OUTPATIENT)
Dept: INTERNAL MEDICINE CLINIC | Age: 80
End: 2025-05-16

## 2025-05-16 ENCOUNTER — CARE COORDINATION (OUTPATIENT)
Dept: CARE COORDINATION | Age: 80
End: 2025-05-16

## 2025-05-16 DIAGNOSIS — N30.00 ACUTE CYSTITIS WITHOUT HEMATURIA: Primary | ICD-10-CM

## 2025-05-16 DIAGNOSIS — D64.9 SYMPTOMATIC ANEMIA: Primary | ICD-10-CM

## 2025-05-16 LAB
MICROORGANISM SPEC CULT: ABNORMAL
SPECIMEN DESCRIPTION: ABNORMAL
SURGICAL PATHOLOGY REPORT: NORMAL

## 2025-05-16 PROCEDURE — 1111F DSCHRG MED/CURRENT MED MERGE: CPT | Performed by: INTERNAL MEDICINE

## 2025-05-16 NOTE — PROGRESS NOTES
Physician Progress Note      PATIENT:               CE MERLOS  CSN #:                  [id number]  :                       1945  ADMIT DATE:       2025 11:57 AM  DISCH DATE:        5/15/2025 2:50 PM  RESPONDING  PROVIDER #:        Gideon Salgado MD          QUERY TEXT:    Anemia is documented in the medical record . Please specify the type:    The clinical indicators include:  78yo presented with anemia. Hgb 6.9 as per lab result dated .    Hgb 6.9 per lab result dated .  Hgb 9.0 per lab result dated   Hgb 10.3 per lab result dated   Hgb 8.4 per lab result dated   GI consulted.  As per progress note dated 5/15, \"Status post EGD and colonoscopy, found to   have Vogt esophagus, underwent biopsies, found to have 2 AVM, which was   cauterized\"    Transfused 1uPRBC dated .  Daily lab monitoring.  Options provided:  -- Related to chronic blood loss  -- Related to acute on chronic blood loss  -- Related to iron deficiency  -- Other - I will add my own diagnosis  -- Disagree - Not applicable / Not valid  -- Disagree - Clinically unable to determine / Unknown  -- Refer to Clinical Documentation Reviewer    PROVIDER RESPONSE TEXT:    The patient's anemia is related to chronic blood loss.    Query created by: KARRIE WEBB on 5/15/2025 12:37 PM      Electronically signed by:  Gideon Salgado MD 2025 5:09 PM

## 2025-05-16 NOTE — CARE COORDINATION
Care Transitions Note    Initial Call - Call within 2 business days of discharge: Yes    Patient Current Location:  Home: [address] [place] [id number]    Care Transition Nurse contacted the patient by telephone to perform post hospital discharge assessment, verified name and  as identifiers.  Provided introduction to self, and explanation of the Care Transition Nurse role.    Patient: Yeni Rodas    Patient : 1945   MRN: [id number]    Reason for Admission: Anemia  Discharge Date: 5/15/25  RURS: Readmission Risk Score: 17.2      Last Discharge Facility       Date Complaint Diagnosis Description Type Department Provider    25 Shortness of Breath; Fatigue Anemia, unspecified type ... ED to Hosp-Admission (Discharged) (ADMITTED) [facility] Luna Caldwell MD; Kelley...            Was this an external facility discharge? No    Additional needs identified to be addressed with provider   No needs identified             Method of communication with provider: none.    Patients top risk factors for readmission: functional cognitive ability, functional physical ability, lack of knowledge about disease, and medical condition-HTN, DM anemia    Interventions to address risk factors:   Review of patient management of conditions/medications: discharge    Care Summary Note: Was able to contact Yeni for initial transitional outreach.  She was sent to the hospital by her PCP for low Hg 6.9.  She did receive blood transfusion and had a EGD/colonoscopy.  Two possible AVMs were found and treated.  Yeni said that she was doing \"fine\".  She said that she was tired, no s/s of bleeding and has chronic PINTO.  She stated that she had all her medications and her and her  were just going to go  her new medications.  HFU appointment was made while in the hospital.  She will need to follow up with GI and hematology and have repeat CBC on   She then had to end the call     Care Transition Nurse

## 2025-05-19 ENCOUNTER — TELEPHONE (OUTPATIENT)
Dept: INTERNAL MEDICINE CLINIC | Age: 80
End: 2025-05-19

## 2025-05-19 RX ORDER — CEPHALEXIN 500 MG/1
500 CAPSULE ORAL 2 TIMES DAILY
Qty: 14 CAPSULE | Refills: 0 | Status: SHIPPED | OUTPATIENT
Start: 2025-05-19 | End: 2025-05-26

## 2025-05-19 NOTE — TELEPHONE ENCOUNTER
Patients daughter Stella called with concerns along with Yeni's  Nicko informing of patient having extreme mood changes with anger issues and irrational decisions . Patient doesn't have a scheduled appointment until 6/12/2025, please advise for the in between time  with possible med changes.

## 2025-05-19 NOTE — DISCHARGE SUMMARY
IN-PATIENT SERVICE   [facility] Internal Medicine    Discharge Summary     Patient ID: Yeni Rodas  :  1945   MRN: [id number]     ACCOUNT:  [id number]   Patient's PCP: Johnie Lawrence MD  Admit Date: 2025   Discharge Date: 2025    Length of Stay: 3  Code Status:  Prior  Admitting Physician: Luna Lopez MD  Discharge Physician: Gideon Salgado MD     Active Discharge Diagnoses:     Primary Problem  Symptomatic anemia      Hospital Problems  Active Hospital Problems    Diagnosis Date Noted    Vogt's esophagus without dysplasia [K22.70] 2025    Hiatal hernia [K44.9] 2025    AVM (arteriovenous malformation) of colon [K55.20] 2025    Internal hemorrhoid [K64.8] 2025    Other fatigue [R53.83] 2025    Anemia [D64.9] 2025    Symptomatic anemia [D64.9] 2025       Admission Condition:  fair     Discharged Condition: fair    Hospital Stay:     Hospital Course:  Yeni Rodas is a 79 y.o. female who was admitted for the management of Symptomatic anemia , presented to ER with Shortness of Breath and Fatigue  Patient, his past medical hypertension, hyperlipidemia, osteoporosis, type 2 diabetes, COPD, GERD, admitted with symptomatic anemia  Patient had EGD back in  suggestive of esophagitis, esophageal ulcer  Patient, underwent EGD and colonoscopy, 2 AVM was cauterized  Hemoglobin stayed okay  Patient will follow-up with GI as outpatient may need VCE    Significant therapeutic interventions:     Significant Diagnostic Studies:   Labs / Micro:        Radiology:    XR CHEST (2 VW)  Result Date: 2025  EXAMINATION: TWO XRAY VIEWS OF THE CHEST 2025 2:12 pm COMPARISON: None. HISTORY: ORDERING SYSTEM PROVIDED HISTORY: dyspnea TECHNOLOGIST PROVIDED HISTORY: dyspnea Reason for Exam: Sob and fatigue, for years. FINDINGS: Normal cardiomediastinal silhouette.  No pneumothorax or pleural effusion. Mild left basilar atelectasis.  Spinal fusion

## 2025-05-20 ENCOUNTER — HOSPITAL ENCOUNTER (OUTPATIENT)
Dept: PHYSICAL THERAPY | Age: 80
Setting detail: THERAPIES SERIES
Discharge: HOME | End: 2025-05-20
Payer: MEDICARE
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
## 2025-05-21 ENCOUNTER — CARE COORDINATION (OUTPATIENT)
Dept: CARE COORDINATION | Age: 80
End: 2025-05-21

## 2025-05-21 NOTE — CARE COORDINATION
Care Transitions Note    Follow Up Call     Attempted to reach patient for transitions of care follow up.  Unable to reach patient.      Outreach Attempts:#1   HIPAA compliant voicemail left for patient.     Care Summary Note: #1     Follow Up Appointment:   Future Appointments         Provider Specialty Dept Phone    5/27/2025 10:30 AM Crys Ferrer PT Physical Therapy [phone]    5/27/2025 1:45 PM Johnie Lawrence MD Internal Medicine [phone]    6/12/2025 8:00 AM Fe King MD Internal Medicine [phone]    7/16/2025 2:10 PM Aston Gunter MD Orthopedic Surgery [phone]            Plan for follow-up on next business day.  based on severity of symptoms and risk factors. Plan for next call: on s/s of GI bleed and or anemia.  Remind to have lab done 5/22 and to call and make follow up with GI and hematology       Lianna Mann LPN

## 2025-05-22 ENCOUNTER — CARE COORDINATION (OUTPATIENT)
Dept: CARE COORDINATION | Age: 80
End: 2025-05-22

## 2025-05-22 NOTE — CARE COORDINATION
Care Transitions Note    Follow Up Call     Attempted to reach patient for transitions of care follow up.  Unable to reach patient.      Outreach Attempts:# 3  HIPAA compliant voicemail left for patient.     Care Summary Note: 3rd attempt will resolve episode if no return call.     Follow Up Appointment:   Future Appointments         Provider Specialty Dept Phone    5/27/2025 10:30 AM Crys Ferrer PT Physical Therapy [phone]    5/27/2025 1:45 PM Johnie Lawrence MD Internal Medicine [phone]    6/12/2025 8:00 AM Fe King MD Internal Medicine [phone]    7/16/2025 2:10 PM Aston Gunter MD Orthopedic Surgery [phone]            No further follow-up call indicated based on severity of symptoms and risk factors. Plan for   Lianna Mann LPN

## 2025-05-22 NOTE — CARE COORDINATION
Care Transitions Note    Follow Up Call     Attempted to reach patient for transitions of care follow up.  Unable to reach patient.      Outreach Attempts: # 2  Writer attempted to speak to pt for follow up call after a few minutes she requested a return call states she is driving.     Care Summary Note: #2 will attempt outreach again later today.     Follow Up Appointment:   Future Appointments         Provider Specialty Dept Phone    5/27/2025 10:30 AM Crys Ferrer, VIVIANA Physical Therapy [phone]    5/27/2025 1:45 PM Johnie Lawrence MD Internal Medicine [phone]    6/12/2025 8:00 AM Fe King MD Internal Medicine [phone]    7/16/2025 2:10 PM Aston Gunter MD Orthopedic Surgery [phone]            No further follow-up call indicated based on severity of symptoms and risk factors. Plan for next callLianna Mann LPN

## 2025-05-27 ENCOUNTER — OFFICE VISIT (OUTPATIENT)
Dept: INTERNAL MEDICINE CLINIC | Age: 80
End: 2025-05-27
Payer: MEDICARE

## 2025-05-27 ENCOUNTER — HOSPITAL ENCOUNTER (OUTPATIENT)
Age: 80
Setting detail: SPECIMEN
Discharge: HOME | End: 2025-05-27

## 2025-05-27 VITALS
OXYGEN SATURATION: 94 % | BODY MASS INDEX: 21 KG/M2 | SYSTOLIC BLOOD PRESSURE: 100 MMHG | HEIGHT: 67 IN | DIASTOLIC BLOOD PRESSURE: 52 MMHG | WEIGHT: 133.8 LBS | HEART RATE: 89 BPM

## 2025-05-27 DIAGNOSIS — E78.2 MIXED HYPERLIPIDEMIA: ICD-10-CM

## 2025-05-27 DIAGNOSIS — Z12.31 SCREENING MAMMOGRAM FOR BREAST CANCER: ICD-10-CM

## 2025-05-27 DIAGNOSIS — D50.0 IRON DEFICIENCY ANEMIA DUE TO CHRONIC BLOOD LOSS: Primary | ICD-10-CM

## 2025-05-27 DIAGNOSIS — E11.40 TYPE 2 DIABETES MELLITUS WITH DIABETIC NEUROPATHY, WITH LONG-TERM CURRENT USE OF INSULIN (HCC): ICD-10-CM

## 2025-05-27 DIAGNOSIS — D50.0 IRON DEFICIENCY ANEMIA DUE TO CHRONIC BLOOD LOSS: ICD-10-CM

## 2025-05-27 DIAGNOSIS — J43.1 PANLOBULAR EMPHYSEMA (HCC): ICD-10-CM

## 2025-05-27 DIAGNOSIS — Z79.4 TYPE 2 DIABETES MELLITUS WITH DIABETIC NEUROPATHY, WITH LONG-TERM CURRENT USE OF INSULIN (HCC): ICD-10-CM

## 2025-05-27 LAB
BASOPHILS # BLD: 0.08 K/UL (ref 0–0.2)
BASOPHILS NFR BLD: 1 % (ref 0–2)
EOSINOPHIL # BLD: 0.24 K/UL (ref 0–0.44)
EOSINOPHILS RELATIVE PERCENT: 3 % (ref 1–4)
ERYTHROCYTE [DISTWIDTH] IN BLOOD BY AUTOMATED COUNT: 27.2 % (ref 11.8–14.4)
HCT VFR BLD AUTO: 36.5 % (ref 36.3–47.1)
HGB BLD-MCNC: 9.4 G/DL (ref 11.9–15.1)
IMM GRANULOCYTES # BLD AUTO: 0 K/UL (ref 0–0.3)
IMM GRANULOCYTES NFR BLD: 0 %
LYMPHOCYTES NFR BLD: 2.53 K/UL (ref 1.1–3.7)
LYMPHOCYTES RELATIVE PERCENT: 32 % (ref 24–43)
MCH RBC QN AUTO: 19.3 PG (ref 25.2–33.5)
MCHC RBC AUTO-ENTMCNC: 25.8 G/DL (ref 28.4–34.8)
MCV RBC AUTO: 74.8 FL (ref 82.6–102.9)
MONOCYTES NFR BLD: 0.95 K/UL (ref 0.1–1.2)
MONOCYTES NFR BLD: 12 % (ref 3–12)
MORPHOLOGY: ABNORMAL
NEUTROPHILS NFR BLD: 52 % (ref 36–65)
NEUTS SEG NFR BLD: 4.1 K/UL (ref 1.5–8.1)
NRBC BLD-RTO: 0 PER 100 WBC
PLATELET # BLD AUTO: 603 K/UL (ref 138–453)
PMV BLD AUTO: 9.8 FL (ref 8.1–13.5)
RBC # BLD AUTO: 4.88 M/UL (ref 3.95–5.11)
WBC OTHER # BLD: 7.9 K/UL (ref 3.5–11.3)

## 2025-05-27 PROCEDURE — 1159F MED LIST DOCD IN RCRD: CPT | Performed by: INTERNAL MEDICINE

## 2025-05-27 PROCEDURE — 99214 OFFICE O/P EST MOD 30 MIN: CPT | Performed by: INTERNAL MEDICINE

## 2025-05-27 PROCEDURE — 3078F DIAST BP <80 MM HG: CPT | Performed by: INTERNAL MEDICINE

## 2025-05-27 PROCEDURE — 1123F ACP DISCUSS/DSCN MKR DOCD: CPT | Performed by: INTERNAL MEDICINE

## 2025-05-27 PROCEDURE — 3074F SYST BP LT 130 MM HG: CPT | Performed by: INTERNAL MEDICINE

## 2025-05-27 PROCEDURE — 3051F HG A1C>EQUAL 7.0%<8.0%: CPT | Performed by: INTERNAL MEDICINE

## 2025-05-27 NOTE — PROGRESS NOTES
Yein Rodas provided verbal consent for the provider to use the LUNA recording tool during their visit.  
mouth daily, Disp: 90 capsule, Rfl: 1    diclofenac sodium (VOLTAREN) 1 % GEL, Apply 4 g topically 4 times daily, Disp: 100 g, Rfl: 2    Continuous Glucose Sensor (DEXCOM G7 SENSOR) MISC, 3 Devices by Does not apply route every 10 days, Disp: 9 each, Rfl: 1    Continuous Glucose  (DEXCOM G7 ) BRIDGET, 1 Device by Does not apply route continuous, Disp: 1 each, Rfl: 0    atorvastatin (LIPITOR) 40 MG tablet, TAKE 1 TABLET BY MOUTH DAILY, Disp: 100 tablet, Rfl: 2    blood glucose monitor kit and supplies, 1 kit by Other route three times daily Dispense One Touch Glucose Meter., Disp: 1 kit, Rfl: 0    blood glucose monitor strips, Test four  times a day & as needed for symptoms of irregular blood glucose. Dispense sufficient amount for indicated testing frequency plus additional to accommodate PRN testing needs., Disp: 120 strip, Rfl: 5    Calcium-Magnesium-Vitamin D (CALCIUM 1200+D3 PO), Take 2 tablets by mouth daily, Disp: , Rfl:     acetaminophen (TYLENOL) 500 MG tablet, Take 1 tablet by mouth 2 times daily, Disp: , Rfl:     aspirin 81 MG chewable tablet, Take 1 tablet by mouth daily, Disp: , Rfl:     LYUMJEV KWIKPEN 100 UNIT/ML SOPN, Inject 6 Units into the skin 3 times daily (before meals), Disp: , Rfl:     Allergies:      Patient has no known allergies.    Social History:    Tobacco:    reports that she quit smoking about 41 years ago. Her smoking use included cigarettes. She started smoking about 61 years ago. She has a 40 pack-year smoking history. She has never been exposed to tobacco smoke. She has never used smokeless tobacco.  Alcohol:      reports current alcohol use.  Drug Use:  reports no history of drug use.    Family History:    Family History   Problem Relation Age of Onset    Cancer Mother         lymphoma    Hypertension Mother     Hypertension Father     Hypertension Brother     Heart Disease Brother         pt states that brother had 5 bypass surgeries.        Review of

## 2025-05-28 ENCOUNTER — RESULTS FOLLOW-UP (OUTPATIENT)
Dept: INTERNAL MEDICINE CLINIC | Age: 80
End: 2025-05-28

## 2025-05-29 ENCOUNTER — CARE COORDINATION (OUTPATIENT)
Dept: CARE COORDINATION | Age: 80
End: 2025-05-29

## 2025-05-30 NOTE — TELEPHONE ENCOUNTER
Informed patient and patient's  of results and message from Dr. Lawrence.  Both voiced understanding

## 2025-06-04 ENCOUNTER — CARE COORDINATION (OUTPATIENT)
Dept: CARE COORDINATION | Age: 80
End: 2025-06-04

## 2025-06-04 NOTE — CARE COORDINATION
Ambulatory Care Coordination Note     6/4/2025 1:15 PM     Final patient outreach attempt by this ACM today to offer care management services. [facility] was unable to reach the patient by telephone today;   Unable to complete call  Multiple attempts by AC and CTN, introduction letter mailed previously.      Patient closed (unable to reach patient) from the High Risk Care Management program on 6/4/2025.

## 2025-06-11 ENCOUNTER — TELEPHONE (OUTPATIENT)
Dept: INTERNAL MEDICINE CLINIC | Age: 80
End: 2025-06-11

## 2025-06-11 NOTE — TELEPHONE ENCOUNTER
Patient called in because she received a message that she had an appt tomorrow at 8AM. Informed her that is correct and she states she never made this appointment and would never come in that early. Informed her that if we cancel this appointment we are scheduling into July and August. Patient confirmed she would like to cancel this appointment and only wants to schedule with Dr. Lawrence. Scheduled appointment for 8/6.

## 2025-06-20 DIAGNOSIS — Z79.4 TYPE 2 DIABETES MELLITUS WITH DIABETIC NEUROPATHY, WITH LONG-TERM CURRENT USE OF INSULIN (HCC): ICD-10-CM

## 2025-06-20 DIAGNOSIS — E11.40 TYPE 2 DIABETES MELLITUS WITH DIABETIC NEUROPATHY, WITH LONG-TERM CURRENT USE OF INSULIN (HCC): ICD-10-CM

## 2025-06-24 RX ORDER — INSULIN LISPRO-AABC 100 [IU]/ML
6 INJECTION, SOLUTION SUBCUTANEOUS
Qty: 5 ADJUSTABLE DOSE PRE-FILLED PEN SYRINGE | Refills: 5 | Status: SHIPPED | OUTPATIENT
Start: 2025-06-24

## 2025-07-01 ENCOUNTER — PATIENT MESSAGE (OUTPATIENT)
Dept: INTERNAL MEDICINE CLINIC | Age: 80
End: 2025-07-01

## 2025-07-07 NOTE — PROGRESS NOTES
[facility] Neurology   [address] [facility]    Progress Note             Date:   10/9/2023  Patient name:  Gino Londono  Date of admission:  10/6/2023  9:06 AM  MRN:   [id number]  Account:  [de-identified]  YOB: 1945  PCP:    Yumiko Mckee MD  Room:   0110119-01  Code Status:    Full Code    Chief Complaint:     No chief complaint on file. Interval hx:     Could not see the patient today as patient went to EGD and then she went to [facility]. EGD was done today and shows 10 ulcer With erosive esophagitis and hiatal hernia  Patient hemoglobin is 9.2.,  Potassium was replaced. white Blood cells within normal limits  Patient to have home care  Resume plavix tomorrow per GI    Brief History of Present Illness: The patient is a 77 y.o. Non- / non  female w PMH of T2DM with neruopathy, former smoker, HTN, HLD, who presents with aphasia since 10/5/23. Patient reportedly was not at her baseline for the past week, was confused per . He states that she made a meal on Tuesday which was \"inedible\" and was just valente and flour which is unlike herself. He also noticed she was having mood swings. On 10/5/23 morning, patient had difficulty expressing herself and right sided weakness.  believed she was having a stroke and took her to the ER where she was evaluated by telestroke. NIH SS 5 for minor facial paralysis, severe aphasia, and answering neither question correctly. CTH showed subacute L frontal cortical infarct involving the middle and inf frontal gyri and fibrocalcific plaque within the left proximal cervical ICA contributing to 40% stenosis. Patient takes aspirin 81 mg at home off and on. She was loaded with Plavix 300 mg and transferred to [facility] for further management. On arrival, NIH SS 2. The patient has severe expressive aphasia.  Speech is nonfluent, intact repetition and can name simple objects with some paraphasic Lov 6[birth year]

## 2025-07-16 ENCOUNTER — OFFICE VISIT (OUTPATIENT)
Dept: ORTHOPEDIC SURGERY | Age: 80
End: 2025-07-16
Payer: MEDICARE

## 2025-07-16 VITALS — WEIGHT: 130 LBS | BODY MASS INDEX: 20.4 KG/M2 | HEIGHT: 67 IN | RESPIRATION RATE: 18 BRPM

## 2025-07-16 DIAGNOSIS — M17.12 PRIMARY OSTEOARTHRITIS OF LEFT KNEE: Primary | ICD-10-CM

## 2025-07-16 PROCEDURE — 1125F AMNT PAIN NOTED PAIN PRSNT: CPT | Performed by: ORTHOPAEDIC SURGERY

## 2025-07-16 PROCEDURE — 1123F ACP DISCUSS/DSCN MKR DOCD: CPT | Performed by: ORTHOPAEDIC SURGERY

## 2025-07-16 PROCEDURE — 1159F MED LIST DOCD IN RCRD: CPT | Performed by: ORTHOPAEDIC SURGERY

## 2025-07-16 PROCEDURE — 99213 OFFICE O/P EST LOW 20 MIN: CPT | Performed by: ORTHOPAEDIC SURGERY

## 2025-07-16 NOTE — PROGRESS NOTES
HAND SURGERY Bilateral     thumbs reconstruction    HIP ARTHROPLASTY Bilateral     JOINT REPLACEMENT Bilateral     LUMBAR FUSION  10/27/2015    UPPER GASTROINTESTINAL ENDOSCOPY N/A 10/09/2023    EGD ESOPHAGOGASTRODUODENOSCOPY performed by Robert Ulrich MD at [facility] OR    UPPER GASTROINTESTINAL ENDOSCOPY N/A 10/18/2023    EGD BIOPSY OF DUODENUM OF ESOPHAGUS performed by Zita Strickland MD at [facility]    UPPER GASTROINTESTINAL ENDOSCOPY N/A 05/14/2025    ESOPHAGOGASTRODUODENOSCOPY BIOPSY performed by Malcolm Wilcox MD at [facility]     Family History   Problem Relation Age of Onset    Cancer Mother         lymphoma    Hypertension Mother     Hypertension Father     Hypertension Brother     Heart Disease Brother         pt states that brother had 5 bypass surgeries.    Plan  At this time patient is relatively asymptomatic and as such I just told her that we would just observe this for now do not feel she needs another injection.  Will get her scheduled to return at her request      Provider Attestation:  I, Aston Gunter, personally performed the services described in this documentation. All medical record entries made by the scribe were at my direction and in my presence. I have reviewed the chart and discharge instructions and agree that the records reflect my personal performance and is accurate and complete. Aston Gunter MD. 07/16/25      Please note that this chart was generated using voice recognition Dragon dictation software.  Although every effort was made to ensure the accuracy of this automated transcription, some errors in transcription may have occurred.

## 2025-08-01 DIAGNOSIS — E11.40 TYPE 2 DIABETES MELLITUS WITH DIABETIC NEUROPATHY, WITH LONG-TERM CURRENT USE OF INSULIN (HCC): ICD-10-CM

## 2025-08-01 DIAGNOSIS — Z79.4 TYPE 2 DIABETES MELLITUS WITH DIABETIC NEUROPATHY, WITH LONG-TERM CURRENT USE OF INSULIN (HCC): ICD-10-CM

## 2025-08-01 RX ORDER — INSULIN GLARGINE 100 [IU]/ML
INJECTION, SOLUTION SUBCUTANEOUS
Qty: 30 ML | Refills: 2 | Status: SHIPPED | OUTPATIENT
Start: 2025-08-01

## 2025-08-04 ENCOUNTER — TELEPHONE (OUTPATIENT)
Dept: NEUROLOGY | Age: 80
End: 2025-08-04

## 2025-08-06 ENCOUNTER — OFFICE VISIT (OUTPATIENT)
Dept: INTERNAL MEDICINE CLINIC | Age: 80
End: 2025-08-06
Payer: MEDICARE

## 2025-08-06 VITALS
DIASTOLIC BLOOD PRESSURE: 60 MMHG | HEART RATE: 98 BPM | OXYGEN SATURATION: 98 % | BODY MASS INDEX: 20.84 KG/M2 | HEIGHT: 67 IN | WEIGHT: 132.8 LBS | SYSTOLIC BLOOD PRESSURE: 120 MMHG

## 2025-08-06 DIAGNOSIS — F33.0 MAJOR DEPRESSIVE DISORDER, RECURRENT, MILD: ICD-10-CM

## 2025-08-06 DIAGNOSIS — D50.0 IRON DEFICIENCY ANEMIA DUE TO CHRONIC BLOOD LOSS: ICD-10-CM

## 2025-08-06 DIAGNOSIS — Z79.4 TYPE 2 DIABETES MELLITUS WITH DIABETIC NEUROPATHY, WITH LONG-TERM CURRENT USE OF INSULIN (HCC): Primary | ICD-10-CM

## 2025-08-06 DIAGNOSIS — E11.00 UNCONTROLLED TYPE 2 DM WITH HYPEROSMOLAR NONKETOTIC HYPERGLYCEMIA (HCC): ICD-10-CM

## 2025-08-06 DIAGNOSIS — E11.40 TYPE 2 DIABETES MELLITUS WITH DIABETIC NEUROPATHY, WITH LONG-TERM CURRENT USE OF INSULIN (HCC): Primary | ICD-10-CM

## 2025-08-06 DIAGNOSIS — E78.2 MIXED HYPERLIPIDEMIA: ICD-10-CM

## 2025-08-06 PROCEDURE — 3074F SYST BP LT 130 MM HG: CPT | Performed by: INTERNAL MEDICINE

## 2025-08-06 PROCEDURE — 3051F HG A1C>EQUAL 7.0%<8.0%: CPT | Performed by: INTERNAL MEDICINE

## 2025-08-06 PROCEDURE — 3078F DIAST BP <80 MM HG: CPT | Performed by: INTERNAL MEDICINE

## 2025-08-06 PROCEDURE — 99214 OFFICE O/P EST MOD 30 MIN: CPT | Performed by: INTERNAL MEDICINE

## 2025-08-06 PROCEDURE — 1123F ACP DISCUSS/DSCN MKR DOCD: CPT | Performed by: INTERNAL MEDICINE

## 2025-08-06 PROCEDURE — 1159F MED LIST DOCD IN RCRD: CPT | Performed by: INTERNAL MEDICINE

## 2025-08-06 RX ORDER — INSULIN GLARGINE 100 [IU]/ML
INJECTION, SOLUTION SUBCUTANEOUS
Qty: 30 ML | Refills: 2 | Status: SHIPPED | OUTPATIENT
Start: 2025-08-06

## 2025-08-18 ENCOUNTER — TELEPHONE (OUTPATIENT)
Dept: INTERNAL MEDICINE CLINIC | Age: 80
End: 2025-08-18

## 2025-08-18 ENCOUNTER — TELEPHONE (OUTPATIENT)
Dept: NEUROLOGY | Age: 80
End: 2025-08-18

## 2025-08-18 ENCOUNTER — HOSPITAL ENCOUNTER (OUTPATIENT)
Age: 80
Setting detail: SPECIMEN
Discharge: HOME | End: 2025-08-18

## 2025-08-18 DIAGNOSIS — D50.0 IRON DEFICIENCY ANEMIA DUE TO CHRONIC BLOOD LOSS: ICD-10-CM

## 2025-08-18 DIAGNOSIS — E11.00 UNCONTROLLED TYPE 2 DM WITH HYPEROSMOLAR NONKETOTIC HYPERGLYCEMIA (HCC): ICD-10-CM

## 2025-08-18 LAB
BASOPHILS # BLD: 0.05 K/UL (ref 0–0.2)
BASOPHILS NFR BLD: 1 % (ref 0–2)
EOSINOPHIL # BLD: 0.11 K/UL (ref 0–0.44)
EOSINOPHILS RELATIVE PERCENT: 2 % (ref 1–4)
ERYTHROCYTE [DISTWIDTH] IN BLOOD BY AUTOMATED COUNT: 20.8 % (ref 11.8–14.4)
EST. AVERAGE GLUCOSE BLD GHB EST-MCNC: 223 MG/DL
HBA1C MFR BLD: 9.4 % (ref 4–6)
HCT VFR BLD AUTO: 26.7 % (ref 36.3–47.1)
HGB BLD-MCNC: 6.9 G/DL (ref 11.9–15.1)
IMM GRANULOCYTES # BLD AUTO: 0 K/UL (ref 0–0.3)
IMM GRANULOCYTES NFR BLD: 0 %
LYMPHOCYTES NFR BLD: 1.48 K/UL (ref 1.1–3.7)
LYMPHOCYTES RELATIVE PERCENT: 28 % (ref 24–43)
MCH RBC QN AUTO: 19.4 PG (ref 25.2–33.5)
MCHC RBC AUTO-ENTMCNC: 25.8 G/DL (ref 28.4–34.8)
MCV RBC AUTO: 75.2 FL (ref 82.6–102.9)
MONOCYTES NFR BLD: 0.64 K/UL (ref 0.1–1.2)
MONOCYTES NFR BLD: 12 % (ref 3–12)
MORPHOLOGY: ABNORMAL
NEUTROPHILS NFR BLD: 57 % (ref 36–65)
NEUTS SEG NFR BLD: 3.02 K/UL (ref 1.5–8.1)
NRBC BLD-RTO: 0.4 PER 100 WBC
PLATELET # BLD AUTO: 474 K/UL (ref 138–453)
PMV BLD AUTO: 9.8 FL (ref 8.1–13.5)
RBC # BLD AUTO: 3.55 M/UL (ref 3.95–5.11)
WBC OTHER # BLD: 5.3 K/UL (ref 3.5–11.3)

## 2025-08-19 ENCOUNTER — HOSPITAL ENCOUNTER (INPATIENT)
Age: 80
LOS: 1 days | Discharge: HOME | DRG: 812 | End: 2025-08-20
Attending: EMERGENCY MEDICINE | Admitting: INTERNAL MEDICINE
Payer: MEDICARE

## 2025-08-19 DIAGNOSIS — D64.9 SYMPTOMATIC ANEMIA: Primary | ICD-10-CM

## 2025-08-19 DIAGNOSIS — D50.9 MICROCYTIC ANEMIA: ICD-10-CM

## 2025-08-19 PROBLEM — E61.1 IRON DEFICIENCY: Status: ACTIVE | Noted: 2025-08-19

## 2025-08-19 PROBLEM — Q27.30 AVM (ARTERIOVENOUS MALFORMATION): Status: ACTIVE | Noted: 2025-08-19

## 2025-08-19 LAB
ALBUMIN SERPL-MCNC: 3.8 G/DL (ref 3.5–5.2)
ALP SERPL-CCNC: 97 U/L (ref 35–104)
ALT SERPL-CCNC: 19 U/L (ref 10–35)
ANION GAP SERPL CALCULATED.3IONS-SCNC: 12 MMOL/L (ref 9–16)
AST SERPL-CCNC: 18 U/L (ref 10–35)
BACTERIA URNS QL MICRO: ABNORMAL
BASOPHILS # BLD: 0.05 K/UL (ref 0–0.2)
BASOPHILS NFR BLD: 1 % (ref 0–2)
BILIRUB SERPL-MCNC: 0.3 MG/DL (ref 0–1.2)
BILIRUB UR QL STRIP: NEGATIVE
BUN SERPL-MCNC: 16 MG/DL (ref 8–23)
CALCIUM SERPL-MCNC: 8.8 MG/DL (ref 8.6–10.4)
CHLORIDE SERPL-SCNC: 101 MMOL/L (ref 98–107)
CLARITY UR: ABNORMAL
CO2 SERPL-SCNC: 23 MMOL/L (ref 20–31)
COLOR UR: YELLOW
CREAT SERPL-MCNC: 0.7 MG/DL (ref 0.7–1.2)
EOSINOPHIL # BLD: 0.09 K/UL (ref 0–0.44)
EOSINOPHILS RELATIVE PERCENT: 2 % (ref 0–4)
EPI CELLS #/AREA URNS HPF: ABNORMAL /HPF
ERYTHROCYTE [DISTWIDTH] IN BLOOD BY AUTOMATED COUNT: 20.6 % (ref 11.5–14.9)
FERRITIN SERPL-MCNC: 7 NG/ML
FOLATE SERPL-MCNC: 26.7 NG/ML (ref 4.8–24.2)
GFR, ESTIMATED: 87 ML/MIN/1.73M2
GLUCOSE BLD-MCNC: 245 MG/DL (ref 65–105)
GLUCOSE BLD-MCNC: 259 MG/DL (ref 65–105)
GLUCOSE BLD-MCNC: 352 MG/DL (ref 65–105)
GLUCOSE BLD-MCNC: 61 MG/DL (ref 65–105)
GLUCOSE BLD-MCNC: 78 MG/DL (ref 65–105)
GLUCOSE SERPL-MCNC: 324 MG/DL (ref 74–99)
GLUCOSE UR STRIP-MCNC: ABNORMAL MG/DL
HCT VFR BLD AUTO: 27.5 % (ref 36–46)
HCT VFR BLD AUTO: 27.7 % (ref 36–46)
HGB BLD-MCNC: 7.1 G/DL (ref 12–16)
HGB BLD-MCNC: 7.2 G/DL (ref 12–16)
HGB UR QL STRIP.AUTO: NEGATIVE
IMM GRANULOCYTES # BLD AUTO: 0 K/UL (ref 0–0.3)
IMM GRANULOCYTES NFR BLD: 0 %
IMM RETICS NFR: 22.2 % (ref 2.7–18.3)
INR PPP: 0.9
IRON SATN MFR SERPL: 8 % (ref 20–55)
IRON SERPL-MCNC: 29 UG/DL (ref 37–145)
KETONES UR STRIP-MCNC: NEGATIVE MG/DL
LDH SERPL-CCNC: 185 U/L (ref 135–214)
LEUKOCYTE ESTERASE UR QL STRIP: ABNORMAL
LYMPHOCYTES NFR BLD: 1.32 K/UL (ref 1.1–3.7)
LYMPHOCYTES RELATIVE PERCENT: 28 % (ref 24–44)
MCH RBC QN AUTO: 19.2 PG (ref 26–34)
MCHC RBC AUTO-ENTMCNC: 26 G/DL (ref 31–37)
MCV RBC AUTO: 73.9 FL (ref 80–100)
MONOCYTES NFR BLD: 0.52 K/UL (ref 0.1–1.2)
MONOCYTES NFR BLD: 11 % (ref 3–12)
MORPHOLOGY: ABNORMAL
NEUTROPHILS NFR BLD: 58 % (ref 36–66)
NEUTS SEG NFR BLD: 2.72 K/UL (ref 1.5–8.1)
NITRITE UR QL STRIP: NEGATIVE
NRBC BLD-RTO: 0.4 PER 100 WBC
PH UR STRIP: 7 [PH] (ref 5–8)
PLATELET # BLD AUTO: 439 K/UL (ref 150–450)
PMV BLD AUTO: 8.9 FL (ref 8–13.5)
POTASSIUM SERPL-SCNC: 4.7 MMOL/L (ref 3.7–5.3)
PROT SERPL-MCNC: 6.4 G/DL (ref 6.6–8.7)
PROT UR STRIP-MCNC: NEGATIVE MG/DL
PROTHROMBIN TIME: 13.4 SEC (ref 11.8–14.6)
RBC # BLD AUTO: 3.75 M/UL (ref 3.95–5.11)
RBC #/AREA URNS HPF: ABNORMAL /HPF
RETIC HEMOGLOBIN: 15.3 PG (ref 28.2–35.7)
RETICS # AUTO: 0.08 M/UL (ref 0.03–0.08)
RETICS/RBC NFR AUTO: 2.2 % (ref 0.5–2.5)
SODIUM SERPL-SCNC: 136 MMOL/L (ref 136–145)
SP GR UR STRIP: 1.02 (ref 1–1.03)
TIBC SERPL-MCNC: 369 UG/DL (ref 250–450)
TRANSFERRIN SERPL-MCNC: 292 MG/DL (ref 200–360)
UNSATURATED IRON BINDING CAPACITY: 340 UG/DL (ref 112–347)
UROBILINOGEN UR STRIP-ACNC: NORMAL EU/DL (ref 0–1)
VIT B12 SERPL-MCNC: 429 PG/ML (ref 232–1245)
WBC #/AREA URNS HPF: ABNORMAL /HPF
WBC OTHER # BLD: 4.7 K/UL (ref 3.5–11)

## 2025-08-19 PROCEDURE — 86900 BLOOD TYPING SEROLOGIC ABO: CPT

## 2025-08-19 PROCEDURE — APPNB60 APP NON BILLABLE TIME 46-60 MINS: Performed by: NURSE PRACTITIONER

## 2025-08-19 PROCEDURE — 1200000000 HC SEMI PRIVATE

## 2025-08-19 PROCEDURE — 82728 ASSAY OF FERRITIN: CPT

## 2025-08-19 PROCEDURE — 6360000002 HC RX W HCPCS

## 2025-08-19 PROCEDURE — 85610 PROTHROMBIN TIME: CPT

## 2025-08-19 PROCEDURE — 85014 HEMATOCRIT: CPT

## 2025-08-19 PROCEDURE — 86920 COMPATIBILITY TEST SPIN: CPT

## 2025-08-19 PROCEDURE — 86901 BLOOD TYPING SEROLOGIC RH(D): CPT

## 2025-08-19 PROCEDURE — 2500000003 HC RX 250 WO HCPCS

## 2025-08-19 PROCEDURE — 84466 ASSAY OF TRANSFERRIN: CPT

## 2025-08-19 PROCEDURE — 83615 LACTATE (LD) (LDH) ENZYME: CPT

## 2025-08-19 PROCEDURE — 6370000000 HC RX 637 (ALT 250 FOR IP)

## 2025-08-19 PROCEDURE — 83540 ASSAY OF IRON: CPT

## 2025-08-19 PROCEDURE — 87086 URINE CULTURE/COLONY COUNT: CPT

## 2025-08-19 PROCEDURE — 82746 ASSAY OF FOLIC ACID SERUM: CPT

## 2025-08-19 PROCEDURE — 99223 1ST HOSP IP/OBS HIGH 75: CPT | Performed by: INTERNAL MEDICINE

## 2025-08-19 PROCEDURE — 82947 ASSAY GLUCOSE BLOOD QUANT: CPT

## 2025-08-19 PROCEDURE — 82607 VITAMIN B-12: CPT

## 2025-08-19 PROCEDURE — 85045 AUTOMATED RETICULOCYTE COUNT: CPT

## 2025-08-19 PROCEDURE — 83550 IRON BINDING TEST: CPT

## 2025-08-19 PROCEDURE — 87077 CULTURE AEROBIC IDENTIFY: CPT

## 2025-08-19 PROCEDURE — 99222 1ST HOSP IP/OBS MODERATE 55: CPT | Performed by: INTERNAL MEDICINE

## 2025-08-19 PROCEDURE — 86850 RBC ANTIBODY SCREEN: CPT

## 2025-08-19 PROCEDURE — 36415 COLL VENOUS BLD VENIPUNCTURE: CPT

## 2025-08-19 PROCEDURE — 99285 EMERGENCY DEPT VISIT HI MDM: CPT

## 2025-08-19 PROCEDURE — 81001 URINALYSIS AUTO W/SCOPE: CPT

## 2025-08-19 PROCEDURE — 87186 SC STD MICRODIL/AGAR DIL: CPT

## 2025-08-19 PROCEDURE — 85025 COMPLETE CBC W/AUTO DIFF WBC: CPT

## 2025-08-19 PROCEDURE — 85018 HEMOGLOBIN: CPT

## 2025-08-19 PROCEDURE — 80053 COMPREHEN METABOLIC PANEL: CPT

## 2025-08-19 RX ORDER — ONDANSETRON 4 MG/1
4 TABLET, ORALLY DISINTEGRATING ORAL EVERY 8 HOURS PRN
Status: DISCONTINUED | OUTPATIENT
Start: 2025-08-19 | End: 2025-08-20 | Stop reason: HOSPADM

## 2025-08-19 RX ORDER — POTASSIUM CHLORIDE 7.45 MG/ML
10 INJECTION INTRAVENOUS PRN
Status: DISCONTINUED | OUTPATIENT
Start: 2025-08-19 | End: 2025-08-20 | Stop reason: HOSPADM

## 2025-08-19 RX ORDER — DULOXETIN HYDROCHLORIDE 60 MG/1
60 CAPSULE, DELAYED RELEASE ORAL DAILY
Status: DISCONTINUED | OUTPATIENT
Start: 2025-08-19 | End: 2025-08-20 | Stop reason: HOSPADM

## 2025-08-19 RX ORDER — INSULIN GLARGINE 100 [IU]/ML
30 INJECTION, SOLUTION SUBCUTANEOUS DAILY
Status: DISCONTINUED | OUTPATIENT
Start: 2025-08-19 | End: 2025-08-20 | Stop reason: HOSPADM

## 2025-08-19 RX ORDER — PANTOPRAZOLE SODIUM 40 MG/1
40 TABLET, DELAYED RELEASE ORAL
Status: DISCONTINUED | OUTPATIENT
Start: 2025-08-20 | End: 2025-08-20 | Stop reason: HOSPADM

## 2025-08-19 RX ORDER — ACETAMINOPHEN 500 MG
500 TABLET ORAL 2 TIMES DAILY
Status: DISCONTINUED | OUTPATIENT
Start: 2025-08-19 | End: 2025-08-20 | Stop reason: HOSPADM

## 2025-08-19 RX ORDER — SODIUM CHLORIDE 0.9 % (FLUSH) 0.9 %
5-40 SYRINGE (ML) INJECTION PRN
Status: DISCONTINUED | OUTPATIENT
Start: 2025-08-19 | End: 2025-08-20 | Stop reason: HOSPADM

## 2025-08-19 RX ORDER — ACETAMINOPHEN 650 MG/1
650 SUPPOSITORY RECTAL EVERY 6 HOURS PRN
Status: DISCONTINUED | OUTPATIENT
Start: 2025-08-19 | End: 2025-08-20 | Stop reason: HOSPADM

## 2025-08-19 RX ORDER — POLYETHYLENE GLYCOL 3350 17 G/17G
17 POWDER, FOR SOLUTION ORAL DAILY PRN
Status: DISCONTINUED | OUTPATIENT
Start: 2025-08-19 | End: 2025-08-20 | Stop reason: HOSPADM

## 2025-08-19 RX ORDER — MAGNESIUM SULFATE HEPTAHYDRATE 40 MG/ML
2000 INJECTION, SOLUTION INTRAVENOUS PRN
Status: DISCONTINUED | OUTPATIENT
Start: 2025-08-19 | End: 2025-08-20 | Stop reason: HOSPADM

## 2025-08-19 RX ORDER — POTASSIUM CHLORIDE 1500 MG/1
40 TABLET, EXTENDED RELEASE ORAL PRN
Status: DISCONTINUED | OUTPATIENT
Start: 2025-08-19 | End: 2025-08-20 | Stop reason: HOSPADM

## 2025-08-19 RX ORDER — ONDANSETRON 2 MG/ML
4 INJECTION INTRAMUSCULAR; INTRAVENOUS EVERY 6 HOURS PRN
Status: DISCONTINUED | OUTPATIENT
Start: 2025-08-19 | End: 2025-08-20 | Stop reason: HOSPADM

## 2025-08-19 RX ORDER — INSULIN LISPRO 100 [IU]/ML
0-8 INJECTION, SOLUTION INTRAVENOUS; SUBCUTANEOUS
Status: DISCONTINUED | OUTPATIENT
Start: 2025-08-19 | End: 2025-08-20 | Stop reason: HOSPADM

## 2025-08-19 RX ORDER — ATORVASTATIN CALCIUM 40 MG/1
40 TABLET, FILM COATED ORAL DAILY
Status: DISCONTINUED | OUTPATIENT
Start: 2025-08-19 | End: 2025-08-20 | Stop reason: HOSPADM

## 2025-08-19 RX ORDER — SODIUM CHLORIDE 9 MG/ML
INJECTION, SOLUTION INTRAVENOUS PRN
Status: DISCONTINUED | OUTPATIENT
Start: 2025-08-19 | End: 2025-08-20 | Stop reason: HOSPADM

## 2025-08-19 RX ORDER — SODIUM CHLORIDE 0.9 % (FLUSH) 0.9 %
5-40 SYRINGE (ML) INJECTION EVERY 12 HOURS SCHEDULED
Status: DISCONTINUED | OUTPATIENT
Start: 2025-08-19 | End: 2025-08-20 | Stop reason: HOSPADM

## 2025-08-19 RX ORDER — ACETAMINOPHEN 325 MG/1
650 TABLET ORAL EVERY 6 HOURS PRN
Status: DISCONTINUED | OUTPATIENT
Start: 2025-08-19 | End: 2025-08-20 | Stop reason: HOSPADM

## 2025-08-19 RX ADMIN — INSULIN LISPRO 4 UNITS: 100 INJECTION, SOLUTION INTRAVENOUS; SUBCUTANEOUS at 12:59

## 2025-08-19 RX ADMIN — INSULIN LISPRO 2 UNITS: 100 INJECTION, SOLUTION INTRAVENOUS; SUBCUTANEOUS at 16:41

## 2025-08-19 RX ADMIN — INSULIN LISPRO 8 UNITS: 100 INJECTION, SOLUTION INTRAVENOUS; SUBCUTANEOUS at 20:49

## 2025-08-19 RX ADMIN — IRON SUCROSE 200 MG: 20 INJECTION, SOLUTION INTRAVENOUS at 14:06

## 2025-08-19 RX ADMIN — ACETAMINOPHEN 650 MG: 325 TABLET ORAL at 12:57

## 2025-08-19 RX ADMIN — ATORVASTATIN CALCIUM 40 MG: 40 TABLET, FILM COATED ORAL at 12:57

## 2025-08-19 RX ADMIN — WATER 1000 MG: 1 INJECTION INTRAMUSCULAR; INTRAVENOUS; SUBCUTANEOUS at 16:39

## 2025-08-19 RX ADMIN — SODIUM CHLORIDE, PRESERVATIVE FREE 10 ML: 5 INJECTION INTRAVENOUS at 20:50

## 2025-08-19 RX ADMIN — ACETAMINOPHEN 500 MG: 500 TABLET ORAL at 20:49

## 2025-08-19 ASSESSMENT — ENCOUNTER SYMPTOMS
ABDOMINAL PAIN: 0
NAUSEA: 0
SHORTNESS OF BREATH: 1
ANAL BLEEDING: 0
CHEST TIGHTNESS: 0
VOMITING: 0
BLOOD IN STOOL: 0
DIARRHEA: 0

## 2025-08-19 ASSESSMENT — PAIN - FUNCTIONAL ASSESSMENT
PAIN_FUNCTIONAL_ASSESSMENT: 0-10

## 2025-08-19 ASSESSMENT — PAIN SCALES - GENERAL
PAINLEVEL_OUTOF10: 0

## 2025-08-19 ASSESSMENT — LIFESTYLE VARIABLES
HOW MANY STANDARD DRINKS CONTAINING ALCOHOL DO YOU HAVE ON A TYPICAL DAY: PATIENT DOES NOT DRINK
HOW OFTEN DO YOU HAVE A DRINK CONTAINING ALCOHOL: NEVER

## 2025-08-20 VITALS
DIASTOLIC BLOOD PRESSURE: 65 MMHG | WEIGHT: 130 LBS | SYSTOLIC BLOOD PRESSURE: 117 MMHG | HEART RATE: 86 BPM | OXYGEN SATURATION: 100 % | HEIGHT: 67 IN | RESPIRATION RATE: 16 BRPM | TEMPERATURE: 97.5 F | BODY MASS INDEX: 20.4 KG/M2

## 2025-08-20 LAB
ANION GAP SERPL CALCULATED.3IONS-SCNC: 13 MMOL/L (ref 9–16)
BASOPHILS # BLD: 0.07 K/UL (ref 0–0.2)
BASOPHILS NFR BLD: 1 % (ref 0–2)
BUN SERPL-MCNC: 15 MG/DL (ref 8–23)
CALCIUM SERPL-MCNC: 8.9 MG/DL (ref 8.6–10.4)
CHLORIDE SERPL-SCNC: 106 MMOL/L (ref 98–107)
CO2 SERPL-SCNC: 21 MMOL/L (ref 20–31)
CREAT SERPL-MCNC: 0.6 MG/DL (ref 0.7–1.2)
DATE, STOOL #1: NORMAL
EOSINOPHIL # BLD: 0.14 K/UL (ref 0–0.4)
EOSINOPHILS RELATIVE PERCENT: 2 % (ref 0–4)
ERYTHROCYTE [DISTWIDTH] IN BLOOD BY AUTOMATED COUNT: 21 % (ref 11.5–14.9)
GFR, ESTIMATED: >90 ML/MIN/1.73M2
GLUCOSE BLD-MCNC: 222 MG/DL (ref 65–105)
GLUCOSE BLD-MCNC: 228 MG/DL (ref 65–105)
GLUCOSE BLD-MCNC: 64 MG/DL (ref 65–105)
GLUCOSE SERPL-MCNC: 61 MG/DL (ref 74–99)
HCT VFR BLD AUTO: 25.7 % (ref 36–46)
HCT VFR BLD AUTO: 31.1 % (ref 36–46)
HCT VFR BLD AUTO: 31.7 % (ref 36–46)
HEMOCCULT SP1 STL QL: NEGATIVE
HGB BLD-MCNC: 6.9 G/DL (ref 12–16)
HGB BLD-MCNC: 8.6 G/DL (ref 12–16)
HGB BLD-MCNC: 8.7 G/DL (ref 12–16)
IMM GRANULOCYTES # BLD AUTO: 0 K/UL (ref 0–0.3)
IMM GRANULOCYTES NFR BLD: 0 %
LYMPHOCYTES NFR BLD: 1.87 K/UL (ref 1–4.8)
LYMPHOCYTES RELATIVE PERCENT: 26 % (ref 24–44)
MCH RBC QN AUTO: 19.7 PG (ref 26–34)
MCHC RBC AUTO-ENTMCNC: 26.8 G/DL (ref 31–37)
MCV RBC AUTO: 73.2 FL (ref 80–100)
MONOCYTES NFR BLD: 0.65 K/UL (ref 0.1–1.3)
MONOCYTES NFR BLD: 9 % (ref 1–7)
MORPHOLOGY: ABNORMAL
NEUTROPHILS NFR BLD: 62 % (ref 36–66)
NEUTS SEG NFR BLD: 4.47 K/UL (ref 1.3–9.1)
NRBC BLD-RTO: 0.6 PER 100 WBC
PLATELET # BLD AUTO: 337 K/UL (ref 150–450)
PMV BLD AUTO: 9.9 FL (ref 8–13.5)
POTASSIUM SERPL-SCNC: 4.4 MMOL/L (ref 3.7–5.3)
RBC # BLD AUTO: 3.51 M/UL (ref 3.95–5.11)
SODIUM SERPL-SCNC: 140 MMOL/L (ref 136–145)
TIME, STOOL #1: 1550
WBC OTHER # BLD: 7.2 K/UL (ref 3.5–11)

## 2025-08-20 PROCEDURE — 36415 COLL VENOUS BLD VENIPUNCTURE: CPT

## 2025-08-20 PROCEDURE — 85018 HEMOGLOBIN: CPT

## 2025-08-20 PROCEDURE — 6370000000 HC RX 637 (ALT 250 FOR IP)

## 2025-08-20 PROCEDURE — 80048 BASIC METABOLIC PNL TOTAL CA: CPT

## 2025-08-20 PROCEDURE — 36430 TRANSFUSION BLD/BLD COMPNT: CPT

## 2025-08-20 PROCEDURE — APPSS30 APP SPLIT SHARED TIME 16-30 MINUTES: Performed by: NURSE PRACTITIONER

## 2025-08-20 PROCEDURE — 85014 HEMATOCRIT: CPT

## 2025-08-20 PROCEDURE — 85025 COMPLETE CBC W/AUTO DIFF WBC: CPT

## 2025-08-20 PROCEDURE — 99232 SBSQ HOSP IP/OBS MODERATE 35: CPT | Performed by: INTERNAL MEDICINE

## 2025-08-20 PROCEDURE — 6360000002 HC RX W HCPCS: Performed by: INTERNAL MEDICINE

## 2025-08-20 PROCEDURE — 82947 ASSAY GLUCOSE BLOOD QUANT: CPT

## 2025-08-20 PROCEDURE — 82272 OCCULT BLD FECES 1-3 TESTS: CPT

## 2025-08-20 PROCEDURE — 30233N1 TRANSFUSION OF NONAUTOLOGOUS RED BLOOD CELLS INTO PERIPHERAL VEIN, PERCUTANEOUS APPROACH: ICD-10-PCS | Performed by: INTERNAL MEDICINE

## 2025-08-20 PROCEDURE — 99239 HOSP IP/OBS DSCHRG MGMT >30: CPT | Performed by: INTERNAL MEDICINE

## 2025-08-20 PROCEDURE — P9016 RBC LEUKOCYTES REDUCED: HCPCS

## 2025-08-20 RX ORDER — SODIUM CHLORIDE 9 MG/ML
INJECTION, SOLUTION INTRAVENOUS PRN
Status: DISCONTINUED | OUTPATIENT
Start: 2025-08-20 | End: 2025-08-20 | Stop reason: HOSPADM

## 2025-08-20 RX ORDER — CEPHALEXIN 500 MG/1
500 CAPSULE ORAL 2 TIMES DAILY
Qty: 6 CAPSULE | Refills: 0 | Status: SHIPPED | OUTPATIENT
Start: 2025-08-20 | End: 2025-08-23

## 2025-08-20 RX ADMIN — ACETAMINOPHEN 500 MG: 500 TABLET ORAL at 09:38

## 2025-08-20 RX ADMIN — INSULIN GLARGINE 30 UNITS: 100 INJECTION, SOLUTION SUBCUTANEOUS at 09:39

## 2025-08-20 RX ADMIN — PANTOPRAZOLE SODIUM 40 MG: 40 TABLET, DELAYED RELEASE ORAL at 06:14

## 2025-08-20 RX ADMIN — DULOXETINE 60 MG: 60 CAPSULE, DELAYED RELEASE ORAL at 09:38

## 2025-08-20 RX ADMIN — INSULIN LISPRO 2 UNITS: 100 INJECTION, SOLUTION INTRAVENOUS; SUBCUTANEOUS at 11:53

## 2025-08-20 RX ADMIN — ATORVASTATIN CALCIUM 40 MG: 40 TABLET, FILM COATED ORAL at 09:38

## 2025-08-20 RX ADMIN — IRON SUCROSE 200 MG: 20 INJECTION, SOLUTION INTRAVENOUS at 00:56

## 2025-08-20 ASSESSMENT — PAIN DESCRIPTION - LOCATION: LOCATION: GENERALIZED

## 2025-08-20 ASSESSMENT — PAIN - FUNCTIONAL ASSESSMENT: PAIN_FUNCTIONAL_ASSESSMENT: 0-10

## 2025-08-20 ASSESSMENT — PAIN SCALES - GENERAL: PAINLEVEL_OUTOF10: 5

## 2025-08-20 ASSESSMENT — PAIN DESCRIPTION - DESCRIPTORS: DESCRIPTORS: ACHING;SORE

## 2025-08-21 ENCOUNTER — CARE COORDINATION (OUTPATIENT)
Dept: CARE COORDINATION | Age: 80
End: 2025-08-21

## 2025-08-21 LAB
ABO/RH: NORMAL
ANTIBODY SCREEN: NEGATIVE
ARM BAND NUMBER: NORMAL
BLOOD BANK BLOOD PRODUCT EXPIRATION DATE: NORMAL
BLOOD BANK DISPENSE STATUS: NORMAL
BLOOD BANK ISBT PRODUCT BLOOD TYPE: 6200
BLOOD BANK PRODUCT CODE: NORMAL
BLOOD BANK SAMPLE EXPIRATION: NORMAL
BLOOD BANK UNIT TYPE AND RH: NORMAL
BPU ID: NORMAL
COMPONENT: NORMAL
CROSSMATCH RESULT: NORMAL
MICROORGANISM SPEC CULT: ABNORMAL
SPECIMEN DESCRIPTION: ABNORMAL
TRANSFUSION STATUS: NORMAL
UNIT DIVISION: 0
UNIT ISSUE DATE/TIME: NORMAL

## 2025-08-22 ENCOUNTER — CARE COORDINATION (OUTPATIENT)
Dept: CASE MANAGEMENT | Age: 80
End: 2025-08-22

## 2025-08-22 DIAGNOSIS — F33.1 MAJOR DEPRESSIVE DISORDER, RECURRENT, MODERATE (HCC): ICD-10-CM

## 2025-08-22 RX ORDER — DULOXETIN HYDROCHLORIDE 60 MG/1
60 CAPSULE, DELAYED RELEASE ORAL DAILY
Qty: 90 CAPSULE | Refills: 3 | Status: SHIPPED | OUTPATIENT
Start: 2025-08-22

## (undated) DEVICE — AIRWAY LARYN MASK CHILD SZ 4 30ML CUF FOR 50-70KG PT STD

## (undated) DEVICE — Device: Brand: DUAL LUMEN CATHETER 7F

## (undated) DEVICE — SINGLE-USE BIOPSY FORCEPS: Brand: RADIAL JAW 4

## (undated) DEVICE — MEDI-VAC NON-CONDUCTIVE SUCTION TUBING: Brand: CARDINAL HEALTH

## (undated) DEVICE — GLOVE SURG SZ 8 L12IN FNGR THK79MIL GRN LTX FREE

## (undated) DEVICE — ST CHARLES CYSTO PACK: Brand: MEDLINE INDUSTRIES, INC.

## (undated) DEVICE — FIAPC® PROBE W/ FILTER 2200 C OD 2.3MM/6.9FR; L 2.2M/7.2FT: Brand: ERBE

## (undated) DEVICE — ENDOSCOPIC KIT 1.1+ 10 FT OP4 CA DE

## (undated) DEVICE — GAUZE,SPONGE,4"X4",16PLY,STRL,LF,10/TRAY: Brand: MEDLINE

## (undated) DEVICE — NO USE 18 MONTHS GOWN STD LG  1153D

## (undated) DEVICE — GOWN,POLY REINFORCED,LG: Brand: MEDLINE

## (undated) DEVICE — Device: Brand: INFUSION PUMP TUBE SET

## (undated) DEVICE — BITEBLOCK 54FR W/ DENT RIM BLOX

## (undated) DEVICE — MEDI-VAC YANKAUER SUCTION HANDLE W/BULBOUS TIP: Brand: CARDINAL HEALTH

## (undated) DEVICE — DISCONTINUED NO SUB IDED CATH URO-DYNMC MALE FEMALE LUER ADAPT

## (undated) DEVICE — 1200CC GUARDIAN II: Brand: GUARDIAN

## (undated) DEVICE — SOLUTION IV IRRIG WATER 1000ML POUR BRL 2F7114

## (undated) DEVICE — Device: Brand: PRESSURE TRANSMISSION TUBING

## (undated) DEVICE — FORCEPS BX L240CM JAW DIA2.8MM L CAP W/ NDL MIC MESH TOOTH

## (undated) DEVICE — GLOVE ORANGE PI 8 1/2   MSG9085

## (undated) DEVICE — DUP USE 240185 SOLUTION IV IRRIG WATER 1000ML IRRIG BAG 2B7114

## (undated) DEVICE — ENDO KIT W/SYRINGE: Brand: MEDLINE INDUSTRIES, INC.

## (undated) DEVICE — DEFENDO AIR WATER SUCTION AND BIOPSY VALVE KIT FOR  OLYMPUS: Brand: DEFENDO AIR/WATER/SUCTION AND BIOPSY VALVE

## (undated) DEVICE — ERBE NESSY® OMEGA PLATE USA (85+23)CM² , WITH CABLE 3 M: Brand: ERBE